# Patient Record
Sex: MALE | Race: BLACK OR AFRICAN AMERICAN | NOT HISPANIC OR LATINO | Employment: UNEMPLOYED | URBAN - METROPOLITAN AREA
[De-identification: names, ages, dates, MRNs, and addresses within clinical notes are randomized per-mention and may not be internally consistent; named-entity substitution may affect disease eponyms.]

---

## 2023-06-25 ENCOUNTER — HOSPITAL ENCOUNTER (EMERGENCY)
Facility: HOSPITAL | Age: 28
Discharge: HOME | End: 2023-06-25
Attending: EMERGENCY MEDICINE

## 2023-06-25 VITALS
TEMPERATURE: 99 F | WEIGHT: 179 LBS | OXYGEN SATURATION: 100 % | RESPIRATION RATE: 20 BRPM | BODY MASS INDEX: 23.72 KG/M2 | SYSTOLIC BLOOD PRESSURE: 137 MMHG | DIASTOLIC BLOOD PRESSURE: 86 MMHG | HEIGHT: 73 IN | HEART RATE: 82 BPM

## 2023-06-25 DIAGNOSIS — S99.912A LEFT ANKLE INJURY: ICD-10-CM

## 2023-06-25 PROCEDURE — 96372 THER/PROPH/DIAG INJ SC/IM: CPT | Performed by: EMERGENCY MEDICINE

## 2023-06-25 PROCEDURE — 63600175 PHARM REV CODE 636 W HCPCS: Mod: ER | Performed by: EMERGENCY MEDICINE

## 2023-06-25 PROCEDURE — 99284 EMERGENCY DEPT VISIT MOD MDM: CPT | Mod: ER

## 2023-06-25 RX ORDER — DICLOFENAC SODIUM 75 MG/1
75 TABLET, DELAYED RELEASE ORAL 2 TIMES DAILY
Qty: 60 TABLET | Refills: 0 | Status: SHIPPED | OUTPATIENT
Start: 2023-06-25

## 2023-06-25 RX ORDER — KETOROLAC TROMETHAMINE 30 MG/ML
15 INJECTION, SOLUTION INTRAMUSCULAR; INTRAVENOUS
Status: COMPLETED | OUTPATIENT
Start: 2023-06-25 | End: 2023-06-25

## 2023-06-25 RX ADMIN — KETOROLAC TROMETHAMINE 15 MG: 30 INJECTION, SOLUTION INTRAMUSCULAR; INTRAVENOUS at 08:06

## 2023-06-26 NOTE — ED PROVIDER NOTES
Encounter Date: 6/25/2023       History     Chief Complaint   Patient presents with    Leg Injury     Patient reports he was playing basketball and experienced a sudden pain and limited rom to the left foot and ankle.      HPI  28 y.o.    Co L posterior ankle pain after playing basketball tonight    Able to walk  Worse with movement  Mod  Nr    Review of patient's allergies indicates:  No Known Allergies  No past medical history on file.  No past surgical history on file.  No family history on file.     Review of Systems  All systems were reviewed/examined and were negative except as noted in the HPI.    Physical Exam     Initial Vitals [06/25/23 2015]   BP Pulse Resp Temp SpO2   137/86 82 20 98.6 °F (37 °C) 100 %      MAP       --         Physical Exam    General: the patient is awake, alert, and in no apparent distress.  Head: normocephalic and atraumatic, sclera are clear  Neck: supple without meningismus  Chest: no respiratory distress  Heart: regular rate and rhythm  Extremities: warm and well perfused    L posterior ankle tenderness, foot nt and nvi    Fibular head nt    Achilles intact  Skin: warm and dry  Psych conversant  Neuro: awake, alert, moving all extremities    ED Course   Procedures  Labs Reviewed - No data to display       Imaging Results              X-Ray Ankle Complete Left (Final result)  Result time 06/25/23 20:40:01      Final result by Daniele Porter MD (06/25/23 20:40:01)                   Impression:      No acute abnormality.      Electronically signed by: Daniele Porter  Date:    06/25/2023  Time:    20:40               Narrative:    EXAMINATION:  XR ANKLE COMPLETE 3 VIEW LEFT    CLINICAL HISTORY:  XR ANKLE COMPLETE 3 VIEW LEFTUnspecified injury of left ankle, initial encounter    COMPARISON:  None    FINDINGS:  Multiple radiographic views  were obtained.    No evidence of acute fracture or dislocation.  Bony mineralization is normal.  Soft tissues are unremarkable.                                        Medications   ketorolac injection 15 mg (15 mg Intramuscular Given 6/25/23 2038)         Medical Decision Making:    This is an emergent evaluation of a patient presenting to the ED.  Nursing notes were reviewed.  Imaging reviewed by me (ANI pardo), personally and independently, and prelims if available.  No acute/emergent pathologic findings noted on radiologic studies ordered.    I reviewed radiology images personally along with interpretations.    I decided to obtain and review old medical records, which showed: few prior visits    Evaluation for Emergency Medical Condition  The patient received a medical screening exam and within a reasonable degree of clinical confidence an emergency medical condition has not been identified.  The patient is instructed on proper follow up and return precautions to the ED.      Henri Haque MD, YG                       Clinical Impression:   Final diagnoses:  [S99.912A] Left ankle injury        ED Disposition Condition    Discharge Stable          ED Prescriptions       Medication Sig Dispense Start Date End Date Auth. Provider    diclofenac (VOLTAREN) 75 MG EC tablet Take 1 tablet (75 mg total) by mouth 2 (two) times daily. 60 tablet 6/25/2023 -- Ten Haque MD          Follow-up Information       Follow up With Specialties Details Why Contact Info    [facility] Schedule an appointment as soon as possible for a visit   [address], [address]  [facility] [address] [phone]          Discharged to home in stable condition, return to ED warnings given, follow up and patient care instructions given.      Henri Haque MD, YG, [facility]  Department of Emergency Medicine       Ten Haque MD  06/28/23 0900

## 2023-07-03 ENCOUNTER — OFFICE VISIT (OUTPATIENT)
Dept: PODIATRY | Facility: CLINIC | Age: 28
End: 2023-07-03
Payer: MEDICAID

## 2023-07-03 VITALS — BODY MASS INDEX: 22.93 KG/M2 | WEIGHT: 173 LBS | HEIGHT: 73 IN

## 2023-07-03 DIAGNOSIS — S99.912A INJURY OF LEFT ANKLE, INITIAL ENCOUNTER: ICD-10-CM

## 2023-07-03 DIAGNOSIS — S86.012A RUPTURE OF LEFT ACHILLES TENDON, INITIAL ENCOUNTER: Primary | ICD-10-CM

## 2023-07-03 PROCEDURE — 99999 PR PBB SHADOW E&M-EST. PATIENT-LVL III: CPT | Mod: PBBFAC,,, | Performed by: PODIATRIST

## 2023-07-03 PROCEDURE — 99203 OFFICE O/P NEW LOW 30 MIN: CPT | Mod: S$PBB,,, | Performed by: PODIATRIST

## 2023-07-03 PROCEDURE — 99213 OFFICE O/P EST LOW 20 MIN: CPT | Mod: PBBFAC,PN | Performed by: PODIATRIST

## 2023-07-03 PROCEDURE — 99203 PR OFFICE/OUTPT VISIT, NEW, LEVL III, 30-44 MIN: ICD-10-PCS | Mod: S$PBB,,, | Performed by: PODIATRIST

## 2023-07-03 PROCEDURE — 99999 PR PBB SHADOW E&M-EST. PATIENT-LVL III: ICD-10-PCS | Mod: PBBFAC,,, | Performed by: PODIATRIST

## 2023-07-03 NOTE — PROGRESS NOTES
"Subjective:      Patient ID: Alonzo Nascimento Jr. is a 28 y.o. male.    Chief Complaint: Foot Pain (Achilles tendon left foot )      28 y.o. male presenting with left ankle pain.  Patient points posterior aspect of the ankle area for pain.  Describes pain as aching.  Mild to moderate pain.  Was playing basketball and noticed pain over Achilles tendon.  Was seen in the emergency room.  X-ray noted no acute osseous injury.  Has been walking since injury.      Level of ambulation/Occupation:   Smoking status:   Berta-D Def:   DM:  Other:     Review of Systems   Musculoskeletal:         Left foot pain           No past medical history on file.    No past surgical history on file.    No family history on file.    Social History     Socioeconomic History    Marital status: Single   Tobacco Use    Smoking status: Never    Smokeless tobacco: Never       Current Outpatient Medications   Medication Sig Dispense Refill    diclofenac (VOLTAREN) 75 MG EC tablet Take 1 tablet (75 mg total) by mouth 2 (two) times daily. 60 tablet 0     No current facility-administered medications for this visit.       Review of patient's allergies indicates:  No Known Allergies    Vitals:    07/03/23 1348   Weight: 78.5 kg (173 lb)   Height: 6' 1" (1.854 m)   PainSc:   5   PainLoc: Foot       Objective:      Physical Exam  Constitutional:       General: He is not in acute distress.     Appearance: He is well-developed.   HENT:      Nose: Nose normal.   Eyes:      Conjunctiva/sclera: Conjunctivae normal.   Pulmonary:      Effort: Pulmonary effort is normal.   Chest:      Chest wall: No tenderness.   Abdominal:      Tenderness: There is no abdominal tenderness.   Musculoskeletal:      Cervical back: Normal range of motion.   Neurological:      Mental Status: He is alert and oriented to person, place, and time.   Psychiatric:         Behavior: Behavior normal.       Vascular: Distal DP/PT pulses palpable 2/4. CRT < 3 sec to tips of toes. No " vericosities noted to LEs. Hair growth present LE, warm to touch LE  Left foot:  Mild edema noted to ankle.     Dermatologic: No open lesions, lacerations or wounds. Interdigital spaces clean, dry and intact. No erythema, rubor, calor noted LE    Musculoskeletal:   Left foot:  Pain along the Achilles tendon distally, positive Gracia test, strike defect noted distal aspect of the Achilles tendon.     Neurological: Light touch, proprioception, and sharp/dull sensation are all intact. Protective threshold with the [place]-Wienstein monofilament is intact. Vibratory sensation intact.         Assessment:       Encounter Diagnoses   Name Primary?    Rupture of left Achilles tendon, initial encounter Yes    Left ankle injury          Plan:       Alonzo was seen today for foot pain.    Diagnoses and all orders for this visit:    Rupture of left Achilles tendon, initial encounter  -     MRI Ankle Without Contrast Left; Future    Left ankle injury  -     Ambulatory referral/consult to Podiatry      I counseled the patient on his conditions, their implications and medical management.    28 y.o. male with left Achilles tendon tear.    -left ankle x-ray reviewed with the patient no acute osseous abnormalities.  -positive Gracia test.  Most likely Achilles tendon rupture.  I will get an MRI to confirm. NWB in long CAM. Long CAM dispensed.     -I reviewed imaging, clinical history, and diagnosis as above with the patient. I attempted to use layman's terms to educate the patient as well as utilize foot models and/or pictures. I personally went through imaging with the patient.    -The nature of the condition, options for management, as well as potential risks and complications were discussed in detail with patient. Patient was amenable to my recommendations and left my office fully informed and will follow up as instructed or sooner if necessary.    -f/u after MRI     Note dictated with voice recognition software, please excuse  any grammatical errors.

## 2023-07-12 ENCOUNTER — HOSPITAL ENCOUNTER (OUTPATIENT)
Dept: RADIOLOGY | Facility: HOSPITAL | Age: 28
Discharge: HOME | End: 2023-07-12
Attending: PODIATRIST
Payer: MEDICAID

## 2023-07-12 DIAGNOSIS — S86.012A RUPTURE OF LEFT ACHILLES TENDON, INITIAL ENCOUNTER: Primary | ICD-10-CM

## 2023-07-12 DIAGNOSIS — S86.012A RUPTURE OF LEFT ACHILLES TENDON, INITIAL ENCOUNTER: ICD-10-CM

## 2023-07-12 PROCEDURE — 73721 MRI JNT OF LWR EXTRE W/O DYE: CPT | Mod: 26,LT,, | Performed by: RADIOLOGY

## 2023-07-12 PROCEDURE — 73721 MRI ANKLE WITHOUT CONTRAST LEFT: ICD-10-PCS | Mod: 26,LT,, | Performed by: RADIOLOGY

## 2023-07-12 PROCEDURE — 73721 MRI JNT OF LWR EXTRE W/O DYE: CPT | Mod: TC,PO,LT

## 2023-07-12 RX ORDER — SODIUM CHLORIDE 0.9 % (FLUSH) 0.9 %
10 SYRINGE (ML) INJECTION
Status: CANCELLED | OUTPATIENT
Start: 2023-07-12

## 2023-07-12 RX ORDER — CEFAZOLIN SODIUM 2 G/50ML
2 SOLUTION INTRAVENOUS
Status: CANCELLED | OUTPATIENT
Start: 2023-07-12

## 2023-07-12 RX ORDER — SODIUM CHLORIDE 9 MG/ML
INJECTION, SOLUTION INTRAVENOUS CONTINUOUS
Status: CANCELLED | OUTPATIENT
Start: 2023-07-12

## 2023-08-08 ENCOUNTER — OFFICE VISIT (OUTPATIENT)
Dept: PODIATRY | Facility: CLINIC | Age: 28
End: 2023-08-08
Payer: MEDICAID

## 2023-08-08 VITALS — HEIGHT: 73 IN | BODY MASS INDEX: 22.8 KG/M2 | WEIGHT: 172 LBS

## 2023-08-08 DIAGNOSIS — M25.60 DECREASED RANGE OF MOTION: ICD-10-CM

## 2023-08-08 DIAGNOSIS — Z98.890 STATUS POST ACHILLES TENDON REPAIR: Primary | ICD-10-CM

## 2023-08-08 DIAGNOSIS — Z09 FOLLOW-UP EXAMINATION FOLLOWING SURGERY: ICD-10-CM

## 2023-08-08 PROCEDURE — 3008F PR BODY MASS INDEX (BMI) DOCUMENTED: ICD-10-PCS | Mod: CPTII,,, | Performed by: PODIATRIST

## 2023-08-08 PROCEDURE — 99024 PR POST-OP FOLLOW-UP VISIT: ICD-10-PCS | Mod: ,,, | Performed by: PODIATRIST

## 2023-08-08 PROCEDURE — 99213 OFFICE O/P EST LOW 20 MIN: CPT | Mod: PBBFAC,PN | Performed by: PODIATRIST

## 2023-08-08 PROCEDURE — 1160F RVW MEDS BY RX/DR IN RCRD: CPT | Mod: CPTII,,, | Performed by: PODIATRIST

## 2023-08-08 PROCEDURE — 3008F BODY MASS INDEX DOCD: CPT | Mod: CPTII,,, | Performed by: PODIATRIST

## 2023-08-08 PROCEDURE — 1160F PR REVIEW ALL MEDS BY PRESCRIBER/CLIN PHARMACIST DOCUMENTED: ICD-10-PCS | Mod: CPTII,,, | Performed by: PODIATRIST

## 2023-08-08 PROCEDURE — 99024 POSTOP FOLLOW-UP VISIT: CPT | Mod: ,,, | Performed by: PODIATRIST

## 2023-08-08 PROCEDURE — 99999 PR PBB SHADOW E&M-EST. PATIENT-LVL III: CPT | Mod: PBBFAC,,, | Performed by: PODIATRIST

## 2023-08-08 PROCEDURE — 99999 PR PBB SHADOW E&M-EST. PATIENT-LVL III: ICD-10-PCS | Mod: PBBFAC,,, | Performed by: PODIATRIST

## 2023-08-08 PROCEDURE — 1159F MED LIST DOCD IN RCRD: CPT | Mod: CPTII,,, | Performed by: PODIATRIST

## 2023-08-08 PROCEDURE — 1159F PR MEDICATION LIST DOCUMENTED IN MEDICAL RECORD: ICD-10-PCS | Mod: CPTII,,, | Performed by: PODIATRIST

## 2023-08-08 NOTE — PROGRESS NOTES
Subjective:      Patient ID: Alonzo Nascimento Jr. is a 28 y.o. male.    Chief Complaint: Post-op Evaluation (Sx:07/14/2023)      28 y.o. male presenting with left ankle pain.  Patient points posterior aspect of the ankle area for pain.  Describes pain as aching.  Mild to moderate pain.  Was playing basketball and noticed pain over Achilles tendon.  Was seen in the emergency room.  X-ray noted no acute osseous injury.  Has been walking since injury.    8/8/23 s/p L achilles tendon repair (DOS 7/14/23).  Has been nonweightbearing since surgery.  Denies pain.  Presenting with crutches.  Patient missed appointment last time due to transportation issues.       Level of ambulation/Occupation:   Smoking status:   Berta-D Def:   DM:  Other:     ROS          No past medical history on file.    Past Surgical History:   Procedure Laterality Date    ANTERIOR CRUCIATE LIGAMENT REPAIR Right     2018    APPLICATION OF SPLINT Left 7/14/2023    Procedure: APPLICATION, SPLINT;  Surgeon: Lenore Pearl DPM;  Location: [facility] OR;  Service: Podiatry;  Laterality: Left;    REPAIR OF ACHILLES TENDON Left 7/14/2023    Procedure: REPAIR, TENDON, ACHILLES;  Surgeon: Lenore Pearl DPM;  Location: [facility] OR;  Service: Podiatry;  Laterality: Left;       No family history on file.    Social History     Socioeconomic History    Marital status: Single   Tobacco Use    Smoking status: Never    Smokeless tobacco: Never   Substance and Sexual Activity    Alcohol use: Yes     Comment: rarely    Drug use: Yes     Types: Marijuana    Sexual activity: Yes     Partners: Female       Current Outpatient Medications   Medication Sig Dispense Refill    diclofenac (VOLTAREN) 75 MG EC tablet Take 1 tablet (75 mg total) by mouth 2 (two) times daily. 60 tablet 0    HYDROcodone-acetaminophen (NORCO) 5-325 mg per tablet Take 1 tablet by mouth every 8 (eight) hours as needed for Pain. 15 tablet 0     No current facility-administered medications for this visit.  "      Review of patient's allergies indicates:  No Known Allergies    Vitals:    08/08/23 1021   Weight: 78 kg (172 lb)   Height: 6' 1" (1.854 m)   PainSc:   2   PainLoc: Foot       Objective:      Physical Exam  Constitutional:       General: He is not in acute distress.     Appearance: He is well-developed.   HENT:      Nose: Nose normal.   Eyes:      Conjunctiva/sclera: Conjunctivae normal.   Pulmonary:      Effort: Pulmonary effort is normal.   Chest:      Chest wall: No tenderness.   Abdominal:      Tenderness: There is no abdominal tenderness.   Musculoskeletal:      Cervical back: Normal range of motion.   Neurological:      Mental Status: He is alert and oriented to person, place, and time.   Psychiatric:         Behavior: Behavior normal.         Vascular: Distal DP/PT pulses palpable 2/4. CRT < 3 sec to tips of toes. No vericosities noted to LEs. Hair growth present LE, warm to touch LE    Dermatologic: No open lesions, lacerations or wounds. Interdigital spaces clean, dry and intact. No erythema, rubor, calor noted LE  LLE: suture intact. No wound dehiscence.     Musculoskeletal:   Left foot:  decreased ROM/strength of ankle/achilles tendon. No pain over achilles tendon.     Neurological: Light touch, proprioception, and sharp/dull sensation are all intact. Protective threshold with the [place]-Wienstein monofilament is intact. Vibratory sensation intact.         Assessment:       Encounter Diagnoses   Name Primary?    Status post Achilles tendon repair Yes    Decreased range of motion     Follow-up examination following surgery            Plan:       Alonzo was seen today for post-op evaluation.    Diagnoses and all orders for this visit:    Status post Achilles tendon repair  -     Ambulatory referral/consult to Physical/Occupational Therapy; Future    Decreased range of motion  -     Ambulatory referral/consult to Physical/Occupational Therapy; Future    Follow-up examination following surgery        I " counseled the patient on his conditions, their implications and medical management.    28 y.o. male with s/p L achilles tendon repair.     -rx. PT  -suture removed. No wound dehiscence.  Will start PT. He already has a long CAM walker at home. I placed PS back until he gets home. Should be able to transition out of CAM walker in 3-4 weeks with PT.     -I reviewed imaging, clinical history, and diagnosis as above with the patient. I attempted to use layman's terms to educate the patient as well as utilize foot models and/or pictures. I personally went through imaging with the patient.    -The nature of the condition, options for management, as well as potential risks and complications were discussed in detail with patient. Patient was amenable to my recommendations and left my office fully informed and will follow up as instructed or sooner if necessary.    -f/u 4 wks     Note dictated with voice recognition software, please excuse any grammatical errors.

## 2023-08-09 PROBLEM — Z98.890 STATUS POST ACHILLES TENDON REPAIR: Status: ACTIVE | Noted: 2023-08-09

## 2023-08-09 PROBLEM — Z74.09 IMPAIRED FUNCTIONAL MOBILITY, BALANCE, GAIT, AND ENDURANCE: Status: ACTIVE | Noted: 2023-08-09

## 2023-08-09 PROBLEM — M25.672 IMPAIRED RANGE OF MOTION OF LEFT ANKLE: Status: ACTIVE | Noted: 2023-08-09

## 2023-09-12 ENCOUNTER — PATIENT MESSAGE (OUTPATIENT)
Dept: PODIATRY | Facility: CLINIC | Age: 28
End: 2023-09-12
Payer: MEDICAID

## 2023-09-20 ENCOUNTER — OFFICE VISIT (OUTPATIENT)
Dept: PODIATRY | Facility: CLINIC | Age: 28
End: 2023-09-20
Payer: MEDICAID

## 2023-09-20 VITALS — BODY MASS INDEX: 22.8 KG/M2 | WEIGHT: 172 LBS | HEIGHT: 73 IN

## 2023-09-20 DIAGNOSIS — Z98.890 STATUS POST ACHILLES TENDON REPAIR: ICD-10-CM

## 2023-09-20 DIAGNOSIS — Z09 FOLLOW-UP EXAMINATION FOLLOWING SURGERY: Primary | ICD-10-CM

## 2023-09-20 PROCEDURE — 1160F PR REVIEW ALL MEDS BY PRESCRIBER/CLIN PHARMACIST DOCUMENTED: ICD-10-PCS | Mod: CPTII,,, | Performed by: PODIATRIST

## 2023-09-20 PROCEDURE — 3008F PR BODY MASS INDEX (BMI) DOCUMENTED: ICD-10-PCS | Mod: CPTII,,, | Performed by: PODIATRIST

## 2023-09-20 PROCEDURE — 1159F MED LIST DOCD IN RCRD: CPT | Mod: CPTII,,, | Performed by: PODIATRIST

## 2023-09-20 PROCEDURE — 99999 PR PBB SHADOW E&M-EST. PATIENT-LVL III: CPT | Mod: PBBFAC,,, | Performed by: PODIATRIST

## 2023-09-20 PROCEDURE — 99999 PR PBB SHADOW E&M-EST. PATIENT-LVL III: ICD-10-PCS | Mod: PBBFAC,,, | Performed by: PODIATRIST

## 2023-09-20 PROCEDURE — 3008F BODY MASS INDEX DOCD: CPT | Mod: CPTII,,, | Performed by: PODIATRIST

## 2023-09-20 PROCEDURE — 1160F RVW MEDS BY RX/DR IN RCRD: CPT | Mod: CPTII,,, | Performed by: PODIATRIST

## 2023-09-20 PROCEDURE — 99024 POSTOP FOLLOW-UP VISIT: CPT | Mod: ,,, | Performed by: PODIATRIST

## 2023-09-20 PROCEDURE — 99213 OFFICE O/P EST LOW 20 MIN: CPT | Mod: PBBFAC,PN | Performed by: PODIATRIST

## 2023-09-20 PROCEDURE — 99024 PR POST-OP FOLLOW-UP VISIT: ICD-10-PCS | Mod: ,,, | Performed by: PODIATRIST

## 2023-09-20 PROCEDURE — 1159F PR MEDICATION LIST DOCUMENTED IN MEDICAL RECORD: ICD-10-PCS | Mod: CPTII,,, | Performed by: PODIATRIST

## 2023-09-20 NOTE — PROGRESS NOTES
Subjective:      Patient ID: Alonzo Nascimento Jr. is a 28 y.o. male.    Chief Complaint: No chief complaint on file.      28 y.o. male presenting with left ankle pain.  Patient points posterior aspect of the ankle area for pain.  Describes pain as aching.  Mild to moderate pain.  Was playing basketball and noticed pain over Achilles tendon.  Was seen in the emergency room.  X-ray noted no acute osseous injury.  Has been walking since injury.    8/8/23 s/p L achilles tendon repair (DOS 7/14/23).  Has been nonweightbearing since surgery.  Denies pain.  Presenting with crutches.  Patient missed appointment last time due to transportation issues.     9/20/23:  PO f/u hx as above. Been in PT wbat in [facility]. No new issues.    Level of ambulation/Occupation:   Smoking status:   Berta-D Def:   DM:  Other:     ROS          No past medical history on file.    Past Surgical History:   Procedure Laterality Date    ANTERIOR CRUCIATE LIGAMENT REPAIR Right     2018    APPLICATION OF SPLINT Left 7/14/2023    Procedure: APPLICATION, SPLINT;  Surgeon: Lenore Pearl DPM;  Location: [facility] OR;  Service: Podiatry;  Laterality: Left;    REPAIR OF ACHILLES TENDON Left 7/14/2023    Procedure: REPAIR, TENDON, ACHILLES;  Surgeon: Lenore Pearl DPM;  Location: [facility] OR;  Service: Podiatry;  Laterality: Left;       No family history on file.    Social History     Socioeconomic History    Marital status: Single   Tobacco Use    Smoking status: Never    Smokeless tobacco: Never   Substance and Sexual Activity    Alcohol use: Yes     Comment: rarely    Drug use: Yes     Types: Marijuana    Sexual activity: Yes     Partners: Female       Current Outpatient Medications   Medication Sig Dispense Refill    diclofenac (VOLTAREN) 75 MG EC tablet Take 1 tablet (75 mg total) by mouth 2 (two) times daily. 60 tablet 0    HYDROcodone-acetaminophen (NORCO) 5-325 mg per tablet Take 1 tablet by mouth every 8 (eight) hours as needed for Pain. 15 tablet 0     No  current facility-administered medications for this visit.       Review of patient's allergies indicates:  No Known Allergies    There were no vitals filed for this visit.      Objective:      Physical Exam  Constitutional:       General: He is not in acute distress.     Appearance: He is well-developed.   HENT:      Nose: Nose normal.   Eyes:      Conjunctiva/sclera: Conjunctivae normal.   Pulmonary:      Effort: Pulmonary effort is normal.   Chest:      Chest wall: No tenderness.   Abdominal:      Tenderness: There is no abdominal tenderness.   Musculoskeletal:      Cervical back: Normal range of motion.   Neurological:      Mental Status: He is alert and oriented to person, place, and time.   Psychiatric:         Behavior: Behavior normal.       Vascular: Distal DP/PT pulses palpable 2/4. CRT < 3 sec to tips of toes. No vericosities noted to LEs. Hair growth present LE, warm to touch LE    Dermatologic: No open lesions, lacerations or wounds. Interdigital spaces clean, dry and intact. No erythema, rubor, calor noted LE  LLE: scar present    Musculoskeletal:   Left foot:  decreased ROM/strength of ankle/achilles tendon. No pain over achilles tendon.     Neurological: Light touch, proprioception, and sharp/dull sensation are all intact. Protective threshold with the [place]-Wienstein monofilament is intact. Vibratory sensation intact.         Assessment:       Encounter Diagnoses   Name Primary?    Follow-up examination following surgery Yes    Status post Achilles tendon repair            Plan:       Diagnoses and all orders for this visit:    Follow-up examination following surgery    Status post Achilles tendon repair        I counseled the patient on his conditions, their implications and medical management.    28 y.o. male with s/p L achilles tendon repair.     -rx. None  Cont PT  -DC CAM  -WBAT  -pt at end of healing    -I reviewed imaging, clinical history, and diagnosis as above with the patient. I attempted  to use layman's terms to educate the patient as well as utilize foot models and/or pictures. I personally went through imaging with the patient.    -The nature of the condition, options for management, as well as potential risks and complications were discussed in detail with patient. Patient was amenable to my recommendations and left my office fully informed and will follow up as instructed or sooner if necessary.    -f/u PRN    Note dictated with voice recognition software, please excuse any grammatical errors.

## 2023-09-20 NOTE — PATIENT INSTRUCTIONS
"Achilles Tendon Rupture    What Is the Achilles Tendon?    A tendon is a band of tissue that connects a muscle to a bone. The Achilles tendon runs down the back of the lower leg and connects the calf muscle to the heel bone. Also called the heel cord, the Achilles tendon facilitates walking by helping to raise the heel off the ground.    What Is an Achilles Tendon Rupture?    An Achilles tendon rupture is a complete or partial tear that occurs when the tendon is stretched beyond its capacity. Forceful jumping or pivoting, or sudden accelerations of running, can overstretch the tendon and cause a tear. An injury to the tendon can also result from falling or tripping.    Achilles tendon ruptures are most often seen in "weekend warriors"--typically, middle-aged people participating in sports in their spare time. Less commonly, illness or medications, such as steroids or certain antibiotics, may weaken the tendon and contribute to ruptures.    Signs & Symptoms   A person with a ruptured Achilles tendon may experience one or more of the following:      Sudden pain (which feels like a kick or a stab) in the back of the ankle or calf--often subsiding into a dull ache  A popping or snapping sensation  Swelling on the back of the leg between the heel and the calf  Difficulty walking (especially upstairs or uphill) and difficulty rising up on the toes     These symptoms require prompt medical attention to prevent further damage. Until the patient is able to see a doctor, the RICE method should be used. This involves:    Rest. Stay off the injured foot and ankle, since walking can cause pain or further damage.  Ice. Apply a bag of ice covered with a thin towel to reduce swelling and pain. Do not put ice directly against the skin.  Compression. Wrap the foot and ankle in an elastic bandage to prevent further swelling.  Elevation. Keep the leg elevated to reduce the swelling. It should be even with or slightly above heart " level.     Diagnosis  In diagnosing an Achilles tendon rupture, the foot and ankle surgeon will ask questions about how and when the injury occurred and whether the patient has previously injured the tendon or experienced similar symptoms. The surgeon will examine the foot and ankle, feeling for a defect in the tendon that suggests a tear. Range of motion and muscle strength will be evaluated and compared to the uninjured foot and ankle. If the Achilles tendon is ruptured, the patient will have less strength in pushing down (as on a gas pedal) and will have difficulty rising on the toes.    The diagnosis of an Achilles tendon rupture is typically straightforward and can be made through this type of examination. In some cases, however, the surgeon may order an MRI or other advanced imaging tests.    Treatment  Treatment options for an Achilles tendon rupture include surgical and nonsurgical approaches. The decision of whether to proceed with surgery or nonsurgical treatment is based on the severity of the rupture and the patients health status and activity level.    Nonsurgical Treatment  Nonsurgical treatment, which is generally associated with a higher rate of rerupture, is selected for minor ruptures, less active patients and those with medical conditions that prevent them from undergoing surgery. Nonsurgical treatment involves use of a cast, walking boot or brace to restrict motion and allow the torn tendon to heal.    Surgery  Surgery offers important potential benefits. Besides decreasing the likelihood of rerupturing the Achilles tendon, surgery often increases the patients push-off strength and improves muscle function and movement of the ankle.     Various surgical techniques are available to repair the rupture. The surgeon will select the procedure best suited to the patient.    Following surgery, the foot and ankle are initially immobilized in a cast or walking boot. The surgeon will determine when the  patient can begin weightbearing.     Complications such as incision-healing difficulties, rerupture of the tendon or nerve pain can arise after surgery.    Physical Therapy  Whether an Achilles tendon rupture is treated surgically or nonsurgically, physical therapy is an important component of the healing process. Physical therapy involves exercises that strengthen the muscles and improve range of motion in the foot and ankle.

## 2024-06-10 ENCOUNTER — CLINICAL SUPPORT (OUTPATIENT)
Dept: REHABILITATION | Facility: HOSPITAL | Age: 29
End: 2024-06-10
Payer: MEDICAID

## 2024-06-10 DIAGNOSIS — Z09 FOLLOW-UP EXAMINATION, FOLLOWING OTHER SURGERY: Primary | ICD-10-CM

## 2024-06-10 DIAGNOSIS — Z98.890 STATUS POST ACHILLES TENDON REPAIR: ICD-10-CM

## 2024-06-10 DIAGNOSIS — R29.898 WEAKNESS OF BOTH HIPS: ICD-10-CM

## 2024-06-10 DIAGNOSIS — M25.60 DECREASED RANGE OF MOTION: ICD-10-CM

## 2024-06-10 DIAGNOSIS — M62.81 CALF MUSCLE WEAKNESS: ICD-10-CM

## 2024-06-10 PROCEDURE — 97110 THERAPEUTIC EXERCISES: CPT | Mod: PO

## 2024-06-10 PROCEDURE — 97161 PT EVAL LOW COMPLEX 20 MIN: CPT | Mod: PO

## 2024-06-10 NOTE — PLAN OF CARE
[facility]  Physical Therapy Initial Evaluation    Date: 6/10/2024   Name: Alonzo Nascimento Jr.  Clinic Number: [id number]    Therapy Diagnosis: No diagnosis found.  Physician: Jayesh Chaney Jr., *    Physician Orders: PT Eval and Treat   Medical Diagnosis from Referral: Follow-up examination following surgery [Z09], Status post Achilles tendon repair [Z98.890], Decreased range of motion [M25.60]   Evaluation Date: 6/10/2024  Authorization Period Expiration: 4/26/2025  Plan of Care Expiration: 9/10/2024  Visit # / Visits authorized: 1/ Eval    Time In: 1407  Time Out: 1507  Total Appointment Time (timed & untimed codes): 60 minutes    Precautions: Standard    Subjective   Date of onset: L achilles tendon repair on 7/14/2023  History of current condition - Alonzo reports: Initial injury playing basketball while pushing off. Pt reports not able to complete therapy due to insurance reasons and that his uncle was in hospice and was taking care of him. Pt's last session was in Dec. Pt wants to continue playing recreational basketball with friends. Pt reports persistent pain at the distal achilles attachment point that radiates around the lateral heel. Pt denies N&T. Pt reports pain with touching, poking, or hitting the distal incision site. Pt reports slight discomfort with EROM DF, and is apprehensive to jumping and running activities.   Pt has recently gotten gym      Medical History:   No past medical history on file.    Surgical History:   Alonzo Nascimento Jr.  has a past surgical history that includes Anterior cruciate ligament repair (Right); Repair of Achilles tendon (Left, 7/14/2023); and Application of splint (Left, 7/14/2023).    Medications:   Alonzo has a current medication list which includes the following prescription(s): diclofenac and hydrocodone-acetaminophen.    Allergies:   Review of patient's allergies indicates:  No Known Allergies     Imaging: Take on 7/3/2023    EXAM:   MRI ANKLE WITHOUT CONTRAST LEFT     CLINICAL HISTORY:   Strain of left Achilles tendon, initial encounter.     TECHNIQUE:  MR left knee performed multiplanar multisequence imaging.     COMPARISON STUDY:  None.     FINDINGS:  The Achilles tendon is disrupted, occurring 7 cm above the calcaneal insertion, with fluid-filled defect and up to 1.5 cm retraction of torn tendon margins.     There is marked Achilles tendinosis with pronounced fusiform thickening and increased signal of the Achilles tendon, most pronounced near the disruption site.     There is associated mild Achilles paratenon and Kager's fat pad edema.     Flexor, extensor, and peroneal tendons are normal.  The lateral and medial ankle ligaments are normal.  The bone marrow signal intensity is normal.  Normal alignment.  Normal talar dome.  No significant arthrosis.     Plantar aponeurosis, sinus tarsi, and tarsal tunnel are normal.           Impression:   Achilles tendon disruption, detailed above.     Finalized on: 7/12/2023 4:36 PM By:  Mk Morley MD  [facility]# [id number]      2023-07-12 16:[id number]    [facility]    Prior Therapy: Yes  Social History: Lives with mom  Occupation: Security (requires ability to run/jog)  Prior Level of Function: Limited with ADLs/IADLs, work, recreational, and health related activities  Current Level of Function: Continued limitations with ADLs/IADLs, work, recreational, and health related activities    Pain:  Current 3/10, worst 5/10, best 0/10   Location: L heel  Description: Aching and Tight  Aggravating Factors: touch, EROM dorsiflexion  Easing Factors:  Epson salt baths, rest    Pt's goals: Return to playing basketball, no apprehension with work duties    Objective   Observation:     Pt A&O x 4 w/ neutral affect.   Distal incision site is hypertrophic     Gait:    Increased tibial IR on L, occasional on R, scissoring gait    Functional:  Squat: Decreased WB through forefoot w/ L > R, increased forward lean, R weight shift, B  foot pronation     SL Squat: Increased knee valgus w/ hip MR and foot pronation B    SLS: 20s B ; increased foot pronation on L, increased hip MR on R    DL calf raise: Decreased heel height on L    SL calf raise: R- decreased heel height after 14 ; L - 25% of DL height with progressive loss of heel height after 5 reps                  Range of Motion: AROM:  Hip Left Right   Flexion 95 degrees 95 degrees   Abduction 45 degrees 45 degrees   Ext Rotation 55 degrees 60 degrees   Int Rotation 35 degrees 25 degrees     Ankle Left Right   Dorsiflexion 0  degrees 5  degrees   Plantarflexion 55  degrees 65  degrees   Inversion 35  degrees 35  degrees   Eversion 10  degrees 15  degrees       Strength:  Hip Left Right   Supine flexion 4-/5 4+/5   Abduction 4-/5 4+/5   Extension 3+/5 3+/5     Ankle Left Right   Dorsiflexion 5/5 5/5   Plantarflexion 4+/5 5/5   Inversion 4/5 4+/5   Eversion 4-/5 4+/5     Special Tests:   Seated Slump Test: Neg   Superficial fibular NTT: Neg   Tibial NTT: Neg   Cutaneous NTT: Neg       Joint Mobility:               1st MTP                          Hypomobile w/ first ray hypermobility compensation              Talocrural                          Hypomobile              Subtalar                           Hyper mobile                   Palpation:   TTP+ = tender to palpation              Distal incision site on heel     Sensation:              WNL     Flexibility:              Big toe                          big toe flexor / extender tightness                           Gastroc/Soleus                           tightness                                        Plantar fascia                           (R) - WNL                          (L) - Hypermobile    Limitation/Restriction for FOTO Foot Survey    Therapist reviewed FOTO scores for Alonzo Azam Ortega on 6/10/2024.   FOTO documents entered into [employer] - see Media section.    Limitation Score: 16%  Predicted Limitation Score: 14%         TREATMENT    Treatment Time In: 1407  Treatment Time Out: 1507  Total Treatment time (time-based codes) separate from Evaluation: 30 minutes    All units billed as Ther-ex as per Medicaid rules    Alonzo received therapeutic exercises to develop strength, endurance, ROM, and flexibility for 21 minutes including:    DL calf raises on half FR 3 x 10 (pt educated on even heel height, quad control over TKE, controlled eccentrics)  Bent knee calf raises on half FR 3 x 10 (pt educated on maintaining knee flex angle/preventing knee ext)  SL runner squat x 12 / side (pt educated on preventing knee valgus/hip MR)    Alonzo received the following manual therapy techniques: Joint mobilizations, Myofacial release, and Soft tissue Mobilization were applied to the: foot/ankle for 09 minutes, including:   Post talar glides w/ DF   Great toe ext mobs    Home Exercises and Patient Education Provided    Education provided:   - HEP  - POC/prognosis     Written Home Exercises Provided: yes.  Exercises were reviewed and Alonzo was able to demonstrate them prior to the end of the session.  Alonzo demonstrated good  understanding of the education provided.     See EMR under Patient Instructions for exercises provided 6/10/2024.    Assessment   Alonzo is a 29 y.o. male referred to outpatient Physical Therapy with a medical diagnosis of Follow-up examination following surgery [Z09], Status post Achilles tendon repair [Z98.890], Decreased range of motion [M25.60] . Patient presents with limitations in ankle ROM, increased heel pain, tenderness of distal incision site, hypomobility of great toe and TC joint, decreased hip and calf strength, increased tightness of great toe and calves with limitations in ambulation, stair negotiation, transfers and ADLs/IADLs with limited ability to participate work, recreational, and health related activities      Pt prognosis is Excellent.   Pt will benefit from skilled outpatient Physical Therapy to address the  deficits stated above and in the chart below, provide pt/family education, and to maximize pt's level of independence.     Plan of care discussed with patient: Yes  Pt's spiritual, cultural and educational needs considered and patient is agreeable to the plan of care and goals as stated below:     Anticipated Barriers for therapy: Chronicity of sx    Medical Necessity is demonstrated by the following  History  Co-morbidities and personal factors that may impact the plan of care Co-morbidities:   None    Personal Factors:   lifestyle     low   Examination  Body Structures and Functions, activity limitations and participation restrictions that may impact the plan of care Body Regions:   lower extremities  trunk    Body Systems:    gross symmetry  ROM  strength  gross coordinated movement  balance  gait  transfers  transitions  motor control    Participation Restrictions:   Work, recreational, and health related activities    Activity limitations:   Learning and applying knowledge  no deficits    General Tasks and Commands  no deficits    Communication  no deficits    Mobility  lifting and carrying objects  walking    Self care  looking after one's health    Domestic Life  shopping  doing house work (cleaning house, washing dishes, laundry)    Interactions/Relationships  no deficits    Life Areas  employment    Community and Social Life  community life  recreation and leisure         high   Clinical Presentation stable and uncomplicated low   Decision Making/ Complexity Score: low     Goals:  Short Term Goals: 4-6 weeks   1. Maintain compliance and understanding of HEP. - PROGRESSING, NOT MET  2. Decrease subjective pain rating from a 5/10 pain with standing/walking to a consistent 0/10 pain with standing/walking. - PROGRESSING, NOT MET  3. Increase bilateral hip strength to > / = 4/5 with manual muscle testing to offload achilles. - PROGRESSING, NOT MET  4. Ability to perform DL heel raise x 20 with even heel height  for improved calf strength to offload achilles tendon PROGRESSING, NOT MET          Long Term Goals: 6 weeks   1. Decrease FOTO limitation score from 16% limitation to </= 14% limitation for better functional outcomes. - PROGRESSING, NOT MET  2. Increase ankle dorsiflexion and plantar flexion active range of motion to within normal limits in order to offload plantar fascia. - PROGRESSING, NOT MET  3. Patient will be able to walk 1 mile without reproduction of ( L ) heel pain. - PROGRESSING, NOT MET  4. Patient goal: Return to playing basketball. - PROGRESSING, NOT MET    Plan   Plan of care Certification: 6/10/2024 to 9/10/2024.    Outpatient Physical Therapy 2 times weekly for 12 weeks to include the following interventions: Gait Training, Manual Therapy, Moist Heat/ Ice, Neuromuscular Re-ed, Patient Education, Therapeutic Activities, and Therapeutic Exercise.     Benjy Quispe, PT, DPT

## 2024-06-13 PROBLEM — M62.81 CALF MUSCLE WEAKNESS: Status: ACTIVE | Noted: 2024-06-13

## 2024-06-13 PROBLEM — Z09 FOLLOW-UP EXAMINATION, FOLLOWING OTHER SURGERY: Status: ACTIVE | Noted: 2024-06-13

## 2024-06-13 PROBLEM — R29.898 WEAKNESS OF BOTH HIPS: Status: ACTIVE | Noted: 2024-06-13

## 2024-06-13 PROBLEM — M25.60 DECREASED RANGE OF MOTION: Status: ACTIVE | Noted: 2024-06-13

## 2024-06-26 ENCOUNTER — CLINICAL SUPPORT (OUTPATIENT)
Dept: REHABILITATION | Facility: HOSPITAL | Age: 29
End: 2024-06-26
Payer: MEDICAID

## 2024-06-26 DIAGNOSIS — R29.898 WEAKNESS OF BOTH HIPS: ICD-10-CM

## 2024-06-26 DIAGNOSIS — M25.60 DECREASED RANGE OF MOTION: ICD-10-CM

## 2024-06-26 DIAGNOSIS — Z09 FOLLOW-UP EXAMINATION, FOLLOWING OTHER SURGERY: Primary | ICD-10-CM

## 2024-06-26 DIAGNOSIS — M62.81 CALF MUSCLE WEAKNESS: ICD-10-CM

## 2024-06-26 PROCEDURE — 97110 THERAPEUTIC EXERCISES: CPT | Mod: PO

## 2024-06-26 NOTE — PROGRESS NOTES
[facility]   Physical Therapy Treatment Note     Name: Alonzo Nascimento Jr.  Clinic Number: [id number]    Therapy Diagnosis:   Encounter Diagnoses   Name Primary?    Follow-up examination, following other surgery Yes    Decreased range of motion     Calf muscle weakness     Weakness of both hips      Physician: Jayesh Chaney Jr., *    Visit Date: 6/26/2024    Physician Orders: PT Eval and Treat   Medical Diagnosis from Referral: Follow-up examination following surgery [Z09], Status post Achilles tendon repair [Z98.890], Decreased range of motion [M25.60]   Evaluation Date: 6/10/2024  Authorization Period Expiration: 4/26/2025  Plan of Care Expiration: 9/10/2024  Visit # / Visits authorized: 1/16 + Eval     Time In: 1006  Time Out: 1106  Total Appointment Time (timed & untimed codes): 60 minutes     Precautions: Standard    FOTO 1st:   FOTO 3rd:  FOTO 10th:      SUBJECTIVE     Pt reports: Increased swelling after IE although no sig pain, reports self lancing distal incision area to drain swelling. Did HEP since with no issues.    He was compliant with home exercise program.  Response to previous treatment: Ongoing  Functional change: Ongoing    Pain: Did not verbalize/10  Location: left foot/ankle     OBJECTIVE     Objective Measures updated at progress report unless specified.     Treatment       Alonzo received the treatments listed below:      Therapeutic exercises to develop strength, endurance, ROM, flexibility, posture, and core stabilization for 60 minutes including:    DL calf raise x 10 > on half FR w/ ball 2 x 15  (pt educated on even heel height, preventing excessive inv)    Towel curls 5# (multiple bouts) (pt educated on incorporating ankld Dfand toe ext/ABD  Toe yoga (multiple bouts) / side (pt educated on maintaining WB through tripod of foot)  Doming (multiple bouts) (pt educated on preventing toe flex, maintaining WB through tripod of foot    DL bent knee calf raise on  half FR w/ ball 3 x 10    Lateral band walks GTB 1 lap (pt educated on preventing knee valgus, maintaining WB through tripod of foot, incorporating doming)  V walks w sprint ready position (pt educated on preventing knee valgus, incorporating doming on midstance phase)    SL runner squat 3 x 10 / side (pt educated on WB through tripod of foot, preventing knee valgus)    Manual therapy techniques: Joint mobilizations, Myofacial release, and Soft tissue Mobilization were applied to the: foot/ankle for 00 minutes, including:          Patient Education and Home Exercises     Home Exercises Provided and Patient Education Provided     Education provided:   - As mentioned above    Written Home Exercises Provided: Patient instructed to cont prior HEP. Exercises were reviewed and Alonzo was able to demonstrate them prior to the end of the session.  Alonzo demonstrated good  understanding of the education provided. See EMR under Patient Instructions for exercises provided during therapy sessions    ASSESSMENT     Pt completed session as noted above. Pt currently benefits from use of ball during calf raises to prevent excessive inv. Pt benefited from mod to max verbal, visual, and tactile cues to maintain WB through tripod of foot during foot intrinsic exercises that improved w/ repetition. Pt tolerated tx well w/ no increase in sx.    Alonzo Is progressing well towards his goals.     Pt prognosis is Good.     Pt will continue to benefit from skilled outpatient physical therapy to address the deficits listed in the problem list box on initial evaluation, provide pt/family education and to maximize pt's level of independence in the home and community environment.     Pt's spiritual, cultural and educational needs considered and pt agreeable to plan of care and goals.     Anticipated barriers to physical therapy: Chronicity of sx     Goals:   Short Term Goals: 4-6 weeks   1. Maintain compliance and understanding of HEP. -  PROGRESSING, NOT MET  2. Decrease subjective pain rating from a 5/10 pain with standing/walking to a consistent 0/10 pain with standing/walking. - PROGRESSING, NOT MET  3. Increase bilateral hip strength to > / = 4/5 with manual muscle testing to offload achilles. - PROGRESSING, NOT MET  4. Ability to perform DL heel raise x 20 with even heel height for improved calf strength to offload achilles tendon PROGRESSING, NOT MET       Long Term Goals: 6 weeks   1. Decrease FOTO limitation score from 16% limitation to </= 14% limitation for better functional outcomes. - PROGRESSING, NOT MET  2. Increase ankle dorsiflexion and plantar flexion active range of motion to within normal limits in order to offload plantar fascia. - PROGRESSING, NOT MET  3. Patient will be able to walk 1 mile without reproduction of ( L ) heel pain. - PROGRESSING, NOT MET  4. Patient goal: Return to playing basketball. - PROGRESSING, NOT MET    PLAN     Cont PT as per POC    Plan of care Certification: 6/10/2024 to 9/10/2024.     Outpatient Physical Therapy 2 times weekly for 12 weeks to include the following interventions: Gait Training, Manual Therapy, Moist Heat/ Ice, Neuromuscular Re-ed, Patient Education, Therapeutic Activities, and Therapeutic Exercise.     Benjy Quispe, PT, DPT

## 2024-07-02 ENCOUNTER — CLINICAL SUPPORT (OUTPATIENT)
Dept: REHABILITATION | Facility: HOSPITAL | Age: 29
End: 2024-07-02
Payer: MEDICAID

## 2024-07-02 DIAGNOSIS — Z09 FOLLOW-UP EXAMINATION, FOLLOWING OTHER SURGERY: Primary | ICD-10-CM

## 2024-07-02 DIAGNOSIS — M25.60 DECREASED RANGE OF MOTION: ICD-10-CM

## 2024-07-02 DIAGNOSIS — R29.898 WEAKNESS OF BOTH HIPS: ICD-10-CM

## 2024-07-02 DIAGNOSIS — M62.81 CALF MUSCLE WEAKNESS: ICD-10-CM

## 2024-07-02 PROCEDURE — 97110 THERAPEUTIC EXERCISES: CPT | Mod: PO

## 2024-07-02 NOTE — PROGRESS NOTES
[facility]   Physical Therapy Treatment Note     Name: Alonzo Nascimento Jr.  Clinic Number: [id number]    Therapy Diagnosis:   Encounter Diagnoses   Name Primary?    Follow-up examination, following other surgery Yes    Decreased range of motion     Calf muscle weakness     Weakness of both hips      Physician: Jayesh Chaney Jr., *    Visit Date: 7/2/2024    Physician Orders: PT Eval and Treat   Medical Diagnosis from Referral: Follow-up examination following surgery [Z09], Status post Achilles tendon repair [Z98.890], Decreased range of motion [M25.60]   Evaluation Date: 6/10/2024  Authorization Period Expiration: 4/26/2025  Plan of Care Expiration: 9/10/2024  Visit # / Visits authorized: 2/16 + Eval     Time In: 1028 (pt late upon arrival)  Time Out: 1128  Total Appointment Time (timed & untimed codes): 60 minutes     Precautions: Standard    FOTO 1st:   FOTO 3rd:  FOTO 10th:      SUBJECTIVE     Pt reports: No issues after previous session. Reports his ankle swelled and had to drain it again after work on Fri    He was compliant with home exercise program.  Response to previous treatment: Ongoing  Functional change: Ongoing    Pain: Did not verbalize/10  Location: left foot/ankle     OBJECTIVE     Distal scar site: Appears as blister, min to no bleeding. Bandage applied    Treatment       Alonzo received the treatments listed below:      Therapeutic exercises to develop strength, endurance, ROM, flexibility, posture, and core stabilization for 60 minutes including:    Pt educated on use of bandage to prevent friction on distal incision site at heel while at work, use of intermittent ankle muscle pump when at work or in long periods of dependent position to manage swelling    NuStep lvl5 10' for improved cardiovascular and muscular endurance, tissue extensibility, and ROM (pt educated on WB through tripod of foot, incorporation of gentle doming)    DL calf raise on half FR w/ ball 3 x  12  (pt educated on even heel height, preventing excessive inv)    Towel curls 5# (multiple bouts) (pt educated on incorporating ankld Dfand toe ext/ABD  Toe yoga (multiple bouts) / side (pt educated on maintaining WB through tripod of foot)  Doming (multiple bouts) (pt educated on preventing toe flex, maintaining WB through tripod of foot    Heel to wall ankle mobility 2 x 15  / side w/ 3-5s holds on L while anterior ankle capsule tightness exists (pt educated on even WB through tripod of foot, gentle doming incorporation)  Squat to chair      NT  DL bent knee calf raise on half FR w/ ball 3 x 10    Lateral band walks GTB 1 lap (pt educated on preventing knee valgus, maintaining WB through tripod of foot, incorporating doming)  V walks w sprint ready position (pt educated on preventing knee valgus, incorporating doming on midstance phase)    SL runner squat 3 x 10 / side (pt educated on WB through tripod of foot, preventing knee valgus)    Manual therapy techniques: Joint mobilizations, Myofacial release, and Soft tissue Mobilization were applied to the: foot/ankle for 00 minutes, including:          Patient Education and Home Exercises     Home Exercises Provided and Patient Education Provided     Education provided:   - As mentioned above    Written Home Exercises Provided: Patient instructed to cont prior HEP. Exercises were reviewed and Alonzo was able to demonstrate them prior to the end of the session.  Alonzo demonstrated good  understanding of the education provided. See EMR under Patient Instructions for exercises provided during therapy sessions    ASSESSMENT     Pt completed session as noted above. Pt educated on use of bandage over distal incision site at work to reduce friction to the area. Pt demo'd good carryover in foot intrinsic exercises from previous session, w/ ability to incorporate it into WB activities. Pt tolerated tx well w/ no increase in sx.    Alonzo Is progressing well towards his  goals.     Pt prognosis is Good.     Pt will continue to benefit from skilled outpatient physical therapy to address the deficits listed in the problem list box on initial evaluation, provide pt/family education and to maximize pt's level of independence in the home and community environment.     Pt's spiritual, cultural and educational needs considered and pt agreeable to plan of care and goals.     Anticipated barriers to physical therapy: Chronicity of sx     Goals:   Short Term Goals: 4-6 weeks   1. Maintain compliance and understanding of HEP. - PROGRESSING, NOT MET  2. Decrease subjective pain rating from a 5/10 pain with standing/walking to a consistent 0/10 pain with standing/walking. - PROGRESSING, NOT MET  3. Increase bilateral hip strength to > / = 4/5 with manual muscle testing to offload achilles. - PROGRESSING, NOT MET  4. Ability to perform DL heel raise x 20 with even heel height for improved calf strength to offload achilles tendon PROGRESSING, NOT MET       Long Term Goals: 6 weeks   1. Decrease FOTO limitation score from 16% limitation to </= 14% limitation for better functional outcomes. - PROGRESSING, NOT MET  2. Increase ankle dorsiflexion and plantar flexion active range of motion to within normal limits in order to offload plantar fascia. - PROGRESSING, NOT MET  3. Patient will be able to walk 1 mile without reproduction of ( L ) heel pain. - PROGRESSING, NOT MET  4. Patient goal: Return to playing basketball. - PROGRESSING, NOT MET    PLAN     Cont PT as per POC    Plan of care Certification: 6/10/2024 to 9/10/2024.     Outpatient Physical Therapy 2 times weekly for 12 weeks to include the following interventions: Gait Training, Manual Therapy, Moist Heat/ Ice, Neuromuscular Re-ed, Patient Education, Therapeutic Activities, and Therapeutic Exercise.     Benjy Quispe, PT, DPT

## 2024-07-19 ENCOUNTER — HOSPITAL ENCOUNTER (EMERGENCY)
Facility: HOSPITAL | Age: 29
Discharge: HOME | End: 2024-07-19
Attending: STUDENT IN AN ORGANIZED HEALTH CARE EDUCATION/TRAINING PROGRAM
Payer: MEDICAID

## 2024-07-19 ENCOUNTER — CLINICAL SUPPORT (OUTPATIENT)
Dept: REHABILITATION | Facility: HOSPITAL | Age: 29
End: 2024-07-19
Payer: MEDICAID

## 2024-07-19 VITALS
BODY MASS INDEX: 23.59 KG/M2 | RESPIRATION RATE: 17 BRPM | TEMPERATURE: 98 F | WEIGHT: 178 LBS | DIASTOLIC BLOOD PRESSURE: 74 MMHG | SYSTOLIC BLOOD PRESSURE: 139 MMHG | HEIGHT: 73 IN | HEART RATE: 73 BPM | OXYGEN SATURATION: 100 %

## 2024-07-19 DIAGNOSIS — M25.672 IMPAIRED RANGE OF MOTION OF LEFT ANKLE: ICD-10-CM

## 2024-07-19 DIAGNOSIS — Z98.890 STATUS POST ACHILLES TENDON REPAIR: Primary | ICD-10-CM

## 2024-07-19 DIAGNOSIS — S90.822A BLISTER OF HEEL, LEFT, INITIAL ENCOUNTER: Primary | ICD-10-CM

## 2024-07-19 DIAGNOSIS — Z74.09 IMPAIRED FUNCTIONAL MOBILITY, BALANCE, GAIT, AND ENDURANCE: ICD-10-CM

## 2024-07-19 DIAGNOSIS — T81.9XXA POST-OPERATIVE COMPLICATION: ICD-10-CM

## 2024-07-19 PROCEDURE — 97110 THERAPEUTIC EXERCISES: CPT | Mod: PO,CQ

## 2024-07-19 PROCEDURE — 99283 EMERGENCY DEPT VISIT LOW MDM: CPT | Mod: 25,ER

## 2024-07-19 NOTE — ED PROVIDER NOTES
NAME:  Alonzo Nascimento Jr.  CSN:     [id number]  MRN:    [id number]  ADMIT DATE: 7/19/2024        eMERGENCY dEPARTMENT eNCOUnter    CHIEF COMPLAINT    Chief Complaint   Patient presents with    Wound Check     Pt presents with C/O wound to L heel since September 2023.  Pt reports symptoms have been present since Achilles Sx in September 2023. Erythema and scab noted.        HPI    Alonzo Nascimento Jr. is a 29 y.o. male with a past medical history of  has no past medical history on file.     he presents to the ED due to chronic wound to the left posterior heel for the last year.  States that it never fully healed after he had surgery for Achilles tendon repair last July.  He notes that this swelling in that area seems to fluctuate.  He shows me a picture from June 29th with far more significant swelling in the area at that time.  States typically does not get any drainage.  Denies any pain is in able to ambulate without difficulty.  Has been working closely with physical therapy and they referred him here for additional evaluation today after outlining the area.  He does note that since the injury he has shifted the way he walks and he does feel his back he will typically rubs on his shoe.  Has not been seen by Podiatry since last fall.  States that he had this ulceration at that visit but they did not address it or explain why this may be the case.  Notes that the other incision is well healed without issue.    HPI       PAST MEDICAL HISTORY  History reviewed. No pertinent past medical history.    SURGICAL HISTORY    Past Surgical History:   Procedure Laterality Date    ANTERIOR CRUCIATE LIGAMENT REPAIR Right     2018    APPLICATION OF SPLINT Left 7/14/2023    Procedure: APPLICATION, SPLINT;  Surgeon: Lenore Pearl DPM;  Location: [facility] OR;  Service: Podiatry;  Laterality: Left;    REPAIR OF ACHILLES TENDON Left 7/14/2023    Procedure: REPAIR, TENDON, ACHILLES;  Surgeon: Lenore Pearl DPM;  Location: [facility] OR;  Service:  "Podiatry;  Laterality: Left;       FAMILY HISTORY    No family history on file.    SOCIAL HISTORY    Social History     Socioeconomic History    Marital status: Single   Tobacco Use    Smoking status: Never    Smokeless tobacco: Never   Substance and Sexual Activity    Alcohol use: Yes     Comment: rarely    Drug use: Yes     Types: Marijuana    Sexual activity: Yes     Partners: Female       MEDICATIONS  No current outpatient medications    ALLERGIES    Review of patient's allergies indicates:  No Known Allergies      REVIEW OF SYSTEMS   Review of Systems       PHYSICAL EXAM    Reviewed Triage Note    VITAL SIGNS:   ED Triage Vitals [07/19/24 1051]   Enc Vitals Group      /74      Pulse 73      Resp 17      Temp 98.1 °F (36.7 °C)      Temp Source Oral      SpO2 100 %      Weight 178 lb      Height 6' 1"      Head Circumference       Peak Flow       Pain Score       Pain Loc       Pain Education       Exclude from Growth Chart        Patient Vitals for the past 24 hrs:   BP Temp Temp src Pulse Resp SpO2 Height Weight   07/19/24 1051 139/74 98.1 °F (36.7 °C) Oral 73 17 100 % 6' 1" (1.854 m) 80.7 kg (178 lb)       Physical Exam    Nursing note and vitals reviewed.  Constitutional: He appears well-developed and well-nourished.   HENT:   Head: Normocephalic and atraumatic.   Eyes: EOM are normal. Pupils are equal, round, and reactive to light.   Neck: Neck supple.   Normal range of motion.  Cardiovascular:  Normal rate.           Pulmonary/Chest: No respiratory distress.   Abdominal: Abdomen is soft.   Musculoskeletal:         General: Normal range of motion.      Cervical back: Normal range of motion and neck supple.     Neurological: He is alert and oriented to person, place, and time.   Skin: Skin is warm and dry.   See photo below.  Trace fluctuance with chronic appearing callus to the left lateral posterior hill.  No reproducible pain with palpation or warmth noted.   Psychiatric: He has a normal mood and " affect.                EKG     Interpreted by EM physician if performed:               LABS  Pertinent labs reviewed. (See chart for details)   Labs Reviewed - No data to display      RADIOLOGY          Imaging Results              X-Ray Ankle Complete Left (Final result)  Result time 07/19/24 11:47:19      Final result by Norberto Mcdaniel MD (07/19/24 11:47:19)                   Impression:      No acute bony abnormality.  See above.      Electronically signed by: Norberto Mcdaniel MD  Date:    07/19/2024  Time:    11:47               Narrative:    EXAMINATION:  XR ANKLE COMPLETE 3 VIEW LEFT    CLINICAL HISTORY:  Unspecified complication of procedure, initial encounter    TECHNIQUE:  Standard radiography performed.    COMPARISON:  06/25/2023 x-ray    FINDINGS:  Bone density in architecture appears normal.    There is an abnormal appearance of the Achilles tendon with soft tissue thickening of the tendon location.  There is infiltration of the fat pad.  Findings may represent Achilles tendinitis.  No calcification.  No surgical clips.                                        PROCEDURES    Procedures      ED COURSE & MEDICAL DECISION MAKING    Pertinent Labs & Imaging studies reviewed. (See chart for details and specific orders.)          Summary of review of records:   Status post Achilles tendon repair on July 14th.  Has continued with physical therapy since that time for ongoing management.    Medical Decision Making  Amount and/or Complexity of Data Reviewed  External Data Reviewed: radiology and notes.  Radiology: ordered and independent interpretation performed. Decision-making details documented in ED Course.      Alonzo Nascimento JrMaia is a 29 y.o. male who presents for chronic blister to left posterior heel.  Notes that thighs fluctuates.  States that it never fully healed after his surgery.  Shows me photos and states that swelling is actually better than it has been recently.  Denies any pain or difficulty  with ambulation at this time.    Differential includes but is not limited to chronic friction blister, clinically low suspicion for underlying abscess or cellulitic infection at this time.          Medications - No data to display    ED Course as of 07/19/24 1223   Fri Jul 19, 2024   1152 X-Ray Ankle Complete Left  There is an abnormal appearance of the Achilles tendon with soft tissue thickening of the tendon location.  There is infiltration of the fat pad.  Findings may represent Achilles tendinitis.  No calcification.  No surgical clips. [HL]      ED Course User Index  [HL] Ivonne Haro, DO     Feel that x-ray findings are likely secondary to previous injury as he does not have any acute pain currently.  Podiatry referral provided and encouraged close follow-up.  May need orthotics or different she was to help this area more adequately heal.  Signs and symptoms of underlying infection discussed and clearly understands reasons to return for evaluation from that standpoint.        FINAL IMPRESSION    Final diagnoses:  [T81.9XXA] Post-operative complication  [S90.822A] Blister of heel, left, initial encounter (Primary)       DISPOSITION  Patient discharge in stable condition        ED Prescriptions    None       Follow-up Information       Follow up With Specialties Details Why Contact Info    Jayesh Chaney Jr., DPM Podiatry Schedule an appointment as soon as possible for a visit   [address] [facility] [address]  [facility] [address] [phone]      [facility] - Emergency Dept Emergency Medicine  As needed, If symptoms worsen [address] [facility] [address] [phone]              DISCLAIMER: This note was prepared with M*[employer] voice recognition transcription software. Garbled syntax, mangled pronouns, and other bizarre constructions may be attributed to that software system.             Ivonne Haro, DO  07/19/24 1223       Ivonne Haro, DO  07/19/24 1223

## 2024-07-19 NOTE — PROGRESS NOTES
"[facility]   Physical Therapy Treatment Note     Name: Alonzo Nascimento Jr.  Clinic Number: [id number]    Therapy Diagnosis:   No diagnosis found.    Physician: Jayesh Chaney Jr., *    Visit Date: 7/19/2024    Physician Orders: PT Eval and Treat   Medical Diagnosis from Referral: Follow-up examination following surgery [Z09], Status post Achilles tendon repair [Z98.890], Decreased range of motion [M25.60]   Evaluation Date: 6/10/2024  Authorization Period Expiration: 4/26/2025  Plan of Care Expiration: 9/10/2024  Visit # / Visits authorized: 2/16 + Eval     Time In: 10:15 AM   Time Out: 10:30 AM   Total Appointment Time (timed & untimed codes):15 minutes     Precautions: Standard    FOTO 1st:   FOTO 3rd:  FOTO 10th:      SUBJECTIVE     Pt reports: "My heel feels tight, I'm not sure why its looking like this today".   He was compliant with home exercise program.  Response to previous treatment: Ongoing  Functional change: Ongoing    Pain: NA/10  Location: left foot/ankle     OBJECTIVE     Distal scar site: Appears as blister, min to no bleeding. Bandage applied    Treatment       Alonzo received the treatments listed below:      Therapeutic exercises to develop strength, endurance, ROM, flexibility, posture, and core stabilization for 15 minutes including:   Wound Observation. >>>> Refer to  ASSESSMENT>>>>    NOT PERFORMED TODAY:   NuStep lvl5 10' for improved cardiovascular and muscular endurance, tissue extensibility, and ROM (pt educated on WB through tripod of foot, incorporation of gentle doming)    DL calf raise on half FR w/ ball 3 x 12  (pt educated on even heel height, preventing excessive inv)    Towel curls 5# (multiple bouts) (pt educated on incorporating ankld Dfand toe ext/ABD  Toe yoga (multiple bouts) / side (pt educated on maintaining WB through tripod of foot)  Doming (multiple bouts) (pt educated on preventing toe flex, maintaining WB through tripod of foot    Heel to " "wall ankle mobility 2 x 15  / side w/ 3-5s holds on L while anterior ankle capsule tightness exists (pt educated on even WB through tripod of foot, gentle doming incorporation)  Squat to chair      NT  DL bent knee calf raise on half FR w/ ball 3 x 10    Lateral band walks GTB 1 lap (pt educated on preventing knee valgus, maintaining WB through tripod of foot, incorporating doming)  V walks w sprint ready position (pt educated on preventing knee valgus, incorporating doming on midstance phase)    SL runner squat 3 x 10 / side (pt educated on WB through tripod of foot, preventing knee valgus)    Manual therapy techniques: Joint mobilizations, Myofacial release, and Soft tissue Mobilization were applied to the: foot/ankle for 00 minutes, including:      Patient Education and Home Exercises     Home Exercises Provided and Patient Education Provided     Education provided:   - advised to continue to monitor wound  - advised to contact MD or visit urgent care for wound L heel.     Written Home Exercises Provided: Patient instructed to cont prior HEP. Exercises were reviewed and Alonzo was able to demonstrate them prior to the end of the session.  Alonzo demonstrated good  understanding of the education provided. See EMR under Patient Instructions for exercises provided during therapy sessions    ASSESSMENT     Pt arrived to session with reports of discomfort in L heel.   Observed wound, thinned skin noted with a dry scab over blister appearance. Noted warmth & redness, which was outlined with sharpie marker to monitor spreading. No drainage noted, no reports of fever per pt, but he did express "tightness" with Left plantar / dorsi flexion in his heel.   PTA impression: possible wound infection. Notified supervising PT (Houston Jacobson DPT) to observe and advise. Pt was encouraged to visit urgent care and / or call MD to inform of findings today.     Alonzo Is progressing well towards his goals.     Pt prognosis is Good. "     Pt will continue to benefit from skilled outpatient physical therapy to address the deficits listed in the problem list box on initial evaluation, provide pt/family education and to maximize pt's level of independence in the home and community environment.     Pt's spiritual, cultural and educational needs considered and pt agreeable to plan of care and goals.     Anticipated barriers to physical therapy: Chronicity of sx     Goals:   Short Term Goals: 4-6 weeks   1. Maintain compliance and understanding of HEP. - PROGRESSING, NOT MET  2. Decrease subjective pain rating from a 5/10 pain with standing/walking to a consistent 0/10 pain with standing/walking. - PROGRESSING, NOT MET  3. Increase bilateral hip strength to > / = 4/5 with manual muscle testing to offload achilles. - PROGRESSING, NOT MET  4. Ability to perform DL heel raise x 20 with even heel height for improved calf strength to offload achilles tendon PROGRESSING, NOT MET       Long Term Goals: 6 weeks   1. Decrease FOTO limitation score from 16% limitation to </= 14% limitation for better functional outcomes. - PROGRESSING, NOT MET  2. Increase ankle dorsiflexion and plantar flexion active range of motion to within normal limits in order to offload plantar fascia. - PROGRESSING, NOT MET  3. Patient will be able to walk 1 mile without reproduction of ( L ) heel pain. - PROGRESSING, NOT MET  4. Patient goal: Return to playing basketball. - PROGRESSING, NOT MET    PLAN     Monitor Left heel wound. Follow up next session, advance as appropriate.   Plan of care Certification: 6/10/2024 to 9/10/2024.     Outpatient Physical Therapy 2 times weekly for 12 weeks to include the following interventions: Gait Training, Manual Therapy, Moist Heat/ Ice, Neuromuscular Re-ed, Patient Education, Therapeutic Activities, and Therapeutic Exercise.     Hammad Jane, PTA

## 2024-09-16 PROBLEM — Z09 FOLLOW-UP EXAMINATION, FOLLOWING OTHER SURGERY: Status: RESOLVED | Noted: 2024-06-13 | Resolved: 2024-09-16

## 2024-10-07 ENCOUNTER — HOSPITAL ENCOUNTER (EMERGENCY)
Facility: HOSPITAL | Age: 29
Discharge: HOSPICE | End: 2024-10-07
Attending: EMERGENCY MEDICINE
Payer: MEDICAID

## 2024-10-07 VITALS
OXYGEN SATURATION: 99 % | SYSTOLIC BLOOD PRESSURE: 135 MMHG | RESPIRATION RATE: 18 BRPM | TEMPERATURE: 99 F | HEART RATE: 61 BPM | DIASTOLIC BLOOD PRESSURE: 76 MMHG

## 2024-10-07 DIAGNOSIS — Z98.890 STATUS POST ACHILLES TENDON REPAIR: ICD-10-CM

## 2024-10-07 DIAGNOSIS — S80.822A: Primary | ICD-10-CM

## 2024-10-07 DIAGNOSIS — M25.473 ANKLE SWELLING: ICD-10-CM

## 2024-10-07 PROCEDURE — 25000003 PHARM REV CODE 250: Mod: ER

## 2024-10-07 PROCEDURE — 99284 EMERGENCY DEPT VISIT MOD MDM: CPT | Mod: 25,ER

## 2024-10-07 RX ORDER — BACITRACIN ZINC 500 UNIT/G
OINTMENT (GRAM) TOPICAL 2 TIMES DAILY
Qty: 30 G | Refills: 0 | Status: SHIPPED | OUTPATIENT
Start: 2024-10-07

## 2024-10-07 RX ORDER — CEPHALEXIN 500 MG/1
500 CAPSULE ORAL 2 TIMES DAILY
Qty: 10 CAPSULE | Refills: 0 | Status: SHIPPED | OUTPATIENT
Start: 2024-10-07 | End: 2024-10-12

## 2024-10-07 RX ADMIN — BACITRACIN ZINC, NEOMYCIN, POLYMYXIN B 1 EACH: 400; 3.5; 5 OINTMENT TOPICAL at 04:10

## 2024-10-07 NOTE — Clinical Note
"Alonzo Colbert"Azam was seen and treated in our emergency department on 10/7/2024.  He may return to work on 10/08/2024.       If you have any questions or concerns, please don't hesitate to call.      Elisabeth Pinto PA-C"

## 2024-10-07 NOTE — DISCHARGE INSTRUCTIONS
Today you were prescribed an antibiotic for possible infection to blister.  Please take this antibiotic as prescribed for the full course to ensure complete resolution of your bacterial infection. Be sure to take it at the same time every day to maintain consistent levels in your body.     While taking the medication, be sure to eat a snack or meal with each dose. DO NOT DRINK ALCOHOL, as it may interact negatively with some antibiotics. After taking an antibiotic you may need to wait for up to three hours before eating or drinking any dairy products. Grapefruit juice and dietary supplements containing minerals like calcium may also lessen the effect of antibiotics. Over the next few days, try to incorporate probiotics to help with your gut health because antibiotics can decrease good bacteria in your gut. Examples include: yogurt, kombucha, pickles, Poppi probiotic sodas, etc.

## 2024-10-07 NOTE — ED PROVIDER NOTES
"Encounter Date: 10/7/2024       History     Chief Complaint   Patient presents with    Foot Injury      Pt c/o of left foot pain that started a couple days ago per pt had a surgery last year in July. Pt is able to bearweight.     Patient is a 29-year-old male with no significant past medical history who presents to emergency room for persistent blister to left lateral ankle.  Patient states that he had surgery for Achilles tendon done a year ago.  After he got his boot off, he started to experience a blister to the area.  It has been persisting over the past year with intermittent swelling and redness.  States that it feels "tight" sometimes. No significant pain.  No fever, body aches, or chills.  Patient denies any new injury to the area.  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  No weakness, numbness, or tingling. No medications taken prior to arrival.     The history is provided by the patient. No  was used.     Review of patient's allergies indicates:  No Known Allergies  No past medical history on file.  Past Surgical History:   Procedure Laterality Date    ANTERIOR CRUCIATE LIGAMENT REPAIR Right     2018    APPLICATION OF SPLINT Left 7/14/2023    Procedure: APPLICATION, SPLINT;  Surgeon: Lenore Pearl DPM;  Location: [facility] OR;  Service: Podiatry;  Laterality: Left;    REPAIR OF ACHILLES TENDON Left 7/14/2023    Procedure: REPAIR, TENDON, ACHILLES;  Surgeon: Lenore Pearl DPM;  Location: [facility] OR;  Service: Podiatry;  Laterality: Left;     No family history on file.  Social History     Tobacco Use    Smoking status: Never    Smokeless tobacco: Never   Substance Use Topics    Alcohol use: Yes     Comment: rarely    Drug use: Yes     Types: Marijuana     Review of Systems   Constitutional:  Negative for chills, diaphoresis, fatigue and fever.   Musculoskeletal:  Positive for arthralgias (left ankle). Negative for joint swelling and myalgias.   Skin:  Positive " for wound (blister to ankle). Negative for color change and rash.   Neurological:  Negative for weakness and numbness.       Physical Exam     Initial Vitals [10/07/24 1542]   BP Pulse Resp Temp SpO2   135/76 61 18 98.7 °F (37.1 °C) 99 %      MAP       --         Physical Exam    Nursing note and vitals reviewed.  Constitutional: He appears well-developed and well-nourished. He is not diaphoretic. No distress.   Patient well-appearing.  Awake and alert.  No acute distress.  Maintaining airway appropriately.  Speaking in complete sentences.   HENT:   Head: Normocephalic and atraumatic.   Right Ear: External ear normal.   Left Ear: External ear normal.   Eyes: Conjunctivae and EOM are normal.   Neck: Neck supple.   Normal range of motion.  Pulmonary/Chest: No respiratory distress.   Musculoskeletal:         General: No tenderness or edema. Normal range of motion.      Cervical back: Normal range of motion and neck supple.     Neurological: He is alert and oriented to person, place, and time. He has normal strength.   Skin: Skin is warm. Capillary refill takes less than 2 seconds. Lesion noted.        Psychiatric: He has a normal mood and affect. His behavior is normal. Thought content normal.         ED Course   Procedures  Labs Reviewed - No data to display       Imaging Results               X-Ray Ankle Complete Left (Final result)  Result time 10/07/24 16:16:30      Final result by Norberto Cerna MD (10/07/24 16:16:30)                   Impression:      As above.    This report was flagged in Epic as abnormal.      Electronically signed by: Norberto Cerna  Date:    10/07/2024  Time:    16:16               Narrative:    EXAMINATION:  XR ANKLE COMPLETE 3 VIEW LEFT    CLINICAL HISTORY:  Effusion, unspecified ankle    TECHNIQUE:  AP, lateral and oblique views of the left ankle were performed.    COMPARISON:  None    FINDINGS:  Question nondisplaced fibular fracture best seen on oblique radiograph.  This could be a  prominent vascular channel.  Soft tissue swelling over the fibula and tiny ankle joint effusion suspected.                                       Medications   neomycin-bacitracnZn-polymyxnB packet 1 each (1 each Topical (Top) Given 10/7/24 1648)     Medical Decision Making  Patient presents to emergency room for swelling and blister to lateral aspect of left ankle.  Vital signs stable and within normal limits.  Physical exam as stated above.    Differential Diagnosis includes, but is not limited to fracture, dislocation, nerve injury/palsy, vascular injury, DVT, septic joint, cellulitis, bursitis, muscle strain, ligament tear/sprain, laceration, foreign body, abrasion, soft tissue contusion, osteoarthritis, or gout.  I do not suspect nerve or vascular injury, as sensation and pulses intact.  No significant extremity edema that would suggest DVT.  Patient with adequate range of motion.  Unlikely septic joint.  X-ray with questionable fracture.  However, patient with full range of motion of his ankle.  States that he has not had any new injury or inciting event that would suggest fracture.  He is still able to bear weight.  Lower suspicion for this.  He does have a blister to lateral aspect of left ankle with some erythema and swelling.  No warmth.  Was seen in the emergency room a few months ago and was diagnosed with a chronic friction blister.  He was lost to follow-up due to insurance.  Bacitracin applied in the emergency room with additional wound care by my nurse.  Will prescribe Keflex to use upon discharge.  Lower suspicion for abscess at this time. Advised patient wound care and conservative management such as rice therapy.  Referral placed to orthopedics and podiatry.  Patient was given multiple facilities and which he can receive follow up regardless of insurance.    I see no indication of an emergent process beyond that addressed during our encounter. Patient stable for discharge at this time. I have  counseled the patient regarding follow up with Podiatry/orthopedics and gave strict return precautions. I have discussed the final diagnosis and gave instructions regarding prescribed medications. Patient verbalized understanding and is agreeable.     Problems Addressed:  Ankle swelling: acute illness or injury  Friction blister of left lower extremity, initial encounter: acute illness or injury  Status post Achilles tendon repair: acute illness or injury    Amount and/or Complexity of Data Reviewed  External Data Reviewed: notes.     Details: Patient is seen in the emergency room on 07/19/2024 for similar complaint.  Imaging reviewed.  Blister appears much improved today than it did before.  He was diagnosed with chronic friction blister at that time.     Radiology: ordered. Decision-making details documented in ED Course.    Risk  OTC drugs.  Prescription drug management.  Risk Details: Comorbidities taken into consideration during the patient's evaluation and treatment include none.  Social determinants of health taken into consideration during development of our treatment plan include difficulty in obtaining follow-up, obtaining medications, health literacy, access to healthy options for preventative/conservative management, and/or support systems due to, but not limited to, transportation limitations, socioeconomic status, and environmental factors.                ED Course as of 10/07/24 1711   Mon Oct 07, 2024   1625 X-Ray Ankle Complete Left(!)  Independently reviewed x-ray.  Radiology reading with question nondisplaced fibular fracture best seen on oblique radiograph.  This could be a prominent vascular channel.  Soft tissue swelling over the fibula and tiny ankle joint effusion suspected. [BJ]      ED Course User Index  [BJ] Elisabeth Pinto PA-C                           Clinical Impression:  Final diagnoses:  [M25.473] Ankle swelling  [S80.822A] Friction blister of left lower extremity, initial encounter  (Primary)  [Z98.890] Status post Achilles tendon repair          ED Disposition Condition    Discharge Stable          ED Prescriptions       Medication Sig Dispense Start Date End Date Auth. Provider    cephALEXin (KEFLEX) 500 MG capsule Take 1 capsule (500 mg total) by mouth 2 (two) times a day. for 5 days 10 capsule 10/7/2024 10/12/2024 Elisabteh Pinto PA-C    bacitracin 500 unit/gram Oint Apply topically 2 (two) times daily. 30 g 10/7/2024 -- Elisabeth Pinto PA-C          Follow-up Information       Follow up With Specialties Details Why Contact Info Additional Information    [place] [facility]   [address]  [address]  [place] [id number] [phone]     [place] [facility]   [address]  [address]  [place] [phone] [address]    [place] [facility]   [address]  [address]  [place] [id number] [phone] [address]            This note was partially created using [employer] Voice Recognition software. Typographical and content errors may occur with this process. While efforts are made to detect and correct such errors, in some cases errors will persist. For this reason, wording in this document should be considered in the proper context and not strictly verbatim.        Elisabeth Pinto PA-C  10/07/24 1155

## 2024-10-21 ENCOUNTER — TELEPHONE (OUTPATIENT)
Dept: PODIATRY | Facility: CLINIC | Age: 29
End: 2024-10-21
Payer: MEDICAID

## 2024-10-21 NOTE — TELEPHONE ENCOUNTER
----- Message from Med Assistant Valdez sent at 10/21/2024  4:24 PM CDT -----  Regarding: FW: Self  If his pcp is not a [facility] pcp he do not qualify to be seen here  ----- Message -----  From: Derrell Carlson  Sent: 10/21/2024  12:18 PM CDT  To: [facility] Pod[facility] Clinical Support Staff  Subject: Self                                             Type: Patient Call Back     What is the request in detail: Please call pt back with clarification on if he can be seen pt has a referral in chart. Called staff no answer     Can the clinic reply by [facility]? No     Would the patient rather a call back or a response via [facility]? Call back    Best call back number: .[phone]      Additional Information:    Thank you.

## 2024-10-21 NOTE — TELEPHONE ENCOUNTER
"It was explained to pt [facility] is not accepting any new medicaid pt. Pt states "I am not a new medicaid  pt, I was seen by an [facility] podiatrist last yr."  Pt states he does not understand what the problem is now. I informed the pt that I would speak to the staff tomorrow and notify him of the outcome. Pt verbalized understanding.  "

## 2024-10-22 ENCOUNTER — TELEPHONE (OUTPATIENT)
Dept: PODIATRY | Facility: CLINIC | Age: 29
End: 2024-10-22
Payer: MEDICAID

## 2024-10-22 NOTE — TELEPHONE ENCOUNTER
Call placed to pt notifying pt that [facility] is not accepting any new medicaid pt. Pt told he can look into community health centers to see if he can get further assistance, and to go to the er or urgent care if he feels like he need to be seen immediately. Pt verbalized understanding.

## 2024-10-22 NOTE — TELEPHONE ENCOUNTER
----- Message from Med Assistant Valdez sent at 10/21/2024  4:24 PM CDT -----  Regarding: FW: Self  If his pcp is not a [facility] pcp he do not qualify to be seen here  ----- Message -----  From: Derrell Carlson  Sent: 10/21/2024  12:18 PM CDT  To: [facility] Podiat[facility] Clinical Support Staff  Subject: Self                                             Type: Patient Call Back     What is the request in detail: Please call pt back with clarification on if he can be seen pt has a referral in chart. Called staff no answer     Can the clinic reply by [facility]? No     Would the patient rather a call back or a response via [facility]? Call back    Best call back number: [phone]      Additional Information:    Thank you.

## 2024-11-11 ENCOUNTER — HOSPITAL ENCOUNTER (OUTPATIENT)
Dept: RADIOLOGY | Facility: HOSPITAL | Age: 29
Discharge: HOME | End: 2024-11-11
Payer: MEDICAID

## 2024-11-11 ENCOUNTER — OFFICE VISIT (OUTPATIENT)
Dept: FAMILY MEDICINE | Facility: HOSPITAL | Age: 29
End: 2024-11-11
Payer: MEDICAID

## 2024-11-11 VITALS
BODY MASS INDEX: 21.79 KG/M2 | WEIGHT: 164.44 LBS | HEIGHT: 73 IN | DIASTOLIC BLOOD PRESSURE: 85 MMHG | RESPIRATION RATE: 16 BRPM | HEART RATE: 65 BPM | SYSTOLIC BLOOD PRESSURE: 125 MMHG | OXYGEN SATURATION: 99 %

## 2024-11-11 DIAGNOSIS — Z11.3 SCREEN FOR STD (SEXUALLY TRANSMITTED DISEASE): ICD-10-CM

## 2024-11-11 DIAGNOSIS — Z76.89 ENCOUNTER TO ESTABLISH CARE WITH NEW DOCTOR: ICD-10-CM

## 2024-11-11 DIAGNOSIS — L03.116 CELLULITIS OF LEFT LOWER EXTREMITY: Primary | ICD-10-CM

## 2024-11-11 DIAGNOSIS — L03.116 CELLULITIS OF LEFT LOWER EXTREMITY: ICD-10-CM

## 2024-11-11 PROCEDURE — 73610 X-RAY EXAM OF ANKLE: CPT | Mod: 26,LT,, | Performed by: RADIOLOGY

## 2024-11-11 PROCEDURE — 73610 X-RAY EXAM OF ANKLE: CPT | Mod: TC,FY,LT

## 2024-11-11 PROCEDURE — 99214 OFFICE O/P EST MOD 30 MIN: CPT | Mod: 25

## 2024-11-11 RX ORDER — SULFAMETHOXAZOLE AND TRIMETHOPRIM 800; 160 MG/1; MG/1
1 TABLET ORAL 2 TIMES DAILY
Qty: 28 TABLET | Refills: 0 | Status: SHIPPED | OUTPATIENT
Start: 2024-11-11 | End: 2024-11-25

## 2024-11-11 NOTE — PROGRESS NOTES
[facility] FAMILY PRACTICE CLINIC NOTE  New Patient Visit      SUBJECTIVE:     Patient: Alonzo Nascimento Jr. is a 29 y.o. male.    Chief Compliant:   Chief Complaint   Patient presents with    [facility]     Foot pain, swelling, stiffness after foot sx in July of last year       History of Present Illness:  Presents to establish care.    Foot pain - s/p achilles repair July 2023. Reports recurrent infections since then with recent ED visit 10/7 with Xray completed and discharged with 5 day keflex prescription that patient reports completed full course as well as topical abx ointment. Reports minimal drainage, swelling, erythema. Denies fever, chills, nausea, vomiting, significant pain. Denies recent trauma, fall, injury. Denies known PMHx of DM, HTN.      Review of patient's allergies indicates:  No Known Allergies    History reviewed. No pertinent past medical history.    Current Outpatient Medications   Medication Instructions    bacitracin 500 unit/gram Oint Topical (Top), 2 times daily    sulfamethoxazole-trimethoprim 800-160mg (BACTRIM DS) 800-160 mg Tab 1 tablet, Oral, 2 times daily       Past Surgical History:   Procedure Laterality Date    ANTERIOR CRUCIATE LIGAMENT REPAIR Right     2018    APPLICATION OF SPLINT Left 7/14/2023    Procedure: APPLICATION, SPLINT;  Surgeon: Lenore Pearl DPM;  Location: [facility] OR;  Service: Podiatry;  Laterality: Left;    REPAIR OF ACHILLES TENDON Left 7/14/2023    Procedure: REPAIR, TENDON, ACHILLES;  Surgeon: Lenore Pearl DPM;  Location: [facility] OR;  Service: Podiatry;  Laterality: Left;       No family history on file.    Social History     Tobacco Use    Smoking status: Never    Smokeless tobacco: Never   Substance Use Topics    Alcohol use: Yes     Comment: rarely    Drug use: Yes     Types: Marijuana          Review of Systems   Constitutional:  Negative for fever.   Respiratory:  Negative for shortness of breath.    Cardiovascular:  Negative for chest pain.   Gastrointestinal:  " Negative for abdominal pain.   Skin:         Wound   Neurological:  Negative for headaches.     A 10+ review of systems was performed with pertinent positives and negatives noted above in the history of present illness. Other systems were negative unless otherwise specified.    OBJECTIVE:     Vital Signs (Most Recent)  Vitals:    11/11/24 0943   BP: 125/85   BP Location: Left arm   Patient Position: Sitting   Pulse: 65   Resp: 16   SpO2: 99%   Weight: 74.6 kg (164 lb 7.4 oz)   Height: 6' 1" (1.854 m)     BMI: Body mass index is 21.7 kg/m².     Physical Exam:  Physical Exam  Vitals and nursing note reviewed.   Constitutional:       General: He is not in acute distress.     Appearance: Normal appearance. He is normal weight. He is not ill-appearing, toxic-appearing or diaphoretic.   HENT:      Head: Normocephalic and atraumatic.      Right Ear: External ear normal.      Left Ear: External ear normal.      Nose: Nose normal.      Mouth/Throat:      Pharynx: Oropharynx is clear.   Eyes:      Extraocular Movements: Extraocular movements intact.   Cardiovascular:      Rate and Rhythm: Normal rate and regular rhythm.   Pulmonary:      Effort: Pulmonary effort is normal. No respiratory distress.   Musculoskeletal:         General: Normal range of motion.      Cervical back: Normal range of motion.      Comments: ROM, sensation, pulses, strength intact in LLE. Minimal tenderness to palpation.   Skin:     General: Skin is warm.      Comments: Left lateral ankle swelling, erythema with opening and minimal puss like, bloody drainage. Appreciate picture.   Neurological:      General: No focal deficit present.      Mental Status: He is alert and oriented to person, place, and time. Mental status is at baseline.   Psychiatric:         Mood and Affect: Mood normal.         Behavior: Behavior normal.         Thought Content: Thought content normal.          ASSESSMENT:   Alonzo Nascimento Jr. is a 29 y.o. male who presents to clinic " to for    1. Cellulitis of left lower extremity    2. Screen for STD (sexually transmitted disease)    3. Encounter to establish care with new doctor         PLAN:     Cellulitis of left lower extremity  - Chronic condition.  - Uncontrolled.  - Reviewed previous labs, tests, and/or imaging obtained since last visit.  - Discussed various diagnostic and treatment options with patient at this visit.  - Medications and/or medication refills as below.  - Orders, labs, and imaging ordered as below. Follow-up results with patient.  - Patient to follow-up for symptomatic progression. If no improvement at next visit, would consider referral to wound care and/or podiatry/previous surgeon.   -     Hemoglobin A1C; Future; Expected date: 11/11/2024  -     Lipid Panel; Future; Expected date: 11/11/2024  -     TSH; Future; Expected date: 11/11/2024  -     Comprehensive Metabolic Panel; Future; Expected date: 11/11/2024  -     CBC Auto Differential; Future; Expected date: 11/11/2024  -     Sedimentation rate; Future; Expected date: 11/11/2024  -     C-reactive protein; Future; Expected date: 11/11/2024  -     MRI Ankle W WO Contrast Left; Future; Expected date: 11/11/2024  -     X-Ray Ankle Complete Left; Future; Expected date: 11/11/2024  -     sulfamethoxazole-trimethoprim 800-160mg (BACTRIM DS) 800-160 mg Tab; Take 1 tablet by mouth 2 (two) times daily. for 14 days  Dispense: 28 tablet; Refill: 0    Screen for STD (sexually transmitted disease)  - Screening indicated. Discussed with patient. Orders as below. Follow up results.   -     HIV 1/2 Ag/Ab (4th Gen); Future; Expected date: 11/11/2024  -     Hepatitis C Antibody; Future; Expected date: 11/11/2024    Encounter to establish care with new doctor  -     HIV 1/2 Ag/Ab (4th Gen); Future; Expected date: 11/11/2024  -     Hepatitis C Antibody; Future; Expected date: 11/11/2024  -     Hemoglobin A1C; Future; Expected date: 11/11/2024  -     Lipid Panel; Future; Expected date:  11/11/2024  -     TSH; Future; Expected date: 11/11/2024  -     Comprehensive Metabolic Panel; Future; Expected date: 11/11/2024  -     CBC Auto Differential; Future; Expected date: 11/11/2024        Provided patient with anticipatory guidance and return precautions. Treatment plan discussed with patient, all questions answered, and patient acknowledged understanding and verbal agreement.      Follow-up in: 1 weeks; or sooner PRN if acute concerns arise.      Case discussed with Dr. SHITAL Sage    ________________________  Sohan Strange MD  [facility] [place] [facility] PGY-3

## 2024-11-15 ENCOUNTER — HOSPITAL ENCOUNTER (OUTPATIENT)
Dept: RADIOLOGY | Facility: HOSPITAL | Age: 29
Discharge: HOME | End: 2024-11-15
Payer: MEDICAID

## 2024-11-15 DIAGNOSIS — L03.116 CELLULITIS OF LEFT LOWER EXTREMITY: ICD-10-CM

## 2024-11-15 PROCEDURE — 25500020 PHARM REV CODE 255: Mod: PN

## 2024-11-15 PROCEDURE — 73723 MRI JOINT LWR EXTR W/O&W/DYE: CPT | Mod: 26,LT,, | Performed by: RADIOLOGY

## 2024-11-15 PROCEDURE — A9585 GADOBUTROL INJECTION: HCPCS | Mod: PN

## 2024-11-15 PROCEDURE — 73723 MRI JOINT LWR EXTR W/O&W/DYE: CPT | Mod: TC,PN,LT

## 2024-11-15 RX ORDER — GADOBUTROL 604.72 MG/ML
8 INJECTION INTRAVENOUS
Status: COMPLETED | OUTPATIENT
Start: 2024-11-15 | End: 2024-11-15

## 2024-11-15 RX ADMIN — GADOBUTROL 8 ML: 604.72 INJECTION INTRAVENOUS at 11:11

## 2024-11-19 ENCOUNTER — HOSPITAL ENCOUNTER (OUTPATIENT)
Facility: HOSPITAL | Age: 29
Discharge: HOME | End: 2024-11-20
Attending: EMERGENCY MEDICINE | Admitting: HOSPITALIST
Payer: MEDICAID

## 2024-11-19 ENCOUNTER — TELEPHONE (OUTPATIENT)
Dept: HEPATOLOGY | Facility: HOSPITAL | Age: 29
End: 2024-11-19
Payer: MEDICAID

## 2024-11-19 DIAGNOSIS — M86.9 OSTEOMYELITIS, UNSPECIFIED SITE, UNSPECIFIED TYPE: ICD-10-CM

## 2024-11-19 DIAGNOSIS — L03.116 CELLULITIS OF LEFT LOWER EXTREMITY: ICD-10-CM

## 2024-11-19 DIAGNOSIS — L08.9 INFECTED WOUND: Primary | ICD-10-CM

## 2024-11-19 DIAGNOSIS — T14.8XXA INFECTED WOUND: Primary | ICD-10-CM

## 2024-11-19 PROBLEM — S91.302A NON HEALING LEFT HEEL WOUND: Status: ACTIVE | Noted: 2024-11-19

## 2024-11-19 LAB
ALBUMIN SERPL BCP-MCNC: 4.4 G/DL (ref 3.5–5.2)
ALP SERPL-CCNC: 59 U/L (ref 40–150)
ALT SERPL W/O P-5'-P-CCNC: 24 U/L (ref 10–44)
ANION GAP SERPL CALC-SCNC: 12 MMOL/L (ref 8–16)
AST SERPL-CCNC: 19 U/L (ref 10–40)
BASOPHILS # BLD AUTO: 0.05 K/UL (ref 0–0.2)
BASOPHILS NFR BLD: 0.8 % (ref 0–1.9)
BILIRUB SERPL-MCNC: 0.3 MG/DL (ref 0.1–1)
BUN SERPL-MCNC: 12 MG/DL (ref 6–20)
CALCIUM SERPL-MCNC: 9.8 MG/DL (ref 8.7–10.5)
CHLORIDE SERPL-SCNC: 105 MMOL/L (ref 95–110)
CO2 SERPL-SCNC: 24 MMOL/L (ref 23–29)
CREAT SERPL-MCNC: 1.5 MG/DL (ref 0.5–1.4)
CRP SERPL-MCNC: 0.7 MG/L (ref 0–8.2)
DIFFERENTIAL METHOD BLD: NORMAL
EOSINOPHIL # BLD AUTO: 0.1 K/UL (ref 0–0.5)
EOSINOPHIL NFR BLD: 1.6 % (ref 0–8)
ERYTHROCYTE [DISTWIDTH] IN BLOOD BY AUTOMATED COUNT: 13.5 % (ref 11.5–14.5)
ERYTHROCYTE [SEDIMENTATION RATE] IN BLOOD BY PHOTOMETRIC METHOD: 8 MM/HR (ref 0–23)
EST. GFR  (NO RACE VARIABLE): >60 ML/MIN/1.73 M^2
GLUCOSE SERPL-MCNC: 93 MG/DL (ref 70–110)
HCT VFR BLD AUTO: 43.2 % (ref 40–54)
HGB BLD-MCNC: 14.4 G/DL (ref 14–18)
IMM GRANULOCYTES # BLD AUTO: 0.01 K/UL (ref 0–0.04)
IMM GRANULOCYTES NFR BLD AUTO: 0.2 % (ref 0–0.5)
LYMPHOCYTES # BLD AUTO: 1.6 K/UL (ref 1–4.8)
LYMPHOCYTES NFR BLD: 25.8 % (ref 18–48)
MCH RBC QN AUTO: 29 PG (ref 27–31)
MCHC RBC AUTO-ENTMCNC: 33.3 G/DL (ref 32–36)
MCV RBC AUTO: 87 FL (ref 82–98)
MONOCYTES # BLD AUTO: 0.5 K/UL (ref 0.3–1)
MONOCYTES NFR BLD: 8 % (ref 4–15)
NEUTROPHILS # BLD AUTO: 4 K/UL (ref 1.8–7.7)
NEUTROPHILS NFR BLD: 63.6 % (ref 38–73)
NRBC BLD-RTO: 0 /100 WBC
PLATELET # BLD AUTO: 189 K/UL (ref 150–450)
PMV BLD AUTO: 9.2 FL (ref 9.2–12.9)
POTASSIUM SERPL-SCNC: 4.4 MMOL/L (ref 3.5–5.1)
PROCALCITONIN SERPL IA-MCNC: 0.02 NG/ML
PROT SERPL-MCNC: 8.6 G/DL (ref 6–8.4)
RBC # BLD AUTO: 4.97 M/UL (ref 4.6–6.2)
SODIUM SERPL-SCNC: 141 MMOL/L (ref 136–145)
WBC # BLD AUTO: 6.24 K/UL (ref 3.9–12.7)

## 2024-11-19 PROCEDURE — 93010 ELECTROCARDIOGRAM REPORT: CPT | Mod: ,,, | Performed by: INTERNAL MEDICINE

## 2024-11-19 PROCEDURE — 84145 PROCALCITONIN (PCT): CPT

## 2024-11-19 PROCEDURE — 87075 CULTR BACTERIA EXCEPT BLOOD: CPT

## 2024-11-19 PROCEDURE — G0378 HOSPITAL OBSERVATION PER HR: HCPCS

## 2024-11-19 PROCEDURE — 99285 EMERGENCY DEPT VISIT HI MDM: CPT | Mod: 25

## 2024-11-19 PROCEDURE — 87205 SMEAR GRAM STAIN: CPT

## 2024-11-19 PROCEDURE — 87076 CULTURE ANAEROBE IDENT EACH: CPT

## 2024-11-19 PROCEDURE — 87186 SC STD MICRODIL/AGAR DIL: CPT

## 2024-11-19 PROCEDURE — 87040 BLOOD CULTURE FOR BACTERIA: CPT | Mod: 59 | Performed by: NURSE PRACTITIONER

## 2024-11-19 PROCEDURE — 25000003 PHARM REV CODE 250

## 2024-11-19 PROCEDURE — 86140 C-REACTIVE PROTEIN: CPT

## 2024-11-19 PROCEDURE — 87176 TISSUE HOMOGENIZATION CULTR: CPT

## 2024-11-19 PROCEDURE — 25500020 PHARM REV CODE 255: Performed by: HOSPITALIST

## 2024-11-19 PROCEDURE — 80053 COMPREHEN METABOLIC PANEL: CPT | Performed by: NURSE PRACTITIONER

## 2024-11-19 PROCEDURE — 85652 RBC SED RATE AUTOMATED: CPT

## 2024-11-19 PROCEDURE — 88311 DECALCIFY TISSUE: CPT | Performed by: PATHOLOGY

## 2024-11-19 PROCEDURE — 96374 THER/PROPH/DIAG INJ IV PUSH: CPT

## 2024-11-19 PROCEDURE — 85025 COMPLETE CBC W/AUTO DIFF WBC: CPT | Performed by: NURSE PRACTITIONER

## 2024-11-19 PROCEDURE — 63600175 PHARM REV CODE 636 W HCPCS

## 2024-11-19 PROCEDURE — 87070 CULTURE OTHR SPECIMN AEROBIC: CPT

## 2024-11-19 PROCEDURE — 88307 TISSUE EXAM BY PATHOLOGIST: CPT | Performed by: PATHOLOGY

## 2024-11-19 PROCEDURE — 93005 ELECTROCARDIOGRAM TRACING: CPT

## 2024-11-19 RX ORDER — TALC
6 POWDER (GRAM) TOPICAL NIGHTLY PRN
Status: DISCONTINUED | OUTPATIENT
Start: 2024-11-19 | End: 2024-11-20 | Stop reason: HOSPADM

## 2024-11-19 RX ORDER — ACETAMINOPHEN 325 MG/1
650 TABLET ORAL EVERY 4 HOURS PRN
Status: DISCONTINUED | OUTPATIENT
Start: 2024-11-19 | End: 2024-11-20 | Stop reason: HOSPADM

## 2024-11-19 RX ORDER — SODIUM CHLORIDE 0.9 % (FLUSH) 0.9 %
5 SYRINGE (ML) INJECTION
Status: DISCONTINUED | OUTPATIENT
Start: 2024-11-19 | End: 2024-11-20 | Stop reason: HOSPADM

## 2024-11-19 RX ORDER — SULFAMETHOXAZOLE AND TRIMETHOPRIM 800; 160 MG/1; MG/1
1 TABLET ORAL 2 TIMES DAILY
Status: DISCONTINUED | OUTPATIENT
Start: 2024-11-19 | End: 2024-11-20

## 2024-11-19 RX ORDER — NALOXONE HCL 0.4 MG/ML
0.02 VIAL (ML) INJECTION
Status: DISCONTINUED | OUTPATIENT
Start: 2024-11-19 | End: 2024-11-20 | Stop reason: HOSPADM

## 2024-11-19 RX ORDER — POLYETHYLENE GLYCOL 3350 17 G/17G
17 POWDER, FOR SOLUTION ORAL DAILY PRN
Status: DISCONTINUED | OUTPATIENT
Start: 2024-11-19 | End: 2024-11-20 | Stop reason: HOSPADM

## 2024-11-19 RX ORDER — ENOXAPARIN SODIUM 100 MG/ML
40 INJECTION SUBCUTANEOUS EVERY 24 HOURS
Status: DISCONTINUED | OUTPATIENT
Start: 2024-11-19 | End: 2024-11-20 | Stop reason: HOSPADM

## 2024-11-19 RX ORDER — KETOROLAC TROMETHAMINE 30 MG/ML
15 INJECTION, SOLUTION INTRAMUSCULAR; INTRAVENOUS ONCE
Status: COMPLETED | OUTPATIENT
Start: 2024-11-19 | End: 2024-11-19

## 2024-11-19 RX ORDER — ONDANSETRON HYDROCHLORIDE 2 MG/ML
4 INJECTION, SOLUTION INTRAVENOUS EVERY 8 HOURS PRN
Status: DISCONTINUED | OUTPATIENT
Start: 2024-11-19 | End: 2024-11-20 | Stop reason: HOSPADM

## 2024-11-19 RX ORDER — AMOXICILLIN 250 MG
1 CAPSULE ORAL 2 TIMES DAILY PRN
Status: DISCONTINUED | OUTPATIENT
Start: 2024-11-19 | End: 2024-11-20 | Stop reason: HOSPADM

## 2024-11-19 RX ADMIN — ACETAMINOPHEN 650 MG: 325 TABLET ORAL at 09:11

## 2024-11-19 RX ADMIN — KETOROLAC TROMETHAMINE 15 MG: 30 INJECTION, SOLUTION INTRAMUSCULAR; INTRAVENOUS at 08:11

## 2024-11-19 RX ADMIN — IOHEXOL 100 ML: 350 INJECTION, SOLUTION INTRAVENOUS at 03:11

## 2024-11-19 RX ADMIN — SULFAMETHOXAZOLE AND TRIMETHOPRIM 1 TABLET: 800; 160 TABLET ORAL at 08:11

## 2024-11-19 NOTE — PHARMACY MED REC
"  [facility]           Pharmacy  Admission Medication History     The home medication history was taken by Surekha Alfredo.      Medication history obtained from Medications listed below were obtained from: Patient/family    Based on information gathered for medication list, you may go to "Admission" then "Reconcile Home Medications" tabs to review and/or act upon those items.     The home medication list has been updated by the Pharmacy department.   Please read ALL comments highlighted in yellow.   Please address this information as you see fit.    Feel free to contact us if you have any questions or require assistance.    The medications listed below were removed from the home medication list.  Please reorder if appropriate:    Patient reports NOT TAKING the following medication(s):  Bacitracin oint      No current facility-administered medications on file prior to encounter.     Current Outpatient Medications on File Prior to Encounter   Medication Sig Dispense Refill    sulfamethoxazole-trimethoprim 800-160mg (BACTRIM DS) 800-160 mg Tab Take 1 tablet by mouth 2 (two) times daily. for 14 days 28 tablet 0       Please address this information as you see fit.  Feel free to contact us if you have any questions or require assistance.    Surekha Alfredo  [phone]                .          "

## 2024-11-19 NOTE — TELEPHONE ENCOUNTER
Called patient x2.    1st call - Made patient aware of MRI results indicating possible osteo and need for ED eval and possible admission. Patient stated understanding. Was made aware by clinic nursing patient showed up to clinic and not ED. Patient then left and went home.    2nd call - Called patient and explained to go to the ED and not clinic. Patient stated understanding and potentially show up to ED 11/19.    ________________________  Sohan Strange MD  \[facility] [place] [facility]\"" Family Medicine PGY-3

## 2024-11-19 NOTE — ASSESSMENT & PLAN NOTE
s/p achilles repair July 2023. Reports recurrent infections since.   Please see left foot wound for further details.

## 2024-11-19 NOTE — NURSING
"Virtual Sepsis Screening Note        Chart Reviewed: 2024, 1:47 PM    MRN: [id number]  : 1995    Bed: Waiting Room/Waiting Room      The sepsis screening tool has been completed on this patient by this virtual RN.       Was the Sepsis Protocol initiated for this patient?: Yes; prior to this screening per other provider.  If "Yes", was an initial Lactate ordered?: Yes; prior to this screening per other provider.    Was a secure chat sent to the patient's current provider?: N/A  If "Yes", to whom was the secure chat sent?:  n/a              Please, contact Kevin Montanez RN via secure chat with any questions or concerns.       "

## 2024-11-19 NOTE — ASSESSMENT & PLAN NOTE
The patient underwent workup in ED including foot x-ray revealed a stable lucent focus involving the posterior aspect of the calcaneus, stable since examination 10/07/2024 and a CT w/c of left foot which is pending.   - 11/19/24 Procalcitonin 0.02,   - 11/19/24 CRP: 0.7 and SED 8. These have down trended since 11/11/24  - Wound culture from 11/11/24: Scant growth of Citrobacter Koseri susceptible to Bactrim   - MRI from 11/11/24: prominent marrow edema and marrow hyperenhancement of the calcaneal tuberosity and body, possibly but not definitive for osteomyelitis    Plan:   Consult podiatry, appreciate recs   Wound care consulted, appreciate recs   Continue with PO bactrim for now, consider switching to IV Abx after podiatry evaluation.   Pending CT of left foot

## 2024-11-19 NOTE — PROGRESS NOTES
"Pharmacokinetic Initial Assessment: IV Vancomycin    Assessment/Plan:    Initiate intravenous vancomycin with loading dose of 1500 mg once followed by a maintenance dose of vancomycin 1000mg IV every 12 hours  Desired empiric serum trough concentration is 10 to 20 mcg/mL  Draw vancomycin trough level 60 min prior to fourth dose on 0430 at approximately 11/21/24  Pharmacy will continue to follow and monitor vancomycin.      Please contact pharmacy at extension [phone] with any questions regarding this assessment.     Thank you for the consult,   Reinier Tian       Patient brief summary:  Alonzo Nascimento Jr. is a 29 y.o. male initiated on antimicrobial therapy with IV Vancomycin for treatment of suspected bone/joint infection    Drug Allergies:   Review of patient's allergies indicates:  No Known Allergies    Actual Body Weight:   74.8 kg    Renal Function:   Estimated Creatinine Clearance: 75.5 mL/min (A) (based on SCr of 1.5 mg/dL (H)).,     Dialysis Method (if applicable):  N/A    CBC (last 72 hours):  Recent Labs   Lab Result Units 11/19/24  1411   WBC K/uL 6.24   Hemoglobin g/dL 14.4   Hematocrit % 43.2   Platelets K/uL 189   Gran % % 63.6   Lymph % % 25.8   Mono % % 8.0   Eosinophil % % 1.6   Basophil % % 0.8   Differential Method  Automated       Metabolic Panel (last 72 hours):  Recent Labs   Lab Result Units 11/19/24  1411   Sodium mmol/L 141   Potassium mmol/L 4.4   Chloride mmol/L 105   CO2 mmol/L 24   Glucose mg/dL 93   BUN mg/dL 12   Creatinine mg/dL 1.5*   Albumin g/dL 4.4   Total Bilirubin mg/dL 0.3   Alkaline Phosphatase U/L 59   AST U/L 19   ALT U/L 24       Drug levels (last 3 results):  No results for input(s): "VANCOMYCINRA", "VANCORANDOM", "VANCOMYCINPE", "VANCOPEAK", "VANCOMYCINTR", "VANCOTROUGH" in the last 72 hours.    Microbiologic Results:  Microbiology Results (last 7 days)       Procedure Component Value Units Date/Time    Blood culture x two cultures. Draw prior to antibiotics. [[id number]] " Collected: 11/19/24 1411    Order Status: Sent Specimen: Blood from Peripheral, Antecubital, Left     Blood culture x two cultures. Draw prior to antibiotics. [[id number]] Collected: 11/19/24 1411    Order Status: Sent Specimen: Blood from Peripheral, Antecubital, Left

## 2024-11-19 NOTE — HPI
29-year-old w/ no pertinent PMHx who presents to the ED after his PCP advised him to go do to concerns of possible osteomyelitis of Left ankle after recent MRI (11/11/24) showed prominent marrow edema and marrow hyperenhancement of the calcaneal tuberosity and body, possibly but not definitive for osteomyelitis. Patient denies any fever/chills, SOB, chest pain, nausea, vomiting, diarrhea, constipation or pain. Denies recent trauma, fall, injury. Denies known PMHx of DM, HTN. Has been tolerating PO without any issues. Compliant with medications including his Bactrim that was prescribed 11/11/24. At baseline patient AAOx3 and lives at home. Denies any alcohol use, tobacco use. Admits to marihuana use daily.     Of note, patient s/p achilles repair July 2023. Reports recurrent infections since then with recent ED visit 10/7 with Xray completed and discharged with 5 day keflex prescription that patient reports completed full course as well as topical abx ointment. Reports minimal drainage, swelling, erythema.      In the ED, initial vitals /69, HR 53, Temp 98.5F, SpO2 99 on room air. CBC unremarkable. CMP noticible for Cr 1.5 (Cr baseline 1.4). Procalcitonin 0.02, CRP: 0.7 and SED 8. The patient underwent workup including foot x-ray revealed a stable lucent focus involving the posterior aspect of the calcaneus, stable since examination 10/07/2024 and a CT w/c of left foot which is pending. Patient received no medications in the ED. [facility] consulted for evaluation non healing wound with concerns for osteomyelitis.

## 2024-11-19 NOTE — SUBJECTIVE & OBJECTIVE
History reviewed. No pertinent past medical history.    Past Surgical History:   Procedure Laterality Date    ANTERIOR CRUCIATE LIGAMENT REPAIR Right     2018    APPLICATION OF SPLINT Left 7/14/2023    Procedure: APPLICATION, SPLINT;  Surgeon: Lenore Pearl DPM;  Location: [facility] OR;  Service: Podiatry;  Laterality: Left;    REPAIR OF ACHILLES TENDON Left 7/14/2023    Procedure: REPAIR, TENDON, ACHILLES;  Surgeon: Lenore Pearl DPM;  Location: [facility] OR;  Service: Podiatry;  Laterality: Left;       Review of patient's allergies indicates:  No Known Allergies    No current facility-administered medications on file prior to encounter.     Current Outpatient Medications on File Prior to Encounter   Medication Sig    bacitracin 500 unit/gram Oint Apply topically 2 (two) times daily.    sulfamethoxazole-trimethoprim 800-160mg (BACTRIM DS) 800-160 mg Tab Take 1 tablet by mouth 2 (two) times daily. for 14 days     Family History    None       Tobacco Use    Smoking status: Never    Smokeless tobacco: Never   Substance and Sexual Activity    Alcohol use: Yes     Comment: rarely    Drug use: Yes     Types: Marijuana    Sexual activity: Yes     Partners: Female     Review of Systems   Constitutional:  Negative for fever.   HENT:  Negative for congestion, ear pain, rhinorrhea and sore throat.    Eyes:  Negative for visual disturbance.   Respiratory:  Negative for chest tightness and shortness of breath.    Cardiovascular:  Negative for chest pain and palpitations.   Gastrointestinal:  Negative for abdominal distention, abdominal pain, constipation, diarrhea, nausea and vomiting.   Endocrine: Negative for polyuria.   Genitourinary:  Negative for difficulty urinating and dysuria.   Musculoskeletal: Negative.    Skin: Negative.    Neurological:  Negative for headaches.     Objective:     Vital Signs (Most Recent):  Temp: 98.5 °F (36.9 °C) (11/19/24 1237)  Pulse: (!) 53 (11/19/24 1502)  Resp: (!) 21 (11/19/24 1502)  BP: (!) 142/83  (11/19/24 1502)  SpO2: 99 % (11/19/24 1502) Vital Signs (24h Range):  Temp:  [98.5 °F (36.9 °C)] 98.5 °F (36.9 °C)  Pulse:  [53-77] 53  Resp:  [18-21] 21  SpO2:  [99 %] 99 %  BP: (130-142)/(69-83) 142/83     Weight: 74.8 kg (165 lb)  Body mass index is 20.08 kg/m².     Physical Exam  Constitutional:       General: He is not in acute distress.     Appearance: Normal appearance. He is not ill-appearing.   HENT:      Head: Normocephalic and atraumatic.      Nose: Nose normal. No congestion or rhinorrhea.      Mouth/Throat:      Mouth: Mucous membranes are moist.   Eyes:      Pupils: Pupils are equal, round, and reactive to light.   Cardiovascular:      Rate and Rhythm: Normal rate and regular rhythm.      Pulses: Normal pulses.      Heart sounds: Normal heart sounds. No murmur heard.  Pulmonary:      Effort: Pulmonary effort is normal. No respiratory distress.      Breath sounds: Normal breath sounds. No wheezing or rales.   Abdominal:      General: Abdomen is flat. Bowel sounds are normal. There is no distension.      Palpations: Abdomen is soft.      Tenderness: There is no abdominal tenderness.   Musculoskeletal:         General: Normal range of motion.      Cervical back: Normal range of motion and neck supple.      Right lower leg: No edema.      Left lower leg: No edema.   Feet:      Left foot:      Skin integrity: Skin breakdown and dry skin present.      Comments: Non-healing wound on posterior aspect of left ankle   Skin:     General: Skin is warm and dry.   Neurological:      Mental Status: He is alert and oriented to person, place, and time. Mental status is at baseline.      Cranial Nerves: No cranial nerve deficit.      Sensory: No sensory deficit.      Motor: No weakness.      Coordination: Coordination normal.      Gait: Gait normal.   Psychiatric:         Mood and Affect: Mood normal.              CRANIAL NERVES     CN III, IV, VI   Pupils are equal, round, and reactive to light.       Significant Labs:  All pertinent labs within the past 24 hours have been reviewed.    Significant Imaging: I have reviewed all pertinent imaging results/findings within the past 24 hours.

## 2024-11-19 NOTE — ED NOTES
Pt arrived to ED with referral for possible osteomyelitis on left heel, posteriorly. Denies pain, unless walking when weight bearing on the foot. Pt reports surgery on left achilles July 2023. Pt noticed swelling in left ankle in March/April 2024. Pt reports that he is taking antibiotics which he got from recent ED stay for the same issue. Pt's physical therapist advised pt to come be seen. Pt AAOx4, NAD noted. Pt changed into gown, placed on cardiac monitoring, pulse ox, and automatic BP.

## 2024-11-19 NOTE — H&P
[facility] Medicine  History & Physical    Patient Name: Alonzo Nascimento Jr.  MRN: [id number]  Patient Class: OP- Observation  Admission Date: 11/19/2024  Attending Physician: Nahum Maravilla,*   Primary Care Provider: Sohan Strange MD         Patient information was obtained from patient, past medical records, and ER records.     Subjective:     Principal Problem:Non healing left heel wound    Chief Complaint:   Chief Complaint   Patient presents with    Referral     Patient presents to the ED for possible osteomyelitis in his left foot. Patient was referred for evaluation of wound. Ambulatory to triage, NAD noted.        HPI: 29-year-old w/ no pertinent PMHx who presents to the ED after his PCP advised him to go do to concerns of possible osteomyelitis of Left ankle after recent MRI (11/11/24) showed prominent marrow edema and marrow hyperenhancement of the calcaneal tuberosity and body, possibly but not definitive for osteomyelitis. Patient denies any fever/chills, SOB, chest pain, nausea, vomiting, diarrhea, constipation or pain. Denies recent trauma, fall, injury. Denies known PMHx of DM, HTN. Has been tolerating PO without any issues. Compliant with medications including his Bactrim that was prescribed 11/11/24. At baseline patient AAOx3 and lives at home. Denies any alcohol use, tobacco use. Admits to marihuana use daily.     Of note, patient s/p achilles repair July 2023. Reports recurrent infections since then with recent ED visit 10/7 with Xray completed and discharged with 5 day keflex prescription that patient reports completed full course as well as topical abx ointment. Reports minimal drainage, swelling, erythema.      In the ED, initial vitals /69, HR 53, Temp 98.5F, SpO2 99 on room air. CBC unremarkable. CMP noticible for Cr 1.5 (Cr baseline 1.4). Procalcitonin 0.02, CRP: 0.7 and SED 8. The patient underwent workup including foot x-ray revealed a stable lucent  focus involving the posterior aspect of the calcaneus, stable since examination 10/07/2024 and a CT w/c of left foot which is pending. Patient received no medications in the ED. [facility] consulted for evaluation non healing wound with concerns for osteomyelitis.            History reviewed. No pertinent past medical history.    Past Surgical History:   Procedure Laterality Date    ANTERIOR CRUCIATE LIGAMENT REPAIR Right     2018    APPLICATION OF SPLINT Left 7/14/2023    Procedure: APPLICATION, SPLINT;  Surgeon: Lenore Pearl DPM;  Location: [facility] OR;  Service: Podiatry;  Laterality: Left;    REPAIR OF ACHILLES TENDON Left 7/14/2023    Procedure: REPAIR, TENDON, ACHILLES;  Surgeon: Lenore Pearl DPM;  Location: [facility] OR;  Service: Podiatry;  Laterality: Left;       Review of patient's allergies indicates:  No Known Allergies    No current facility-administered medications on file prior to encounter.     Current Outpatient Medications on File Prior to Encounter   Medication Sig    bacitracin 500 unit/gram Oint Apply topically 2 (two) times daily.    sulfamethoxazole-trimethoprim 800-160mg (BACTRIM DS) 800-160 mg Tab Take 1 tablet by mouth 2 (two) times daily. for 14 days     Family History    None       Tobacco Use    Smoking status: Never    Smokeless tobacco: Never   Substance and Sexual Activity    Alcohol use: Yes     Comment: rarely    Drug use: Yes     Types: Marijuana    Sexual activity: Yes     Partners: Female     Review of Systems   Constitutional:  Negative for fever.   HENT:  Negative for congestion, ear pain, rhinorrhea and sore throat.    Eyes:  Negative for visual disturbance.   Respiratory:  Negative for chest tightness and shortness of breath.    Cardiovascular:  Negative for chest pain and palpitations.   Gastrointestinal:  Negative for abdominal distention, abdominal pain, constipation, diarrhea, nausea and vomiting.   Endocrine: Negative for polyuria.   Genitourinary:  Negative for  difficulty urinating and dysuria.   Musculoskeletal: Negative.    Skin: Negative.    Neurological:  Negative for headaches.     Objective:     Vital Signs (Most Recent):  Temp: 98.5 °F (36.9 °C) (11/19/24 1237)  Pulse: (!) 53 (11/19/24 1502)  Resp: (!) 21 (11/19/24 1502)  BP: (!) 142/83 (11/19/24 1502)  SpO2: 99 % (11/19/24 1502) Vital Signs (24h Range):  Temp:  [98.5 °F (36.9 °C)] 98.5 °F (36.9 °C)  Pulse:  [53-77] 53  Resp:  [18-21] 21  SpO2:  [99 %] 99 %  BP: (130-142)/(69-83) 142/83     Weight: 74.8 kg (165 lb)  Body mass index is 20.08 kg/m².     Physical Exam  Constitutional:       General: He is not in acute distress.     Appearance: Normal appearance. He is not ill-appearing.   HENT:      Head: Normocephalic and atraumatic.      Nose: Nose normal. No congestion or rhinorrhea.      Mouth/Throat:      Mouth: Mucous membranes are moist.   Eyes:      Pupils: Pupils are equal, round, and reactive to light.   Cardiovascular:      Rate and Rhythm: Normal rate and regular rhythm.      Pulses: Normal pulses.      Heart sounds: Normal heart sounds. No murmur heard.  Pulmonary:      Effort: Pulmonary effort is normal. No respiratory distress.      Breath sounds: Normal breath sounds. No wheezing or rales.   Abdominal:      General: Abdomen is flat. Bowel sounds are normal. There is no distension.      Palpations: Abdomen is soft.      Tenderness: There is no abdominal tenderness.   Musculoskeletal:         General: Normal range of motion.      Cervical back: Normal range of motion and neck supple.      Right lower leg: No edema.      Left lower leg: No edema.   Feet:      Left foot:      Skin integrity: Skin breakdown and dry skin present.      Comments: Non-healing wound on posterior aspect of left ankle   Skin:     General: Skin is warm and dry.   Neurological:      Mental Status: He is alert and oriented to person, place, and time. Mental status is at baseline.      Cranial Nerves: No cranial nerve deficit.       Sensory: No sensory deficit.      Motor: No weakness.      Coordination: Coordination normal.      Gait: Gait normal.   Psychiatric:         Mood and Affect: Mood normal.              CRANIAL NERVES     CN III, IV, VI   Pupils are equal, round, and reactive to light.       Significant Labs: All pertinent labs within the past 24 hours have been reviewed.    Significant Imaging: I have reviewed all pertinent imaging results/findings within the past 24 hours.  Assessment/Plan:     * Non healing left heel wound  The patient underwent workup in ED including foot x-ray revealed a stable lucent focus involving the posterior aspect of the calcaneus, stable since examination 10/07/2024 and a CT w/c of left foot which is pending.   - 11/19/24 Procalcitonin 0.02,   - 11/19/24 CRP: 0.7 and SED 8. These have down trended since 11/11/24  - Wound culture from 11/11/24: Scant growth of Citrobacter Koseri susceptible to Bactrim   - MRI from 11/11/24: prominent marrow edema and marrow hyperenhancement of the calcaneal tuberosity and body, possibly but not definitive for osteomyelitis    Plan:   Consult podiatry, appreciate recs   Wound care consulted, appreciate recs   Continue with PO bactrim for now, consider switching to IV Abx after podiatry evaluation.   Pending CT of left foot         Status post Achilles tendon repair  s/p achilles repair July 2023. Reports recurrent infections since.   Please see left foot wound for further details.         VTE Risk Mitigation (From admission, onward)           Ordered     enoxaparin injection 40 mg  Daily         11/19/24 0810                    Smith Ward MD  Department of Hospital Medicine  [facility] - Emergency Dept

## 2024-11-19 NOTE — ED PROVIDER NOTES
Encounter Date: 11/19/2024       History     Chief Complaint   Patient presents with    Referral     Patient presents to the ED for possible osteomyelitis in his left foot. Patient was referred for evaluation of wound. Ambulatory to triage, NAD noted.     29-year-old healthy male presents to the emergency department with a concern of osteomyelitis of the left heel.  Patient had a Achilles heel injury proximally 1 year ago during a basketball game.  That injury was surgically repaired on July 4th of last year.  Several months later the patient noticed some purulent material being expressed from the left lateral portion of the heel.  He was seen a doctor several times for this condition it was referred back to the original surgeon who did it was procedure.  However, he was never contacted by that surgeon and ultimately started developing some swelling of his left foot.  He was back to the emergency department a couple of weeks ago when he noticed the swelling it was prescribed oral Bactrim and referred to our facility for further evaluation and potential osteomyelitis.  Patient was denies any fever, chills, joint pain, muscle aches, or any other signs of bacteremia or systemic illness.  Patient was denies any pain or paresthesias with the foot.  He has no further concerns at this time.        Review of patient's allergies indicates:  No Known Allergies  History reviewed. No pertinent past medical history.  Past Surgical History:   Procedure Laterality Date    ANTERIOR CRUCIATE LIGAMENT REPAIR Right     2018    APPLICATION OF SPLINT Left 7/14/2023    Procedure: APPLICATION, SPLINT;  Surgeon: Lenore Pearl DPM;  [facility];  Service: Podiatry;  Laterality: Left;    REPAIR OF ACHILLES TENDON Left 7/14/2023    Procedure: REPAIR, TENDON, ACHILLES;  Surgeon: Lenore Pearl DPM;  [facility];  Service: Podiatry;  Laterality: Left;     No family history on file.  Social History     Tobacco Use    Smoking status:  Never    Smokeless tobacco: Never   Substance Use Topics    Alcohol use: Yes     Comment: rarely    Drug use: Yes     Types: Marijuana     Review of Systems    Physical Exam     Initial Vitals [11/19/24 1237]   BP Pulse Resp Temp SpO2   130/69 77 18 98.5 °F (36.9 °C) 99 %      MAP       --         Physical Exam    Vitals reviewed.  Constitutional: He appears well-developed and well-nourished. He is not diaphoretic. No distress.   HENT:   Head: Normocephalic and atraumatic.   Eyes: EOM are normal. Pupils are equal, round, and reactive to light.   Neck: Neck supple.   Normal range of motion.  Cardiovascular:  Normal rate, normal heart sounds and intact distal pulses.           Pulmonary/Chest: Breath sounds normal. No respiratory distress. He has no wheezes. He has no rhonchi. He has no rales. He exhibits no tenderness.   Abdominal: Abdomen is soft. He exhibits no distension. There is no abdominal tenderness. There is no rebound.   Musculoskeletal:         General: No tenderness or edema. Normal range of motion.      Cervical back: Normal range of motion and neck supple.      Comments: Weeping wounds in his left posterior and lateral heel.  Gentle pressure express some purulent material.  No tenderness to palpation.  No reduction in sensation.  No edema or significant discoloration noted.  Patient has full range of motion in his foot and Achilles     Neurological: He is alert and oriented to person, place, and time. He has normal strength. GCS score is 15. GCS eye subscore is 4. GCS verbal subscore is 5. GCS motor subscore is 6.   Skin: Skin is warm and dry.   Psychiatric: He has a normal mood and affect. His behavior is normal. Judgment and thought content normal.         ED Course   Procedures  Labs Reviewed   COMPREHENSIVE METABOLIC PANEL - Abnormal       Result Value    Sodium 141      Potassium 4.4      Chloride 105      CO2 24      Glucose 93      BUN 12      Creatinine 1.5 (*)     Calcium 9.8      Total Protein  8.6 (*)     Albumin 4.4      Total Bilirubin 0.3      Alkaline Phosphatase 59      AST 19      ALT 24      eGFR >60      Anion Gap 12     CULTURE, BLOOD   CULTURE, BLOOD   CBC W/ AUTO DIFFERENTIAL    WBC 6.24      RBC 4.97      Hemoglobin 14.4      Hematocrit 43.2      MCV 87      MCH 29.0      MCHC 33.3      RDW 13.5      Platelets 189      MPV 9.2      Immature Granulocytes 0.2      Gran # (ANC) 4.0      Immature Grans (Abs) 0.01      Lymph # 1.6      Mono # 0.5      Eos # 0.1      Baso # 0.05      nRBC 0      Gran % 63.6      Lymph % 25.8      Mono % 8.0      Eosinophil % 1.6      Basophil % 0.8      Differential Method Automated     SEDIMENTATION RATE    Sed Rate 8     C-REACTIVE PROTEIN    CRP 0.7     PROCALCITONIN    Procalcitonin 0.02       EKG Readings: (Independently Interpreted)   Normal sinus rhythm.  Normal axis.  Normal intervals.  No STEMI     ECG Results              EKG 12-lead (In process)        Collection Time Result Time QRS Duration OHS QTC Calculation    11/19/24 14:29:00 11/19/24 15:55:38 96 366                     In process by Interface, Lab In [facility] (11/19/24 15:55:47)                   Narrative:    Test Reason : M86.9,    Vent. Rate :  50 BPM     Atrial Rate :  50 BPM     P-R Int : 146 ms          QRS Dur :  96 ms      QT Int : 402 ms       P-R-T Axes :  50  52  54 degrees    QTcB Int : 366 ms    Sinus bradycardia  Septal infarct ,age undetermined  Abnormal ECG  No previous ECGs available    Referred By: AAAREFERRAL SELF           Confirmed By:                                   Imaging Results              CT Foot With Contrast Left (In process)                      X-Ray Foot Complete Left (In process)                      Medications   sodium chloride 0.9% flush 5 mL (has no administration in time range)   melatonin tablet 6 mg (has no administration in time range)   ondansetron injection 4 mg (has no administration in time range)   polyethylene glycol packet 17 g (has no  administration in time range)   senna-docusate 8.6-50 mg per tablet 1 tablet (has no administration in time range)   acetaminophen tablet 650 mg (has no administration in time range)   naloxone 0.4 mg/mL injection 0.02 mg (has no administration in time range)   enoxaparin injection 40 mg (has no administration in time range)   vancomycin - pharmacy to dose (has no administration in time range)   piperacillin-tazobactam (ZOSYN) 4.5 g in D5W 100 mL IVPB (MB+) (has no administration in time range)   vancomycin 1,500 mg in D5W 250 mL IVPB (admixture device) (has no administration in time range)   vancomycin (VANCOCIN) 1,000 mg in D5W 250 mL IVPB (admixture device) (1,000 mg Intravenous Trough Due As Scheduled Before Dose 11/21/24 0430)   iohexoL (OMNIPAQUE 350) injection 100 mL (100 mLs Intravenous Given 11/19/24 1555)     Medical Decision Making  29-year-old male with a concern for osteomyelitis    Differential includes but is not limited to local soft tissue infection, osteomyelitis, bacteremia    Patient has no clinical signs of bacteremia or sepsis.  There was some concern for osteomyelitis secondary to chronic soft tissue infection.  We will evaluate with a inflammatory markers and advanced imaging.    Patient was x-ray consistent with lytic lesions on calcaneus underlying the superficial infection.  It was presentation is consistent with osteomyelitis.  We have admitted the patient for further evaluation and MRI.    Amount and/or Complexity of Data Reviewed  Labs: ordered. Decision-making details documented in ED Course.  Radiology: ordered and independent interpretation performed. Decision-making details documented in ED Course.  ECG/medicine tests: ordered and independent interpretation performed.    Risk  OTC drugs.  Prescription drug management.               ED Course as of 11/19/24 1611   Tue Nov 19, 2024   1524 CBC auto differential  No leukocytosis [VC]   1524 Sedimentation rate  Within normal limits [VC]    1526 X-Ray Foot Complete Left  A concerning lytic lesion apparent on his calcaneus [VC]      ED Course User Index  [VC] Ananth Jurado MD                           Clinical Impression:  Final diagnoses:  [T14.8XXA, L08.9] Infected wound (Primary)          ED Disposition Condition    Observation                 Ananth Jurado MD  Resident  11/19/24 3190

## 2024-11-19 NOTE — FIRST PROVIDER EVALUATION
Emergency Department TeleTriage Encounter Note      CHIEF COMPLAINT    Chief Complaint   Patient presents with    Referral     Patient presents to the ED for possible osteomyelitis in his left foot. Patient was referred for evaluation of wound. Ambulatory to triage, NAD noted.       VITAL SIGNS   Initial Vitals [11/19/24 1237]   BP Pulse Resp Temp SpO2   130/69 77 18 98.5 °F (36.9 °C) 99 %      MAP       --            ALLERGIES    Review of patient's allergies indicates:  No Known Allergies    PROVIDER TRIAGE NOTE  Had MRI last week that showed possible osteomyelitis in the left foot. Instructed to come to the ED by his PCP. Has had no fever. Currently on PO antibiotics.     Limited physical exam via telehealth: The patient is awake, alert, answering questions appropriately and is not in respiratory distress.  As the Teletriage provider, I performed an initial assessment and ordered appropriate labs and imaging studies, if any, to facilitate the patient's care once placed in the ED. Once a room is available, care and a full evaluation will be completed by an alternate ED provider.  Any additional orders and the final disposition will be determined by that provider.  All imaging and labs will not be followed-up by the Teletriage Team, including myself.          ORDERS  Labs Reviewed - No data to display    ED Orders (720h ago, onward)      Start Ordered     Status Ordering Provider    11/19/24 1722 11/19/24 1322  POCT Venous Blood Gas (Lactate) #2  Once        Comments: This test should be used for VBGs.  If using this order for other tests (K, creatinine, HCT, PT/INR, lactate etc)  ONLY do so in the case of an emergency or rapid response.Notify Physician if: see parameters below.      Ordered REBECA UMAÑA.    11/19/24 1322 11/19/24 1322  ED Preference List Used to Initiate Sepsis Orders  Until discontinued         Ordered REBECA UMAÑA.    11/19/24 1322 11/19/24 1322  Blood culture x two cultures. Draw prior  to antibiotics.  Every 15 min      Comments: Aerobic and anaerobic     Order ID Start Status Ordering Provider   [id number] 11/19/24 1322 Ordered REBECA UMAÑA.   [id number] 11/19/24 1337 Ordered DALILADARREBECA A.       Ordered DARDARYULISAA A.    11/19/24 1322 11/19/24 1322  CBC auto differential  STAT         Ordered DALILADARYULISAA A.    11/19/24 1322 11/19/24 1322  Comprehensive metabolic panel  STAT         Ordered DALILADARYULISAA A.    11/19/24 1322 11/19/24 1322  Vital signs  Every 15 min        Comments: Every 15 minutes until SBP greater than 90 or MAP greater than 65, then every 30 minutes times one hour, then every one hour.    Ordered DALILADARYULISAA A.    11/19/24 1322 11/19/24 1322  Bed rest  Until discontinued         Ordered DALILADARYULISAA A.    11/19/24 1322 11/19/24 1322  Cardiac Monitoring - Adult  Continuous        Comments: Notify Physician If:    Ordered DALILADARYULISAA A.    11/19/24 1322 11/19/24 1322  Pulse Oximetry Continuous  Continuous         Ordered DALILADARYULISAA A.    11/19/24 1322 11/19/24 1322  Strict intake and output  Until discontinued         Ordered DALILADARYULISAA A.    11/19/24 1322 11/19/24 1322  Urinalysis, Reflex to Urine Culture Urine, Clean Catch  STAT         Ordered DALILADARYULISAA A.    11/19/24 1322 11/19/24 1322  Saline lock IV  Once         Ordered DALILADARYULISAA A.    11/19/24 1322 11/19/24 1322  EKG 12-lead  Once         Ordered DARDARYULISAA A.    11/19/24 1322 11/19/24 1322  POCT Venous Blood Gas (Lactate) #1  Once        Comments: This test should be used for VBGs.  If using this order for other tests (K, creatinine, HCT, PT/INR, lactate etc)  ONLY do so in the case of an emergency or rapid response.Notify Physician if: see parameters below.      Ordered REBECA UMAÑA A.              Virtual Visit Note: The provider triage portion of this emergency department evaluation and documentation was performed via [employer], a HIPAA-compliant telemedicine  application, in concert with a tele-presenter in the room. A face to face patient evaluation with one of my colleagues will occur once the patient is placed in an emergency department room.      DISCLAIMER: This note was prepared with [email or web address] voice recognition transcription software. Garbled syntax, mangled pronouns, and other bizarre constructions may be attributed to that software system.

## 2024-11-20 VITALS
TEMPERATURE: 100 F | BODY MASS INDEX: 20 KG/M2 | HEIGHT: 76 IN | DIASTOLIC BLOOD PRESSURE: 74 MMHG | WEIGHT: 164.25 LBS | SYSTOLIC BLOOD PRESSURE: 129 MMHG | OXYGEN SATURATION: 99 % | HEART RATE: 71 BPM | RESPIRATION RATE: 20 BRPM

## 2024-11-20 PROBLEM — M86.9 OSTEOMYELITIS: Status: ACTIVE | Noted: 2024-11-20

## 2024-11-20 LAB
ALBUMIN SERPL BCP-MCNC: 3.8 G/DL (ref 3.5–5.2)
ALP SERPL-CCNC: 53 U/L (ref 40–150)
ALT SERPL W/O P-5'-P-CCNC: 21 U/L (ref 10–44)
ANION GAP SERPL CALC-SCNC: 8 MMOL/L (ref 8–16)
AST SERPL-CCNC: 16 U/L (ref 10–40)
BASOPHILS # BLD AUTO: 0.05 K/UL (ref 0–0.2)
BASOPHILS NFR BLD: 0.6 % (ref 0–1.9)
BILIRUB SERPL-MCNC: 0.5 MG/DL (ref 0.1–1)
BILIRUB UR QL STRIP: NEGATIVE
BUN SERPL-MCNC: 12 MG/DL (ref 6–20)
CALCIUM SERPL-MCNC: 9.2 MG/DL (ref 8.7–10.5)
CHLORIDE SERPL-SCNC: 107 MMOL/L (ref 95–110)
CLARITY UR: CLEAR
CO2 SERPL-SCNC: 23 MMOL/L (ref 23–29)
COLOR UR: COLORLESS
CREAT SERPL-MCNC: 1.7 MG/DL (ref 0.5–1.4)
DIFFERENTIAL METHOD BLD: ABNORMAL
EOSINOPHIL # BLD AUTO: 0.1 K/UL (ref 0–0.5)
EOSINOPHIL NFR BLD: 1.6 % (ref 0–8)
ERYTHROCYTE [DISTWIDTH] IN BLOOD BY AUTOMATED COUNT: 13.3 % (ref 11.5–14.5)
EST. GFR  (NO RACE VARIABLE): 55 ML/MIN/1.73 M^2
GLUCOSE SERPL-MCNC: 96 MG/DL (ref 70–110)
GLUCOSE UR QL STRIP: NEGATIVE
GRAM STN SPEC: NORMAL
GRAM STN SPEC: NORMAL
HCT VFR BLD AUTO: 40.4 % (ref 40–54)
HGB BLD-MCNC: 13.5 G/DL (ref 14–18)
HGB UR QL STRIP: NEGATIVE
IMM GRANULOCYTES # BLD AUTO: 0.01 K/UL (ref 0–0.04)
IMM GRANULOCYTES NFR BLD AUTO: 0.1 % (ref 0–0.5)
KETONES UR QL STRIP: NEGATIVE
LEUKOCYTE ESTERASE UR QL STRIP: NEGATIVE
LYMPHOCYTES # BLD AUTO: 1.5 K/UL (ref 1–4.8)
LYMPHOCYTES NFR BLD: 18.8 % (ref 18–48)
MAGNESIUM SERPL-MCNC: 1.8 MG/DL (ref 1.6–2.6)
MCH RBC QN AUTO: 28.7 PG (ref 27–31)
MCHC RBC AUTO-ENTMCNC: 33.4 G/DL (ref 32–36)
MCV RBC AUTO: 86 FL (ref 82–98)
MONOCYTES # BLD AUTO: 1 K/UL (ref 0.3–1)
MONOCYTES NFR BLD: 12 % (ref 4–15)
NEUTROPHILS # BLD AUTO: 5.3 K/UL (ref 1.8–7.7)
NEUTROPHILS NFR BLD: 66.9 % (ref 38–73)
NITRITE UR QL STRIP: NEGATIVE
NRBC BLD-RTO: 0 /100 WBC
OHS QRS DURATION: 96 MS
OHS QTC CALCULATION: 366 MS
PH UR STRIP: 7 [PH] (ref 5–8)
PHOSPHATE SERPL-MCNC: 4 MG/DL (ref 2.7–4.5)
PLATELET # BLD AUTO: 187 K/UL (ref 150–450)
PMV BLD AUTO: 9.6 FL (ref 9.2–12.9)
POTASSIUM SERPL-SCNC: 4.4 MMOL/L (ref 3.5–5.1)
PROT SERPL-MCNC: 7.7 G/DL (ref 6–8.4)
PROT UR QL STRIP: NEGATIVE
RBC # BLD AUTO: 4.71 M/UL (ref 4.6–6.2)
SODIUM SERPL-SCNC: 138 MMOL/L (ref 136–145)
SP GR UR STRIP: 1.01 (ref 1–1.03)
URN SPEC COLLECT METH UR: ABNORMAL
UROBILINOGEN UR STRIP-ACNC: NEGATIVE EU/DL
WBC # BLD AUTO: 7.98 K/UL (ref 3.9–12.7)

## 2024-11-20 PROCEDURE — 99214 OFFICE O/P EST MOD 30 MIN: CPT | Mod: GC,,, | Performed by: PODIATRIST

## 2024-11-20 PROCEDURE — 85025 COMPLETE CBC W/AUTO DIFF WBC: CPT

## 2024-11-20 PROCEDURE — 81003 URINALYSIS AUTO W/O SCOPE: CPT

## 2024-11-20 PROCEDURE — 36415 COLL VENOUS BLD VENIPUNCTURE: CPT

## 2024-11-20 PROCEDURE — 84100 ASSAY OF PHOSPHORUS: CPT

## 2024-11-20 PROCEDURE — 25000003 PHARM REV CODE 250

## 2024-11-20 PROCEDURE — G0378 HOSPITAL OBSERVATION PER HR: HCPCS

## 2024-11-20 PROCEDURE — 80053 COMPREHEN METABOLIC PANEL: CPT

## 2024-11-20 PROCEDURE — 83735 ASSAY OF MAGNESIUM: CPT

## 2024-11-20 RX ORDER — SULFAMETHOXAZOLE AND TRIMETHOPRIM 800; 160 MG/1; MG/1
2 TABLET ORAL 2 TIMES DAILY
Qty: 56 TABLET | Refills: 0 | Status: SHIPPED | OUTPATIENT
Start: 2024-11-20 | End: 2024-11-25

## 2024-11-20 RX ORDER — SULFAMETHOXAZOLE AND TRIMETHOPRIM 800; 160 MG/1; MG/1
2 TABLET ORAL 2 TIMES DAILY
Status: DISCONTINUED | OUTPATIENT
Start: 2024-11-20 | End: 2024-11-20 | Stop reason: HOSPADM

## 2024-11-20 RX ORDER — DOCUSATE SODIUM 100 MG
200 CAPSULE ORAL
Status: DISCONTINUED | OUTPATIENT
Start: 2024-11-20 | End: 2024-11-20 | Stop reason: HOSPADM

## 2024-11-20 RX ADMIN — SULFAMETHOXAZOLE AND TRIMETHOPRIM 1 TABLET: 800; 160 TABLET ORAL at 08:11

## 2024-11-20 RX ADMIN — Medication 200 ML: at 08:11

## 2024-11-20 RX ADMIN — Medication 200 ML: at 11:11

## 2024-11-20 RX ADMIN — ACETAMINOPHEN 650 MG: 325 TABLET ORAL at 01:11

## 2024-11-20 RX ADMIN — Medication 200 ML: at 04:11

## 2024-11-20 NOTE — CONSULTS
General Infectious Diseases: Consult Note    Consult Requested By: Nahum Maravilla,*    Reason for Consult: osteomyelitis      IMPRESSION:  30yo man w/a history of Achilles tendon rupture (s/p L Achilles tendon repair on 7/14/2023, c/b chronic left heel/lateral wound with recurrent SSTI last in 10/2024 treated with kelfex for 5 days) who was admitted on 11/19/2024 with 10 days of worsening swelling, pain, erythema, and seropurulent drainage from a blistering left lateral ankle/heel ulceration with underlying calcaneal osteomyelitis (calcaneal tuberosity marrow edema and hyper-enhancement per 11/11/2024 MRI). He has undergone bone biopsy with GPC on gram stain, consistent with presumptive osteomyelitis (expect Staph or Strep species as etiology). Given improvement on TMP-SMX, suspect this agent may be efficacious for the cause. Patient would be a good candidate for oral therapy for osteomyelitis per OVIVA trial data and will follow-up cultures as an outpatient to determine final regimen.    - would discharge on TMP-SMX 2 DS tabs PO q12h (patient aware of dose increase to achieve 4mg/kg q12h of TMP)  - await pending bone cx as an outpatient to tailor 6wk course for osteomyelitis (ideally, will be able to treat with oral agents alone but will not be certain until culture results can be reviewed)  - would arrange appointment in primary care clinic on Monday to review culture results and settle on final antibiotics plan  - I may be reached via chat or on my cell (number below) to discuss final antibiotics plan on Monday  - patient aware of need for 6wks of antibiotics  - wound care and any activity restrictions per podiatry    Thanks for the consult. ID will sign off given patient discharge today.    Margy Johnson MD  ID Attending  [phone]    SUBJECTIVE:     History of Present Illness:  Mr. Nascimento is a 30yo man w/a history of Achilles tendon rupture (s/p L Achilles tendon repair on 7/14/2023, c/b chronic left  heel/lateral wound with recurrent SSTI last in 10/2024 treated with kelfex for 5 days) who was admitted on 11/19/2024 with 10 days of worsening swelling, pain, erythema, and seropurulent drainage from a blistering left lateral ankle/heel ulceration with underlying calcaneal osteomyelitis (calcaneal tuberosity marrow edema and hyper-enhancement per 11/11/2024 MRI). Patient denies systemic symptoms including F/C/S or N/V. He was prescribed TMP-SMX in primary care clinic with some improvement prior to admission for further evaluation given his MRI findings. He was afebrile on arrival without leukocytosis or elevated inflammatory markers (CRP 0.7, ESR 8). Bone biopsy was performed yesterday (in setting of recent TMP-SMX use) that revealed GPC on stain. ID has been consulted to assist in additional care of presumptive osteomyelitis. Patient reports no issues with taking TMP-SMX as an outpatient.    Past Medical History:  History reviewed. No pertinent past medical history.    Past Surgical History:  Past Surgical History:   Procedure Laterality Date    ANTERIOR CRUCIATE LIGAMENT REPAIR Right     2018    APPLICATION OF SPLINT Left 7/14/2023    Procedure: APPLICATION, SPLINT;  Surgeon: Lenore Pearl DPM;  Location: [facility] OR;  Service: Podiatry;  Laterality: Left;    REPAIR OF ACHILLES TENDON Left 7/14/2023    Procedure: REPAIR, TENDON, ACHILLES;  Surgeon: Lenore Pearl DPM;  Location: [facility] OR;  Service: Podiatry;  Laterality: Left;       Family History:  No MSK diseases.    Social History:  Social History     Tobacco Use    Smoking status: Never    Smokeless tobacco: Never   Substance Use Topics    Alcohol use: Yes     Comment: rarely    Drug use: Yes     Types: Marijuana   Athlete -- plays basketball.    Allergies:  Review of patient's allergies indicates:  No Known Allergies     Pertinent Medications:  Antibiotics (From admission, onward)      Start     Stop Route Frequency Ordered    11/19/24 2100   sulfamethoxazole-trimethoprim 800-160mg per tablet 1 tablet         -- Oral 2 times daily 11/19/24 6736              Review of Symptoms:  Constitutional: Denies fevers, weight loss, chills, or weakness.  Eyes: Denies changes in vision.  ENT: Denies dysphagia, nasal discharge, ear pain or discharge.  Cardiovascular: Denies chest pain, palpitations, orthopnea, or claudication.  Respiratory: Denies shortness of breath, cough, hemoptysis, or wheezing.  GI: Denies nausea/vomiting, hematochezia, melena, abd pain, or changes in appetite.  : Denies dysuria, incontinence, or hematuria.  Musculoskeletal: Denies joint pain or myalgias.  Skin/breast: as per HPI.  Neurologic: Denies headache, dizziness, vertigo, or paresthesias.  Psychiatric: Denies changes in mood or hallucinations.  Endocrine: Denies polyuria, polydipsia, heat/cold intolerance.  Hematologic/Lymph: Denies lymphadenopathy, easy bruising or easy bleeding.  Allergic/Immunologic: Denies rash, rhinitis.     OBJECTIVE:     Vital Signs (Most Recent)  Temp: 99.6 °F (37.6 °C) (11/20/24 1135)  Pulse: 69 (11/20/24 1135)  Resp: 20 (11/20/24 1135)  BP: 118/69 (11/20/24 1135)  SpO2: 99 % (11/20/24 1135)    Temperature Range Min/Max (Last 24H):  Temp:  [97.7 °F (36.5 °C)-99.6 °F (37.6 °C)]     Physical Exam:  Gen: Nontoxic, comfortable, no acute distress.    HEENT: PERRL. No scleral icterus. EOMI intact. No sinus tenderness. OP clear.  Pulmonary: Non labored. Clear to auscultation A/P/L. No wheezing, crackles, or rhonchi.   Cardiac: Normal S1 & S2 w/o rubs/murmurs/gallops.   Abdomen: Normal bowel sounds. Soft, nontender, nondistended. No rebound or guarding. No hepatosplenomegaly.  Extremities: No clubbing, cyanosis, or peripheral edema. Distal pulses symmetric..  Lymphatics: no preauricular, cervical, supraclavicular, or axillary LAD.  Musculoskeletal: no joint deformities or effusions.  Neurological:  Alert and oriented.  CN II-XII grossly intact. Gross motor intact x4.  Sensation grossly intact.  Skin: mildly macerated tissue with redness and swelling of left heel, serous drainage scant; negative probe to bone.      Laboratory:  CBC:   Lab Results   Component Value Date    WBC 7.98 11/20/2024    HGB 13.5 (L) 11/20/2024    HCT 40.4 11/20/2024    MCV 86 11/20/2024     11/20/2024       BMP:   Recent Labs   Lab 11/20/24  0549   GLU 96      K 4.4      CO2 23   BUN 12   CREATININE 1.7*   CALCIUM 9.2   MG 1.8       LFT:   Lab Results   Component Value Date    ALT 21 11/20/2024    AST 16 11/20/2024    ALKPHOS 53 11/20/2024    BILITOT 0.5 11/20/2024       Microbiology:  11/19 blood cx NGTD  11/19 bone cx: GPC on stain, cx pending      Diagnostic Results: personally reviewed and interpreted.  CT left foot:  Similar findings as previous MRI with bony erosions surrounding the lateral fixation screw of Achilles tendon repair.  This may represent infection or loosening of the screw.     Enlargement of the Achilles tendon.  This may be postoperative scarring as no intrinsic abnormal enhancement fluid or abnormal signal on the previous MRI to suggest inflammatory or infectious tendinitis.     Mild surrounding para tenon edema.  Correlate for inflammatory versus infectious process with no drainable abscess evident.     No evidence of fracture or other sites of bone erosion to suggest infection.     MRI left foot:  Intact Achilles tendon repair. Prominent marrow edema and marrow hyperenhancement of the calcaneal tuberosity and body, possibly but not definitive for osteomyelitis. Inflammatory change and edema anterior to the distal Achilles tendon compatible with patient's history of cellulitis. No evidence of abscess.     ASSESSMENT/PLAN:     Active Hospital Problems    Diagnosis  POA    *Non healing left heel wound [S91.302A]  Unknown    Status post Achilles tendon repair [Z98.890]  Not Applicable      Resolved Hospital Problems   No resolved problems to display.       Plan:  Please see top of page

## 2024-11-20 NOTE — DISCHARGE SUMMARY
[facility] Medicine  Discharge Summary      Patient Name: Alonzo Nascimento Jr.  MRN: [id number]  MAAME: [id number]  Patient Class: OP- Observation  Admission Date: 11/19/2024  Hospital Length of Stay: 0 days  Discharge Date and Time:  11/20/2024 3:06 PM  Attending Physician: Nahum Maravilla,*   Discharging Provider: Smith Ward MD  Primary Care Provider: Sohan Strange MD    Primary Care Team: Networked reference to record PCT     HPI:   29-year-old w/ no pertinent PMHx who presents to the ED after his PCP advised him to go do to concerns of possible osteomyelitis of Left ankle after recent MRI (11/11/24) showed prominent marrow edema and marrow hyperenhancement of the calcaneal tuberosity and body, possibly but not definitive for osteomyelitis. Patient denies any fever/chills, SOB, chest pain, nausea, vomiting, diarrhea, constipation or pain. Denies recent trauma, fall, injury. Denies known PMHx of DM, HTN. Has been tolerating PO without any issues. Compliant with medications including his Bactrim that was prescribed 11/11/24. At baseline patient AAOx3 and lives at home. Denies any alcohol use, tobacco use. Admits to marihuana use daily.     Of note, patient s/p achilles repair July 2023. Reports recurrent infections since then with recent ED visit 10/7 with Xray completed and discharged with 5 day keflex prescription that patient reports completed full course as well as topical abx ointment. Reports minimal drainage, swelling, erythema.      In the ED, initial vitals /69, HR 53, Temp 98.5F, SpO2 99 on room air. CBC unremarkable. CMP noticible for Cr 1.5 (Cr baseline 1.4). Procalcitonin 0.02, CRP: 0.7 and SED 8. The patient underwent workup including foot x-ray revealed a stable lucent focus involving the posterior aspect of the calcaneus, stable since examination 10/07/2024 and a CT w/c of left foot which is pending. Patient received no medications in the ED. [facility] consulted for evaluation non healing wound with concerns for osteomyelitis.          * No surgery found *      Hospital Course:   29yr old admitted for non healing wound of Left ankle with concerns for osteomyelitis. Patient underwent Wound culture on 11/11/24: Scant growth of Citrobacter Koseri which was susceptible to Bactrim DS 1 tab BID. However, MRI from 11/11/24 revealed prominent marrow edema and marrow hyperenhancement of the calcaneal tuberosity and body, possibly but not definitive for osteomyelitis. Bactrim DS has shown clinical improvement. Patient asymptomatic and afebrile with no signs of sepsis. VSS through admission. Podiatry was consulted and bone biopsy done 11/19/24 (NGTD). ID consulted for recs on antibiotic regimen who agreed on TMP-SMX but would increase dosage to 2 DS tabs q12h (as this should be closer to 4mg/kg PO q12h for osteomyelitis).     On the day of discharge patient was back to baseline and hemodynamically stable. He was sent home to resume home meds (as below). Bactrim regimen modified to 2 DS tabs q12h x 14 days. Close outpatient f/u was advised with  Podiatry clinic with Dr Garcia and to re-dress 2-3 times a week as follows: Rinse L heel wound with Vashe pat dry. Dress with Hydrofera blue, boot, and ACE wrap until follow-up appointment. Patient will f/u with [facility] clinic on 11/25/24 for recs on final cultures and antibiotic regimen modifications if needed (ok to contact ID Dr. Margy Johnson via chat or at [phone]). The importance of medication adherence was again stressed to the patient. Patient agreeable to discharge plan, expressed understanding, all questions answered, return precautions were discussed.     Physical Exam  Constitutional:       General: He is not in acute distress.     Appearance: Normal appearance. He is not ill-appearing.   HENT:      Head: Normocephalic and atraumatic.      Nose: Nose normal. No congestion or rhinorrhea.      Mouth/Throat:       Mouth: Mucous membranes are moist.   Eyes:      Pupils: Pupils are equal, round, and reactive to light.   Cardiovascular:      Rate and Rhythm: Normal rate and regular rhythm.      Pulses: Normal pulses.      Heart sounds: Normal heart sounds. No murmur heard.  Pulmonary:      Effort: Pulmonary effort is normal. No respiratory distress.      Breath sounds: Normal breath sounds. No wheezing or rales.   Abdominal:      General: Abdomen is flat. Bowel sounds are normal. There is no distension.      Palpations: Abdomen is soft.      Tenderness: There is no abdominal tenderness.   Musculoskeletal:         General: Normal range of motion.      Cervical back: Normal range of motion and neck supple.      Right lower leg: No edema.      Left lower leg: No edema.   Feet:      Left foot:      Skin integrity: Skin breakdown and dry skin present.      Comments: Non-healing wound on lateral/posterior aspect of left ankle (currently dressed)  Skin:     General: Skin is warm and dry.   Neurological:      Mental Status: He is alert and oriented to person, place, and time. Mental status is at baseline.      Cranial Nerves: No cranial nerve deficit.      Sensory: No sensory deficit.      Motor: No weakness.      Coordination: Coordination normal.      Gait: Gait normal.   Psychiatric:         Mood and Affect: Mood normal.    Goals of Care Treatment Preferences:  Code Status: Full Code    SDOH Screening:  The patient was screened for food insecurity, housing instability, transportation needs, utility difficulties, and interpersonal safety. The social determinant(s) of health identified as a concern this admission are:  Transportation difficulties    Social Drivers of Health with Concerns     Transportation Needs: Unmet Transportation Needs (11/19/2024)        Consults:   Consults (From admission, onward)          Status Ordering Provider     Inpatient consult to Infectious Diseases  Once        Provider:  (Not yet assigned)     Completed SOHAN STRANGE     Inpatient consult to Podiatry  Once        Provider:  (Not yet assigned)    Completed SIMON BARKER            No new Assessment & Plan notes have been filed under this hospital service since the last note was generated.  Service: Hospital Medicine    Final Active Diagnoses:    Diagnosis Date Noted POA    PRINCIPAL PROBLEM:  Osteomyelitis [M86.9] 11/20/2024 Yes    Non healing left heel wound [S91.302A] 11/19/2024 Unknown    Status post Achilles tendon repair [Z98.890] 08/09/2023 Not Applicable      Problems Resolved During this Admission:       Discharged Condition: good    Disposition: Home or Self Care    Follow Up:   Follow-up Information       Sohan Strange MD Follow up on 11/25/2024.    Specialty: Family Medicine  Contact information:  [address], [address] [address] [phone]                           Patient Instructions:      Ambulatory referral/consult to Podiatry   Standing Status: Future   Referral Priority: Routine Referral Type: Consultation   Referral Reason: Specialty Services Required   Requested Specialty: Podiatry   Number of Visits Requested: 1     Diet Adult Regular     Diet renal     Notify your health care provider if you experience any of the following:  temperature >100.4     Notify your health care provider if you experience any of the following:  persistent nausea and vomiting or diarrhea     Notify your health care provider if you experience any of the following:  severe uncontrolled pain     Notify your health care provider if you experience any of the following:  redness, tenderness, or signs of infection (pain, swelling, redness, odor or green/yellow discharge around incision site)     Notify your health care provider if you experience any of the following:  worsening rash     Notify your health care provider if you experience any of the following:  increased confusion or weakness     Notify your health care provider if you  experience any of the following:  persistent dizziness, light-headedness, or visual disturbances     Change dressing (specify)   Order Comments: Rinse wound with vashe (wound solution) and pat dry. Pack wound with iodoform, followed by gauze on top, kirlex, and ACE wrap.     Activity as tolerated       Significant Diagnostic Studies: N/A    Pending Diagnostic Studies:       Procedure Component Value Units Date/Time    Specimen to Pathology, Surgery Orthopedics [[id number]] Collected: 11/19/24 1826    Order Status: Sent Lab Status: In process Updated: 11/20/24 6301    Specimen: Tissue            Medications:  Reconciled Home Medications:      Medication List        CHANGE how you take these medications      sulfamethoxazole-trimethoprim 800-160mg 800-160 mg Tab  Commonly known as: BACTRIM DS  Take 2 tablets by mouth 2 (two) times daily. for 14 days  What changed: how much to take              Indwelling Lines/Drains at time of discharge:   Lines/Drains/Airways       None                   Time spent on the discharge of patient: 30 minutes         Smith Ward MD  Department of Hospital Medicine  [facility] S[facility]

## 2024-11-20 NOTE — NURSING
Patient being discharged home. IV removed without complication and catheter tip intact. Patient belongings accounted for. AVS reviewed with virtual nurse.

## 2024-11-20 NOTE — PLAN OF CARE
"CM met with pt - is AAO x 3; confirmed demographics   lives with his mother Talisha  [phone] -   dx:  infected L leg wound   Bone bx done - results are pending   pm:  "patient s/p achilles repair July 2023. Reports recurrent infections since then"  Pt for d/c to home today - will f/u with pcp Monday am   Sister will transport pt to home      11/20/24 1618   Discharge Planning   Assessment Type Discharge Planning Brief Assessment   Resource/Environmental Concerns none   Support Systems Parent  (Pantera Woodall )   Equipment Currently Used at Home crutches   Current Living Arrangements apartment   Patient/Family Anticipates Transition to home;home with family   Patient/Family Anticipated Services at Transition none   DME Needed Upon Discharge  none   Discharge Plan A Home;Home with family   Discharge Plan B Home with family;Home       "

## 2024-11-20 NOTE — PLAN OF CARE
Pt for d/c to home today   Will f/u with [facility]  Mon 11/25 - apt made by Jessi Mera MA with [facility]     Future Appointments   Date Time Provider Department Center   11/25/2024  9:00 AM Kapil Burgess MD [facility]     Pt may need IV abx -- cx are pending   [facility] with [facility] is aware of this pt - a new referral will need to be placed if pt needs IV abx      11/20/24 6086   Final Note   Assessment Type Final Discharge Note   Anticipated Discharge Disposition Home   What phone number can be called within the next 1-3 days to see how you are doing after discharge? [phone]   Post-Acute Status   Post-Acute Authorization Other   Other Status No Post-Acute Service Needs   Discharge Delays None known at this time

## 2024-11-20 NOTE — PLAN OF CARE
Problem: Adult Inpatient Plan of Care  Goal: Plan of Care Review  Outcome: Progressing  Goal: Absence of Hospital-Acquired Illness or Injury  Outcome: Progressing  Goal: Optimal Comfort and Wellbeing  Outcome: Progressing     Problem: Pain Acute  Goal: Optimal Pain Control and Function  Outcome: Progressing     Problem: Skin or Soft Tissue Infection  Goal: Absence of Infection Signs and Symptoms  Outcome: Progressing     Problem: Wound  Goal: Optimal Coping  Outcome: Progressing  Goal: Optimal Functional Ability  Outcome: Progressing  Goal: Absence of Infection Signs and Symptoms  Outcome: Progressing  Goal: Improved Oral Intake  Outcome: Progressing  Goal: Optimal Pain Control and Function  Outcome: Progressing  Goal: Skin Health and Integrity  Outcome: Progressing  Goal: Optimal Wound Healing  Outcome: Progressing     Pt AAOx4. One-time dose Toradol and PRN Tylenol given for c/o pain. No N/V. Medications administered per MAR. On RA. L foot dressing CDI. LLE elevated on pillow. Bed in lowest position with wheels locked and side rails raised x2. Encouraged to call for assistance.

## 2024-11-20 NOTE — HOSPITAL COURSE
29yr old admitted for non healing wound of Left ankle with concerns for osteomyelitis. Patient underwent Wound culture on 11/11/24: Scant growth of Citrobacter Koseri which was susceptible to Bactrim DS 1 tab BID. However, MRI from 11/11/24 revealed prominent marrow edema and marrow hyperenhancement of the calcaneal tuberosity and body, possibly but not definitive for osteomyelitis. Bactrim DS has shown clinical improvement. Patient asymptomatic and afebrile with no signs of sepsis. VSS through admission. Podiatry was consulted and bone biopsy done 11/19/24 (NGTD). ID consulted for recs on antibiotic regimen who agreed on TMP-SMX but would increase dosage to 2 DS tabs q12h (as this should be closer to 4mg/kg PO q12h for osteomyelitis).     On the day of discharge patient was back to baseline and hemodynamically stable. He was sent home to resume home meds (as below). Bactrim regimen modified to 2 DS tabs q12h x 14 days. Close outpatient f/u was advised with  Podiatry clinic with Dr Garcia and to re-dress 2-3 times a week as follows: Rinse L heel wound with Vashe pat dry. Dress with Hydrofera blue, boot, and ACE wrap until follow-up appointment. Patient will f/u with [facility] clinic on 11/25/24 for recs on final cultures and antibiotic regimen modifications if needed (ok to contact ID Dr. Margy Johnson via chat or at [phone]). The importance of medication adherence was again stressed to the patient. Patient agreeable to discharge plan, expressed understanding, all questions answered, return precautions were discussed.

## 2024-11-20 NOTE — CONSULTS
[facility] - Emergency Dept  Podiatry  Consult Note    Patient Name: Alonzo Nascimento Jr.  MRN: [id number]  Admission Date: 11/19/2024  Hospital Length of Stay: 0 days  Attending Physician: Nahum Maravilla,*  Primary Care Provider: Sohan Strange MD     Inpatient consult to Podiatry  Consult performed by: Liv Gonzalez DPM  Consult ordered by: Smith Ward MD  Reason for consult: see below        Subjective:     History of Present Illness:  29M with no PMHx who presented to the ED after his PCP advised him to go due to concerns of possible OM to L heel. Podiatry was consulted for non healing L heel wound. Patient had achilles tendon repair w/ Dr Pearl on 7/14/23. Patient says wound to his heel has been a chronic problem. Per chart review, patient reports recurrent infections since surgery with recent ED visit on 10/7 and d/c w/ 5 day keflex prescription, that the reportedly completed as well as topical abx ointment. Reports minimal drainage, swelling, erythema from his wound. Denies any recent trauma to his L foot. Denies f/c/n/v. Denies SOB or chest pain. Denies any other pedal complaints.     VSS, afebrile, WBC WNL. MRI L ankle 11/19 shows marrow edema and marrow hyperenhancement of the calcaneal tuberosity and body, possibly but not definitive for OM. XR L foot unremarkable. CT L ankle shows similar findings to MRI.    Scheduled Meds:   enoxparin  40 mg Subcutaneous Daily    sulfamethoxazole-trimethoprim 800-160mg  1 tablet Oral BID     Continuous Infusions:  PRN Meds:  Current Facility-Administered Medications:     acetaminophen, 650 mg, Oral, Q4H PRN    melatonin, 6 mg, Oral, Nightly PRN    naloxone, 0.02 mg, Intravenous, PRN    ondansetron, 4 mg, Intravenous, Q8H PRN    polyethylene glycol, 17 g, Oral, Daily PRN    senna-docusate 8.6-50 mg, 1 tablet, Oral, BID PRN    sodium chloride 0.9%, 5 mL, Intravenous, PRN    Review of patient's allergies indicates:  No Known Allergies     History reviewed. No  pertinent past medical history.  Past Surgical History:   Procedure Laterality Date    ANTERIOR CRUCIATE LIGAMENT REPAIR Right     2018    APPLICATION OF SPLINT Left 7/14/2023    Procedure: APPLICATION, SPLINT;  Surgeon: Lenore Pearl DPM;  Location: [facility];  Service: Podiatry;  Laterality: Left;    REPAIR OF ACHILLES TENDON Left 7/14/2023    Procedure: REPAIR, TENDON, ACHILLES;  Surgeon: Lenore Pearl DPM;  Location: [facility] OR;  Service: Podiatry;  Laterality: Left;       Family History    None       Tobacco Use    Smoking status: Never    Smokeless tobacco: Never   Substance and Sexual Activity    Alcohol use: Yes     Comment: rarely    Drug use: Yes     Types: Marijuana    Sexual activity: Yes     Partners: Female     Review of Systems   Constitutional:  Negative for fatigue and fever.   HENT: Negative.     Eyes: Negative.    Respiratory: Negative.     Cardiovascular: Negative.    Gastrointestinal: Negative.    Endocrine: Negative.    Genitourinary: Negative.    Skin:  Positive for wound.        Reports wound to L heel   Allergic/Immunologic: Negative.    Hematological: Negative.    Psychiatric/Behavioral: Negative.       Objective:     Vital Signs (Most Recent):  Temp: 98.5 °F (36.9 °C) (11/19/24 1237)  Pulse: (!) 53 (11/19/24 1502)  Resp: (!) 21 (11/19/24 1502)  BP: (!) 142/83 (11/19/24 1502)  SpO2: 99 % (11/19/24 1502) Vital Signs (24h Range):  Temp:  [98.5 °F (36.9 °C)] 98.5 °F (36.9 °C)  Pulse:  [53-77] 53  Resp:  [18-21] 21  SpO2:  [99 %] 99 %  BP: (130-142)/(69-83) 142/83     Weight: 74.8 kg (165 lb)  Body mass index is 20.08 kg/m².    Foot Exam    Left Foot/Ankle      Inspection and Palpation  Ecchymosis: none  Tenderness: bony tenderness   Swelling: (Swelling surrounding L heel wound)  Skin Exam: drainage, dry skin, maceration, cellulitis, skin changes, abnormal color, ulcer and erythema;     Neurovascular  Dorsalis pedis: 2+  Posterior tibial: 2+  Saphenous nerve sensation: normal  Tibial nerve  sensation: normal  Superficial peroneal nerve sensation: normal  Deep peroneal nerve sensation: normal  Sural nerve sensation: normal    Comments  L foot: Wound note to lateral heel. Masceration, erythema, swelling, and scant drainage noted. Negative probe to bone. No crepitus, malodor, or fluctuance noted. No other wounds noted. Palpable PT and DP pulse.     Laboratory:  BMP:   Recent Labs   Lab 11/19/24  1411   GLU 93      K 4.4      CO2 24   BUN 12   CREATININE 1.5*   CALCIUM 9.8     CBC:   Recent Labs   Lab 11/19/24  1411   WBC 6.24   RBC 4.97   HGB 14.4   HCT 43.2      MCV 87   MCH 29.0   MCHC 33.3     Diagnostic Results:  I have reviewed all pertinent imaging results/findings within the past 24 hours.    Clinical Findings:    Assessment/Plan:     Orthopedic  * Non healing left heel wound  Assesment  Wound note to lateral L heel. Masceration, erythema, swelling, and scant drainage noted. Negative probe to bone. No crepitus, malodor, or fluctuance noted. No other wounds noted. Palpable PT and DP pulses. XR L foot unremarkable. MRI L ankle 11/19 shows marrow edema and marrow hyperenhancement of the calcaneal tuberosity and body, possibly but not definitive for OM. CT L ankle shows similar findings to MRI. IDSA mild foot wound infection    Plan  -Physical exam and imaging findings discussed with the patient.  Discussed with the patient that imaging shows possible OM to L heel.  Due to nonhealing and chronic L heel wound that was not resolved with abx there is high suspicion of OM to L calcaneus.  -Podiatry recommends bone biopsy of L calcaneus, abx, and wound care.  Patient amenable to plan  -No surgical intervention from Podiatry perspective.  -Bone bx conducted to L calcaneaus. Full procedure note to follow  -Abx per Primary  -L foot dressed with Hydrofera blue, Kerlix, and ACE wrap  -Wound care orders to follow  -WBAT  -All other care per primary  -Podiatry will continue to follow    Future  Discharge Recommendations  -Patient to follow up in outpatient Podiatry clinic with Dr Garcia. Podiatry will schedule  -Antibiotics per Primary  -HH/SNF to do dressings 2-3 times a week as follows:  Rinse L heel wound with Vashe pat dry.  Dress with Hydrofera blue, boot, and ACE wrap  -WBAT  -Keep dressings clean, dry, and intact until follow-up appointment    Thank you for your consult. I will follow-up with patient. Please contact us if you have any additional questions.    Liv Gonzalez DPM  Podiatry  [place] - Emergency Dept

## 2024-11-20 NOTE — PLAN OF CARE
Reviewed AVS and discharge instructions with patient.Teaching done with teach back method on wound care, follow up visits, activity. Patient verbalized understanding to all instructions

## 2024-11-20 NOTE — ASSESSMENT & PLAN NOTE
The patient underwent workup in ED including foot x-ray revealed a stable lucent focus involving the posterior aspect of the calcaneus, stable since examination 10/07/2024 and a CT w/c of left foot which is pending.   - 11/19/24 Procalcitonin 0.02,   - 11/19/24 CRP: 0.7 and SED 8. These have down trended since 11/11/24  - Wound culture from 11/11/24: Scant growth of Citrobacter Koseri susceptible to Bactrim   - MRI from 11/11/24: prominent marrow edema and marrow hyperenhancement of the calcaneal tuberosity and body, possibly but not definitive for osteomyelitis  - CT left foot 11/19/24: Similar findings as previous MRI with bony erosions surrounding the lateral fixation screw of Achilles tendon repair.  This may represent infection or loosening of the screw.   - Bone Bx 11/19/24 (In process)    Plan:   Consult podiatry, appreciate recs   Wound care consulted, appreciate recs   Continue with PO bactrim for now, consider switching to IV Abx after final Bone culture review.

## 2024-11-20 NOTE — CONSULTS
Wound consult received for L foot wound- pt already assessed by podiatry with treatment plan in place.  Nurse reports no other wound/skin concerns.

## 2024-11-20 NOTE — ASSESSMENT & PLAN NOTE
Assesment  Wound note to lateral L heel. Masceration, erythema, swelling, and scant drainage noted. Negative probe to bone. No crepitus, malodor, or fluctuance noted. No other wounds noted. Palpable PT and DP pulses. XR L foot unremarkable. MRI L ankle 11/19 shows marrow edema and marrow hyperenhancement of the calcaneal tuberosity and body, possibly but not definitive for OM. CT L ankle shows similar findings to MRI. IDSA mild foot wound infection    Plan  -Physical exam and imaging findings discussed with the patient.  Discussed with the patient that imaging shows possible OM to L heel.  Due to nonhealing and chronic L heel wound that was not resolved with abx there is high suspicion of OM to L calcaneus.  -Podiatry recommends bone biopsy of L calcaneus, abx, and wound care.  Patient amenable to plan  -No surgical intervention from Podiatry perspective.  -Bone bx conducted to L calcaneaus. Full procedure note to follow  -Abx per Primary  -L foot dressed with Hydrofera blue, Kerlix, and ACE wrap  -Wound care orders to follow up  -WBAT  -All other care per primary  -Podiatry will continue to follow    Future Discharge Recommendations  -Patient to follow up in outpatient Podiatry clinic with Dr Garcia. Podiatry will schedule  -Antibiotics per Primary  -HH/SNF to do dressings 2-3 times a week as follows:  Rinse L heel wound with Vashe pat dry.  Dress with Hydrofera blue, boot, and ACE wrap  -WBAT  -Keep dressings clean, dry, and intact until follow-up appointment

## 2024-11-20 NOTE — HPI
29M with no PMHx who presented to the ED after his PCP advised him to go due to concerns of possible OM to L heel. Podiatry was consulted for non healing L heel wound. Patient had achilles tendon repair w/ Dr Pealr on 7/14/23. Patient says wound to his heel has been a chronic problem. Per chart review, patient reports recurrent infections since surgery with recent ED visit on 10/7 and d/c w/ 5 day keflex prescription, that the reportedly completed as well as topical abx ointment. Reports minimal drainage, swelling, erythema from his wound. Denies any recent trauma to his L foot. Denies f/c/n/v. Denies SOB or chest pain. Denies any other pedal complaints.     VSS, afebrile, WBC WNL. MRI L ankle 11/19 shows marrow edema and marrow hyperenhancement of the calcaneal tuberosity and body, possibly but not definitive for OM. XR L foot unremarkable. CT L ankle shows similar findings to MRI.

## 2024-11-20 NOTE — SUBJECTIVE & OBJECTIVE
Scheduled Meds:   enoxparin  40 mg Subcutaneous Daily    sulfamethoxazole-trimethoprim 800-160mg  1 tablet Oral BID     Continuous Infusions:  PRN Meds:  Current Facility-Administered Medications:     acetaminophen, 650 mg, Oral, Q4H PRN    melatonin, 6 mg, Oral, Nightly PRN    naloxone, 0.02 mg, Intravenous, PRN    ondansetron, 4 mg, Intravenous, Q8H PRN    polyethylene glycol, 17 g, Oral, Daily PRN    senna-docusate 8.6-50 mg, 1 tablet, Oral, BID PRN    sodium chloride 0.9%, 5 mL, Intravenous, PRN    Review of patient's allergies indicates:  No Known Allergies     History reviewed. No pertinent past medical history.  Past Surgical History:   Procedure Laterality Date    ANTERIOR CRUCIATE LIGAMENT REPAIR Right     2018    APPLICATION OF SPLINT Left 7/14/2023    Procedure: APPLICATION, SPLINT;  Surgeon: Lenore Pearl DPM;  Location: [facility] OR;  Service: Podiatry;  Laterality: Left;    REPAIR OF ACHILLES TENDON Left 7/14/2023    Procedure: REPAIR, TENDON, ACHILLES;  Surgeon: Lenore Pearl DPM;  Location: [facility] OR;  Service: Podiatry;  Laterality: Left;       Family History    None       Tobacco Use    Smoking status: Never    Smokeless tobacco: Never   Substance and Sexual Activity    Alcohol use: Yes     Comment: rarely    Drug use: Yes     Types: Marijuana    Sexual activity: Yes     Partners: Female     Review of Systems   Constitutional:  Negative for fatigue and fever.   HENT: Negative.     Eyes: Negative.    Respiratory: Negative.     Cardiovascular: Negative.    Gastrointestinal: Negative.    Endocrine: Negative.    Genitourinary: Negative.    Skin:  Positive for wound.        Reports wound to L heel   Allergic/Immunologic: Negative.    Hematological: Negative.    Psychiatric/Behavioral: Negative.       Objective:     Vital Signs (Most Recent):  Temp: 98.5 °F (36.9 °C) (11/19/24 1237)  Pulse: (!) 53 (11/19/24 1502)  Resp: (!) 21 (11/19/24 1502)  BP: (!) 142/83 (11/19/24 1502)  SpO2: 99 % (11/19/24 1502) Vital  Signs (24h Range):  Temp:  [98.5 °F (36.9 °C)] 98.5 °F (36.9 °C)  Pulse:  [53-77] 53  Resp:  [18-21] 21  SpO2:  [99 %] 99 %  BP: (130-142)/(69-83) 142/83     Weight: 74.8 kg (165 lb)  Body mass index is 20.08 kg/m².    Foot Exam    Left Foot/Ankle      Inspection and Palpation  Ecchymosis: none  Tenderness: bony tenderness   Swelling: (Swelling surrounding L heel wound)  Skin Exam: drainage, dry skin, maceration, cellulitis, skin changes, abnormal color, ulcer and erythema;     Neurovascular  Dorsalis pedis: 2+  Posterior tibial: 2+  Saphenous nerve sensation: normal  Tibial nerve sensation: normal  Superficial peroneal nerve sensation: normal  Deep peroneal nerve sensation: normal  Sural nerve sensation: normal    Comments  L foot: Wound note to lateral heel. Masceration, erythema, swelling, and scant drainage noted. Negative probe to bone. No crepitus, malodor, or fluctuance noted. No other wounds noted. Palpable PT and DP pulse.     Laboratory:  BMP:   Recent Labs   Lab 11/19/24  1411   GLU 93      K 4.4      CO2 24   BUN 12   CREATININE 1.5*   CALCIUM 9.8     CBC:   Recent Labs   Lab 11/19/24  1411   WBC 6.24   RBC 4.97   HGB 14.4   HCT 43.2      MCV 87   MCH 29.0   MCHC 33.3     Diagnostic Results:  I have reviewed all pertinent imaging results/findings within the past 24 hours.    Clinical Findings:

## 2024-11-20 NOTE — PROCEDURES
"Alonzo Nascimento Jr. is a 29 y.o. male patient.    Temp: 98.5 °F (36.9 °C) (11/19/24 1237)  Pulse: (!) 53 (11/19/24 1502)  Resp: (!) 21 (11/19/24 1502)  BP: (!) 142/83 (11/19/24 1502)  SpO2: 99 % (11/19/24 1502)  Weight: 74.8 kg (165 lb) (11/19/24 1237)  Height: 6' 4" (193 cm) (11/19/24 1237)       Bone marrow    Date/Time: 11/19/2024 7:41 PM    Performed by: Liv Gonzalez DPM  Authorized by: Jaswinder Garcia DPM    Consent Done?: Yes (Written)   Immediately prior to procedure a time out was called to verify the correct patient, procedure, equipment, support staff and site/side marked as required. .      Assistants?: Yes    List of assistants: Jaswinder Landa was present for the entire procedure.    Position: supine  Anesthesia: local infiltration  Local anesthetic: bupivacaine 0.5% without epinephrine and lidocaine 1% without epinephrine  Aspiration?: No    Biopsy?: Yes    Number of Specimens: 1  Estimated blood loss (cc):2  Patient tolerated the procedure well with no immediate complications.  Post-operative instructions were provided for the patient.    Bone biopsy conducted to L calcaneus using a Jamshidi trocar needle.  Patient tolerated procedure well.  Bone specimen sent to the lab for further evaluation.      11/19/2024    "

## 2024-11-21 ENCOUNTER — TELEPHONE (OUTPATIENT)
Dept: PODIATRY | Facility: CLINIC | Age: 29
End: 2024-11-21
Payer: MEDICAID

## 2024-11-21 NOTE — PROGRESS NOTES
[facility]  Podiatry  Progress Note    Patient Name: Alonzo Nascimento Jr.  MRN: [id number]  Admission Date: 11/19/2024  Hospital Length of Stay: 0 days  Attending Physician: No att. providers found  Primary Care Provider: Sohan Strange MD     Subjective:     Interval History:  No adverse events overnight.  VSS, afebrile, WBC WNL.  Dressings clean, dry, and intact.  Patient reports pain to his L heel that is tolerable with pain meds.  Denies fevers, chills, nausea, or vomiting.  Denies SOB or chest pain.  Denies any new pedal complaints.    Scheduled Meds:  Continuous Infusions:  PRN Meds:    Review of Systems   Constitutional:  Negative for fatigue and fever.   HENT: Negative.     Eyes: Negative.    Respiratory: Negative.     Cardiovascular: Negative.    Gastrointestinal: Negative.    Endocrine: Negative.    Genitourinary: Negative.    Skin:  Positive for wound.        Reports wound to L heel   Allergic/Immunologic: Negative.    Hematological: Negative.    Psychiatric/Behavioral: Negative.    Objective:     Vital Signs (Most Recent):  Temp: 99.8 °F (37.7 °C) (11/20/24 1548)  Pulse: 71 (11/20/24 1548)  Resp: 20 (11/20/24 1548)  BP: 129/74 (11/20/24 1548)  SpO2: 99 % (11/20/24 1548) Vital Signs (24h Range):  Temp:  [99.6 °F (37.6 °C)-99.8 °F (37.7 °C)] 99.8 °F (37.7 °C)  Pulse:  [69-71] 71  Resp:  [20] 20  SpO2:  [99 %] 99 %  BP: (118-129)/(69-74) 129/74     Weight: 74.5 kg (164 lb 3.9 oz)  Body mass index is 19.99 kg/m².    Foot Exam     Left Foot/Ankle    Inspection and Palpation  Ecchymosis: none  Tenderness: bony tenderness   Swelling: (Swelling surrounding L heel wound)  Skin Exam: drainage, dry skin, maceration, cellulitis, skin changes, abnormal color, ulcer and erythema;      Neurovascular  Dorsalis pedis: 2+  Posterior tibial: 2+  Saphenous nerve sensation: normal  Tibial nerve sensation: normal  Superficial peroneal nerve sensation: normal  Deep peroneal nerve sensation: normal  Sural nerve sensation:  normal     Comments  L foot: Wound note to lateral heel. Masceration, erythema, swelling, and scant drainage noted. Negative probe to bone. No crepitus, malodor, or fluctuance noted. No other wounds noted. Palpable PT and DP pulse.    10/20:  S/p L calcaneus bone biopsy.  Wound noted to lateral heel with scant sanguinous drainage.  Erythema and swelling still present    Laboratory:  BMP:   Recent Labs   Lab 11/20/24  0549   GLU 96      K 4.4      CO2 23   BUN 12   CREATININE 1.7*   CALCIUM 9.2   MG 1.8     CBC:   Recent Labs   Lab 11/20/24  0549   WBC 7.98   RBC 4.71   HGB 13.5*   HCT 40.4      MCV 86   MCH 28.7   MCHC 33.4     Diagnostic Results:  I have reviewed all pertinent imaging results/findings within the past 24 hours.    Clinical Findings:    Assessment/Plan:     Orthopedic  Non healing left heel wound  Assesment  Wound note to lateral L heel. Masceration, erythema, swelling, and scant drainage noted. Negative probe to bone. No crepitus, malodor, or fluctuance noted. No other wounds noted. Palpable PT and DP pulses. XR L foot unremarkable. MRI L ankle 11/19 shows marrow edema and marrow hyperenhancement of the calcaneal tuberosity and body, possibly but not definitive for OM. CT L ankle shows similar findings to MRI. IDSA mild foot wound infection. s/p L calcaneaus bone bx 11/19. Pending bone cx. Possile I&D and hardware removal. Patient was dischaged from [facility] hospital on the evening of 11/21 before cultures were finalized and surgical intervention.     Plan  -Physical exam and imaging findings discussed with the patient.  Discussed with the patient that imaging shows possible OM to L heel.   -S/p L calcaneaus bone bx 11/19.   -Bone cx +gram positive cocc, final cx results pending  -Discussed with the patient that we would review imaging and possible surgical intervention with incision and drainage and possible audible  -Abx per Primary  -L foot dressed with Hydrofera blue, Kerlix, and  ACE wrap  -Wound care orders to follow up  -WBAT  -All other care per primary  -Podiatry will continue to follow    Future Discharge Recommendations  -Patient to follow up in outpatient Podiatry clinic with Dr Garcia. Podiatry will schedule  -Antibiotics per Primary  -HH/SNF to do dressings 2-3 times a week as follows:  Rinse L heel wound with Vashe pat dry.  Dress with Hydrofera blue, boot, and ACE wrap  -WBAT  -Keep dressings clean, dry, and intact until follow-up appointment                Liv Gonzalez DPM  Podiatry  [place] - Med Surg

## 2024-11-21 NOTE — SUBJECTIVE & OBJECTIVE
Subjective:     Interval History:  No adverse events overnight.  VSS, afebrile, WBC WNL.  Dressings clean, dry, and intact.  Patient reports pain to his L heel that is tolerable with pain meds.  Denies fevers, chills, nausea, or vomiting.  Denies SOB or chest pain.  Denies any new pedal complaints.    Scheduled Meds:  Continuous Infusions:  PRN Meds:    Review of Systems   Constitutional:  Negative for fatigue and fever.   HENT: Negative.     Eyes: Negative.    Respiratory: Negative.     Cardiovascular: Negative.    Gastrointestinal: Negative.    Endocrine: Negative.    Genitourinary: Negative.    Skin:  Positive for wound.        Reports wound to L heel   Allergic/Immunologic: Negative.    Hematological: Negative.    Psychiatric/Behavioral: Negative.    Objective:     Vital Signs (Most Recent):  Temp: 99.8 °F (37.7 °C) (11/20/24 1548)  Pulse: 71 (11/20/24 1548)  Resp: 20 (11/20/24 1548)  BP: 129/74 (11/20/24 1548)  SpO2: 99 % (11/20/24 1548) Vital Signs (24h Range):  Temp:  [99.6 °F (37.6 °C)-99.8 °F (37.7 °C)] 99.8 °F (37.7 °C)  Pulse:  [69-71] 71  Resp:  [20] 20  SpO2:  [99 %] 99 %  BP: (118-129)/(69-74) 129/74     Weight: 74.5 kg (164 lb 3.9 oz)  Body mass index is 19.99 kg/m².    Foot Exam     Left Foot/Ankle    Inspection and Palpation  Ecchymosis: none  Tenderness: bony tenderness   Swelling: (Swelling surrounding L heel wound)  Skin Exam: drainage, dry skin, maceration, cellulitis, skin changes, abnormal color, ulcer and erythema;      Neurovascular  Dorsalis pedis: 2+  Posterior tibial: 2+  Saphenous nerve sensation: normal  Tibial nerve sensation: normal  Superficial peroneal nerve sensation: normal  Deep peroneal nerve sensation: normal  Sural nerve sensation: normal     Comments  L foot: Wound note to lateral heel. Masceration, erythema, swelling, and scant drainage noted. Negative probe to bone. No crepitus, malodor, or fluctuance noted. No other wounds noted. Palpable PT and DP pulse.    10/20:  S/p  L calcaneus bone biopsy.  Wound noted to lateral heel with scant sanguinous drainage.  Erythema and swelling still present    Laboratory:  BMP:   Recent Labs   Lab 11/20/24  0549   GLU 96      K 4.4      CO2 23   BUN 12   CREATININE 1.7*   CALCIUM 9.2   MG 1.8     CBC:   Recent Labs   Lab 11/20/24  0549   WBC 7.98   RBC 4.71   HGB 13.5*   HCT 40.4      MCV 86   MCH 28.7   MCHC 33.4     Diagnostic Results:  I have reviewed all pertinent imaging results/findings within the past 24 hours.    Clinical Findings:

## 2024-11-21 NOTE — TELEPHONE ENCOUNTER
Spoke with Mr Nascimento to assist him with making an appt with Dr Garcia. Accepted appt date and time of 11/25/24 at 11:15 am for hospital f/u (biopsy). Will see [facility] prior then come to appt to see Dr Garcia. No other needs voiced. Call back encouraged if needed.

## 2024-11-21 NOTE — ASSESSMENT & PLAN NOTE
Assesment  Wound note to lateral L heel. Masceration, erythema, swelling, and scant drainage noted. Negative probe to bone. No crepitus, malodor, or fluctuance noted. No other wounds noted. Palpable PT and DP pulses. XR L foot unremarkable. MRI L ankle 11/19 shows marrow edema and marrow hyperenhancement of the calcaneal tuberosity and body, possibly but not definitive for OM. CT L ankle shows similar findings to MRI. IDSA mild foot wound infection. s/p L calcaneaus bone bx 11/19. Pending bone cx. Possile I&D and hardware removal. Patient was dischaged from [facility] on the evening of 11/21.    Plan  -Physical exam and imaging findings discussed with the patient.  Discussed with the patient that imaging shows possible OM to L heel.   -S/p L calcaneaus bone bx 11/19.   -Bone cx +gram positive cocc, final cx results pending  -Discussed with the patient that we would review imaging and possible surgical intervention with incision and drainage and possible audible  -Abx per Primary  -L foot dressed with Hydrofera blue, Kerlix, and ACE wrap  -Wound care orders to follow up  -WBAT  -All other care per primary  -Podiatry will continue to follow    Future Discharge Recommendations  -Patient to follow up in outpatient Podiatry clinic with Dr Garcia. Podiatry will schedule  -Antibiotics per Primary  -HH/SNF to do dressings 2-3 times a week as follows:  Rinse L heel wound with Vashe pat dry.  Dress with Hydrofera blue, boot, and ACE wrap  -WBAT  -Keep dressings clean, dry, and intact until follow-up appointment

## 2024-11-21 NOTE — TELEPHONE ENCOUNTER
"----- Message from Liv Gonzalez sent at 11/20/2024  7:09 PM CST -----  Regarding: Follow up appointment  Hi,     This patient was seen by this provider while admitted to the hospital. They have now been discharged. Please schedule a follow-up Podiatry clinic appointment in 1-2 weeks with this provider, for "L heel wound +OM and s/p bone bx procedure".    Thank you.     Liv Gonzalez DPM   Podiatric Medicine & Surgery   [facility]  "

## 2024-11-22 LAB
BACTERIA SPEC AEROBE CULT: ABNORMAL
BACTERIA SPEC ANAEROBE CULT: ABNORMAL
FINAL PATHOLOGIC DIAGNOSIS: NORMAL
GROSS: NORMAL
Lab: NORMAL

## 2024-11-23 LAB
BACTERIA BLD CULT: NORMAL

## 2024-11-24 LAB
BACTERIA BLD CULT: NORMAL
BACTERIA BLD CULT: NORMAL

## 2024-11-25 ENCOUNTER — OFFICE VISIT (OUTPATIENT)
Dept: FAMILY MEDICINE | Facility: HOSPITAL | Age: 29
End: 2024-11-25
Payer: MEDICAID

## 2024-11-25 ENCOUNTER — OFFICE VISIT (OUTPATIENT)
Dept: PODIATRY | Facility: CLINIC | Age: 29
End: 2024-11-25
Payer: MEDICAID

## 2024-11-25 ENCOUNTER — ANESTHESIA EVENT (OUTPATIENT)
Dept: SURGERY | Facility: HOSPITAL | Age: 29
End: 2024-11-25
Payer: MEDICAID

## 2024-11-25 VITALS
BODY MASS INDEX: 21.27 KG/M2 | HEIGHT: 73 IN | DIASTOLIC BLOOD PRESSURE: 70 MMHG | SYSTOLIC BLOOD PRESSURE: 116 MMHG | WEIGHT: 160.5 LBS | OXYGEN SATURATION: 99 % | HEART RATE: 60 BPM

## 2024-11-25 VITALS
HEART RATE: 62 BPM | HEIGHT: 73 IN | DIASTOLIC BLOOD PRESSURE: 69 MMHG | BODY MASS INDEX: 21.27 KG/M2 | SYSTOLIC BLOOD PRESSURE: 130 MMHG | WEIGHT: 160.5 LBS

## 2024-11-25 DIAGNOSIS — M86.172 ACUTE OSTEOMYELITIS OF LEFT CALCANEUS: Primary | ICD-10-CM

## 2024-11-25 DIAGNOSIS — M86.9 OSTEOMYELITIS, UNSPECIFIED SITE, UNSPECIFIED TYPE: Primary | ICD-10-CM

## 2024-11-25 LAB — BACTERIA SPEC ANAEROBE CULT: ABNORMAL

## 2024-11-25 PROCEDURE — 99213 OFFICE O/P EST LOW 20 MIN: CPT | Mod: 27

## 2024-11-25 PROCEDURE — 3075F SYST BP GE 130 - 139MM HG: CPT | Mod: CPTII,,, | Performed by: PODIATRIST

## 2024-11-25 PROCEDURE — 99999 PR PBB SHADOW E&M-EST. PATIENT-LVL IV: CPT | Mod: PBBFAC,,, | Performed by: PODIATRIST

## 2024-11-25 PROCEDURE — 1160F RVW MEDS BY RX/DR IN RCRD: CPT | Mod: CPTII,,, | Performed by: PODIATRIST

## 2024-11-25 PROCEDURE — 3008F BODY MASS INDEX DOCD: CPT | Mod: CPTII,,, | Performed by: PODIATRIST

## 2024-11-25 PROCEDURE — 3078F DIAST BP <80 MM HG: CPT | Mod: CPTII,,, | Performed by: PODIATRIST

## 2024-11-25 PROCEDURE — 99214 OFFICE O/P EST MOD 30 MIN: CPT | Mod: PBBFAC,PN | Performed by: PODIATRIST

## 2024-11-25 PROCEDURE — 3044F HG A1C LEVEL LT 7.0%: CPT | Mod: CPTII,,, | Performed by: PODIATRIST

## 2024-11-25 PROCEDURE — 99215 OFFICE O/P EST HI 40 MIN: CPT | Mod: 57,S$PBB,, | Performed by: PODIATRIST

## 2024-11-25 PROCEDURE — 1159F MED LIST DOCD IN RCRD: CPT | Mod: CPTII,,, | Performed by: PODIATRIST

## 2024-11-25 RX ORDER — METRONIDAZOLE 500 MG/1
500 TABLET ORAL EVERY 8 HOURS
Qty: 126 TABLET | Refills: 0 | Status: SHIPPED | OUTPATIENT
Start: 2024-11-25 | End: 2025-01-06

## 2024-11-25 RX ORDER — CEFAZOLIN SODIUM 2 G/50ML
2 SOLUTION INTRAVENOUS
OUTPATIENT
Start: 2024-11-25

## 2024-11-25 RX ORDER — SODIUM CHLORIDE 0.9 % (FLUSH) 0.9 %
10 SYRINGE (ML) INJECTION
Status: SHIPPED | OUTPATIENT
Start: 2024-11-25

## 2024-11-25 RX ORDER — SULFAMETHOXAZOLE AND TRIMETHOPRIM 800; 160 MG/1; MG/1
2 TABLET ORAL 2 TIMES DAILY
Qty: 148 TABLET | Refills: 0 | Status: SHIPPED | OUTPATIENT
Start: 2024-11-25 | End: 2025-01-01

## 2024-11-25 NOTE — PLAN OF CARE
Brief ID Note:  Patient's final bone biopsy cultures reviewed: Citrobacter koseri (sensitive to TMP-SMX, quinolones) and Prevotella (an anaerobe) have grown. Will avoid use of quinolone given prior repair of Achilles tendon. He will be seen in family medicine clinic this morning for revision of his antibiotics regimen followed by clinical follow-up with podiatry.    - would continue TMP-SMX 2DS tabs PO q12h and add metronidazole 500mg PO q8h for treatment of polymicrobial osteomyelitis -- plan a 6wk course through 1/1/2025  - would check a BMP today in FM clinic to ensure tolerance of high dose TMP-SMX and if K/Cr are reasonably stable, may continue this regimen without further lab checks through conclusion in January  - I have contacted his primary team from admission to connect with clinic provider today to ensure recommendations are seen (as provider is not online via secure chat)  - clinical follow-up to continue with podiatry    Please contact me at the number below with additional questions.    Margy Johnson MD  [employer] ID Attending  [phone]

## 2024-11-25 NOTE — PROGRESS NOTES
"Subjective:     Patient ID: Alonzo Nascimento Jr. is a 29 y.o. male.    Chief Complaint: Follow-up    Patient is seen post hospital discharge for osteomyelitis of the left calcaneus.  He has a prior history of Achilles tendon repair per  on 07/14/2023.  States that he has always had some type of discomfort to this area.  Bone biopsy was completed while in the hospital which grew Citrobacter koseri and prevotella bevia.  Patient was placed on 6 week course of oral Bactrim DS and metronidazole per PCP.  Patient was ambulating with slippers and relates that he has a job working on the [employer] that requires an hour standing at a time.    Vitals:    11/25/24 1058   BP: 130/69   Pulse: 62   Weight: 72.8 kg (160 lb 7.9 oz)   Height: 6' 1" (1.854 m)   PainSc: 0-No pain      No past medical history on file.    Past Surgical History:   Procedure Laterality Date    ANTERIOR CRUCIATE LIGAMENT REPAIR Right     2018    APPLICATION OF SPLINT Left 7/14/2023    Procedure: APPLICATION, SPLINT;  Surgeon: Lenore Pearl DPM;  Location: [facility] OR;  Service: Podiatry;  Laterality: Left;    REPAIR OF ACHILLES TENDON Left 7/14/2023    Procedure: REPAIR, TENDON, ACHILLES;  Surgeon: Lenore Pearl DPM;  Location: [facility] OR;  Service: Podiatry;  Laterality: Left;       No family history on file.    Social History     Socioeconomic History    Marital status: Single   Tobacco Use    Smoking status: Never    Smokeless tobacco: Never   Substance and Sexual Activity    Alcohol use: Yes     Comment: rarely    Drug use: Yes     Types: Marijuana    Sexual activity: Yes     Partners: Female     Social Drivers of Health     Financial Resource Strain: Low Risk  (11/19/2024)    Overall Financial Resource Strain (CARDIA)     Difficulty of Paying Living Expenses: Not very hard   Food Insecurity: No Food Insecurity (11/19/2024)    Hunger Vital Sign     Worried About Running Out of Food in the Last Year: Never true     Ran Out of Food in the Last Year: " Never true   Transportation Needs: Unmet Transportation Needs (11/19/2024)    TRANSPORTATION NEEDS     Transportation : Yes, it has kept me from non-medical meetings, appointments, work or from getting things that I need.   Stress: No Stress Concern Present (11/19/2024)    [employer] [place] [employer] - Occupational Stress Questionnaire     Feeling of Stress : Only a little   Housing Stability: Low Risk  (11/19/2024)    Housing Stability Vital Sign     Unable to Pay for Housing in the Last Year: No     Homeless in the Last Year: No       Current Outpatient Medications   Medication Sig Dispense Refill    sulfamethoxazole-trimethoprim 800-160mg (BACTRIM DS) 800-160 mg Tab Take 2 tablets by mouth 2 (two) times daily. for 14 days 56 tablet 0     Current Facility-Administered Medications   Medication Dose Route Frequency Provider Last Rate Last Admin    sodium chloride 0.9% flush 10 mL  10 mL Intravenous PRN Jaswinder Garcia DPM           Review of patient's allergies indicates:  No Known Allergies      Review of Systems   Constitutional: Negative for chills, fever and malaise/fatigue.   HENT:  Negative for congestion and hearing loss.    Cardiovascular:  Negative for chest pain, claudication and leg swelling.   Respiratory:  Negative for cough and shortness of breath.    Skin:  Positive for color change and poor wound healing.   Musculoskeletal:  Negative for back pain, joint pain, muscle cramps and muscle weakness.   Gastrointestinal:  Negative for nausea and vomiting.   Neurological:  Negative for numbness, paresthesias and weakness.   Psychiatric/Behavioral:  Negative for altered mental status.         Objective:     Physical Exam  Constitutional:       General: He is not in acute distress.     Appearance: He is not ill-appearing.   Cardiovascular:      Pulses:           Dorsalis pedis pulses are 2+ on the right side and 2+ on the left side.        Posterior tibial pulses are 2+ on the right side and  2+ on the left side.   Musculoskeletal:      Comments: Localized pain on palpation to lateral aspect of the left heel overlying the wound site.  No pain with attempted subtalar joint or ankle range motion left lower extremity.  No palpable fluctuance or crepitance.  Pain with squeezing the posterior tuber of the left calcaneus.   Skin:     General: Skin is warm.      Capillary Refill: Capillary refill takes less than 2 seconds.      Findings: Erythema present.      Nails: There is no clubbing.      Comments: Multiple small wounds to lateral aspect of the left heel with mixed granular fibrous base and scant serosanguineous drainage.  Moderate localized edema and resolving erythema to the area.   Neurological:      Mental Status: He is alert and oriented to person, place, and time.      Sensory: Sensation is intact.      Motor: Motor function is intact.           Assessment:      Encounter Diagnosis   Name Primary?    Acute osteomyelitis of left calcaneus Yes     Plan:     Alonzo was seen today for follow-up.    Diagnoses and all orders for this visit:    Acute osteomyelitis of left calcaneus  -     Case Request Operating Room: IRRIGATION AND DEBRIDEMENT  -     Full code; Standing  -     Place in Outpatient; Standing  -     Insert peripheral IV; Standing  -     Verify surgical site; Standing  -     Verify informed consent; Standing  -     Chlorhexidine (CHG) 2% Wipes; Standing  -     Insert peripheral IV    Other orders  -     sodium chloride 0.9% flush 10 mL  -     cefazolin (ANCEF) 2 gram in dextrose 5% 50 mL IVPB (premix)      I counseled the patient on his conditions, their implications and medical management.    Previous bone C&S and pathology reviewed.  Patient on oral Bactrim DS and metronidazole per PCP.  Patient with significant region of osteolysis within the body of the calcaneus in addition to MRI findings concerning for osteomyelitis.  We discussed the need for irrigation debridement of the infected bone  and to remove any foreign bodies and a be in the area which suspect previous peek anchor from the Achilles tendon repair in addition possible suture.  Risks, benefits anticipate postop course discussed in great detail.  No guarantees were given or implied.  Patient elected to proceed with surgical intervention outlined above.      This procedure has been fully reviewed with the patient and written informed consent has been obtained.    Patient recently admitted and optimize therefore does not require preoperative medical clearance.    Scheduled for surgical intervention on 12/03/2024 as mac with local anesthetic for outpatient debridement and implantation of antibiotic eluting cement.  This may require subsequent debridements or other staged procedures.  High risk for the infection progressing more proximally and leading to amputation if we do not remove the implanted foreign material and stabilize the bone.  Antibiotic therapy alone will not be enough.    Darco shoe dispensed.  Reviewed activity restrictions and modifications.  Very permitted limited weight-bearing.  Encouraged elevation at rest.      RTC week postop or p.r.n. as discussed     Assisted by Ming Gonzalez DPM PGY 2    A portion of this note was generated by voice recognition software and may contain spelling and grammar errors.      .

## 2024-11-25 NOTE — H&P (VIEW-ONLY)
"Subjective:     Patient ID: Alonzo Nascimento Jr. is a 29 y.o. male.    Chief Complaint: Follow-up    Patient is seen post hospital discharge for osteomyelitis of the left calcaneus.  He has a prior history of Achilles tendon repair per  on 07/14/2023.  States that he has always had some type of discomfort to this area.  Bone biopsy was completed while in the hospital which grew Citrobacter koseri and prevotella bevia.  Patient was placed on 6 week course of oral Bactrim DS and metronidazole per PCP.  Patient was ambulating with slippers and relates that he has a job working on the [employer] that requires an hour standing at a time.    Vitals:    11/25/24 1058   BP: 130/69   Pulse: 62   Weight: 72.8 kg (160 lb 7.9 oz)   Height: 6' 1" (1.854 m)   PainSc: 0-No pain      No past medical history on file.    Past Surgical History:   Procedure Laterality Date    ANTERIOR CRUCIATE LIGAMENT REPAIR Right     2018    APPLICATION OF SPLINT Left 7/14/2023    Procedure: APPLICATION, SPLINT;  Surgeon: Lenore Pearl DPM;  Location: [facility] OR;  Service: Podiatry;  Laterality: Left;    REPAIR OF ACHILLES TENDON Left 7/14/2023    Procedure: REPAIR, TENDON, ACHILLES;  Surgeon: Lenore Pearl DPM;  Location: [facility] OR;  Service: Podiatry;  Laterality: Left;       No family history on file.    Social History     Socioeconomic History    Marital status: Single   Tobacco Use    Smoking status: Never    Smokeless tobacco: Never   Substance and Sexual Activity    Alcohol use: Yes     Comment: rarely    Drug use: Yes     Types: Marijuana    Sexual activity: Yes     Partners: Female     Social Drivers of Health     Financial Resource Strain: Low Risk  (11/19/2024)    Overall Financial Resource Strain (CARDIA)     Difficulty of Paying Living Expenses: Not very hard   Food Insecurity: No Food Insecurity (11/19/2024)    Hunger Vital Sign     Worried About Running Out of Food in the Last Year: Never true     Ran Out of Food in the Last Year: " Never true   Transportation Needs: Unmet Transportation Needs (11/19/2024)    TRANSPORTATION NEEDS     Transportation : Yes, it has kept me from non-medical meetings, appointments, work or from getting things that I need.   Stress: No Stress Concern Present (11/19/2024)    [place] [place] [employer] - Occupational Stress Questionnaire     Feeling of Stress : Only a little   Housing Stability: Low Risk  (11/19/2024)    Housing Stability Vital Sign     Unable to Pay for Housing in the Last Year: No     Homeless in the Last Year: No       Current Outpatient Medications   Medication Sig Dispense Refill    sulfamethoxazole-trimethoprim 800-160mg (BACTRIM DS) 800-160 mg Tab Take 2 tablets by mouth 2 (two) times daily. for 14 days 56 tablet 0     Current Facility-Administered Medications   Medication Dose Route Frequency Provider Last Rate Last Admin    sodium chloride 0.9% flush 10 mL  10 mL Intravenous PRN Jaswinder Garcia DPM           Review of patient's allergies indicates:  No Known Allergies      Review of Systems   Constitutional: Negative for chills, fever and malaise/fatigue.   HENT:  Negative for congestion and hearing loss.    Cardiovascular:  Negative for chest pain, claudication and leg swelling.   Respiratory:  Negative for cough and shortness of breath.    Skin:  Positive for color change and poor wound healing.   Musculoskeletal:  Negative for back pain, joint pain, muscle cramps and muscle weakness.   Gastrointestinal:  Negative for nausea and vomiting.   Neurological:  Negative for numbness, paresthesias and weakness.   Psychiatric/Behavioral:  Negative for altered mental status.         Objective:     Physical Exam  Constitutional:       General: He is not in acute distress.     Appearance: He is not ill-appearing.   Cardiovascular:      Pulses:           Dorsalis pedis pulses are 2+ on the right side and 2+ on the left side.        Posterior tibial pulses are 2+ on the right side and  2+ on the left side.   Musculoskeletal:      Comments: Localized pain on palpation to lateral aspect of the left heel overlying the wound site.  No pain with attempted subtalar joint or ankle range motion left lower extremity.  No palpable fluctuance or crepitance.  Pain with squeezing the posterior tuber of the left calcaneus.   Skin:     General: Skin is warm.      Capillary Refill: Capillary refill takes less than 2 seconds.      Findings: Erythema present.      Nails: There is no clubbing.      Comments: Multiple small wounds to lateral aspect of the left heel with mixed granular fibrous base and scant serosanguineous drainage.  Moderate localized edema and resolving erythema to the area.   Neurological:      Mental Status: He is alert and oriented to person, place, and time.      Sensory: Sensation is intact.      Motor: Motor function is intact.           Assessment:      Encounter Diagnosis   Name Primary?    Acute osteomyelitis of left calcaneus Yes     Plan:     Alonzo was seen today for follow-up.    Diagnoses and all orders for this visit:    Acute osteomyelitis of left calcaneus  -     Case Request Operating Room: IRRIGATION AND DEBRIDEMENT  -     Full code; Standing  -     Place in Outpatient; Standing  -     Insert peripheral IV; Standing  -     Verify surgical site; Standing  -     Verify informed consent; Standing  -     Chlorhexidine (CHG) 2% Wipes; Standing  -     Insert peripheral IV    Other orders  -     sodium chloride 0.9% flush 10 mL  -     cefazolin (ANCEF) 2 gram in dextrose 5% 50 mL IVPB (premix)      I counseled the patient on his conditions, their implications and medical management.    Previous bone C&S and pathology reviewed.  Patient on oral Bactrim DS and metronidazole per PCP.  Patient with significant region of osteolysis within the body of the calcaneus in addition to MRI findings concerning for osteomyelitis.  We discussed the need for irrigation debridement of the infected bone  and to remove any foreign bodies and a be in the area which suspect previous peek anchor from the Achilles tendon repair in addition possible suture.  Risks, benefits anticipate postop course discussed in great detail.  No guarantees were given or implied.  Patient elected to proceed with surgical intervention outlined above.      This procedure has been fully reviewed with the patient and written informed consent has been obtained.    Patient recently admitted and optimize therefore does not require preoperative medical clearance.    Scheduled for surgical intervention on 12/03/2024 as mac with local anesthetic for outpatient debridement and implantation of antibiotic eluting cement.  This may require subsequent debridements or other staged procedures.  High risk for the infection progressing more proximally and leading to amputation if we do not remove the implanted foreign material and stabilize the bone.  Antibiotic therapy alone will not be enough.    Darco shoe dispensed.  Reviewed activity restrictions and modifications.  Very permitted limited weight-bearing.  Encouraged elevation at rest.      RTC week postop or p.r.n. as discussed     Assisted by Ming Gonzalez DPM PGY 2    A portion of this note was generated by voice recognition software and may contain spelling and grammar errors.      .

## 2024-11-25 NOTE — PROGRESS NOTES
"[facility]    Subjective:     Alonzo Nascimento Jr. is a 29 y.o. year old male who presents to clinic for     HFU:  - Patient was previously admitted to the hospital for osteomyelitis   - Prescribed Bactrim DS 2 tabletes BID for 14 days   - Seeing Podiatry today   - Per ID:   " - would continue TMP-SMX 2DS tabs PO q12h and add metronidazole 500mg PO q8h for treatment of polymicrobial osteomyelitis -- plan a 6wk course through 1/1/2025  - would check a BMP today in FM clinic to ensure tolerance of high dose TMP-SMX and if K/Cr are reasonably stable, may continue this regimen without further lab checks through conclusion in January  - clinical follow-up to continue with podiatry"   - Patient denies fevers, chills, diarrhea, or constipation  - States his walking is improving, still has some slight pain/swelling at the sight  - No discharge noted     Patient Active Problem List    Diagnosis Date Noted    Osteomyelitis 11/20/2024    Non healing left heel wound 11/19/2024    Decreased range of motion 06/13/2024    Calf muscle weakness 06/13/2024    Weakness of both hips 06/13/2024    Status post Achilles tendon repair 08/09/2023    Impaired range of motion of left ankle 08/09/2023    Impaired functional mobility, balance, gait, and endurance 08/09/2023        Review of patient's allergies indicates:  No Known Allergies     No past medical history on file.   Past Surgical History:   Procedure Laterality Date    ANTERIOR CRUCIATE LIGAMENT REPAIR Right     2018    APPLICATION OF SPLINT Left 7/14/2023    Procedure: APPLICATION, SPLINT;  Surgeon: Lenore Pearl DPM;  Location: [facility] OR;  Service: Podiatry;  Laterality: Left;    REPAIR OF ACHILLES TENDON Left 7/14/2023    Procedure: REPAIR, TENDON, ACHILLES;  Surgeon: Lenore Pearl DPM;  Location: [facility] OR;  Service: Podiatry;  Laterality: Left;      No family history on file.   Social History     Tobacco Use    Smoking status: Never    Smokeless tobacco: Never   Substance " Use Topics    Alcohol use: Yes     Comment: rarely      Review of Systems   Constitutional:  Negative for chills and fever.   Respiratory:  Negative for shortness of breath.    Cardiovascular:  Negative for chest pain.   Gastrointestinal:  Negative for nausea and vomiting.   Neurological:  Negative for dizziness.     Objective:     There were no vitals filed for this visit.  Physical Exam  Constitutional:       General: He is not in acute distress.     Appearance: He is not toxic-appearing.   HENT:      Head: Normocephalic.      Right Ear: External ear normal.      Left Ear: External ear normal.      Nose: Nose normal.   Eyes:      Extraocular Movements: Extraocular movements intact.   Cardiovascular:      Rate and Rhythm: Normal rate.      Pulses: Normal pulses.      Heart sounds: Normal heart sounds.   Pulmonary:      Effort: Pulmonary effort is normal.      Breath sounds: Normal breath sounds.   Musculoskeletal:      Comments: Bandage on left lateral ankle, appears blood tinged  Dressing taken down, wound appears nonerythematous, no drainage noted   Skin:     General: Skin is warm and dry.   Neurological:      Mental Status: He is alert.   Psychiatric:         Mood and Affect: Mood normal.       Assessment/Plan:     Alonzo Nascimento Jr. is a 29 y.o. year old male who presents to clinic for     1. Osteomyelitis, unspecified site, unspecified type (Primary)  - Wound appears stable on physical exam, patient has no systemic signs  - He endorses compliance with medication regimen thus far  - BMP ordered per ID recs, Cr essentially stable from about 1 week ago  - Will continue with antibiotic therapy and have patient return in 1 month to follow up on symptoms and progress  -Encourage continued wound care and follow up with podiatry    - Basic Metabolic Panel; Future  - metroNIDAZOLE (FLAGYL) 500 MG tablet; Take 1 tablet (500 mg total) by mouth every 8 (eight) hours.  Dispense: 126 tablet; Refill: 0  -  sulfamethoxazole-trimethoprim 800-160mg (BACTRIM DS) 800-160 mg Tab; Take 2 tablets by mouth 2 (two) times daily.  Dispense: 148 tablet; Refill: 0      Follow-up:1 month    ________________________  Kapil Burgess Jr, MD  [facility] PGY-2

## 2024-11-29 NOTE — PROGRESS NOTES
I assume primary medical responsibility for this patient. I have reviewed the history, physical, and assessement & treatment plan with the resident and agree that the care is reasonable and necessary. This service has been performed by a resident without the presence of a teaching physician under the primary care exception. If necessary, an addendum of additional findings or evaluation beyond the resident documentation will be noted below.      Silvia Mooney MD    [facility]

## 2024-12-03 ENCOUNTER — ANESTHESIA (OUTPATIENT)
Dept: SURGERY | Facility: HOSPITAL | Age: 29
End: 2024-12-03
Payer: MEDICAID

## 2024-12-04 ENCOUNTER — HOSPITAL ENCOUNTER (OUTPATIENT)
Facility: HOSPITAL | Age: 29
Discharge: HOME | End: 2024-12-04
Attending: PODIATRIST | Admitting: PODIATRIST
Payer: MEDICAID

## 2024-12-04 VITALS
TEMPERATURE: 98 F | SYSTOLIC BLOOD PRESSURE: 125 MMHG | HEART RATE: 59 BPM | DIASTOLIC BLOOD PRESSURE: 66 MMHG | RESPIRATION RATE: 15 BRPM | OXYGEN SATURATION: 98 %

## 2024-12-04 DIAGNOSIS — M86.172 ACUTE OSTEOMYELITIS OF LEFT CALCANEUS: ICD-10-CM

## 2024-12-04 DIAGNOSIS — Z98.890 POSTOPERATIVE STATE: Primary | ICD-10-CM

## 2024-12-04 PROCEDURE — 63600175 PHARM REV CODE 636 W HCPCS

## 2024-12-04 PROCEDURE — 25000003 PHARM REV CODE 250

## 2024-12-04 PROCEDURE — 88300 SURGICAL PATH GROSS: CPT | Performed by: PATHOLOGY

## 2024-12-04 PROCEDURE — 37000009 HC ANESTHESIA EA ADD 15 MINS: Performed by: PODIATRIST

## 2024-12-04 PROCEDURE — 71000015 HC POSTOP RECOV 1ST HR: Performed by: PODIATRIST

## 2024-12-04 PROCEDURE — 87102 FUNGUS ISOLATION CULTURE: CPT | Performed by: PODIATRIST

## 2024-12-04 PROCEDURE — 36000706: Performed by: PODIATRIST

## 2024-12-04 PROCEDURE — 37000008 HC ANESTHESIA 1ST 15 MINUTES: Performed by: PODIATRIST

## 2024-12-04 PROCEDURE — C1713 ANCHOR/SCREW BN/BN,TIS/BN: HCPCS | Performed by: PODIATRIST

## 2024-12-04 PROCEDURE — 87116 MYCOBACTERIA CULTURE: CPT | Performed by: PODIATRIST

## 2024-12-04 PROCEDURE — 87206 SMEAR FLUORESCENT/ACID STAI: CPT | Performed by: PODIATRIST

## 2024-12-04 PROCEDURE — 28005 TREAT FOOT BONE LESION: CPT | Mod: LT,,, | Performed by: PODIATRIST

## 2024-12-04 PROCEDURE — 87075 CULTR BACTERIA EXCEPT BLOOD: CPT | Performed by: PODIATRIST

## 2024-12-04 PROCEDURE — 36000707: Performed by: PODIATRIST

## 2024-12-04 PROCEDURE — 87070 CULTURE OTHR SPECIMN AEROBIC: CPT | Performed by: PODIATRIST

## 2024-12-04 PROCEDURE — 71000016 HC POSTOP RECOV ADDL HR: Performed by: PODIATRIST

## 2024-12-04 PROCEDURE — 63600175 PHARM REV CODE 636 W HCPCS: Performed by: PODIATRIST

## 2024-12-04 PROCEDURE — 87205 SMEAR GRAM STAIN: CPT | Performed by: PODIATRIST

## 2024-12-04 DEVICE — KIT STIMULAN RAPID CURE 5CC: Type: IMPLANTABLE DEVICE | Site: FOOT | Status: FUNCTIONAL

## 2024-12-04 RX ORDER — TOBRAMYCIN 40 MG/ML
INJECTION INTRAMUSCULAR; INTRAVENOUS
Status: DISCONTINUED | OUTPATIENT
Start: 2024-12-04 | End: 2024-12-04 | Stop reason: HOSPADM

## 2024-12-04 RX ORDER — BUPIVACAINE HYDROCHLORIDE 2.5 MG/ML
INJECTION, SOLUTION EPIDURAL; INFILTRATION; INTRACAUDAL
Status: DISCONTINUED | OUTPATIENT
Start: 2024-12-04 | End: 2024-12-04 | Stop reason: HOSPADM

## 2024-12-04 RX ORDER — LIDOCAINE HYDROCHLORIDE 10 MG/ML
INJECTION, SOLUTION INFILTRATION; PERINEURAL
Status: DISCONTINUED | OUTPATIENT
Start: 2024-12-04 | End: 2024-12-04 | Stop reason: HOSPADM

## 2024-12-04 RX ORDER — TOBRAMYCIN 40 MG/ML
240 INJECTION INTRAMUSCULAR; INTRAVENOUS ONCE
Status: DISCONTINUED | OUTPATIENT
Start: 2024-12-04 | End: 2024-12-04 | Stop reason: HOSPADM

## 2024-12-04 RX ORDER — HYDROCODONE BITARTRATE AND ACETAMINOPHEN 5; 325 MG/1; MG/1
1 TABLET ORAL EVERY 4 HOURS PRN
Status: DISCONTINUED | OUTPATIENT
Start: 2024-12-04 | End: 2024-12-04 | Stop reason: HOSPADM

## 2024-12-04 RX ORDER — MIDAZOLAM HYDROCHLORIDE 1 MG/ML
INJECTION, SOLUTION INTRAMUSCULAR; INTRAVENOUS
Status: DISCONTINUED | OUTPATIENT
Start: 2024-12-04 | End: 2024-12-04

## 2024-12-04 RX ORDER — DEXAMETHASONE SODIUM PHOSPHATE 4 MG/ML
INJECTION, SOLUTION INTRA-ARTICULAR; INTRALESIONAL; INTRAMUSCULAR; INTRAVENOUS; SOFT TISSUE
Status: DISCONTINUED | OUTPATIENT
Start: 2024-12-04 | End: 2024-12-04

## 2024-12-04 RX ORDER — VANCOMYCIN HYDROCHLORIDE 1 G/20ML
INJECTION, POWDER, LYOPHILIZED, FOR SOLUTION INTRAVENOUS
Status: DISCONTINUED | OUTPATIENT
Start: 2024-12-04 | End: 2024-12-04 | Stop reason: HOSPADM

## 2024-12-04 RX ORDER — CEFAZOLIN 2 G/1
2 INJECTION, POWDER, FOR SOLUTION INTRAMUSCULAR; INTRAVENOUS
Status: DISCONTINUED | OUTPATIENT
Start: 2024-12-04 | End: 2024-12-04 | Stop reason: HOSPADM

## 2024-12-04 RX ORDER — CEFAZOLIN SODIUM 1 G/3ML
INJECTION, POWDER, FOR SOLUTION INTRAMUSCULAR; INTRAVENOUS
Status: DISCONTINUED | OUTPATIENT
Start: 2024-12-04 | End: 2024-12-04

## 2024-12-04 RX ORDER — PROPOFOL 10 MG/ML
VIAL (ML) INTRAVENOUS
Status: DISCONTINUED | OUTPATIENT
Start: 2024-12-04 | End: 2024-12-04

## 2024-12-04 RX ORDER — ONDANSETRON HYDROCHLORIDE 2 MG/ML
INJECTION, SOLUTION INTRAVENOUS
Status: DISCONTINUED | OUTPATIENT
Start: 2024-12-04 | End: 2024-12-04

## 2024-12-04 RX ORDER — HYDROCODONE BITARTRATE AND ACETAMINOPHEN 7.5; 325 MG/1; MG/1
1 TABLET ORAL EVERY 4 HOURS PRN
Qty: 28 TABLET | Refills: 0 | Status: SHIPPED | OUTPATIENT
Start: 2024-12-04

## 2024-12-04 RX ORDER — FENTANYL CITRATE 50 UG/ML
INJECTION, SOLUTION INTRAMUSCULAR; INTRAVENOUS
Status: DISCONTINUED | OUTPATIENT
Start: 2024-12-04 | End: 2024-12-04

## 2024-12-04 RX ADMIN — ONDANSETRON 4 MG: 2 INJECTION, SOLUTION INTRAMUSCULAR; INTRAVENOUS at 02:12

## 2024-12-04 RX ADMIN — PROPOFOL 70 MG: 10 INJECTION, EMULSION INTRAVENOUS at 02:12

## 2024-12-04 RX ADMIN — CEFAZOLIN 2 G: 330 INJECTION, POWDER, FOR SOLUTION INTRAMUSCULAR; INTRAVENOUS at 02:12

## 2024-12-04 RX ADMIN — SODIUM CHLORIDE: 0.9 INJECTION, SOLUTION INTRAVENOUS at 02:12

## 2024-12-04 RX ADMIN — FENTANYL CITRATE 50 MCG: 50 INJECTION INTRAMUSCULAR; INTRAVENOUS at 02:12

## 2024-12-04 RX ADMIN — PROPOFOL 50 MCG/KG/MIN: 10 INJECTION, EMULSION INTRAVENOUS at 02:12

## 2024-12-04 RX ADMIN — DEXAMETHASONE SODIUM PHOSPHATE 4 MG: 4 INJECTION, SOLUTION INTRA-ARTICULAR; INTRALESIONAL; INTRAMUSCULAR; INTRAVENOUS; SOFT TISSUE at 02:12

## 2024-12-04 RX ADMIN — MIDAZOLAM 2 MG: 1 INJECTION INTRAMUSCULAR; INTRAVENOUS at 02:12

## 2024-12-04 NOTE — BRIEF OP NOTE
[facility] - Surgery (Hospital)  Brief Operative Note    Surgery Date: 12/4/2024     Surgeons and Role:     * Jaswinder Garcia DPM - Primary    Assisting Surgeon: Liv Gonzalez DPM PGY 2    Pre-op Diagnosis:  Acute osteomyelitis of left calcaneus [M86.172]    Post-op Diagnosis:  Post-Op Diagnosis Codes:     * Acute osteomyelitis of left calcaneus [M86.172]    Procedure(s) (LRB):  IRRIGATION AND DEBRIDEMENT of osteomyelitic bone (Left) calcaneus with excision of foreign body/anchors and suture within the calcaneus left foot.  Implantation of antibiotic eluding cement left calcaneus    Anesthesia: Local MAC    Operative Findings: excised extensive hypergranular tissue eminating from the skin along the lateral calcaneus and extending deep to the wall of the calcaneus with small opening noted directly into the bone. Once the non-viable bone was debrided fiberwire suture and peak anchors were excised from within the calcaneus with debridement proceeding to normal appearing bone. Copious irrigation with NS performed and antibiotic eluding cement pellets were implanted into the calcaneus.    Estimated Blood Loss: < 5 mL         Specimens:   Specimen (24h ago, onward)       Start     Ordered    12/04/24 1440  Specimen to Pathology, Surgery Other  Once        Comments: Pre-op Diagnosis: Acute osteomyelitis of left calcaneus [M86.172]Procedure(s):IRRIGATION AND DEBRIDEMENT Number of specimens: 1Name of specimens: Foreign Body - perm     References:    Click here for ordering Quick Tip   Question Answer Comment   Procedure Type: Other    Release to patient Immediate        12/04/24 1439                      Discharge Note    OUTCOME: Patient tolerated treatment/procedure well without complication and is now ready for discharge.    DISPOSITION: Home or Self Care    FINAL DIAGNOSIS:  Acute osteomyelitis of left calcaneus    FOLLOWUP: In clinic    DISCHARGE INSTRUCTIONS:    Discharge Procedure Orders   Diet general     Keep  surgical extremity elevated   Order Comments: To heart level at rest. Place pillows under the leg and float the heel to avoid pressure at the back of the heel.     Call MD for:  temperature >100.4     Call MD for:  persistent nausea and vomiting     Call MD for:  severe uncontrolled pain     Call MD for:  difficulty breathing, headache or visual disturbances     Leave dressing on - Keep it clean, dry, and intact until clinic visit     Weight bearing restrictions (specify)   Order Comments: If possible non-weightbearing left foot x 3 days with crutches advancing to light pressure with surgical shoe and crutches as tolerated.

## 2024-12-04 NOTE — ANESTHESIA PREPROCEDURE EVALUATION
[facility]  Anesthesia Pre-Operative Evaluation         Patient Name: Alonzo Nascimenot Jr.  YOB: 1995  MRN: [id number]    SUBJECTIVE:     Pre-operative evaluation for Procedure(s) (LRB):  IRRIGATION AND DEBRIDEMENT (Left)     12/04/2024    Alonzo Nascimento Jr. is a 29 y.o. male w/ a significant PMHx of acute osteomyelitis on the L calcaneus.    Patient now presents for the above procedure(s).    TTE:   none documented.     Patient Active Problem List   Diagnosis    Status post Achilles tendon repair    Impaired range of motion of left ankle    Impaired functional mobility, balance, gait, and endurance    Decreased range of motion    Calf muscle weakness    Weakness of both hips    Non healing left heel wound    Osteomyelitis    Acute osteomyelitis of left calcaneus       Review of patient's allergies indicates:  No Known Allergies    Current Inpatient Medications:   tobramycin  240 mg Intramuscular Once       No current facility-administered medications on file prior to encounter.     Current Outpatient Medications on File Prior to Encounter   Medication Sig Dispense Refill    metroNIDAZOLE (FLAGYL) 500 MG tablet Take 1 tablet (500 mg total) by mouth every 8 (eight) hours. 126 tablet 0    sulfamethoxazole-trimethoprim 800-160mg (BACTRIM DS) 800-160 mg Tab Take 2 tablets by mouth 2 (two) times daily. 148 tablet 0       Past Surgical History:   Procedure Laterality Date    ANTERIOR CRUCIATE LIGAMENT REPAIR Right     2018    APPLICATION OF SPLINT Left 7/14/2023    Procedure: APPLICATION, SPLINT;  Surgeon: Lenore Pearl DPM;  Location: [facility] OR;  Service: Podiatry;  Laterality: Left;    REPAIR OF ACHILLES TENDON Left 7/14/2023    Procedure: REPAIR, TENDON, ACHILLES;  Surgeon: Lenore Pearl DPM;  Location: [facility] OR;  Service: Podiatry;  Laterality: Left;       OBJECTIVE:     Vital Signs Range (Last 24H):  Temp:  [36.7 °C (98.1 °F)]   Pulse:  [63]   Resp:  [14]   BP: (126)/(67)   SpO2:  [100 %]        Significant Labs:  Lab Results   Component Value Date    WBC 7.98 11/20/2024    HGB 13.5 (L) 11/20/2024    HCT 40.4 11/20/2024     11/20/2024    CHOL 136 11/11/2024    TRIG 70 11/11/2024    HDL 43 11/11/2024    ALT 21 11/20/2024    AST 16 11/20/2024     11/25/2024    K 4.5 11/25/2024     11/25/2024    CREATININE 1.8 (H) 11/25/2024    BUN 16 11/25/2024    CO2 24 11/25/2024    TSH 0.925 11/11/2024    HGBA1C 5.4 11/11/2024       Diagnostic Studies: No relevant studies.    EKG:   Results for orders placed or performed during the hospital encounter of 11/19/24   EKG 12-lead    Collection Time: 11/19/24  2:29 PM   Result Value Ref Range    QRS Duration 96 ms    [facility] QTC Calculation 366 ms    Narrative    Test Reason : M86.9,    Vent. Rate :  50 BPM     Atrial Rate :  50 BPM     P-R Int : 146 ms          QRS Dur :  96 ms      QT Int : 402 ms       P-R-T Axes :  50  52  54 degrees    QTcB Int : 366 ms    Sinus bradycardia  Septal infarct ,age undetermined  Abnormal ECG  No previous ECGs available  Confirmed by Elizabeth Tillman ([id number]) on 11/20/2024 7:56:25 AM    Referred By: AAAREFERRAL SELF           Confirmed By: Elizabeth Tillman       ASSESSMENT/PLAN:         Pre-op Assessment    I have reviewed the Patient Summary Reports.     I have reviewed the Nursing Notes. I have reviewed the NPO Status.   I have reviewed the Medications.     Review of Systems  Anesthesia Hx:  No problems with previous Anesthesia               Denies Personal Hx of Anesthesia complications.                    Social:  Non-Smoker       Hematology/Oncology:       -- Denies Anemia:                  Denies Current/Recent Cancer                Cardiovascular:  Exercise tolerance: good    Denies Hypertension.   Denies MI.  Denies CAD.     Denies Dysrhythmias.    Denies CHF.    no hyperlipidemia   ECG has been reviewed.    Functional Capacity good / => 4 METS                         Pulmonary:    Denies COPD.  Denies  Asthma.   Denies Shortness of breath.   Denies Sleep Apnea.                Renal/:   Denies Chronic Renal Disease.                Hepatic/GI:      Denies GERD.                Musculoskeletal:     Osteomyelitis of L calcaneus            Neurological:  Denies TIA.  Denies CVA.    Denies Seizures.                                Endocrine:  Endocrine Normal                Physical Exam  General: Well nourished, Cooperative, Alert and Oriented    Airway:  Mouth Opening: Normal  TM Distance: Normal  Tongue: Normal  Neck ROM: Normal ROM    Dental:  Intact        Anesthesia Plan  Type of Anesthesia, risks & benefits discussed:    Anesthesia Type: MAC, Gen Natural Airway  Intra-op Monitoring Plan: Standard ASA Monitors  Post Op Pain Control Plan: multimodal analgesia  Induction:  IV  Informed Consent: Informed consent signed with the Patient and all parties understand the risks and agree with anesthesia plan.  All questions answered.   ASA Score: 2  Day of Surgery Review of History & Physical: H&P Update referred to the surgeon/provider.H&P completed by Anesthesiologist.    Ready For Surgery From Anesthesia Perspective.     .

## 2024-12-04 NOTE — TRANSFER OF CARE
Anesthesia Transfer of Care Note    Patient: Alonzo Nascimento Jr.    Procedure(s) Performed: Procedure(s) (LRB):  IRRIGATION AND DEBRIDEMENT (Left)    Patient location: [facility]    Anesthesia Type: general    Transport from OR: Transported from OR on room air with adequate spontaneous ventilation    Post pain: adequate analgesia    Post assessment: no apparent anesthetic complications and tolerated procedure well    Post vital signs: stable    Level of consciousness: responds to stimulation    Nausea/Vomiting: no nausea/vomiting    Complications: none    Transfer of care protocol was followed      Last vitals: Visit Vitals  /67 (BP Location: Right arm, Patient Position: Lying)   Pulse 63   Temp 36.7 °C (98.1 °F) (Skin)   Resp 14   SpO2 100%

## 2024-12-05 LAB
GRAM STN SPEC: NORMAL
GRAM STN SPEC: NORMAL

## 2024-12-05 NOTE — OP NOTE
[facility] - Surgery (Hospital)  Operative Note      Date of Procedure: 12/4/2024     Procedure(s) (LRB):  IRRIGATION AND DEBRIDEMENT of osteomyelitic bone (Left) calcaneus with excision of foreign body/anchors and suture within the calcaneus left foot.  Implantation of antibiotic eluding cement left calcaneus    Surgeons and Role:     * Jaswinder Garcia DPM - Primary    Assisting Surgeon:    Liv Gonzalez DPM, Rahim DPM    Pre-Operative Diagnosis: Acute osteomyelitis of left calcaneus [M86.172]    Post-Operative Diagnosis: Post-Op Diagnosis Codes:     * Acute osteomyelitis of left calcaneus [M86.172]    Anesthesia: Local MAC    Operative Findings (including complications, if any):   excised extensive hypergranular tissue eminating from the skin along the lateral calcaneus and extending deep to the wall of the calcaneus with small opening noted directly into the bone. Once the non-viable bone was debrided fiberwire suture and peak anchors were excised from within the calcaneus with debridement proceeding to normal appearing bone. Copious irrigation with NS performed and antibiotic eluding cement pellets were implanted into the calcaneus.     Description of Technical Procedures:   Patient was brought to the operating room on a stretcher and transferred to the OR table in supine position. Anesthesia was induced. Tourniquet was applied to the L mid calf.  20 mL of one to one mixture of 0.5% bupivacaine plain and 1% lidocaine plain was locally infiltrated. The L foot was draped and prepped in a sterile manner. Tourniquet was inflated to 250 mmHg.     Attention was first directed to the L lateral heel.  An open wound was noted inferior to the lateral malleolus with surrounding erythema swelling.  A skin marker was used to draw a planned curvilinear incision inferior to the peroneal tendons along the lateral hindfoot and also ellipsing the wound.  Using a #15 scalpel incision was made through skin following the  previously drawn incision down to subcutaneous tissue.  Thickened hypertrophic granulation tissue and scar tissue was noted and was excised throughout the wound site down to bone.  The sural nerve could not be adequately identified and most likely was excised.  The lateral wall of the calcaneus was identified and the wound tracked into the bone.  A rongeur was utilized to excise all nonviable tissue and bone creating a cortical window proximally 1 cm in diameter into the calcaneus.  Peak Arthrex anchor and FiberWire suture were identified and excised in addition to any hypergranular tissue within the calcaneus down to healthy appearing bone. The surgical site was rinsed with copious amounts of normal saline via bulb syringe x5.  Stimulant antibiotic cement beads imbedded with tobramycin and vancomycin antibiotics were lightly packed into the bony deficit of the calcaneus.  The skin was repaired and reapproximated with 3-0 Prolene suture utilizing a combination vertical mattress and simple interrupted sutures.  Tourniquet deflated.  R foot dressed with Xeroform, gauze, cast padding, and ACE wrap.    The patient tolerated the procedure and anesthesia well. Patient was transferred to the recovery room with vital signs stable, vascular status intact, and capillary refill time <3 seconds to the distal L foot. Following a period of post-op monitoring, the patient will be discharged home on the following written and oral post-op instructions:      - Keep dressing dry and intact until clinic visit.  - Avoid excessive ambulation, ambulate with surgical shoe on at all times with partial weightbearing as tolerated  - Ice and elevate L foot when at rest.  - All prescriptions were given to patient pre and post-op.   - Contact Dr. Garcia for all post op follow up care and if any problems arise.     Estimated Blood Loss (EBL): < 5 mL           Implants:   Implant Name Type Inv. Item Serial No.  Lot No. LRB No. Used  Action   KIT STIMULAN RAPID CURE 5CC - [id number]  KIT STIMULAN RAPID CURE 5CC  BIOCOMPOSITE  Left 1 Implanted       Specimens:   Specimen (24h ago, onward)       Start     Ordered    12/04/24 1440  Specimen to Pathology, Surgery Other  Once        Comments: Pre-op Diagnosis: Acute osteomyelitis of left calcaneus [M86.172]Procedure(s):IRRIGATION AND DEBRIDEMENT Number of specimens: 1Name of specimens: Foreign Body - perm     References:    Click here for ordering Quick Tip   Question Answer Comment   Procedure Type: Other    Release to patient Immediate        12/04/24 1439                            Condition: Good    Disposition: PACU - hemodynamically stable.    Attestation: I was present and scrubbed for the entire procedure.    Discharge Note    OUTCOME: Patient tolerated treatment/procedure well without complication and is now ready for discharge.    DISPOSITION: Home or Self Care    FINAL DIAGNOSIS:  Acute osteomyelitis of left calcaneus    FOLLOWUP: In clinic    DISCHARGE INSTRUCTIONS:    Discharge Procedure Orders   Diet general     Keep surgical extremity elevated   Order Comments: To heart level at rest. Place pillows under the leg and float the heel to avoid pressure at the back of the heel.     Call MD for:  temperature >100.4     Call MD for:  persistent nausea and vomiting     Call MD for:  severe uncontrolled pain     Call MD for:  difficulty breathing, headache or visual disturbances     Leave dressing on - Keep it clean, dry, and intact until clinic visit     Weight bearing restrictions (specify)   Order Comments: If possible non-weightbearing left foot x 3 days with crutches advancing to light pressure with surgical shoe and crutches as tolerated.     I directly supervised the above resident and was scrubbed for the entire surgical case.  Jaswinder Garcia DPM, FACFAS

## 2024-12-05 NOTE — ANESTHESIA POSTPROCEDURE EVALUATION
Anesthesia Post Evaluation    Patient: Alonzo Nascimento Jr.    Procedure(s) Performed: Procedure(s) (LRB):  IRRIGATION AND DEBRIDEMENT (Left)  INSERTION, ANTIBIOTIC SPACER, LOWER EXTREMITY (Left)    Final Anesthesia Type: general      Patient location during evaluation: GI PACU  Patient participation: Yes- Able to Participate  Level of consciousness: awake and alert and oriented  Post-procedure vital signs: reviewed and stable  Pain management: adequate  Airway patency: patent    PONV status at discharge: No PONV  Anesthetic complications: no      Cardiovascular status: hemodynamically stable  Respiratory status: spontaneous ventilation and unassisted  Hydration status: euvolemic  Follow-up not needed.              Vitals Value Taken Time   /66 12/04/24 1730   Temp 36.8 °C (98.3 °F) 12/04/24 1730   Pulse 59 12/04/24 1730   Resp 15 12/04/24 1730   SpO2 98 % 12/04/24 1730         No case tracking events are documented in the log.      Pain/Jes Score: Jes Score: 10 (12/4/2024  5:30 PM)

## 2024-12-06 LAB
ACID FAST MOD KINY STN SPEC: NORMAL
MYCOBACTERIUM SPEC QL CULT: NORMAL

## 2024-12-07 LAB — BACTERIA SPEC AEROBE CULT: NORMAL

## 2024-12-09 LAB
BACTERIA SPEC ANAEROBE CULT: NORMAL
FUNGUS SPEC CULT: NORMAL

## 2024-12-10 ENCOUNTER — OFFICE VISIT (OUTPATIENT)
Dept: PODIATRY | Facility: CLINIC | Age: 29
End: 2024-12-10
Payer: MEDICAID

## 2024-12-10 VITALS — DIASTOLIC BLOOD PRESSURE: 74 MMHG | SYSTOLIC BLOOD PRESSURE: 119 MMHG | HEART RATE: 72 BPM

## 2024-12-10 DIAGNOSIS — T81.31XA POSTOPERATIVE WOUND DEHISCENCE, INITIAL ENCOUNTER: Primary | ICD-10-CM

## 2024-12-10 DIAGNOSIS — M86.172 ACUTE OSTEOMYELITIS OF LEFT CALCANEUS: ICD-10-CM

## 2024-12-10 PROCEDURE — 99024 POSTOP FOLLOW-UP VISIT: CPT | Mod: ,,, | Performed by: PODIATRIST

## 2024-12-10 PROCEDURE — 3044F HG A1C LEVEL LT 7.0%: CPT | Mod: CPTII,,, | Performed by: PODIATRIST

## 2024-12-10 PROCEDURE — 3078F DIAST BP <80 MM HG: CPT | Mod: CPTII,,, | Performed by: PODIATRIST

## 2024-12-10 PROCEDURE — 1159F MED LIST DOCD IN RCRD: CPT | Mod: CPTII,,, | Performed by: PODIATRIST

## 2024-12-10 PROCEDURE — 99999 PR PBB SHADOW E&M-EST. PATIENT-LVL III: CPT | Mod: PBBFAC,,, | Performed by: PODIATRIST

## 2024-12-10 PROCEDURE — 3074F SYST BP LT 130 MM HG: CPT | Mod: CPTII,,, | Performed by: PODIATRIST

## 2024-12-10 PROCEDURE — 1160F RVW MEDS BY RX/DR IN RCRD: CPT | Mod: CPTII,,, | Performed by: PODIATRIST

## 2024-12-10 PROCEDURE — 99213 OFFICE O/P EST LOW 20 MIN: CPT | Mod: PBBFAC,PN | Performed by: PODIATRIST

## 2024-12-10 NOTE — PROGRESS NOTES
Subjective:     Patient ID: Alonzo Nascimento Jr. is a 29 y.o. male.    Chief Complaint: Post-op Evaluation (1 week after surgery, no pain)    Patient is seen post hospital discharge for osteomyelitis of the left calcaneus.  He has a prior history of Achilles tendon repair per  on 07/14/2023.  States that he has always had some type of discomfort to this area.  Bone biopsy was completed while in the hospital which grew Citrobacter koseri and prevotella bevia.  Patient was placed on 6 week course of oral Bactrim DS and metronidazole per PCP.  Patient was ambulating with slippers and relates that he has a job working on the [employer] that requires an hour standing at a time.    12/10/2024:  Post I&D of infected osteomyelitic bone with foreign body removal left calcaneus on 12/04/2024.  No pain reported.  Partial weight-bearing to left lower extremity with Darco shoe and crutches.  Taking oral Bactrim DS and Flagyl as prescribed.    Vitals:    12/10/24 1540   BP: 119/74   Pulse: 72   PainSc: 0-No pain      History reviewed. No pertinent past medical history.    Past Surgical History:   Procedure Laterality Date    ANTERIOR CRUCIATE LIGAMENT REPAIR Right     2018    APPLICATION OF SPLINT Left 7/14/2023    Procedure: APPLICATION, SPLINT;  Surgeon: Lenore Pearl DPM;  Location: [facility] beds at the [facility] OR;  Service: Podiatry;  Laterality: Left;    INSERTION, ANTIBIOTIC SPACER, LOWER EXTREMITY Left 12/4/2024    Procedure: INSERTION, ANTIBIOTIC SPACER, LOWER EXTREMITY;  Surgeon: Jaswinder Garcia DPM;  Location: [facility] OR;  Service: Podiatry;  Laterality: Left;    IRRIGATION AND DEBRIDEMENT Left 12/4/2024    Procedure: IRRIGATION AND DEBRIDEMENT;  Surgeon: Jaswinder Garcia DPM;  Location: [facility] OR;  Service: Podiatry;  Laterality: Left;  mini c-arm, Stimulan jr tobramycin/Vancomycin    REPAIR OF ACHILLES TENDON Left 7/14/2023    Procedure: REPAIR, TENDON, ACHILLES;  Surgeon: Lenore Pearl DPM;  Location: [facility] beds at the [facility] OR;  Service:  Podiatry;  Laterality: Left;       No family history on file.    Social History     Socioeconomic History    Marital status: Single   Tobacco Use    Smoking status: Never    Smokeless tobacco: Never   Substance and Sexual Activity    Alcohol use: Yes     Comment: rarely    Drug use: Yes     Types: Marijuana    Sexual activity: Yes     Partners: Female     Social Drivers of Health     Financial Resource Strain: Low Risk  (11/19/2024)    Overall Financial Resource Strain (CARDIA)     Difficulty of Paying Living Expenses: Not very hard   Food Insecurity: No Food Insecurity (11/19/2024)    Hunger Vital Sign     Worried About Running Out of Food in the Last Year: Never true     Ran Out of Food in the Last Year: Never true   Transportation Needs: Unmet Transportation Needs (11/19/2024)    TRANSPORTATION NEEDS     Transportation : Yes, it has kept me from non-medical meetings, appointments, work or from getting things that I need.   Stress: No Stress Concern Present (11/19/2024)    [employer] - Occupational Stress Questionnaire     Feeling of Stress : Only a little   Housing Stability: Low Risk  (11/19/2024)    Housing Stability Vital Sign     Unable to Pay for Housing in the Last Year: No     Homeless in the Last Year: No       Current Outpatient Medications   Medication Sig Dispense Refill    HYDROcodone-acetaminophen (NORCO) 7.5-325 mg per tablet Take 1 tablet by mouth every 4 (four) hours as needed for Pain. 28 tablet 0    metroNIDAZOLE (FLAGYL) 500 MG tablet Take 1 tablet (500 mg total) by mouth every 8 (eight) hours. 126 tablet 0    sulfamethoxazole-trimethoprim 800-160mg (BACTRIM DS) 800-160 mg Tab Take 2 tablets by mouth 2 (two) times daily. 148 tablet 0     Current Facility-Administered Medications   Medication Dose Route Frequency Provider Last Rate Last Admin    sodium chloride 0.9% flush 10 mL  10 mL Intravenous PRN Jaswinder Garcia DPM           Review of patient's allergies  indicates:  No Known Allergies      Review of Systems   Constitutional: Negative for chills, fever and malaise/fatigue.   HENT:  Negative for congestion and hearing loss.    Cardiovascular:  Negative for chest pain, claudication and leg swelling.   Respiratory:  Negative for cough and shortness of breath.    Skin:  Positive for color change and poor wound healing.   Musculoskeletal:  Negative for back pain, joint pain, muscle cramps and muscle weakness.   Gastrointestinal:  Negative for nausea and vomiting.   Neurological:  Negative for numbness, paresthesias and weakness.   Psychiatric/Behavioral:  Negative for altered mental status.         Objective:     Physical Exam  Constitutional:       General: He is not in acute distress.     Appearance: He is not ill-appearing.   Cardiovascular:      Pulses:           Dorsalis pedis pulses are 2+ on the right side and 2+ on the left side.        Posterior tibial pulses are 2+ on the right side and 2+ on the left side.   Musculoskeletal:      Comments: Localized pain on palpation to lateral aspect of the left heel overlying the wound site.  No pain with attempted subtalar joint or ankle range motion left lower extremity.  No palpable fluctuance or crepitance.  Mild pain with squeezing the posterior tuber of the left calcaneus.   Skin:     General: Skin is warm.      Capillary Refill: Capillary refill takes less than 2 seconds.      Findings: Erythema present.      Nails: There is no clubbing.      Comments: Lateral left heel surgical incision with intact sutures but there is some central dehiscence noting sydnie incisional maceration and granular tissue formation in the mid aspect.  No surrounding erythema.  No odor.  Mild serous drainage observed.   Neurological:      Mental Status: He is alert and oriented to person, place, and time.      Sensory: Sensation is intact.      Motor: Motor function is intact.           Assessment:      Encounter Diagnoses   Name Primary?     Postoperative wound dehiscence, initial encounter Yes    Acute osteomyelitis of left calcaneus      Plan:     Alonzo was seen today for post-op evaluation.    Diagnoses and all orders for this visit:    Postoperative wound dehiscence, initial encounter  -     X-Ray Calcaneus 2 View Left; Future    Acute osteomyelitis of left calcaneus  -     X-Ray Calcaneus 2 View Left; Future      I counseled the patient on his conditions, their implications and medical management.    Dressing left foot removed.  Left foot cleansed Hibiclens scrub and normal saline.  Applied Hydrofera ready blue secured with cast padding and Ace wrap forming modified football dressing.  Extra supplies was given to the patient to change in 1 week.  Reviewed appropriate instructions in detail.  Continue elevation at rest.      Continue modified weight-bearing to the left lower extremity with crutches.  Patient is not cleared returned to work.  We discussed rest and elevation as much as possible.      Intra op pathology pending.      RTC within 2 weeks for suture removal or p.r.n. as discussed     Assisted by Ming Gonzalez DPM PGY 2    A portion of this note was generated by voice recognition software and may contain spelling and grammar errors.      .

## 2024-12-11 DIAGNOSIS — N18.31 STAGE 3A CHRONIC KIDNEY DISEASE: Primary | ICD-10-CM

## 2024-12-11 LAB
FINAL PATHOLOGIC DIAGNOSIS: NORMAL
GROSS: NORMAL
Lab: NORMAL

## 2024-12-14 ENCOUNTER — HOSPITAL ENCOUNTER (EMERGENCY)
Facility: HOSPITAL | Age: 29
Discharge: HOME | End: 2024-12-14
Attending: EMERGENCY MEDICINE
Payer: MEDICAID

## 2024-12-14 ENCOUNTER — NURSE TRIAGE (OUTPATIENT)
Dept: ADMINISTRATIVE | Facility: CLINIC | Age: 29
End: 2024-12-14
Payer: MEDICAID

## 2024-12-14 VITALS
OXYGEN SATURATION: 100 % | HEART RATE: 83 BPM | BODY MASS INDEX: 22.26 KG/M2 | TEMPERATURE: 100 F | SYSTOLIC BLOOD PRESSURE: 132 MMHG | DIASTOLIC BLOOD PRESSURE: 77 MMHG | RESPIRATION RATE: 18 BRPM | WEIGHT: 168 LBS | HEIGHT: 73 IN

## 2024-12-14 DIAGNOSIS — B34.9 VIRAL SYNDROME: ICD-10-CM

## 2024-12-14 DIAGNOSIS — R51.9 NONINTRACTABLE HEADACHE, UNSPECIFIED CHRONICITY PATTERN, UNSPECIFIED HEADACHE TYPE: ICD-10-CM

## 2024-12-14 DIAGNOSIS — S91.302A OPEN WOUND OF LEFT FOOT WITH COMPLICATION, INITIAL ENCOUNTER: ICD-10-CM

## 2024-12-14 DIAGNOSIS — R11.2 NAUSEA AND VOMITING, UNSPECIFIED VOMITING TYPE: Primary | ICD-10-CM

## 2024-12-14 LAB
ALBUMIN SERPL BCP-MCNC: 4.5 G/DL (ref 3.5–5.2)
ALP SERPL-CCNC: 182 U/L (ref 38–126)
ALT SERPL W/O P-5'-P-CCNC: 877 U/L (ref 10–44)
ANION GAP SERPL CALC-SCNC: 7 MMOL/L (ref 8–16)
AST SERPL-CCNC: 1069 U/L (ref 15–46)
BILIRUB SERPL-MCNC: 0.3 MG/DL (ref 0.1–1)
CALCIUM SERPL-MCNC: 9.2 MG/DL (ref 8.7–10.5)
CHLORIDE SERPL-SCNC: 99 MMOL/L (ref 95–110)
CO2 SERPL-SCNC: 27 MMOL/L (ref 23–29)
CREAT SERPL-MCNC: 1.62 MG/DL (ref 0.5–1.4)
EST. GFR  (NO RACE VARIABLE): 57.8 ML/MIN/1.73 M^2
GLUCOSE SERPL-MCNC: 107 MG/DL (ref 70–110)
INFLUENZA A, MOLECULAR: NEGATIVE
INFLUENZA B, MOLECULAR: NEGATIVE
LACTATE SERPL-SCNC: 1.1 MMOL/L (ref 0.5–2.2)
POTASSIUM SERPL-SCNC: 4.3 MMOL/L (ref 3.5–5.1)
PROT SERPL-MCNC: 8.3 G/DL (ref 6–8.4)
SARS-COV-2 RDRP RESP QL NAA+PROBE: NEGATIVE
SODIUM SERPL-SCNC: 133 MMOL/L (ref 136–145)
SPECIMEN SOURCE: NORMAL
UUN UR-MCNC: 12 MG/DL (ref 2–20)

## 2024-12-14 PROCEDURE — 80053 COMPREHEN METABOLIC PANEL: CPT | Mod: ER | Performed by: EMERGENCY MEDICINE

## 2024-12-14 PROCEDURE — 85025 COMPLETE CBC W/AUTO DIFF WBC: CPT | Mod: ER | Performed by: EMERGENCY MEDICINE

## 2024-12-14 PROCEDURE — 83605 ASSAY OF LACTIC ACID: CPT | Mod: ER | Performed by: EMERGENCY MEDICINE

## 2024-12-14 PROCEDURE — 87635 SARS-COV-2 COVID-19 AMP PRB: CPT | Mod: ER | Performed by: EMERGENCY MEDICINE

## 2024-12-14 PROCEDURE — 80074 ACUTE HEPATITIS PANEL: CPT | Mod: ER | Performed by: EMERGENCY MEDICINE

## 2024-12-14 PROCEDURE — 96372 THER/PROPH/DIAG INJ SC/IM: CPT | Mod: ER

## 2024-12-14 PROCEDURE — 63600175 PHARM REV CODE 636 W HCPCS: Mod: ER | Performed by: EMERGENCY MEDICINE

## 2024-12-14 PROCEDURE — 87502 INFLUENZA DNA AMP PROBE: CPT | Mod: ER | Performed by: EMERGENCY MEDICINE

## 2024-12-14 PROCEDURE — 99284 EMERGENCY DEPT VISIT MOD MDM: CPT | Mod: 25,ER

## 2024-12-14 PROCEDURE — 96372 THER/PROPH/DIAG INJ SC/IM: CPT | Performed by: EMERGENCY MEDICINE

## 2024-12-14 PROCEDURE — 25000003 PHARM REV CODE 250: Mod: ER | Performed by: EMERGENCY MEDICINE

## 2024-12-14 RX ORDER — METOCLOPRAMIDE 10 MG/1
10 TABLET ORAL EVERY 6 HOURS
Qty: 30 TABLET | Refills: 0 | Status: SHIPPED | OUTPATIENT
Start: 2024-12-14

## 2024-12-14 RX ORDER — METOCLOPRAMIDE HYDROCHLORIDE 5 MG/ML
10 INJECTION INTRAMUSCULAR; INTRAVENOUS
Status: COMPLETED | OUTPATIENT
Start: 2024-12-14 | End: 2024-12-14

## 2024-12-14 RX ORDER — ONDANSETRON 4 MG/1
4 TABLET, ORALLY DISINTEGRATING ORAL
Status: COMPLETED | OUTPATIENT
Start: 2024-12-14 | End: 2024-12-14

## 2024-12-14 RX ADMIN — ONDANSETRON 4 MG: 4 TABLET, ORALLY DISINTEGRATING ORAL at 10:12

## 2024-12-14 RX ADMIN — METOCLOPRAMIDE 10 MG: 5 INJECTION, SOLUTION INTRAMUSCULAR; INTRAVENOUS at 10:12

## 2024-12-14 NOTE — TELEPHONE ENCOUNTER
"Pt c/o feeling nausea/vomiting. States that he is currently on Flagyl 500 mg and Bactrim 800-160 mg as ordered after I&D. Pt states that he is taking meds with food. States that he ate pizza on Thursday and vomited. Pt states that he has not vomited since. Pt states that he was advised to eat greek yogurt but ran out a day ago. Pt also c/o decrease in appetite. Home care advised per protocol. Pt advised to callback with new or worsening of s/s. VU. Encounter routed to provider.   Reason for Disposition   MILD or MODERATE vomiting (e.g., 1 - 5 times / day)    Additional Information   Negative: Shock suspected (e.g., cold/pale/clammy skin, too weak to stand, low BP, rapid pulse)   Negative: Difficult to awaken or acting confused (e.g., disoriented, slurred speech)   Negative: Sounds like a life-threatening emergency to the triager   Negative: [1] Vomiting AND [2] contains red blood or black ("coffee ground") material  (Exception: Few red streaks in vomit that only happened once.)   Negative: Severe pain in one eye   Negative: Recent head injury (within last 3 days)   Negative: Recent abdominal injury (within last 3 days)   Negative: [1] Insulin-dependent diabetes (Type I) AND [2] glucose > 400 mg/dL (22 mmol/L)   Negative: [1] Vomiting AND [2] hernia is more painful or swollen than usual   Negative: [1] SEVERE vomiting (e.g., 6 or more times/day) AND [2] present > 8 hours (Exception: Patient sounds well, is drinking liquids, does not sound dehydrated, and vomiting has lasted less than 24 hours.)   Negative: [1] MODERATE vomiting (e.g., 3 - 5 times/day) AND [2] age > 60 years   Negative: Severe headache (e.g., excruciating)  (Exception: Similar to previous migraines.)   Negative: High-risk adult (e.g., diabetes mellitus, brain tumor, V-P shunt, hernia)   Negative: [1] Drinking very little AND [2] dehydration suspected (e.g., no urine > 12 hours, very dry mouth, very lightheaded)   Negative: Patient sounds very sick or " weak to the triager   Negative: [1] Vomiting AND [2] abdomen looks much more swollen than usual   Negative: [1] Vomiting AND [2] contains bile (green color)   Negative: [1] Constant abdominal pain AND [2] present > 2 hours   Negative: [1] Fever > 103 F (39.4 C) AND [2] not able to get the fever down using Fever Care Advice   Negative: [1] Fever > 101 F (38.3 C) AND [2] age > 60 years   Negative: [1] Fever > 100 F (37.8 C) AND [2] bedridden (e.g., CVA, chronic illness, recovering from surgery)   Negative: [1] Fever > 100 F (37.8 C) AND [2] weak immune system (e.g., HIV positive, cancer chemo, splenectomy, organ transplant, chronic steroids)   Negative: Taking any of the following medications: digoxin (Lanoxin), lithium, theophylline, phenytoin (Dilantin)   Negative: [1] MILD or MODERATE vomiting AND [2] present > 48 hours (2 days)  (Exception: Mild vomiting with associated diarrhea.)   Negative: Fever present > 3 days (72 hours)   Negative: Vomiting a prescription medication   Negative: [1] MILD vomiting with diarrhea AND [2] present > 5 days   Negative: Substance use (drug use) or unhealthy alcohol use, known or suspected   Negative: [1] Few streaks of blood in vomit AND [2] occurred one time   Negative: Vomiting is a chronic symptom (recurrent or ongoing AND present > 4 weeks)   Negative: [1] SEVERE vomiting (e.g., 6 or more times/day, vomits everything) BUT [2] hydrated    Protocols used: Vomiting-A-AH

## 2024-12-15 LAB
BASOPHILS # BLD AUTO: 0.04 K/UL (ref 0–0.2)
BASOPHILS NFR BLD: 0.7 % (ref 0–1.9)
DIFFERENTIAL METHOD BLD: ABNORMAL
EOSINOPHIL # BLD AUTO: 0.4 K/UL (ref 0–0.5)
EOSINOPHIL NFR BLD: 7.9 % (ref 0–8)
ERYTHROCYTE [DISTWIDTH] IN BLOOD BY AUTOMATED COUNT: 13.2 % (ref 11.5–14.5)
GIANT PLATELETS BLD QL SMEAR: PRESENT
HAV IGM SERPL QL IA: NORMAL
HBV CORE IGM SERPL QL IA: NORMAL
HBV SURFACE AG SERPL QL IA: NORMAL
HCT VFR BLD AUTO: 41.4 % (ref 40–54)
HCV AB SERPL QL IA: NORMAL
HGB BLD-MCNC: 14.3 G/DL (ref 14–18)
IMM GRANULOCYTES # BLD AUTO: 0.02 K/UL (ref 0–0.04)
IMM GRANULOCYTES NFR BLD AUTO: 0.4 % (ref 0–0.5)
LYMPHOCYTES # BLD AUTO: 0.7 K/UL (ref 1–4.8)
LYMPHOCYTES NFR BLD: 11.9 % (ref 18–48)
MCH RBC QN AUTO: 28.5 PG (ref 27–31)
MCHC RBC AUTO-ENTMCNC: 34.5 G/DL (ref 32–36)
MCV RBC AUTO: 83 FL (ref 82–98)
MONOCYTES # BLD AUTO: 0.5 K/UL (ref 0.3–1)
MONOCYTES NFR BLD: 8.4 % (ref 4–15)
NEUTROPHILS # BLD AUTO: 3.9 K/UL (ref 1.8–7.7)
NEUTROPHILS NFR BLD: 70.7 % (ref 38–73)
NRBC BLD-RTO: 0 /100 WBC
PLATELET # BLD AUTO: 114 K/UL (ref 150–450)
PMV BLD AUTO: 9.4 FL (ref 9.2–12.9)
RBC # BLD AUTO: 5.01 M/UL (ref 4.6–6.2)
WBC # BLD AUTO: 5.45 K/UL (ref 3.9–12.7)

## 2024-12-15 NOTE — ED NOTES
Pt arrives to the ED with chills and an overall feeling of unwellness, accompanied by intermittent nausea & vomiting that began on Thursday.  Patient reports being treated with antibiotics for osteomyelitis, specifically Bactrim & Flagyl, and states he has been on antibiotics since November.  The patient mentions that his doctor increased his antibiotic dosage approximately 2 weeks ago and is concerned that this might be contributing to his gastric upset. Pt denies experiencing any fevers but reports not having a thermometer at home to verify.

## 2024-12-15 NOTE — ED NOTES
Called patient to inform him that additional blood is needed for further testing.   Patient stated he has no issue returning to the facility to have more blood drawn.

## 2024-12-15 NOTE — ED NOTES
Called patient to inform him that his liver enzyme levels were elevated. Explained that the ED physician ordered additional blood test for further evaluation and will send a message to his doctor regarding the results. Advised the patient to contact his physician on Monday for follow-up and to avoid Tylenol or any medications with Tylenol/Acetaminophen in it.

## 2024-12-15 NOTE — ED PROVIDER NOTES
Encounter Date: 12/14/2024       History     Chief Complaint   Patient presents with    Nausea    Vomiting    Headache    Chills     Pt states that he has been feeling unwell for the last couple days. Taking abx for acute osteomyelitis. Bactrim/Flagyl.      HPI  29 y.o.  Co nv frontal ha chills x few days  Not vaccinated for covid nor flu  Also chronic osteo of L foot, has been debrided on two abx    Review of patient's allergies indicates:  No Known Allergies  History reviewed. No pertinent past medical history.  Past Surgical History:   Procedure Laterality Date    ANTERIOR CRUCIATE LIGAMENT REPAIR Right     2018    APPLICATION OF SPLINT Left 7/14/2023    Procedure: APPLICATION, SPLINT;  Surgeon: Lenore Pearl DPM;  Location: [facility] OR;  Service: Podiatry;  Laterality: Left;    INSERTION, ANTIBIOTIC SPACER, LOWER EXTREMITY Left 12/4/2024    Procedure: INSERTION, ANTIBIOTIC SPACER, LOWER EXTREMITY;  Surgeon: Jaswinder Garcia DPM;  Location: [facility] OR;  Service: Podiatry;  Laterality: Left;    IRRIGATION AND DEBRIDEMENT Left 12/4/2024    Procedure: IRRIGATION AND DEBRIDEMENT;  Surgeon: Jaswinder Garcia DPM;  Location: [facility] OR;  Service: Podiatry;  Laterality: Left;  mini c-arm, Stimulan jr tobramycin/Vancomycin    REPAIR OF ACHILLES TENDON Left 7/14/2023    Procedure: REPAIR, TENDON, ACHILLES;  Surgeon: Lenore Pearl DPM;  Location: [facility] OR;  Service: Podiatry;  Laterality: Left;     No family history on file.  Social History     Tobacco Use    Smoking status: Never    Smokeless tobacco: Never   Substance Use Topics    Alcohol use: Yes     Comment: rarely    Drug use: Yes     Types: Marijuana     Review of Systems  All systems were reviewed/examined and were negative except as noted in the HPI.    Physical Exam     Initial Vitals [12/14/24 2144]   BP Pulse Resp Temp SpO2   132/73 105 18 100 °F (37.8 °C) 96 %      MAP       --         Physical Exam    General: the patient is awake, alert, and in no apparent  distress.  Head: normocephalic and atraumatic, sclera are clear  Neck: supple without meningismus  Chest:, no respiratory distress  Heart: regular rate and rhythm  ABD soft, nontender, nondistended, no peritoneal signs  Extremities: warm and well perfused    L foot in splint/dressing  Skin: warm and dry  Psych conversant  Neuro: awake, alert, moving all extremities    ED Course   Procedures  Labs Reviewed   CBC W/ AUTO DIFFERENTIAL - Abnormal       Result Value    WBC 5.45      RBC 5.01      Hemoglobin 14.3      Hematocrit 41.4      MCV 83      MCH 28.5      MCHC 34.5      RDW 13.2      Platelets 114 (*)     MPV 9.4      Immature Granulocytes 0.4      Gran # (ANC) 3.9      Immature Grans (Abs) 0.02      Lymph # 0.7 (*)     Mono # 0.5      Eos # 0.4      Baso # 0.04      nRBC 0      Gran % 70.7      Lymph % 11.9 (*)     Mono % 8.4      Eosinophil % 7.9      Basophil % 0.7      Large/Giant Platelets Present      Differential Method Automated     COMPREHENSIVE METABOLIC PANEL - Abnormal    Sodium 133 (*)     Potassium 4.3      Chloride 99      CO2 27      Glucose 107      BUN 12      Creatinine 1.62 (*)     Calcium 9.2      Total Protein 8.3      Albumin 4.5      Total Bilirubin 0.3      Alkaline Phosphatase 182 (*)     AST 1,069 (*)      (*)     Anion Gap 7 (*)     eGFR 57.8 (*)    INFLUENZA A & B BY MOLECULAR    Influenza A, Molecular Negative      Influenza B, Molecular Negative      Flu A & B Source Nasal swab     SARS-COV-2 RNA AMPLIFICATION, QUAL    SARS-CoV-2 RNA, Amplification, Qual Negative     LACTIC ACID, PLASMA    Lactate (Lactic Acid) 1.1     HEPATITIS PANEL, ACUTE   HEPATITIS PANEL, ACUTE    Hepatitis B Surface Ag Non-reactive      Hep B C IgM Non-reactive      Hep A IgM Non-reactive      Hepatitis C Ab Non-reactive            Imaging Results    None          Medications   ondansetron disintegrating tablet 4 mg (4 mg Oral Given 12/14/24 2207)   metoclopramide injection 10 mg (10 mg Intramuscular  Given 12/14/24 3514)     Medical Decision Making                 Medical Decision Making:    This is an emergent evaluation of a patient presenting to the ED.  Nursing notes were reviewed.  Elev LFTs  Will send hep panel  I personally reviewed and interpreted the laboratory results.  Communicated with another physician regarding patient's care: PCP msg  I decided to obtain and review old medical records, which showed: podiatry fu    Evaluation for Emergency Medical Condition  The patient received a medical screening exam and within a reasonable degree of clinical confidence an emergency medical condition has not been identified.  The patient is instructed on proper follow up and return precautions to the ED.      Henri Haque MD, YG                     Clinical Impression:  Final diagnoses:  [R11.2] Nausea and vomiting, unspecified vomiting type (Primary)  [R51.9] Nonintractable headache, unspecified chronicity pattern, unspecified headache type  [B34.9] Viral syndrome  [S91.302A] Open wound of left foot with complication, initial encounter          ED Disposition Condition    Discharge Stable          ED Prescriptions       Medication Sig Dispense Start Date End Date Auth. Provider    metoclopramide HCl (REGLAN) 10 MG tablet Take 1 tablet (10 mg total) by mouth every 6 (six) hours. 30 tablet 12/14/2024 -- Ten Haque MD          Follow-up Information       Follow up With Specialties Details Why Contact Info    Sohan Strange MD Family Medicine Schedule an appointment as soon as possible for a visit   [address], [place] [address]  [place] [address] [phone]            Discharged to home in stable condition, return to ED warnings given, follow up and patient care instructions given.      Henri Haque MD, YG, FACEP  Department of Emergency Medicine       Ten Haque MD  12/16/24 8350

## 2024-12-16 ENCOUNTER — NURSE TRIAGE (OUTPATIENT)
Dept: ADMINISTRATIVE | Facility: CLINIC | Age: 29
End: 2024-12-16
Payer: MEDICAID

## 2024-12-17 ENCOUNTER — OFFICE VISIT (OUTPATIENT)
Dept: PRIMARY CARE CLINIC | Facility: CLINIC | Age: 29
End: 2024-12-17
Payer: MEDICAID

## 2024-12-17 ENCOUNTER — TELEPHONE (OUTPATIENT)
Dept: FAMILY MEDICINE | Facility: HOSPITAL | Age: 29
End: 2024-12-17
Payer: MEDICAID

## 2024-12-17 DIAGNOSIS — A08.4 VIRAL GASTROENTERITIS: ICD-10-CM

## 2024-12-17 DIAGNOSIS — E87.1 HYPONATREMIA: ICD-10-CM

## 2024-12-17 DIAGNOSIS — N17.9 AKI (ACUTE KIDNEY INJURY): ICD-10-CM

## 2024-12-17 DIAGNOSIS — N18.1 STAGE 1 CHRONIC KIDNEY DISEASE: ICD-10-CM

## 2024-12-17 DIAGNOSIS — R79.89 ELEVATED LFTS: Primary | ICD-10-CM

## 2024-12-17 PROCEDURE — 3044F HG A1C LEVEL LT 7.0%: CPT | Mod: CPTII,95,,

## 2024-12-17 PROCEDURE — G2211 COMPLEX E/M VISIT ADD ON: HCPCS | Mod: 95,,,

## 2024-12-17 PROCEDURE — 1160F RVW MEDS BY RX/DR IN RCRD: CPT | Mod: CPTII,95,,

## 2024-12-17 PROCEDURE — 1159F MED LIST DOCD IN RCRD: CPT | Mod: CPTII,95,,

## 2024-12-17 PROCEDURE — 99213 OFFICE O/P EST LOW 20 MIN: CPT | Mod: 95,,,

## 2024-12-17 NOTE — TELEPHONE ENCOUNTER
Returned patients call patient was scheduled for an appt.        ----- Message from Priscilla sent at 12/16/2024  4:25 PM CST -----  Type:  Call Back    Who Called:pt     Does the patient know what this is regarding?:Medication and Er visit   Would the patient rather a call back or a response via [employer]? Call   Best Call Back Number: [phone]  Additional Information:

## 2024-12-17 NOTE — TELEPHONE ENCOUNTER
"Has been vomiting. Went to ED on Saturday for vomiting. Was told it could be from taking the antibiotics Bactrim and Flagyl after being diagnosed with acute osteomyelitis. Has been decreasing the dose of the antibiotics. Scared to eat due to vomiting. Has vomited once in the past 24 hours. Advised to see PCP within 24 hours.  Reason for Disposition   [1] MILD or MODERATE vomiting AND [2] present > 48 hours (2 days)  (Exception: Mild vomiting with associated diarrhea.)    Additional Information   Negative: Shock suspected (e.g., cold/pale/clammy skin, too weak to stand, low BP, rapid pulse)   Negative: Difficult to awaken or acting confused (e.g., disoriented, slurred speech)   Negative: Sounds like a life-threatening emergency to the triager   Negative: [1] Vomiting AND [2] contains red blood or black ("coffee ground") material  (Exception: Few red streaks in vomit that only happened once.)   Negative: Severe pain in one eye   Negative: Recent head injury (within last 3 days)   Negative: Recent abdominal injury (within last 3 days)   Negative: [1] Insulin-dependent diabetes (Type I) AND [2] glucose > 400 mg/dL (22 mmol/L)   Negative: [1] Vomiting AND [2] hernia is more painful or swollen than usual   Negative: [1] SEVERE vomiting (e.g., 6 or more times/day) AND [2] present > 8 hours (Exception: Patient sounds well, is drinking liquids, does not sound dehydrated, and vomiting has lasted less than 24 hours.)   Negative: [1] MODERATE vomiting (e.g., 3 - 5 times/day) AND [2] age > 60 years   Negative: Severe headache (e.g., excruciating)  (Exception: Similar to previous migraines.)   Negative: High-risk adult (e.g., diabetes mellitus, brain tumor, V-P shunt, hernia)   Negative: [1] Drinking very little AND [2] dehydration suspected (e.g., no urine > 12 hours, very dry mouth, very lightheaded)   Negative: Patient sounds very sick or weak to the triager   Negative: [1] Vomiting AND [2] abdomen looks much more swollen than " usual   Negative: [1] Vomiting AND [2] contains bile (green color)   Negative: [1] Constant abdominal pain AND [2] present > 2 hours   Negative: [1] Fever > 103 F (39.4 C) AND [2] not able to get the fever down using Fever Care Advice   Negative: [1] Fever > 101 F (38.3 C) AND [2] age > 60 years   Negative: [1] Fever > 100 F (37.8 C) AND [2] bedridden (e.g., CVA, chronic illness, recovering from surgery)   Negative: [1] Fever > 100 F (37.8 C) AND [2] weak immune system (e.g., HIV positive, cancer chemo, splenectomy, organ transplant, chronic steroids)   Negative: Taking any of the following medications: digoxin (Lanoxin), lithium, theophylline, phenytoin (Dilantin)    Protocols used: Vomiting-A-AH

## 2024-12-17 NOTE — PROGRESS NOTES
I assume primary medical responsibility for this patient. I have reviewed the history, physical, and assessement & treatment plan with the resident and agree that the care is reasonable and necessary. This service has been performed by a resident without the presence of a teaching physician under the primary care exception. If necessary, an addendum of additional findings or evaluation beyond the resident documentation will be noted below.    Agree with plan to trend LFTs and kidney function with recent flu like symptoms improving.

## 2024-12-17 NOTE — PROGRESS NOTES
[facility] FAMILY PRACTICE CLINIC NOTE  Virtual Visit      SUBJECTIVE:     Patient: Alonzo Nascimento Jr. is a 29 y.o. male.    Chief Compliant: No chief complaint on file.      History of Present Illness:  Attended a televisit concerning vomiting, lab results.    Vomiting - Onset Thursday. Visited the ED Saturday. Got prescription for metoclopromide. Improved since then. Started on bland diet (chicken broth, crackers) without issue. Endorses associated diarrhea on Sunday, he thinks is 2/2 to all liquid diet. Endorses associated intermittent headache, chills, and subjective fever. Denies abdominal pain, chest pain, shortness of breath, constipation.    Lab Results - decreased kidney function. Repeat testing completed during ED visit Saturday and found to have significantly increased LFTs. Recent acute hep panel negative. Of note, patient taking lots of abx for ankle wound (PO bactrim and flagyl). Patient denies abdominal pain, alcohol use, risky sexual behavior, IV drug use, known exposures.      Review of Systems   Constitutional:  Positive for chills. Negative for fever.   Respiratory:  Negative for shortness of breath.    Cardiovascular:  Negative for chest pain.   Gastrointestinal:  Positive for nausea and vomiting. Negative for abdominal pain, constipation and diarrhea.   Neurological:  Negative for headaches.     A 10+ review of systems was performed with pertinent positives and negatives noted above in the history of present illness. Other systems were negative unless otherwise specified.    ASSESSMENT:   Alonzo Nascimento Jr. is a 29 y.o. male who presents to clinic to for    1. Elevated LFTs    2. LINK (acute kidney injury)    3. Hyponatremia    4. Stage 1 chronic kidney disease         PLAN:     Elevated LFTs  - Acute condition.  - Uncontrolled.  - Reviewed previous labs, tests, and/or imaging obtained since last visit.  - Discussed possible etiologies with patient including but not limited to adverse effect from PO  abx and less likely acute hepatitis or malignancy given recent normal LFTs.  - Discussed various diagnostic and treatment options with patient at this visit.  - Continue current medications.  - Orders, labs, and imaging ordered as below. Follow-up results with patient.  - Patient to follow-up with Podiatry on 12/23.  - Defer abx regimen to Podiatry. Will message Dr. Garcia to make aware.  - Repeat CMP ordered for 12/23.  - Patient to follow-up in person with me 12/27.   -     Comprehensive Metabolic Panel; Future; Expected date: 12/23/2024    LINK (acute kidney injury)  - Acute condition.  - Uncontrolled.  - Improving.  - Reviewed previous labs, tests, and/or imaging obtained since last visit.  - Discussed possible etiologies with patient including but not limited to LINK in setting of gastroenteritis /- PO abx use vs CKD.  - Discussed various diagnostic and treatment options with patient at this visit.  - Discussed adequate PO hydration with patient at this visit.  - Continue to monitor and if no improvement in 3-4 weeks, likely refer to nephrology.  - Will monitor on CMP ordered on 12/23.   -     Comprehensive Metabolic Panel; Future; Expected date: 12/23/2024    Hyponatremia  - Acute condition.  - Uncontrolled.  - Reviewed previous labs, tests, and/or imaging obtained since last visit.  - Discussed possible etiologies with patient including but not limited to hypovolemia.  - Discussed various diagnostic and treatment options with patient at this visit.  - Discussed adequate PO hydration and [place] diet with patient at this visit.  - Orders, labs, and imaging ordered as below. Follow-up results with patient.   -     Comprehensive Metabolic Panel; Future; Expected date: 12/23/2024    Stage 1 chronic kidney disease  - Acute condition.  - Uncontrolled.  - Plan as above.   -     Comprehensive Metabolic Panel; Future; Expected date: 12/23/2024    Viral gastroenteritis  - Acute condition.  - Controlled.  - Improving.  -  Reviewed previous labs, tests, and/or imaging obtained since last visit.  - Discussed various diagnostic and treatment options with patient at this visit.  - Discussed adequate PO hydration and bland diet with advancement as tolerated with patient at this visit.          Provided patient with anticipatory guidance and return precautions. Treatment plan discussed with patient, all questions answered, and patient acknowledged understanding and verbal agreement.      Follow-up in: 1-2 weeks; or sooner PRN if acute concerns arise.      ________________________  Sohan Strange MD  [facility] Family Medicine PGY-3        Audio Only Telehealth Visit  The patient location is: Home  The chief complaint leading to consultation is: viral gastroenteritis, lab results  Visit type: Virtual visit with audio only due to patient choice  Total time spent with patient: 20 minutes  Each patient to whom I provide medical services by telemedicine is:  (1) informed of the relationship between the physician and patient and the respective role of any other health care provider with respect to management of the patient; and (2) notified that they may decline to receive medical services by telemedicine and may withdraw from such care at any time. Patient verbally consented to receive this service via voice-only telephone call.     This service was not originating from a related E/M service provided within the previous 7 days nor will  to an E/M service or procedure within the next 24 hours or my soonest available appointment.  Prevailing standard of care was able to be met in this audio-only visit.

## 2024-12-19 ENCOUNTER — TELEPHONE (OUTPATIENT)
Dept: FAMILY MEDICINE | Facility: HOSPITAL | Age: 29
End: 2024-12-19
Payer: MEDICAID

## 2024-12-19 NOTE — TELEPHONE ENCOUNTER
----- Message from Jaswinder Garcia DPM sent at 12/18/2024  7:14 AM CST -----  Thanks for the update...lets hold the antibiotic therapy for a week a reassess. He also has antibiotic eluding cement in the wound that helps.    Max.  ----- Message -----  From: Sohan Strange MD  Sent: 12/17/2024   2:01 PM CST  To: Jaswinder Garcia DPM    Good afternoon Dr. Garcia.    Just wanted to keep you in the loop. His LFTs are significantly elevated. He seems to have a viral gastroenteritis right now so it is most likely a reactive elevation in the setting of recent normal LFTs, recent negative acute hepatitis panel, no RUQ pain, no risk factors. I know he is on a heavy dose of bactrim and flagyl which I know can cause elevated LFTs. I think that this may be less likely in his case, but wanted to make you aware especially since his cultures remain negative as well as he will be following up with you on Mon 12/23.      ________________________  Sohan Strange MD  [facility] Family Medicine PGY-3

## 2024-12-19 NOTE — TELEPHONE ENCOUNTER
Called patient to convey message consultation with Dr. Garcia to hold patient PO abx at this time.    Patient verbally acknowledged understanding and agreement with treatment plan. All patient questions were answered. Patient encouraged to follow the treatment plan (i.e. complete ordered labs/imaging, take medications as prescribed) as documented. Patient instructed to call the clinic, reach out via [employer], and/or schedule an in office appointment with any further questions or concerns.    ________________________  Sohan Strange MD  [facility] Family Medicine PGY-3

## 2024-12-20 ENCOUNTER — HOSPITAL ENCOUNTER (EMERGENCY)
Facility: HOSPITAL | Age: 29
Discharge: HOME | End: 2024-12-21
Attending: EMERGENCY MEDICINE
Payer: MEDICAID

## 2024-12-20 DIAGNOSIS — R21 RASH: ICD-10-CM

## 2024-12-20 DIAGNOSIS — R11.2 NAUSEA AND VOMITING, UNSPECIFIED VOMITING TYPE: ICD-10-CM

## 2024-12-20 DIAGNOSIS — R79.89 ELEVATED LIVER FUNCTION TESTS: Primary | ICD-10-CM

## 2024-12-20 DIAGNOSIS — R53.1 WEAKNESS: ICD-10-CM

## 2024-12-20 LAB
ALBUMIN SERPL BCP-MCNC: 3.8 G/DL (ref 3.5–5.2)
ALP SERPL-CCNC: 437 U/L (ref 38–126)
ALT SERPL W/O P-5'-P-CCNC: 390 U/L (ref 10–44)
ANION GAP SERPL CALC-SCNC: 13 MMOL/L (ref 8–16)
AST SERPL-CCNC: 256 U/L (ref 15–46)
BASOPHILS # BLD AUTO: 0.28 K/UL (ref 0–0.2)
BASOPHILS NFR BLD: 2.1 % (ref 0–1.9)
BILIRUB SERPL-MCNC: 5.1 MG/DL (ref 0.1–1)
CALCIUM SERPL-MCNC: 8.8 MG/DL (ref 8.7–10.5)
CHLORIDE SERPL-SCNC: 96 MMOL/L (ref 95–110)
CO2 SERPL-SCNC: 20 MMOL/L (ref 23–29)
CREAT SERPL-MCNC: 1.41 MG/DL (ref 0.5–1.4)
DIFFERENTIAL METHOD BLD: ABNORMAL
EOSINOPHIL # BLD AUTO: 1.4 K/UL (ref 0–0.5)
EOSINOPHIL NFR BLD: 10.6 % (ref 0–8)
ERYTHROCYTE [DISTWIDTH] IN BLOOD BY AUTOMATED COUNT: 13.1 % (ref 11.5–14.5)
EST. GFR  (NO RACE VARIABLE): >60 ML/MIN/1.73 M^2
GLUCOSE SERPL-MCNC: 114 MG/DL (ref 70–110)
HCT VFR BLD AUTO: 44.1 % (ref 40–54)
HGB BLD-MCNC: 15.7 G/DL (ref 14–18)
IMM GRANULOCYTES # BLD AUTO: 0.09 K/UL (ref 0–0.04)
IMM GRANULOCYTES NFR BLD AUTO: 0.7 % (ref 0–0.5)
INFLUENZA A, MOLECULAR: NEGATIVE
INFLUENZA B, MOLECULAR: NEGATIVE
LIPASE SERPL-CCNC: 194 U/L (ref 23–300)
LYMPHOCYTES # BLD AUTO: 5 K/UL (ref 1–4.8)
LYMPHOCYTES NFR BLD: 37.8 % (ref 18–48)
MCH RBC QN AUTO: 28.2 PG (ref 27–31)
MCHC RBC AUTO-ENTMCNC: 35.6 G/DL (ref 32–36)
MCV RBC AUTO: 79 FL (ref 82–98)
MONOCYTES # BLD AUTO: 0.9 K/UL (ref 0.3–1)
MONOCYTES NFR BLD: 6.9 % (ref 4–15)
NEUTROPHILS # BLD AUTO: 5.5 K/UL (ref 1.8–7.7)
NEUTROPHILS NFR BLD: 41.9 % (ref 38–73)
NRBC BLD-RTO: 0 /100 WBC
PLATELET # BLD AUTO: 234 K/UL (ref 150–450)
PLATELET BLD QL SMEAR: ABNORMAL
PMV BLD AUTO: 10.2 FL (ref 9.2–12.9)
POTASSIUM SERPL-SCNC: 4 MMOL/L (ref 3.5–5.1)
PROT SERPL-MCNC: 8 G/DL (ref 6–8.4)
RBC # BLD AUTO: 5.56 M/UL (ref 4.6–6.2)
SARS-COV-2 RDRP RESP QL NAA+PROBE: NEGATIVE
SODIUM SERPL-SCNC: 129 MMOL/L (ref 136–145)
SPECIMEN SOURCE: NORMAL
UUN UR-MCNC: 11 MG/DL (ref 2–20)
WBC # BLD AUTO: 13.1 K/UL (ref 3.9–12.7)

## 2024-12-20 PROCEDURE — 25000003 PHARM REV CODE 250: Mod: ER | Performed by: EMERGENCY MEDICINE

## 2024-12-20 PROCEDURE — 25500020 PHARM REV CODE 255: Mod: ER | Performed by: EMERGENCY MEDICINE

## 2024-12-20 PROCEDURE — 99285 EMERGENCY DEPT VISIT HI MDM: CPT | Mod: 25,ER

## 2024-12-20 PROCEDURE — 83690 ASSAY OF LIPASE: CPT | Mod: ER | Performed by: NURSE PRACTITIONER

## 2024-12-20 PROCEDURE — 87502 INFLUENZA DNA AMP PROBE: CPT | Mod: ER | Performed by: EMERGENCY MEDICINE

## 2024-12-20 PROCEDURE — 96361 HYDRATE IV INFUSION ADD-ON: CPT | Mod: ER

## 2024-12-20 PROCEDURE — 87635 SARS-COV-2 COVID-19 AMP PRB: CPT | Mod: ER | Performed by: EMERGENCY MEDICINE

## 2024-12-20 PROCEDURE — 96360 HYDRATION IV INFUSION INIT: CPT | Mod: ER

## 2024-12-20 PROCEDURE — 80053 COMPREHEN METABOLIC PANEL: CPT | Mod: ER | Performed by: NURSE PRACTITIONER

## 2024-12-20 PROCEDURE — 85025 COMPLETE CBC W/AUTO DIFF WBC: CPT | Mod: ER | Performed by: NURSE PRACTITIONER

## 2024-12-20 RX ORDER — ONDANSETRON 4 MG/1
4 TABLET, ORALLY DISINTEGRATING ORAL EVERY 8 HOURS PRN
Qty: 14 TABLET | Refills: 1 | Status: ON HOLD | OUTPATIENT
Start: 2024-12-20

## 2024-12-20 RX ADMIN — SODIUM CHLORIDE 1000 ML: 9 INJECTION, SOLUTION INTRAVENOUS at 09:12

## 2024-12-20 RX ADMIN — IOHEXOL 85 ML: 350 INJECTION, SOLUTION INTRAVENOUS at 10:12

## 2024-12-20 RX ADMIN — SODIUM CHLORIDE 1000 ML: 9 INJECTION, SOLUTION INTRAVENOUS at 11:12

## 2024-12-21 VITALS
DIASTOLIC BLOOD PRESSURE: 74 MMHG | BODY MASS INDEX: 21.2 KG/M2 | HEART RATE: 82 BPM | SYSTOLIC BLOOD PRESSURE: 130 MMHG | HEIGHT: 73 IN | OXYGEN SATURATION: 97 % | RESPIRATION RATE: 18 BRPM | WEIGHT: 160 LBS | TEMPERATURE: 99 F

## 2024-12-21 LAB
BACTERIA #/AREA URNS AUTO: NORMAL /HPF
BILIRUB UR QL STRIP: ABNORMAL
CLARITY UR REFRACT.AUTO: CLEAR
COLOR UR AUTO: YELLOW
GLUCOSE UR QL STRIP: NEGATIVE
HGB UR QL STRIP: NEGATIVE
HYALINE CASTS UR QL AUTO: 0 /LPF
KETONES UR QL STRIP: ABNORMAL
LEUKOCYTE ESTERASE UR QL STRIP: NEGATIVE
MICROSCOPIC COMMENT: NORMAL
NITRITE UR QL STRIP: NEGATIVE
PH UR STRIP: 7 [PH] (ref 5–8)
PROT UR QL STRIP: ABNORMAL
RBC #/AREA URNS AUTO: 0 /HPF (ref 0–4)
SP GR UR STRIP: <=1.005 (ref 1–1.03)
URN SPEC COLLECT METH UR: ABNORMAL
UROBILINOGEN UR STRIP-ACNC: ABNORMAL EU/DL
WBC #/AREA URNS AUTO: 2 /HPF (ref 0–5)

## 2024-12-21 PROCEDURE — 25000003 PHARM REV CODE 250: Mod: ER | Performed by: EMERGENCY MEDICINE

## 2024-12-21 PROCEDURE — 81000 URINALYSIS NONAUTO W/SCOPE: CPT | Mod: ER | Performed by: NURSE PRACTITIONER

## 2024-12-21 RX ORDER — DIPHENHYDRAMINE HCL 50 MG
50 CAPSULE ORAL
Status: COMPLETED | OUTPATIENT
Start: 2024-12-21 | End: 2024-12-21

## 2024-12-21 RX ADMIN — DIPHENHYDRAMINE HYDROCHLORIDE 50 MG: 50 CAPSULE ORAL at 12:12

## 2024-12-21 NOTE — DISCHARGE INSTRUCTIONS
Rest.  Drink plenty of fluids.  Return here at any time.  Call your doctor for close follow-up.  Have your blood work done on Monday as planned.  You may take Benadryl every 4-6 hours as needed for itching

## 2024-12-21 NOTE — ED PROVIDER NOTES
"Encounter Date: 12/20/2024       History     Chief Complaint   Patient presents with    Dehydration     Pt reports feeling dehydrated, vomited prior to arrival. Symptoms since last Thursday. Denies diarrhea. Pt states he was here Sunday for similar symptoms.      Chief complaint: Dehydrated  29-year-old says he feels dehydrated.  He has had excessive thirst for the last day.  He has had nausea, vomiting and decreased appetite for the last week.  He was seen here 6 days ago and his liver function tests were elevated.  This was assumed to be from taking Flagyl and Bactrim for osteomyelitis to his left foot.  He was told to stop taking the antibiotics.  His last dose was 3 days ago.  He has a occasional "bubbling" to his abdomen but denies overt abdominal pain.  He is able to keep Pedialyte down today but vomits water occasionally.  He feels weak.  He said his urine has been dark as well.  He had a hepatitis test done last week which was negative.  He did noticed that his eyes were yellow today and he just noticed a rash while in the ER    The history is provided by the patient and medical records.     Review of patient's allergies indicates:  No Known Allergies  History reviewed. No pertinent past medical history.  Past Surgical History:   Procedure Laterality Date    ANTERIOR CRUCIATE LIGAMENT REPAIR Right     2018    APPLICATION OF SPLINT Left 7/14/2023    Procedure: APPLICATION, SPLINT;  Surgeon: Lenore Pearl DPM;  Location: [facility] OR;  Service: Podiatry;  Laterality: Left;    INSERTION, ANTIBIOTIC SPACER, LOWER EXTREMITY Left 12/4/2024    Procedure: INSERTION, ANTIBIOTIC SPACER, LOWER EXTREMITY;  Surgeon: Jaswinder Garcia DPM;  Location: [facility] OR;  Service: Podiatry;  Laterality: Left;    IRRIGATION AND DEBRIDEMENT Left 12/4/2024    Procedure: IRRIGATION AND DEBRIDEMENT;  Surgeon: Jaswinder Garcia DPM;  Location: [facility] OR;  Service: Podiatry;  Laterality: Left;  mini c-arm, Stimulan jr tobramycin/Vancomycin " "   REPAIR OF ACHILLES TENDON Left 7/14/2023    Procedure: REPAIR, TENDON, ACHILLES;  Surgeon: Lenore Pearl DPM;  Location: [facility] OR;  Service: Podiatry;  Laterality: Left;     No family history on file.  Social History     Tobacco Use    Smoking status: Never    Smokeless tobacco: Never   Substance Use Topics    Alcohol use: Yes     Comment: rarely    Drug use: Yes     Types: Marijuana     Review of Systems   Constitutional:  Positive for activity change, appetite change and fatigue.   HENT: Negative.     Eyes:         Yellow eyes   Respiratory:  Negative for shortness of breath.    Cardiovascular:  Negative for chest pain.   Gastrointestinal:  Positive for abdominal pain (occasional "bubbling), constipation, nausea and vomiting. Negative for diarrhea.   Genitourinary:  Negative for decreased urine volume (urine is dark colored).   Musculoskeletal:  Negative for myalgias.   Skin:  Positive for rash.   Neurological:  Positive for weakness.   Psychiatric/Behavioral:  The patient is nervous/anxious.        Physical Exam     Initial Vitals [12/20/24 2021]   BP Pulse Resp Temp SpO2   125/67 (!) 123 18 99.8 °F (37.7 °C) 96 %      MAP       --         Physical Exam    Constitutional: He appears well-developed and well-nourished.   HENT:   Head: Normocephalic and atraumatic.   Eyes: EOM are normal. Pupils are equal, round, and reactive to light. Scleral icterus is present.   Neck: Neck supple.   Cardiovascular:  Regular rhythm and normal heart sounds.   Tachycardia present.         Pulmonary/Chest: Breath sounds normal. No respiratory distress. He has no wheezes.   Abdominal: Abdomen is soft. He exhibits no distension. There is no hepatomegaly. There is no abdominal tenderness. There is no rebound and no guarding.   Musculoskeletal:         General: Normal range of motion.      Cervical back: Neck supple.      Comments: Left foot in bandage     Neurological: He is alert and oriented to person, place, and time. No cranial " nerve deficit.   Skin: Skin is warm and dry. Rash (diffuse petechial rash) noted.   Psychiatric: He has a normal mood and affect.         ED Course   Critical Care    Date/Time: 12/21/2024 3:06 AM    Performed by: Janie Rodriguez MD  Authorized by: Janie Rodriguez MD  Direct patient critical care time: 30 minutes  Additional history critical care time: 5 minutes  Ordering / reviewing critical care time: 5 minutes  Documentation critical care time: 5 minutes  Consulting other physicians critical care time: 5 minutes  Total critical care time (exclusive of procedural time) : 50 minutes  Critical care was necessary to treat or prevent imminent or life-threatening deterioration of the following conditions: hepatic failure.        Labs Reviewed   CBC W/ AUTO DIFFERENTIAL - Abnormal       Result Value    WBC 13.10 (*)     RBC 5.56      Hemoglobin 15.7      Hematocrit 44.1      MCV 79 (*)     MCH 28.2      MCHC 35.6      RDW 13.1      Platelets 234      MPV 10.2      Immature Granulocytes 0.7 (*)     Gran # (ANC) 5.5      Immature Grans (Abs) 0.09 (*)     Lymph # 5.0 (*)     Mono # 0.9      Eos # 1.4 (*)     Baso # 0.28 (*)     nRBC 0      Gran % 41.9      Lymph % 37.8      Mono % 6.9      Eosinophil % 10.6 (*)     Basophil % 2.1 (*)     Platelet Estimate Appears normal      Differential Method Automated     COMPREHENSIVE METABOLIC PANEL - Abnormal    Sodium 129 (*)     Potassium 4.0      Chloride 96      CO2 20 (*)     Glucose 114 (*)     BUN 11      Creatinine 1.41 (*)     Calcium 8.8      Total Protein 8.0      Albumin 3.8      Total Bilirubin 5.1 (*)     Alkaline Phosphatase 437 (*)      (*)      (*)     Anion Gap 13      eGFR >60.0     URINALYSIS, REFLEX TO URINE CULTURE - Abnormal    Specimen UA Urine, Clean Catch      Color, UA Yellow      Appearance, UA Clear      pH, UA 7.0      Specific Gravity, UA <=1.005 (*)     Protein, UA 1+ (*)     Glucose, UA Negative      Ketones, UA 1+ (*)      Bilirubin (UA) 3+ (*)     Occult Blood UA Negative      Nitrite, UA Negative      Urobilinogen, UA 4.0-6.0 (*)     Leukocytes, UA Negative      Narrative:     Preferred Collection Type->Urine, Clean Catch  Specimen Source->Urine   INFLUENZA A & B BY MOLECULAR    Influenza A, Molecular Negative      Influenza B, Molecular Negative      Flu A & B Source Nasal swab     LIPASE    Lipase Result 194     SARS-COV-2 RNA AMPLIFICATION, QUAL    SARS-CoV-2 RNA, Amplification, Qual Negative     URINALYSIS MICROSCOPIC    RBC, UA 0      WBC, UA 2      Bacteria None      Hyaline Casts, UA 0      Microscopic Comment SEE COMMENT      Narrative:     Preferred Collection Type->Urine, Clean Catch  Specimen Source->Urine          Imaging Results              CT Abdomen Pelvis With IV Contrast Routine Oral Contrast (Final result)  Result time 12/20/24 22:23:46      Final result by Richi Elizalde DO (12/20/24 22:23:46)                   Impression:     No acute findings.    All CT scans at [this location] are performed using dose modulation techniques as appropriate to a performed exam including the following:  Automated exposure control; adjustment of the mA and/or kV according to patient size (this includes techniques or standardized protocols for targeted exams where dose is matched to indication / reason for exam; i.e. extremities or head); use of iterative reconstruction technique.    Finalized on: 12/20/2024 10:23 PM By:  Richi Elizalde  [facility]# [id number]      2024-12-20 22:25:48.202    [facility]               Narrative:    EXAM: CT ABDOMEN PELVIS WITH IV CONTRAST    CLINICAL HISTORY: Nausea and vomiting    COMPARISON: None    TECHNIQUE: Standard thin-section axial images, with reformatted sagittal and coronal images.    FINDINGS: Lung bases are clear.    Liver, spleen, pancreas, adrenals, gallbladder, kidneys and ureters are unremarkable.    Vascular structures are unremarkable.  No abdominal or retroperitoneal lymphadenopathy.  No ascites  or fat stranding within the abdomen.  No dilatation of the large or small bowel.  No evidence for appendicitis.    Pelvis: No pelvic free fluid, iliac chain or inguinal lymphadenopathy.    No concerning lytic or sclerotic bony lesions.                                         Medications   sodium chloride 0.9% bolus 1,000 mL 1,000 mL (0 mLs Intravenous Stopped 12/20/24 2201)   iohexoL (OMNIPAQUE 350) injection 85 mL (85 mLs Intravenous Given 12/20/24 2201)   sodium chloride 0.9% bolus 1,000 mL 1,000 mL (0 mLs Intravenous Stopped 12/21/24 0021)   diphenhydrAMINE capsule 50 mg (50 mg Oral Given 12/21/24 0025)     Medical Decision Making  29-year-old presents secondary to feeling dehydrated.  Patient was told last week that he had elevated liver function tests.  He was advised to stop taking Bactrim and Flagyl which she has been on for about a month for osteomyelitis.  He still feels fatigued.  He has no tenderness to his abdomen.  He does have a mild diffuse erythematous rash.  Patient was not aware of this until the nurse pointed it out.  He does admit that he has been kind of itchy recently    Medical decision-making: An IV was established and patient was given 2 L of IV fluids.  He was also given Benadryl for the itching.  He continues to have elevated liver function tests.  On December 14th his bilirubin was normal and today it is 5.1 which would explain the icteric sclera.  His alkaline phosphatase also increased to 437.  AST and ALT are still elevated but are decreased from previous levels.  CT of the abdomen and pelvis was done and this was negative for acute abnormalities.  I discussed his care with Dr. Watson with GI.  He agreed with me that the patient did not need to be admitted to the hospital at this time.  Patient has repeat labs due in 2 days.  An emergent referral was placed to the hepatology Clinic.  He did have a hepatitis panel done last week which was negative.  Patient has a white count of 13.1.   His urine showed protein ketones and 3+ bilirubin.  He will be discharged on Zofran    Amount and/or Complexity of Data Reviewed  External Data Reviewed:      Details: Patient had surgery for I and D of an infected calcaneus with osteomyelitis.  Please see above for previous labs  Labs: ordered. Decision-making details documented in ED Course.     Details: See above  Radiology: ordered.     Details: No acute findings on CT per the radiologist's  Discussion of management or test interpretation with external provider(s): I discussed the patient with Dr. Watson with GI who agreed that the patient did not require admission at this time    Risk  Prescription drug management.      Additional MDM:   Differential Diagnosis:   Increased LFTs, cirrhosis, hepatitis, medication side effect, pruritus, rash associated with liver failure                                    Clinical Impression:  Final diagnoses:  [R79.89] Elevated liver function tests (Primary)  [R53.1] Weakness  [R11.2] Nausea and vomiting, unspecified vomiting type  [R21] Rash          ED Disposition Condition    Discharge Stable          ED Prescriptions       Medication Sig Dispense Start Date End Date Auth. Provider    ondansetron (ZOFRAN-ODT) 4 MG TbDL Take 1 tablet (4 mg total) by mouth every 8 (eight) hours as needed. 14 tablet 12/20/2024 -- Janie Rodriguez MD          Follow-up Information       Follow up With Specialties Details Why Contact Info    Sohan Strange MD Family Medicine Schedule an appointment as soon as possible for a visit   [address], [address]  [place] [id number] [phone]      EMERGENCY - [facility]   If symptoms worsen [facility] [facility] [id number]    Jaswinder Garcia, JOYA Podiatry, Wound Care   [address]  [address]  [place] [address]  [phone]               Janie Rodriguez MD  12/21/24 3746

## 2024-12-21 NOTE — FIRST PROVIDER EVALUATION
Emergency Department TeleTriage Encounter Note      CHIEF COMPLAINT    Chief Complaint   Patient presents with    Dehydration     Pt reports feeling dehydrated, vomited prior to arrival. Symptoms since last Thursday. Denies diarrhea. Pt states he was here Sunday for similar symptoms.        VITAL SIGNS   Initial Vitals [12/20/24 2021]   BP Pulse Resp Temp SpO2   125/67 (!) 123 18 99.8 °F (37.7 °C) 96 %      MAP       --            ALLERGIES    Review of patient's allergies indicates:  No Known Allergies    PROVIDER TRIAGE NOTE  Verbal consent for the teletriage evaluation was given by the patient at the start of the evaluation.  All efforts will be made to maintain patient's privacy during the evaluation.      This is a teletriage evaluation of a 29 y.o. male presenting to the ED with c/o dark urine, dry itchy skin, vomiting (2 episodes today) that started 8 days ago. Limited physical exam via telehealth: The patient is awake, alert, answering questions appropriately and is not in respiratory distress.  As the Teletriage provider, I performed an initial assessment and ordered appropriate labs and imaging studies, if any, to facilitate the patient's care once placed in the ED. Once a room is available, care and a full evaluation will be completed by an alternate ED provider.  Any additional orders and the final disposition will be determined by that provider.  All imaging and labs will not be followed-up by the Teletriage Team, including myself.      ORDERS  Labs Reviewed   CBC W/ AUTO DIFFERENTIAL   COMPREHENSIVE METABOLIC PANEL   LIPASE   URINALYSIS, REFLEX TO URINE CULTURE   SARS-COV-2 RDRP GENE   POCT INFLUENZA A/B MOLECULAR     ED Orders (720h ago, onward)      Start Ordered     Status Ordering Provider    12/20/24 2028 12/20/24 2027  Weigh patient  Once         Ordered FERMIN MELARA    12/20/24 2027 12/20/24 2026  Vital signs  Every 2 hours         Ordered FERMIN MELARA    12/20/24 2027 12/20/24 2026  Diet NPO   Diet effective now         Ordered FERMIN MELARA    12/20/24 2027 12/20/24 2026  Insert peripheral IV  Once         Ordered FERMIN MELARA    12/20/24 2027 12/20/24 2026  CBC W/ AUTO DIFFERENTIAL  STAT         Ordered FERMIN MELARA    12/20/24 2027 12/20/24 2026  Comp. Metabolic Panel  STAT         Ordered FERMIN MELARA    12/20/24 2027 12/20/24 2026  Lipase  STAT         Ordered FERMIN MELARA    12/20/24 2027 12/20/24 2026  Urinalysis, Reflex to Urine Culture Urine, Clean Catch  STAT         Ordered FERMIN MELARA    12/20/24 2027 12/20/24 2026  POCT COVID-19 Rapid Screening  Once         Ordered FERMIN MELARA    12/20/24 2027 12/20/24 2026  POCT Influenza A/B Molecular  Once         Ordered FERMIN MELARA          Virtual Visit Note: The provider triage portion of this emergency department evaluation and documentation was performed via [employer], a HIPAA-compliant telemedicine application, in concert with a tele-presenter in the room. A face to face patient evaluation with one of my colleagues will occur once the patient is placed in an emergency department room.      DISCLAIMER: This note was prepared with [employer] voice recognition transcription software. Garbled syntax, mangled pronouns, and other bizarre constructions may be attributed to that software system.

## 2024-12-23 ENCOUNTER — HOSPITAL ENCOUNTER (INPATIENT)
Facility: HOSPITAL | Age: 29
LOS: 1 days | Discharge: ANOTHER HOSPITAL | DRG: 815 | End: 2024-12-24
Attending: EMERGENCY MEDICINE | Admitting: HOSPITALIST
Payer: MEDICAID

## 2024-12-23 ENCOUNTER — HOSPITAL ENCOUNTER (OUTPATIENT)
Dept: RADIOLOGY | Facility: HOSPITAL | Age: 29
Discharge: HOME | DRG: 815 | End: 2024-12-23
Attending: PODIATRIST
Payer: MEDICAID

## 2024-12-23 ENCOUNTER — OFFICE VISIT (OUTPATIENT)
Dept: PODIATRY | Facility: CLINIC | Age: 29
End: 2024-12-23
Payer: MEDICAID

## 2024-12-23 VITALS — HEART RATE: 116 BPM | DIASTOLIC BLOOD PRESSURE: 72 MMHG | SYSTOLIC BLOOD PRESSURE: 100 MMHG

## 2024-12-23 DIAGNOSIS — D72.12 DRESS SYNDROME: Primary | ICD-10-CM

## 2024-12-23 DIAGNOSIS — T81.31XA POSTOPERATIVE WOUND DEHISCENCE, INITIAL ENCOUNTER: ICD-10-CM

## 2024-12-23 DIAGNOSIS — T50.905A DRESS SYNDROME: Primary | ICD-10-CM

## 2024-12-23 DIAGNOSIS — M86.172 ACUTE OSTEOMYELITIS OF LEFT CALCANEUS: ICD-10-CM

## 2024-12-23 DIAGNOSIS — T81.31XD POSTOPERATIVE WOUND DEHISCENCE, SUBSEQUENT ENCOUNTER: Primary | ICD-10-CM

## 2024-12-23 DIAGNOSIS — R11.2 NAUSEA AND VOMITING, UNSPECIFIED VOMITING TYPE: ICD-10-CM

## 2024-12-23 DIAGNOSIS — T50.905S ADVERSE EFFECT OF DRUG, SEQUELA: ICD-10-CM

## 2024-12-23 DIAGNOSIS — R11.2 NAUSEA & VOMITING: ICD-10-CM

## 2024-12-23 DIAGNOSIS — Z09 FOLLOW-UP EXAMINATION FOLLOWING SURGERY: ICD-10-CM

## 2024-12-23 PROBLEM — Z98.890 STATUS POST ACHILLES TENDON REPAIR: Status: RESOLVED | Noted: 2023-08-09 | Resolved: 2024-12-23

## 2024-12-23 PROBLEM — E87.1 HYPONATREMIA: Status: ACTIVE | Noted: 2024-12-23

## 2024-12-23 PROBLEM — N17.9 AKI (ACUTE KIDNEY INJURY): Status: ACTIVE | Noted: 2024-12-23

## 2024-12-23 LAB
ALBUMIN SERPL BCP-MCNC: 2.3 G/DL (ref 3.5–5.2)
ALP SERPL-CCNC: 341 U/L (ref 40–150)
ALT SERPL W/O P-5'-P-CCNC: 635 U/L (ref 10–44)
ANION GAP SERPL CALC-SCNC: 12 MMOL/L (ref 8–16)
AST SERPL-CCNC: 384 U/L (ref 10–40)
BACTERIA #/AREA URNS HPF: NORMAL /HPF
BASOPHILS NFR BLD: 0 % (ref 0–1.9)
BILIRUB SERPL-MCNC: 4.1 MG/DL (ref 0.1–1)
BILIRUB UR QL STRIP: ABNORMAL
BUN SERPL-MCNC: 19 MG/DL (ref 6–20)
CALCIUM SERPL-MCNC: 8 MG/DL (ref 8.7–10.5)
CHLORIDE SERPL-SCNC: 92 MMOL/L (ref 95–110)
CLARITY UR: ABNORMAL
CO2 SERPL-SCNC: 21 MMOL/L (ref 23–29)
COLOR UR: YELLOW
CREAT SERPL-MCNC: 1.9 MG/DL (ref 0.5–1.4)
CREAT UR-MCNC: 142.7 MG/DL (ref 23–375)
CRP SERPL-MCNC: 36.4 MG/L (ref 0–8.2)
DIFFERENTIAL METHOD BLD: ABNORMAL
EOSINOPHIL NFR BLD: 12 % (ref 0–8)
ERYTHROCYTE [DISTWIDTH] IN BLOOD BY AUTOMATED COUNT: 13.8 % (ref 11.5–14.5)
ERYTHROCYTE [SEDIMENTATION RATE] IN BLOOD BY PHOTOMETRIC METHOD: 21 MM/HR (ref 0–23)
EST. GFR  (NO RACE VARIABLE): 48 ML/MIN/1.73 M^2
FIO2: 21 %
GLUCOSE SERPL-MCNC: 133 MG/DL (ref 70–110)
GLUCOSE UR QL STRIP: NEGATIVE
HCT VFR BLD AUTO: 43.9 % (ref 40–54)
HGB BLD-MCNC: 15.6 G/DL (ref 14–18)
HGB UR QL STRIP: ABNORMAL
HYALINE CASTS #/AREA URNS LPF: 0 /LPF
IMM GRANULOCYTES # BLD AUTO: ABNORMAL K/UL (ref 0–0.04)
IMM GRANULOCYTES NFR BLD AUTO: ABNORMAL % (ref 0–0.5)
INFLUENZA A, MOLECULAR: NEGATIVE
INFLUENZA B, MOLECULAR: NEGATIVE
INR PPP: 2.1 (ref 0.8–1.2)
KETONES UR QL STRIP: NEGATIVE
LACTATE SERPL-SCNC: 2.7 MMOL/L (ref 0.5–2.2)
LEUKOCYTE ESTERASE UR QL STRIP: NEGATIVE
LIPASE SERPL-CCNC: 28 U/L (ref 4–60)
LYMPHOCYTES NFR BLD: 7 % (ref 18–48)
MAGNESIUM SERPL-MCNC: 1.8 MG/DL (ref 1.6–2.6)
MCH RBC QN AUTO: 28.4 PG (ref 27–31)
MCHC RBC AUTO-ENTMCNC: 35.5 G/DL (ref 32–36)
MCV RBC AUTO: 80 FL (ref 82–98)
METAMYELOCYTES NFR BLD MANUAL: 5 %
MICROSCOPIC COMMENT: NORMAL
MONOCYTES NFR BLD: 3 % (ref 4–15)
MYELOCYTES NFR BLD MANUAL: 6 %
NEUTROPHILS NFR BLD: 64 % (ref 38–73)
NEUTS BAND NFR BLD MANUAL: 3 %
NITRITE UR QL STRIP: NEGATIVE
NRBC BLD-RTO: 0 /100 WBC
PCO2 BLDA: 49.7 MMHG (ref 35–45)
PH SMN: 7.35 [PH] (ref 7.35–7.45)
PH UR STRIP: 6 [PH] (ref 5–8)
PLATELET # BLD AUTO: 248 K/UL (ref 150–450)
PLATELET BLD QL SMEAR: ABNORMAL
PMV BLD AUTO: 10.4 FL (ref 9.2–12.9)
PO2 BLDA: 22.9 MMHG (ref 40–60)
POC BASE DEFICIT: 0.9 MMOL/L (ref -2–2)
POC HCO3: 27.4 MMOL/L (ref 24–28)
POC PERFORMED BY: ABNORMAL
POC SATURATED O2: 31.3 % (ref 95–100)
POTASSIUM SERPL-SCNC: 4.6 MMOL/L (ref 3.5–5.1)
PROT SERPL-MCNC: 6.6 G/DL (ref 6–8.4)
PROT UR QL STRIP: ABNORMAL
PROTHROMBIN TIME: 22 SEC (ref 9–12.5)
RBC # BLD AUTO: 5.49 M/UL (ref 4.6–6.2)
RBC #/AREA URNS HPF: 1 /HPF (ref 0–4)
SARS-COV-2 RDRP RESP QL NAA+PROBE: NEGATIVE
SODIUM SERPL-SCNC: 125 MMOL/L (ref 136–145)
SODIUM UR-SCNC: 22 MMOL/L (ref 20–250)
SP GR UR STRIP: 1.01 (ref 1–1.03)
SPECIMEN SOURCE: ABNORMAL
SPECIMEN SOURCE: NORMAL
TROPONIN I SERPL DL<=0.01 NG/ML-MCNC: <0.006 NG/ML (ref 0–0.03)
URN SPEC COLLECT METH UR: ABNORMAL
UROBILINOGEN UR STRIP-ACNC: >=8 EU/DL
UUN UR-MCNC: 651 MG/DL (ref 140–1050)
WBC # BLD AUTO: 28.36 K/UL (ref 3.9–12.7)
WBC #/AREA URNS HPF: 1 /HPF (ref 0–5)

## 2024-12-23 PROCEDURE — 83605 ASSAY OF LACTIC ACID: CPT | Performed by: EMERGENCY MEDICINE

## 2024-12-23 PROCEDURE — 99900035 HC TECH TIME PER 15 MIN (STAT)

## 2024-12-23 PROCEDURE — 99213 OFFICE O/P EST LOW 20 MIN: CPT | Mod: 24,S$PBB,, | Performed by: PODIATRIST

## 2024-12-23 PROCEDURE — 87798 DETECT AGENT NOS DNA AMP: CPT

## 2024-12-23 PROCEDURE — 87040 BLOOD CULTURE FOR BACTERIA: CPT | Performed by: EMERGENCY MEDICINE

## 2024-12-23 PROCEDURE — 93005 ELECTROCARDIOGRAM TRACING: CPT

## 2024-12-23 PROCEDURE — 87502 INFLUENZA DNA AMP PROBE: CPT | Performed by: EMERGENCY MEDICINE

## 2024-12-23 PROCEDURE — 80053 COMPREHEN METABOLIC PANEL: CPT | Performed by: EMERGENCY MEDICINE

## 2024-12-23 PROCEDURE — 99213 OFFICE O/P EST LOW 20 MIN: CPT | Mod: PBBFAC,25,PN | Performed by: PODIATRIST

## 2024-12-23 PROCEDURE — 84300 ASSAY OF URINE SODIUM: CPT

## 2024-12-23 PROCEDURE — 25000003 PHARM REV CODE 250: Performed by: EMERGENCY MEDICINE

## 2024-12-23 PROCEDURE — 93010 ELECTROCARDIOGRAM REPORT: CPT | Mod: ,,, | Performed by: INTERNAL MEDICINE

## 2024-12-23 PROCEDURE — 86140 C-REACTIVE PROTEIN: CPT

## 2024-12-23 PROCEDURE — 99999 PR PBB SHADOW E&M-EST. PATIENT-LVL III: CPT | Mod: PBBFAC,,, | Performed by: PODIATRIST

## 2024-12-23 PROCEDURE — 84484 ASSAY OF TROPONIN QUANT: CPT

## 2024-12-23 PROCEDURE — 25000003 PHARM REV CODE 250

## 2024-12-23 PROCEDURE — 11000001 HC ACUTE MED/SURG PRIVATE ROOM

## 2024-12-23 PROCEDURE — 1160F RVW MEDS BY RX/DR IN RCRD: CPT | Mod: CPTII,,, | Performed by: PODIATRIST

## 2024-12-23 PROCEDURE — 81000 URINALYSIS NONAUTO W/SCOPE: CPT

## 2024-12-23 PROCEDURE — 84540 ASSAY OF URINE/UREA-N: CPT

## 2024-12-23 PROCEDURE — 99285 EMERGENCY DEPT VISIT HI MDM: CPT | Mod: 25,27

## 2024-12-23 PROCEDURE — 73650 X-RAY EXAM OF HEEL: CPT | Mod: 26,LT,, | Performed by: STUDENT IN AN ORGANIZED HEALTH CARE EDUCATION/TRAINING PROGRAM

## 2024-12-23 PROCEDURE — 96374 THER/PROPH/DIAG INJ IV PUSH: CPT

## 2024-12-23 PROCEDURE — 96361 HYDRATE IV INFUSION ADD-ON: CPT

## 2024-12-23 PROCEDURE — 85027 COMPLETE CBC AUTOMATED: CPT | Performed by: EMERGENCY MEDICINE

## 2024-12-23 PROCEDURE — 3044F HG A1C LEVEL LT 7.0%: CPT | Mod: CPTII,,, | Performed by: PODIATRIST

## 2024-12-23 PROCEDURE — 63600175 PHARM REV CODE 636 W HCPCS: Performed by: EMERGENCY MEDICINE

## 2024-12-23 PROCEDURE — 85652 RBC SED RATE AUTOMATED: CPT

## 2024-12-23 PROCEDURE — 87635 SARS-COV-2 COVID-19 AMP PRB: CPT | Performed by: EMERGENCY MEDICINE

## 2024-12-23 PROCEDURE — 63600175 PHARM REV CODE 636 W HCPCS

## 2024-12-23 PROCEDURE — 83735 ASSAY OF MAGNESIUM: CPT | Performed by: EMERGENCY MEDICINE

## 2024-12-23 PROCEDURE — 1159F MED LIST DOCD IN RCRD: CPT | Mod: CPTII,,, | Performed by: PODIATRIST

## 2024-12-23 PROCEDURE — 3074F SYST BP LT 130 MM HG: CPT | Mod: CPTII,,, | Performed by: PODIATRIST

## 2024-12-23 PROCEDURE — 85007 BL SMEAR W/DIFF WBC COUNT: CPT | Performed by: EMERGENCY MEDICINE

## 2024-12-23 PROCEDURE — 82803 BLOOD GASES ANY COMBINATION: CPT

## 2024-12-23 PROCEDURE — 82570 ASSAY OF URINE CREATININE: CPT

## 2024-12-23 PROCEDURE — 83690 ASSAY OF LIPASE: CPT | Performed by: EMERGENCY MEDICINE

## 2024-12-23 PROCEDURE — 85610 PROTHROMBIN TIME: CPT

## 2024-12-23 PROCEDURE — 3078F DIAST BP <80 MM HG: CPT | Mod: CPTII,,, | Performed by: PODIATRIST

## 2024-12-23 PROCEDURE — 73650 X-RAY EXAM OF HEEL: CPT | Mod: TC,PN,LT

## 2024-12-23 RX ORDER — POLYETHYLENE GLYCOL 3350 17 G/17G
17 POWDER, FOR SOLUTION ORAL DAILY
Status: DISCONTINUED | OUTPATIENT
Start: 2024-12-23 | End: 2024-12-24 | Stop reason: HOSPADM

## 2024-12-23 RX ORDER — CEFTRIAXONE 2 G/1
2 INJECTION, POWDER, FOR SOLUTION INTRAMUSCULAR; INTRAVENOUS
Status: DISCONTINUED | OUTPATIENT
Start: 2024-12-23 | End: 2024-12-23

## 2024-12-23 RX ORDER — ONDANSETRON HYDROCHLORIDE 2 MG/ML
4 INJECTION, SOLUTION INTRAVENOUS EVERY 8 HOURS PRN
Status: DISCONTINUED | OUTPATIENT
Start: 2024-12-23 | End: 2024-12-24 | Stop reason: HOSPADM

## 2024-12-23 RX ORDER — ENOXAPARIN SODIUM 100 MG/ML
40 INJECTION SUBCUTANEOUS EVERY 24 HOURS
Status: DISCONTINUED | OUTPATIENT
Start: 2024-12-23 | End: 2024-12-24 | Stop reason: HOSPADM

## 2024-12-23 RX ORDER — GLUCAGON 1 MG
1 KIT INJECTION
Status: DISCONTINUED | OUTPATIENT
Start: 2024-12-23 | End: 2024-12-24 | Stop reason: HOSPADM

## 2024-12-23 RX ORDER — IBUPROFEN 200 MG
16 TABLET ORAL
Status: DISCONTINUED | OUTPATIENT
Start: 2024-12-23 | End: 2024-12-24 | Stop reason: HOSPADM

## 2024-12-23 RX ORDER — IBUPROFEN 200 MG
24 TABLET ORAL
Status: DISCONTINUED | OUTPATIENT
Start: 2024-12-23 | End: 2024-12-24 | Stop reason: HOSPADM

## 2024-12-23 RX ORDER — SODIUM CHLORIDE 9 MG/ML
INJECTION, SOLUTION INTRAVENOUS
Status: COMPLETED | OUTPATIENT
Start: 2024-12-23 | End: 2024-12-23

## 2024-12-23 RX ORDER — SODIUM CHLORIDE 0.9 % (FLUSH) 0.9 %
5 SYRINGE (ML) INJECTION
Status: DISCONTINUED | OUTPATIENT
Start: 2024-12-23 | End: 2024-12-24 | Stop reason: HOSPADM

## 2024-12-23 RX ORDER — NALOXONE HCL 0.4 MG/ML
0.02 VIAL (ML) INJECTION
Status: DISCONTINUED | OUTPATIENT
Start: 2024-12-23 | End: 2024-12-24 | Stop reason: HOSPADM

## 2024-12-23 RX ORDER — DIPHENHYDRAMINE HYDROCHLORIDE 50 MG/ML
50 INJECTION INTRAMUSCULAR; INTRAVENOUS
Status: COMPLETED | OUTPATIENT
Start: 2024-12-23 | End: 2024-12-23

## 2024-12-23 RX ORDER — CLOBETASOL PROPIONATE 0.5 MG/G
CREAM TOPICAL 2 TIMES DAILY
Status: DISCONTINUED | OUTPATIENT
Start: 2024-12-23 | End: 2024-12-24 | Stop reason: HOSPADM

## 2024-12-23 RX ORDER — ONDANSETRON 8 MG/1
8 TABLET, ORALLY DISINTEGRATING ORAL
Status: DISCONTINUED | OUTPATIENT
Start: 2024-12-23 | End: 2024-12-23

## 2024-12-23 RX ORDER — AMOXICILLIN 250 MG
1 CAPSULE ORAL 2 TIMES DAILY
Status: DISCONTINUED | OUTPATIENT
Start: 2024-12-23 | End: 2024-12-24 | Stop reason: HOSPADM

## 2024-12-23 RX ORDER — TALC
6 POWDER (GRAM) TOPICAL NIGHTLY PRN
Status: DISCONTINUED | OUTPATIENT
Start: 2024-12-23 | End: 2024-12-24 | Stop reason: HOSPADM

## 2024-12-23 RX ADMIN — ENOXAPARIN SODIUM 40 MG: 40 INJECTION SUBCUTANEOUS at 03:12

## 2024-12-23 RX ADMIN — POLYETHYLENE GLYCOL 3350 17 G: 17 POWDER, FOR SOLUTION ORAL at 04:12

## 2024-12-23 RX ADMIN — SODIUM CHLORIDE, POTASSIUM CHLORIDE, SODIUM LACTATE AND CALCIUM CHLORIDE 1000 ML: 600; 310; 30; 20 INJECTION, SOLUTION INTRAVENOUS at 01:12

## 2024-12-23 RX ADMIN — DIPHENHYDRAMINE HYDROCHLORIDE 50 MG: 50 INJECTION INTRAMUSCULAR; INTRAVENOUS at 11:12

## 2024-12-23 RX ADMIN — PREDNISONE 50 MG: 20 TABLET ORAL at 03:12

## 2024-12-23 RX ADMIN — Medication 6 MG: at 10:12

## 2024-12-23 RX ADMIN — SODIUM CHLORIDE: 9 INJECTION, SOLUTION INTRAVENOUS at 11:12

## 2024-12-23 RX ADMIN — DOCUSATE SODIUM AND SENNOSIDES 1 TABLET: 8.6; 5 TABLET, FILM COATED ORAL at 04:12

## 2024-12-23 RX ADMIN — CLOBETASOL PROPIONATE: 0.5 CREAM TOPICAL at 08:12

## 2024-12-23 NOTE — PROGRESS NOTES
Subjective:     Patient ID: Alonzo Nascimento Jr. is a 29 y.o. male.    Chief Complaint: Post-op Evaluation (3 week post op; reports nausea, vomiting and muscle cramps. Rash noted to bilateral palms. Repots rash to torso, back and arms. Per pt he did notify PCP and did take Benadryl. Dr Garcia aware)    Patient is seen post hospital discharge for osteomyelitis of the left calcaneus.  He has a prior history of Achilles tendon repair per  on 07/14/2023.  States that he has always had some type of discomfort to this area.  Bone biopsy was completed while in the hospital which grew Citrobacter koseri and prevotella bevia.  Patient was placed on 6 week course of oral Bactrim DS and metronidazole per PCP.  Patient was ambulating with slippers and relates that he has a job working on the [employer] that requires an hour standing at a time.    12/10/2024:  Post I&D of infected osteomyelitic bone with foreign body removal left calcaneus on 12/04/2024.  No pain reported.  Partial weight-bearing to left lower extremity with Darco shoe and crutches.  Taking oral Bactrim DS and Flagyl as prescribed.    12/23/2024:  Patient complains of persistent nausea and vomiting with dehydration.  He has been taking Pedialyte and multiple fluids to try stay dehydrated.  His lips or cracking.  He has a diffuse papular rash has broken out throughout his body.  He was seen at the ED on 12/20/2024 and given a single dose of Benadryl.  A referral was also placed to a kidney specialist.  Patient was told to stop his oral Bactrim nearly a week ago by his primary.  Patient has a follow up with his primary care physician on 12/27/2024 but will attempt to go today.    Vitals:    12/23/24 0926   BP: 100/72   Pulse: (!) 116   PainSc: 0-No pain      History reviewed. No pertinent past medical history.    Past Surgical History:   Procedure Laterality Date    ANTERIOR CRUCIATE LIGAMENT REPAIR Right     2018    APPLICATION OF SPLINT Left 7/14/2023     Procedure: APPLICATION, SPLINT;  Surgeon: Lenore Pearl DPM;  Location: [facility] OR;  Service: Podiatry;  Laterality: Left;    INSERTION, ANTIBIOTIC SPACER, LOWER EXTREMITY Left 12/4/2024    Procedure: INSERTION, ANTIBIOTIC SPACER, LOWER EXTREMITY;  Surgeon: Jaswinder Garcia DPM;  Location: [facility] OR;  Service: Podiatry;  Laterality: Left;    IRRIGATION AND DEBRIDEMENT Left 12/4/2024    Procedure: IRRIGATION AND DEBRIDEMENT;  Surgeon: Jaswinder Garcia DPM;  Location: [facility] OR;  Service: Podiatry;  Laterality: Left;  mini c-arm, Stimulan jr tobramycin/Vancomycin    REPAIR OF ACHILLES TENDON Left 7/14/2023    Procedure: REPAIR, TENDON, ACHILLES;  Surgeon: Lenore Pearl DPM;  Location: [facility] OR;  Service: Podiatry;  Laterality: Left;       No family history on file.    Social History     Socioeconomic History    Marital status: Single   Tobacco Use    Smoking status: Never    Smokeless tobacco: Never   Substance and Sexual Activity    Alcohol use: Yes     Comment: rarely    Drug use: Yes     Types: Marijuana    Sexual activity: Yes     Partners: Female     Social Drivers of Health     Financial Resource Strain: Low Risk  (11/19/2024)    Overall Financial Resource Strain (CARDIA)     Difficulty of Paying Living Expenses: Not very hard   Food Insecurity: No Food Insecurity (11/19/2024)    Hunger Vital Sign     Worried About Running Out of Food in the Last Year: Never true     Ran Out of Food in the Last Year: Never true   Transportation Needs: Unmet Transportation Needs (11/19/2024)    TRANSPORTATION NEEDS     Transportation : Yes, it has kept me from non-medical meetings, appointments, work or from getting things that I need.   Stress: No Stress Concern Present (11/19/2024)    [employer] - Occupational Stress Questionnaire     Feeling of Stress : Only a little   Housing Stability: Low Risk  (11/19/2024)    Housing Stability Vital Sign     Unable to Pay for Housing in the Last Year: No      Homeless in the Last Year: No       Current Outpatient Medications   Medication Sig Dispense Refill    HYDROcodone-acetaminophen (NORCO) 7.5-325 mg per tablet Take 1 tablet by mouth every 4 (four) hours as needed for Pain. 28 tablet 0    metoclopramide HCl (REGLAN) 10 MG tablet Take 1 tablet (10 mg total) by mouth every 6 (six) hours. 30 tablet 0    metroNIDAZOLE (FLAGYL) 500 MG tablet Take 1 tablet (500 mg total) by mouth every 8 (eight) hours. 126 tablet 0    ondansetron (ZOFRAN-ODT) 4 MG TbDL Take 1 tablet (4 mg total) by mouth every 8 (eight) hours as needed. 14 tablet 1    sulfamethoxazole-trimethoprim 800-160mg (BACTRIM DS) 800-160 mg Tab Take 2 tablets by mouth 2 (two) times daily. 148 tablet 0     Current Facility-Administered Medications   Medication Dose Route Frequency Provider Last Rate Last Admin    sodium chloride 0.9% flush 10 mL  10 mL Intravenous PRN Jaswinder Garcia, DPM           Review of patient's allergies indicates:  No Known Allergies      Review of Systems   Constitutional: Negative for chills, fever and malaise/fatigue.   HENT:  Negative for congestion and hearing loss.    Cardiovascular:  Negative for chest pain, claudication and leg swelling.   Respiratory:  Negative for cough and shortness of breath.    Skin:  Positive for color change and poor wound healing.   Musculoskeletal:  Negative for back pain, joint pain, muscle cramps and muscle weakness.   Gastrointestinal:  Negative for nausea and vomiting.   Neurological:  Negative for numbness, paresthesias and weakness.   Psychiatric/Behavioral:  Negative for altered mental status.         Objective:     Physical Exam  Constitutional:       General: He is not in acute distress.     Appearance: He is not ill-appearing.   Cardiovascular:      Pulses:           Dorsalis pedis pulses are 2+ on the right side and 2+ on the left side.        Posterior tibial pulses are 2+ on the right side and 2+ on the left side.   Musculoskeletal:       Comments: Localized pain on palpation to lateral aspect of the left heel overlying the wound site.  No pain with attempted subtalar joint or ankle range motion left lower extremity.  No palpable fluctuance or crepitance.  Mild pain with squeezing the posterior tuber of the left calcaneus.   Skin:     General: Skin is warm.      Capillary Refill: Capillary refill takes less than 2 seconds.      Findings: No erythema.      Nails: There is no clubbing.      Comments: Surgical dehiscence to wound site lateral left hindfoot.  Note mild serosanguineous drainage with granular tissue beginning to form.  Moderate reduction in edema to the left heel.   Neurological:      Mental Status: He is alert and oriented to person, place, and time.      Sensory: Sensation is intact.      Motor: Motor function is intact.           Assessment:      Encounter Diagnoses   Name Primary?    Postoperative wound dehiscence, subsequent encounter Yes    Acute osteomyelitis of left calcaneus     Follow-up examination following surgery      Plan:     Alonzo was seen today for post-op evaluation.    Diagnoses and all orders for this visit:    Postoperative wound dehiscence, subsequent encounter    Acute osteomyelitis of left calcaneus    Follow-up examination following surgery      I counseled the patient on his conditions, their implications and medical management.    Dressing left foot removed.  Left foot cleansed Hibiclens scrub and normal saline.  Remove sutures.  A dissolving antibiotic cement bead was noted and removed from the surgical site with a sterile curette.  Applied Hydrofera ready blue secured with cast padding and Ace wrap forming modified football dressing.  Extra supplies was given to the patient to change later this week.  Reviewed appropriate instructions in detail.  Continue elevation at rest.      I discussed with the patient if his symptoms are worsening that he should most likely present to the ED for further workup and to  receive IV antibiotic fluids.  In addition patient will stop by to see his PCP on the 4th floor of this building today.    Continue modified weight-bearing to the left lower extremity with crutches.  Patient is not cleared returned to work.  We discussed rest and elevation as much as possible.      RTC weekly for dressing changes and to monitor of the wound.  Left calcaneus x-ray reviewed noting stable postoperative changes.    Assisted by Dani Cornelius DPM PGY 3    A portion of this note was generated by voice recognition software and may contain spelling and grammar errors.      .

## 2024-12-23 NOTE — H&P
[facility] Medicine  History & Physical    Patient Name: Alonzo Nascimento Jr.  MRN: [id number]  Patient Class: IP- Inpatient  Admission Date: 12/23/2024  Attending Physician: Bashir Sawyer MD   Primary Care Provider: Sohan Strange MD         Patient information was obtained from patient, past medical records, and ER records.     Subjective:     Principal Problem:DRESS syndrome    Chief Complaint:   Chief Complaint   Patient presents with    Emesis     Vomiting, fatigue, body aches, cold sweats x 1 week. Unable to tolerate PO. Pt states also has rash.        HPI: Ms. Alonzo Nascimento is a 28 y/o male with a PMHx of a left achilles tendon rupture s/p repair on 07/14/23 and osteomyelitis of of left calcaneus on 11/20/24 s/p I&D 12/4/24 by Dr. Garcia presents to the ED with complaints of nausea/vomiting that started a couple of weeks ago and a full body rash that he noticed on 12/20. Patient was started on oral bactrim and flagyl on 11/20 for a 6 week course (end date: 01/01/2025). Patient states that he went to the ED on 12/20 for the rash and was given benedryl and anti-emetics which, either did not help much. Patient presented to Dr. Garcia/s office on 12/23 for post-op evaluation and was still complaining of nausea/vomiting and worsening full body rash. Patient advised to see PCP however PCP unavailable today so patient presented to the ED. Patient states he has only been able to keep Pedialyte and Liquid IV down. On Physical exam, patient noted to have whole body maculopapular rash that is mildly TTP with erythema and dry mucus membranes.     In the ED, initial vital signs /55, HR 82, Temp 98.3F, SpO2 98% on room air. Labs include CBC with stable H/H 15.8/43.9 and WBC elevated to 28.38. CMP with Na 125, K 4.6, Cl 92, Co2 21 and BUN/Cr 19/1.9 (baseline Cr 1.4). LFTs elevated: t bili 4.1, , , . CRP 36.4. Left Calcaneus XR: No acute fracture. Ill-defined lucency  involving posterolateral aspect of calcaneus with overlying increased soft tissue attenuation. Of note, patient with reported previous osteomyelitis and foreign body removal at this site. No definite new cortical destruction/erosive changes. Recommend clinical correlation and continue follow-up. EKG NSR. Lactic acid level: 2.7, INR 2.1, PT, 22. Initiated on Benedryl and 1L NS.  [facility] consulted for evaluation for admission for DRESS.            History reviewed. No pertinent past medical history.    Past Surgical History:   Procedure Laterality Date    ANTERIOR CRUCIATE LIGAMENT REPAIR Right     2018    APPLICATION OF SPLINT Left 7/14/2023    Procedure: APPLICATION, SPLINT;  Surgeon: Lenore Pearl DPM;  Location: [facility] OR;  Service: Podiatry;  Laterality: Left;    INSERTION, ANTIBIOTIC SPACER, LOWER EXTREMITY Left 12/4/2024    Procedure: INSERTION, ANTIBIOTIC SPACER, LOWER EXTREMITY;  Surgeon: Jaswinder Garcia DPM;  Location: [facility] OR;  Service: Podiatry;  Laterality: Left;    IRRIGATION AND DEBRIDEMENT Left 12/4/2024    Procedure: IRRIGATION AND DEBRIDEMENT;  Surgeon: Jaswinder Garcia DPM;  Location: [facility] OR;  Service: Podiatry;  Laterality: Left;  mini c-arm, Stimulan jr tobramycin/Vancomycin    REPAIR OF ACHILLES TENDON Left 7/14/2023    Procedure: REPAIR, TENDON, ACHILLES;  Surgeon: Lenore Pearl DPM;  Location: [facility] OR;  Service: Podiatry;  Laterality: Left;       Review of patient's allergies indicates:   Allergen Reactions    Bactrim [sulfamethoxazole-trimethoprim] Rash     Developed DRESS       Current Facility-Administered Medications on File Prior to Encounter   Medication    sodium chloride 0.9% flush 10 mL     Current Outpatient Medications on File Prior to Encounter   Medication Sig    ondansetron (ZOFRAN-ODT) 4 MG TbDL Take 1 tablet (4 mg total) by mouth every 8 (eight) hours as needed.    HYDROcodone-acetaminophen (NORCO) 7.5-325 mg per tablet Take 1 tablet by mouth every 4 (four)  hours as needed for Pain. (Patient not taking: Reported on 12/23/2024)    metoclopramide HCl (REGLAN) 10 MG tablet Take 1 tablet (10 mg total) by mouth every 6 (six) hours. (Patient not taking: Reported on 12/23/2024)    metroNIDAZOLE (FLAGYL) 500 MG tablet Take 1 tablet (500 mg total) by mouth every 8 (eight) hours. (Patient not taking: Reported on 12/23/2024)    [DISCONTINUED] sulfamethoxazole-trimethoprim 800-160mg (BACTRIM DS) 800-160 mg Tab Take 2 tablets by mouth 2 (two) times daily.     Family History    None       Tobacco Use    Smoking status: Never    Smokeless tobacco: Never   Substance and Sexual Activity    Alcohol use: Yes     Comment: rarely    Drug use: Yes     Types: Marijuana    Sexual activity: Yes     Partners: Female     Review of Systems   Constitutional:  Positive for activity change, appetite change and fatigue. Negative for fever.   Respiratory:  Negative for shortness of breath.    Cardiovascular:  Negative for chest pain.   Gastrointestinal:  Positive for constipation, nausea and vomiting.   Genitourinary:  Negative for dysuria.   Skin:  Positive for rash (Whole body rash with erythema).   Neurological:  Negative for dizziness and light-headedness.     Objective:     Vital Signs (Most Recent):  Temp: 98.3 °F (36.8 °C) (12/23/24 1019)  Pulse: 82 (12/23/24 1402)  Resp: (!) 21 (12/23/24 1402)  BP: (!) 94/52 (12/23/24 1402)  SpO2: 98 % (12/23/24 1402) Vital Signs (24h Range):  Temp:  [98.3 °F (36.8 °C)] 98.3 °F (36.8 °C)  Pulse:  [] 82  Resp:  [12-35] 21  SpO2:  [94 %-100 %] 98 %  BP: ()/(52-76) 94/52     Weight: 72.6 kg (160 lb)  Body mass index is 21.11 kg/m².     Physical Exam  Constitutional:       General: He is not in acute distress.     Appearance: Normal appearance. He is normal weight. He is not ill-appearing, toxic-appearing or diaphoretic.   HENT:      Head: Normocephalic and atraumatic.      Mouth/Throat:      Mouth: Mucous membranes are dry.      Comments: Tongue dry  with lesion, see media  Cardiovascular:      Rate and Rhythm: Normal rate and regular rhythm.      Pulses: Normal pulses.      Heart sounds: Normal heart sounds.   Pulmonary:      Effort: Pulmonary effort is normal. No respiratory distress.      Breath sounds: Normal breath sounds.   Abdominal:      General: Bowel sounds are normal. There is no distension.      Palpations: Abdomen is soft.      Tenderness: There is no abdominal tenderness.   Skin:     Findings: Erythema and rash (whole body maculopapular rash with erythema) present.   Neurological:      Mental Status: He is alert and oriented to person, place, and time.            Significant Labs: All pertinent labs within the past 24 hours have been reviewed.    Significant Imaging: I have reviewed all pertinent imaging results/findings within the past 24 hours.  Assessment/Plan:     * DRESS syndrome  Patient with history of taking oral bactrim and flagyl developed whole body maculopapular rash that he noticed on 12/20/24.   Concern for moderate DRESS syndrome with elevated LFTs and LINK  - US retroperitoneal unremarkable  - PT/INR: 22/2.1  - Crp elevated to 36.4  - Lactic acid 2.7  - Troponin WNL and EKG WNL  - WBC 28.36    Plan:  - Initiated prednisone 50 mg daily and clobetasol cream  - Call placed to [facility] for transfer to another facility for dermatology and hepatology  - Will hold all antibiotics for time being, consider ID consult for adjustment of antibiotic regimen  - F/u CMV/EBV      LINK (acute kidney injury)  LINK is likely due to pre-renal azotemia due to dehydration. Baseline creatinine is  1.4 . Most recent creatinine and eGFR are listed below.  Recent Labs     12/20/24  2100 12/23/24  1059   CREATININE 1.41* 1.9*   EGFRNORACEVR >60.0 48*      Plan  - LINK is stable  - Avoid nephrotoxins and renally dose meds for GFR listed above  - Monitor urine output, serial BMP, and adjust therapy as needed  - Will bolus 1L LR    Hyponatremia  Hyponatremia is likely  due to Dehydration/hypovolemia. The patient's most recent sodium results are listed below.  Recent Labs     12/20/24  2100 12/23/24  1059   * 125*     Plan  - Correct the sodium by 4-6mEq in 24 hours.   - Obtain the following studies: Urine sodium, urine osmolality, serum osmolality.  - Will treat the hyponatremia with IV fluids as follows: 1L LR for dehydration and Removal of offending medications  - Monitor sodium Daily.   - Patient hyponatremia is stable      Osteomyelitis  See Dress Syndrome for plan        VTE Risk Mitigation (From admission, onward)           Ordered     enoxaparin injection 40 mg  Daily         12/23/24 1255     IP VTE HIGH RISK PATIENT  Once         12/23/24 1255     Place sequential compression device  Until discontinued         12/23/24 1255                     _________________________________________  Adilene Bolton MD, PGY-1  [facility] - [place]

## 2024-12-23 NOTE — PHARMACY MED REC
"        [facility]           Pharmacy  Admission Medication History     The home medication history was taken by Charlotte Magallanes.      Medication history obtained from Medications listed below were obtained from: Patient/family.    Based on information gathered for medication list, you may go to "Admission" then "Reconcile Home Medications" tabs to review and/or act upon those items.     The home medication list has been updated by the Pharmacy department.   Please read ALL comments highlighted in yellow.   Please address this information as you see fit.    Feel free to contact us if you have any questions or require assistance.        Current Facility-Administered Medications on File Prior to Encounter   Medication Dose Route Frequency Provider Last Rate Last Admin    sodium chloride 0.9% flush 10 mL  10 mL Intravenous PRN Jaswinder Garcia, JOYA         Current Outpatient Medications on File Prior to Encounter   Medication Sig Dispense Refill    ondansetron (ZOFRAN-ODT) 4 MG TbDL Take 1 tablet (4 mg total) by mouth every 8 (eight) hours as needed. 14 tablet 1    HYDROcodone-acetaminophen (NORCO) 7.5-325 mg per tablet Take 1 tablet by mouth every 4 (four) hours as needed for Pain. (Patient not taking: Reported on 12/23/2024) 28 tablet 0    metoclopramide HCl (REGLAN) 10 MG tablet Take 1 tablet (10 mg total) by mouth every 6 (six) hours. (Patient not taking: Reported on 12/23/2024) 30 tablet 0    metroNIDAZOLE (FLAGYL) 500 MG tablet Take 1 tablet (500 mg total) by mouth every 8 (eight) hours. (Patient not taking: Reported on 12/23/2024) 126 tablet 0    [DISCONTINUED] sulfamethoxazole-trimethoprim 800-160mg (BACTRIM DS) 800-160 mg Tab Take 2 tablets by mouth 2 (two) times daily. 148 tablet 0       Please address this information as you see fit.  Feel free to contact us if you have any questions or require assistance.    Charlotte Magallanes  [phone]          .          "

## 2024-12-23 NOTE — ED NOTES
"Pt presented to ED from home w/ C/O "feeling unwell, nausea, vomiting, and weakness." Pt states that this has been ongoing for "about a week." Recent surgical pt on 12/5 w/ Dr. Garcia for a wound wash out 2/2 osteo to the foot. Pt wound is currently wrapped and covered. Pt using crutches and wearing boot. Pt denies CP, SOB, abdominal pain, or fevers at home. Pt reports chills, muscle aches, and extreme fatigue; noting "he has been in bed everday all day." Pt also states that he was on antibiotics post surgical procedure with Dr. Garcia, but he "stopped taking then when he's been sick." Pt AOx4, VSS, denies pain.   "

## 2024-12-23 NOTE — ED PROVIDER NOTES
Encounter Date: 12/23/2024       History     Chief Complaint   Patient presents with    Emesis     Vomiting, fatigue, body aches, cold sweats x 1 week. Unable to tolerate PO. Pt states also has rash.     29-year-old male with a history of osteomyelitis of the foot presents with ongoing nausea vomiting and diffuse body rash.  Patient says he feels very dehydrated.  Patient was seen twice in the ED over the past few weeks was told his liver function tests were elevated.  Patient has been on Flagyl and Bactrim for infection of the foot.  Patient says he stopped using the antibiotics a few days ago.  Last dose of Benadryl was a few days ago.  No fever, abdominal pain cough, shortness of breath.      Review of patient's allergies indicates:  No Known Allergies  History reviewed. No pertinent past medical history.  Past Surgical History:   Procedure Laterality Date    ANTERIOR CRUCIATE LIGAMENT REPAIR Right     2018    APPLICATION OF SPLINT Left 7/14/2023    Procedure: APPLICATION, SPLINT;  Surgeon: Lenore Pearl DPM;  Location: [facility] OR;  Service: Podiatry;  Laterality: Left;    INSERTION, ANTIBIOTIC SPACER, LOWER EXTREMITY Left 12/4/2024    Procedure: INSERTION, ANTIBIOTIC SPACER, LOWER EXTREMITY;  Surgeon: Jaswinder Garcia DPM;  Location: [facility] OR;  Service: Podiatry;  Laterality: Left;    IRRIGATION AND DEBRIDEMENT Left 12/4/2024    Procedure: IRRIGATION AND DEBRIDEMENT;  Surgeon: Jaswinder Garcia DPM;  Location: [facility] OR;  Service: Podiatry;  Laterality: Left;  mini c-arm, Stimulan rj tobramycin/Vancomycin    REPAIR OF ACHILLES TENDON Left 7/14/2023    Procedure: REPAIR, TENDON, ACHILLES;  Surgeon: Lenore Pearl DPM;  Location: [facility] OR;  Service: Podiatry;  Laterality: Left;     No family history on file.  Social History     Tobacco Use    Smoking status: Never    Smokeless tobacco: Never   Substance Use Topics    Alcohol use: Yes     Comment: rarely    Drug use: Yes     Types: Marijuana     Review of Systems    Constitutional:  Negative for appetite change.   Eyes:  Negative for pain.   Respiratory:  Negative for shortness of breath.    Cardiovascular:  Negative for chest pain.   Gastrointestinal:  Positive for nausea and vomiting. Negative for abdominal pain and diarrhea.   Genitourinary:  Negative for frequency.   Musculoskeletal:  Negative for arthralgias and neck pain.   Skin:  Positive for rash.   Neurological:  Negative for headaches.   Psychiatric/Behavioral:  Negative for confusion.        Physical Exam     Initial Vitals [12/23/24 1019]   BP Pulse Resp Temp SpO2   (!) 115/55 (!) 129 20 98.3 °F (36.8 °C) 97 %      MAP       --         Physical Exam    Nursing note and vitals reviewed.  HENT:   Head: Atraumatic.   Mucous membrane lesions as noted in picture below   Eyes: Conjunctivae and EOM are normal.   Neck:   Normal range of motion.  Cardiovascular:      Exam reveals no gallop and no friction rub.       No murmur heard.  Tachycardic   Pulmonary/Chest: Breath sounds normal. No respiratory distress. He has no wheezes. He has no rales.   Abdominal: Abdomen is soft. Bowel sounds are normal. He exhibits no distension. There is no abdominal tenderness.   Musculoskeletal:         General: No edema. Normal range of motion.      Cervical back: Normal range of motion.     Neurological: He is alert and oriented to person, place, and time.   Skin: Skin is warm and dry.   Psychiatric: He has a normal mood and affect.               ED Course   Critical Care    Date/Time: 12/23/2024 11:58 AM    Performed by: Bashir Sawyer MD  Authorized by: Bashir Sawyer MD  Direct patient critical care time: 28 minutes  Ordering / reviewing critical care time: 10 minutes  Documentation critical care time: 10 minutes  Consulting other physicians critical care time: 5 minutes  Total critical care time (exclusive of procedural time) : 53 minutes  Critical care was necessary to treat or prevent imminent or life-threatening deterioration  of the following conditions: shock, sepsis, hepatic failure, circulatory failure, endocrine crisis and metabolic crisis.  Critical care was time spent personally by me on the following activities: development of treatment plan with patient or surrogate, discussions with consultants, obtaining history from patient or surrogate, ordering and performing treatments and interventions, examination of patient, evaluation of patient's response to treatment, ordering and review of laboratory studies, pulse oximetry, review of old charts and re-evaluation of patient's condition.        Labs Reviewed   CBC W/ AUTO DIFFERENTIAL - Abnormal       Result Value    WBC 28.36 (*)     RBC 5.49      Hemoglobin 15.6      Hematocrit 43.9      MCV 80 (*)     MCH 28.4      MCHC 35.5      RDW 13.8      Platelets 248      MPV 10.4      Immature Granulocytes CANCELED      Immature Grans (Abs) CANCELED      nRBC 0      Gran % 64.0      Lymph % 7.0 (*)     Mono % 3.0 (*)     Eosinophil % 12.0 (*)     Basophil % 0.0      Bands 3.0      Metamyelocytes 5.0      Myelocytes 6.0      Platelet Estimate Appears normal      Differential Method Manual     COMPREHENSIVE METABOLIC PANEL - Abnormal    Sodium 125 (*)     Potassium 4.6      Chloride 92 (*)     CO2 21 (*)     Glucose 133 (*)     BUN 19      Creatinine 1.9 (*)     Calcium 8.0 (*)     Total Protein 6.6      Albumin 2.3 (*)     Total Bilirubin 4.1 (*)     Alkaline Phosphatase 341 (*)      (*)      (*)     eGFR 48 (*)     Anion Gap 12     INFLUENZA A & B BY MOLECULAR    Influenza A, Molecular Negative      Influenza B, Molecular Negative      Flu A & B Source Nasal swab     CULTURE, BLOOD   CULTURE, BLOOD   SARS-COV-2 RNA AMPLIFICATION, QUAL    SARS-CoV-2 RNA, Amplification, Qual Negative     LIPASE    Lipase 28     MAGNESIUM    Magnesium 1.8     LACTIC ACID, PLASMA     EKG Readings: (Independently Interpreted)   Initial Reading: No STEMI. Rhythm: Normal Sinus Rhythm. Heart Rate: 95.  Ectopy: No Ectopy. Conduction: Normal.       Imaging Results    None          Medications   0.9% NaCl infusion ( Intravenous New Bag 12/23/24 1101)   diphenhydrAMINE injection 50 mg (50 mg Intravenous Given 12/23/24 1101)     Medical Decision Making  29-year-old male presenting with ongoing vomiting and rash.  Recently seen and had elevated liver function tests.  During 1 of his previous ED visits, hepatitis panel was sent is negative.     Amount and/or Complexity of Data Reviewed  Labs: ordered. Decision-making details documented in ED Course.    Risk  Prescription drug management.  Decision regarding hospitalization.               ED Course as of 12/23/24 1236   Mon Dec 23, 2024   1156 Comprehensive Metabolic Panel(!) [SN]   1156 CBC Auto Differential(!) [SN]   1156 Lipase: 28 [SN]   1156 Magnesium : 1.8 [SN]   1156 POCT Venous Blood Gas(!!) [SN]   1156 COVID-19 Rapid Screening [SN]   1156 Influenza A & B by Molecular [SN]   1156 Patient's labs show worsening hyponatremia.  Renal function also mildly worsening.  LFTs stable.  CBC shows leukocytosis worsening, now 28,000.  Also has increasing eosinophilia.  Unclear if this is DRESS vs SJS with mucous membrane findings.  Discussed with family Medicine resident on-call who will come evaluate the patient [SN]   1235 Family medicine resident has evaluated patient.  They will admit patient to the hospital.  With the patient's leukocytosis and known ostia of the foot, will draw blood cultures however will hold on antibiotics per their request. [SN]      ED Course User Index  [SN] Bashir Sawyer MD                           Clinical Impression:  Final diagnoses:  [R11.2] Nausea & vomiting  [D72.12, T50.905A] DRESS syndrome (Primary)          ED Disposition Condition    Admit Stable                Bashir Sawyer MD  12/23/24 1236

## 2024-12-23 NOTE — ASSESSMENT & PLAN NOTE
Hyponatremia is likely due to Dehydration/hypovolemia. The patient's most recent sodium results are listed below.  Recent Labs     12/20/24  2100 12/23/24  1059   * 125*     Plan  - Correct the sodium by 4-6mEq in 24 hours.   - Obtain the following studies: Urine sodium, urine osmolality, serum osmolality.  - Will treat the hyponatremia with IV fluids as follows: 1L LR for dehydration and Removal of offending medications  - Monitor sodium Daily.   - Patient hyponatremia is stable

## 2024-12-23 NOTE — ASSESSMENT & PLAN NOTE
Patient with history of taking oral bactrim and flagyl developed whole body maculopapular rash that he noticed on 12/20/24.   Concern for moderate DRESS syndrome with elevated LFTs and LINK  - US retroperitoneal unremarkable  - PT/INR: 22/2.1  - Crp elevated to 36.4  - Lactic acid 2.7  - Troponin WNL and EKG WNL  - WBC 28.36    Plan:  - Initiated prednisone 50 mg daily and clobetasol cream  - Call placed to [facility] for transfer to another facility for dermatology and hepatology  - Will hold all antibiotics for time being, consider ID consult for adjustment of antibiotic regimen  - F/u CMV/EBV

## 2024-12-23 NOTE — Clinical Note
Diagnosis: DRESS syndrome [212157]   Reason for IP Medical Treatment  (Clinical interventions that can only be accomplished in the IP setting? ) :: DRESS syndrome

## 2024-12-23 NOTE — SUBJECTIVE & OBJECTIVE
History reviewed. No pertinent past medical history.    Past Surgical History:   Procedure Laterality Date    ANTERIOR CRUCIATE LIGAMENT REPAIR Right     2018    APPLICATION OF SPLINT Left 7/14/2023    Procedure: APPLICATION, SPLINT;  Surgeon: Lenore Pearl DPM;  Location: [facility] OR;  Service: Podiatry;  Laterality: Left;    INSERTION, ANTIBIOTIC SPACER, LOWER EXTREMITY Left 12/4/2024    Procedure: INSERTION, ANTIBIOTIC SPACER, LOWER EXTREMITY;  Surgeon: Jaswinder Garcia DPM;  Location: [facility] OR;  Service: Podiatry;  Laterality: Left;    IRRIGATION AND DEBRIDEMENT Left 12/4/2024    Procedure: IRRIGATION AND DEBRIDEMENT;  Surgeon: Jaswinder Garcia DPM;  Location: [facility] OR;  Service: Podiatry;  Laterality: Left;  mini c-arm, Stimulan jr tobramycin/Vancomycin    REPAIR OF ACHILLES TENDON Left 7/14/2023    Procedure: REPAIR, TENDON, ACHILLES;  Surgeon: Lenore Pearl DPM;  Location: [facility] OR;  Service: Podiatry;  Laterality: Left;       Review of patient's allergies indicates:   Allergen Reactions    Bactrim [sulfamethoxazole-trimethoprim] Rash     Developed DRESS       Current Facility-Administered Medications on File Prior to Encounter   Medication    sodium chloride 0.9% flush 10 mL     Current Outpatient Medications on File Prior to Encounter   Medication Sig    ondansetron (ZOFRAN-ODT) 4 MG TbDL Take 1 tablet (4 mg total) by mouth every 8 (eight) hours as needed.    HYDROcodone-acetaminophen (NORCO) 7.5-325 mg per tablet Take 1 tablet by mouth every 4 (four) hours as needed for Pain. (Patient not taking: Reported on 12/23/2024)    metoclopramide HCl (REGLAN) 10 MG tablet Take 1 tablet (10 mg total) by mouth every 6 (six) hours. (Patient not taking: Reported on 12/23/2024)    metroNIDAZOLE (FLAGYL) 500 MG tablet Take 1 tablet (500 mg total) by mouth every 8 (eight) hours. (Patient not taking: Reported on 12/23/2024)    [DISCONTINUED] sulfamethoxazole-trimethoprim 800-160mg (BACTRIM DS) 800-160 mg Tab Take 2  tablets by mouth 2 (two) times daily.     Family History    None       Tobacco Use    Smoking status: Never    Smokeless tobacco: Never   Substance and Sexual Activity    Alcohol use: Yes     Comment: rarely    Drug use: Yes     Types: Marijuana    Sexual activity: Yes     Partners: Female     Review of Systems   Constitutional:  Positive for activity change, appetite change and fatigue. Negative for fever.   Respiratory:  Negative for shortness of breath.    Cardiovascular:  Negative for chest pain.   Gastrointestinal:  Positive for constipation, nausea and vomiting.   Genitourinary:  Negative for dysuria.   Skin:  Positive for rash (Whole body rash with erythema).   Neurological:  Negative for dizziness and light-headedness.     Objective:     Vital Signs (Most Recent):  Temp: 98.3 °F (36.8 °C) (12/23/24 1019)  Pulse: 82 (12/23/24 1402)  Resp: (!) 21 (12/23/24 1402)  BP: (!) 94/52 (12/23/24 1402)  SpO2: 98 % (12/23/24 1402) Vital Signs (24h Range):  Temp:  [98.3 °F (36.8 °C)] 98.3 °F (36.8 °C)  Pulse:  [] 82  Resp:  [12-35] 21  SpO2:  [94 %-100 %] 98 %  BP: ()/(52-76) 94/52     Weight: 72.6 kg (160 lb)  Body mass index is 21.11 kg/m².     Physical Exam  Constitutional:       General: He is not in acute distress.     Appearance: Normal appearance. He is normal weight. He is not ill-appearing, toxic-appearing or diaphoretic.   HENT:      Head: Normocephalic and atraumatic.      Mouth/Throat:      Mouth: Mucous membranes are dry.      Comments: Tongue dry with lesion, see media  Cardiovascular:      Rate and Rhythm: Normal rate and regular rhythm.      Pulses: Normal pulses.      Heart sounds: Normal heart sounds.   Pulmonary:      Effort: Pulmonary effort is normal. No respiratory distress.      Breath sounds: Normal breath sounds.   Abdominal:      General: Bowel sounds are normal. There is no distension.      Palpations: Abdomen is soft.      Tenderness: There is no abdominal tenderness.   Skin:      Findings: Erythema and rash (whole body maculopapular rash with erythema) present.   Neurological:      Mental Status: He is alert and oriented to person, place, and time.            Significant Labs: All pertinent labs within the past 24 hours have been reviewed.    Significant Imaging: I have reviewed all pertinent imaging results/findings within the past 24 hours.

## 2024-12-23 NOTE — ASSESSMENT & PLAN NOTE
LINK is likely due to pre-renal azotemia due to dehydration. Baseline creatinine is  1.4 . Most recent creatinine and eGFR are listed below.  Recent Labs     12/20/24  2100 12/23/24  1059   CREATININE 1.41* 1.9*   EGFRNORACEVR >60.0 48*      Plan  - LINK is stable  - Avoid nephrotoxins and renally dose meds for GFR listed above  - Monitor urine output, serial BMP, and adjust therapy as needed  - Will bolus 1L LR

## 2024-12-23 NOTE — HPI
Ms. Alonzo Nascimento is a 30 y/o male with a PMHx of a left achilles tendon rupture s/p repair on 07/14/23 and osteomyelitis of of left calcaneus on 11/20/24 s/p I&D 12/4/24 by Dr. Garcia presents to the ED with complaints of nausea/vomiting that started a couple of weeks ago and a full body rash that he noticed on 12/20. Patient was started on oral bactrim and flagyl on 11/20 for a 6 week course (end date: 01/01/2025). Patient states that he went to the ED on 12/20 for the rash and was given benedryl and anti-emetics which, either did not help much. Patient presented to Dr. Garcia/s office on 12/23 for post-op evaluation and was still complaining of nausea/vomiting and worsening full body rash. Patient advised to see PCP however PCP unavailable today so patient presented to the ED. Patient states he has only been able to keep Pedialyte and Liquid IV down. On Physical exam, patient noted to have whole body maculopapular rash that is mildly TTP with erythema and dry mucus membranes.     In the ED, initial vital signs /55, HR 82, Temp 98.3F, SpO2 98% on room air. Labs include CBC with stable H/H 15.8/43.9 and WBC elevated to 28.38. CMP with Na 125, K 4.6, Cl 92, Co2 21 and BUN/Cr 19/1.9 (baseline Cr 1.4). LFTs elevated: t bili 4.1, , , . CRP 36.4. Left Calcaneus XR: No acute fracture. Ill-defined lucency involving posterolateral aspect of calcaneus with overlying increased soft tissue attenuation. Of note, patient with reported previous osteomyelitis and foreign body removal at this site. No definite new cortical destruction/erosive changes. Recommend clinical correlation and continue follow-up. EKG NSR. Lactic acid level: 2.7, INR 2.1, PT, 22. Initiated on Benedryl and 1L NS.  [facility] consulted for evaluation for admission for DRESS.

## 2024-12-24 ENCOUNTER — HOSPITAL ENCOUNTER (INPATIENT)
Facility: HOSPITAL | Age: 29
LOS: 10 days | Discharge: HOME HEALTH | DRG: 814 | End: 2025-01-03
Attending: STUDENT IN AN ORGANIZED HEALTH CARE EDUCATION/TRAINING PROGRAM | Admitting: STUDENT IN AN ORGANIZED HEALTH CARE EDUCATION/TRAINING PROGRAM
Payer: MEDICAID

## 2024-12-24 VITALS
WEIGHT: 158.75 LBS | DIASTOLIC BLOOD PRESSURE: 64 MMHG | RESPIRATION RATE: 18 BRPM | SYSTOLIC BLOOD PRESSURE: 111 MMHG | HEART RATE: 95 BPM | BODY MASS INDEX: 21.04 KG/M2 | OXYGEN SATURATION: 96 % | TEMPERATURE: 99 F | HEIGHT: 73 IN

## 2024-12-24 DIAGNOSIS — Z01.818 PRE-TRANSPLANT EVALUATION FOR LIVER TRANSPLANT: Primary | ICD-10-CM

## 2024-12-24 DIAGNOSIS — D72.12 DRESS SYNDROME: ICD-10-CM

## 2024-12-24 DIAGNOSIS — T50.905A DRESS SYNDROME: ICD-10-CM

## 2024-12-24 DIAGNOSIS — R21 RASH: ICD-10-CM

## 2024-12-24 DIAGNOSIS — R00.0 TACHYCARDIA: ICD-10-CM

## 2024-12-24 DIAGNOSIS — M86.172 ACUTE OSTEOMYELITIS OF LEFT CALCANEUS: ICD-10-CM

## 2024-12-24 PROBLEM — N18.30 STAGE 3 CHRONIC KIDNEY DISEASE: Status: ACTIVE | Noted: 2024-12-24

## 2024-12-24 PROBLEM — R79.89 ELEVATED LFTS: Status: ACTIVE | Noted: 2024-12-24

## 2024-12-24 LAB
ALBUMIN SERPL BCP-MCNC: 2 G/DL (ref 3.5–5.2)
ALP SERPL-CCNC: 307 U/L (ref 40–150)
ALT SERPL W/O P-5'-P-CCNC: 611 U/L (ref 10–44)
ANION GAP SERPL CALC-SCNC: 12 MMOL/L (ref 8–16)
AST SERPL-CCNC: 318 U/L (ref 10–40)
BASOPHILS # BLD AUTO: 0.05 K/UL (ref 0–0.2)
BASOPHILS NFR BLD: 0.2 % (ref 0–1.9)
BILIRUB SERPL-MCNC: 2.5 MG/DL (ref 0.1–1)
BUN SERPL-MCNC: 23 MG/DL (ref 6–20)
CALCIUM SERPL-MCNC: 8 MG/DL (ref 8.7–10.5)
CHLORIDE SERPL-SCNC: 96 MMOL/L (ref 95–110)
CMV DNA SPEC QL NAA+PROBE: NORMAL
CO2 SERPL-SCNC: 20 MMOL/L (ref 23–29)
CREAT SERPL-MCNC: 1.5 MG/DL (ref 0.5–1.4)
CYTOMEGALOVIRUS PCR, QUANT: NOT DETECTED IU/ML
DIFFERENTIAL METHOD BLD: ABNORMAL
EOSINOPHIL # BLD AUTO: 4.3 K/UL (ref 0–0.5)
EOSINOPHIL NFR BLD: 14.1 % (ref 0–8)
ERYTHROCYTE [DISTWIDTH] IN BLOOD BY AUTOMATED COUNT: 14.2 % (ref 11.5–14.5)
EST. GFR  (NO RACE VARIABLE): >60 ML/MIN/1.73 M^2
GLUCOSE SERPL-MCNC: 110 MG/DL (ref 70–110)
HCT VFR BLD AUTO: 42.1 % (ref 40–54)
HGB BLD-MCNC: 14.6 G/DL (ref 14–18)
IMM GRANULOCYTES # BLD AUTO: 0.62 K/UL (ref 0–0.04)
IMM GRANULOCYTES NFR BLD AUTO: 2 % (ref 0–0.5)
LYMPHOCYTES # BLD AUTO: 6.9 K/UL (ref 1–4.8)
LYMPHOCYTES NFR BLD: 22.5 % (ref 18–48)
MAGNESIUM SERPL-MCNC: 2 MG/DL (ref 1.6–2.6)
MCH RBC QN AUTO: 27.8 PG (ref 27–31)
MCHC RBC AUTO-ENTMCNC: 34.7 G/DL (ref 32–36)
MCV RBC AUTO: 80 FL (ref 82–98)
MONOCYTES # BLD AUTO: 3.2 K/UL (ref 0.3–1)
MONOCYTES NFR BLD: 10.5 % (ref 4–15)
NEUTROPHILS # BLD AUTO: 15.5 K/UL (ref 1.8–7.7)
NEUTROPHILS NFR BLD: 50.7 % (ref 38–73)
NRBC BLD-RTO: 0 /100 WBC
PHOSPHATE SERPL-MCNC: 5.1 MG/DL (ref 2.7–4.5)
PLATELET # BLD AUTO: 257 K/UL (ref 150–450)
PLATELET BLD QL SMEAR: ABNORMAL
PMV BLD AUTO: 11.2 FL (ref 9.2–12.9)
POTASSIUM SERPL-SCNC: 5.1 MMOL/L (ref 3.5–5.1)
PROT SERPL-MCNC: 6 G/DL (ref 6–8.4)
RBC # BLD AUTO: 5.26 M/UL (ref 4.6–6.2)
SODIUM SERPL-SCNC: 128 MMOL/L (ref 136–145)
WBC # BLD AUTO: 30.59 K/UL (ref 3.9–12.7)

## 2024-12-24 PROCEDURE — 85025 COMPLETE CBC W/AUTO DIFF WBC: CPT

## 2024-12-24 PROCEDURE — 63600175 PHARM REV CODE 636 W HCPCS

## 2024-12-24 PROCEDURE — 25000003 PHARM REV CODE 250

## 2024-12-24 PROCEDURE — 80053 COMPREHEN METABOLIC PANEL: CPT

## 2024-12-24 PROCEDURE — 11000001 HC ACUTE MED/SURG PRIVATE ROOM

## 2024-12-24 PROCEDURE — 83735 ASSAY OF MAGNESIUM: CPT

## 2024-12-24 PROCEDURE — 25000003 PHARM REV CODE 250: Performed by: HOSPITALIST

## 2024-12-24 PROCEDURE — 84100 ASSAY OF PHOSPHORUS: CPT

## 2024-12-24 PROCEDURE — 36415 COLL VENOUS BLD VENIPUNCTURE: CPT

## 2024-12-24 RX ORDER — PREDNISONE 50 MG/1
50 TABLET ORAL DAILY
Status: ON HOLD
Start: 2024-12-25

## 2024-12-24 RX ORDER — NALOXONE HCL 0.4 MG/ML
0.02 VIAL (ML) INJECTION
Status: DISCONTINUED | OUTPATIENT
Start: 2024-12-24 | End: 2025-01-03 | Stop reason: HOSPADM

## 2024-12-24 RX ORDER — CLOBETASOL PROPIONATE 0.5 MG/G
CREAM TOPICAL 2 TIMES DAILY
Status: DISCONTINUED | OUTPATIENT
Start: 2024-12-24 | End: 2024-12-25

## 2024-12-24 RX ORDER — IBUPROFEN 200 MG
24 TABLET ORAL
Status: DISCONTINUED | OUTPATIENT
Start: 2024-12-24 | End: 2025-01-03 | Stop reason: HOSPADM

## 2024-12-24 RX ORDER — CLOBETASOL PROPIONATE 0.5 MG/G
CREAM TOPICAL 2 TIMES DAILY
Status: ON HOLD
Start: 2024-12-24

## 2024-12-24 RX ORDER — TALC
6 POWDER (GRAM) TOPICAL NIGHTLY PRN
Status: DISCONTINUED | OUTPATIENT
Start: 2024-12-24 | End: 2025-01-03 | Stop reason: HOSPADM

## 2024-12-24 RX ORDER — SODIUM CHLORIDE 0.9 % (FLUSH) 0.9 %
5 SYRINGE (ML) INJECTION
Status: DISCONTINUED | OUTPATIENT
Start: 2024-12-24 | End: 2025-01-03 | Stop reason: HOSPADM

## 2024-12-24 RX ORDER — ENOXAPARIN SODIUM 100 MG/ML
40 INJECTION SUBCUTANEOUS EVERY 24 HOURS
Status: DISCONTINUED | OUTPATIENT
Start: 2024-12-25 | End: 2024-12-28

## 2024-12-24 RX ORDER — IBUPROFEN 200 MG
16 TABLET ORAL
Status: DISCONTINUED | OUTPATIENT
Start: 2024-12-24 | End: 2025-01-03 | Stop reason: HOSPADM

## 2024-12-24 RX ORDER — ONDANSETRON HYDROCHLORIDE 2 MG/ML
4 INJECTION, SOLUTION INTRAVENOUS EVERY 8 HOURS PRN
Status: DISCONTINUED | OUTPATIENT
Start: 2024-12-24 | End: 2025-01-03 | Stop reason: HOSPADM

## 2024-12-24 RX ORDER — DIPHENHYDRAMINE HCL 25 MG
25 CAPSULE ORAL EVERY 6 HOURS PRN
Status: DISCONTINUED | OUTPATIENT
Start: 2024-12-25 | End: 2025-01-03 | Stop reason: HOSPADM

## 2024-12-24 RX ORDER — GLUCAGON 1 MG
1 KIT INJECTION
Status: DISCONTINUED | OUTPATIENT
Start: 2024-12-24 | End: 2025-01-03 | Stop reason: HOSPADM

## 2024-12-24 RX ORDER — POLYETHYLENE GLYCOL 3350 17 G/17G
17 POWDER, FOR SOLUTION ORAL DAILY
Status: DISCONTINUED | OUTPATIENT
Start: 2024-12-25 | End: 2025-01-03 | Stop reason: HOSPADM

## 2024-12-24 RX ORDER — SODIUM CHLORIDE 9 MG/ML
INJECTION, SOLUTION INTRAVENOUS CONTINUOUS
Status: DISCONTINUED | OUTPATIENT
Start: 2024-12-24 | End: 2024-12-25

## 2024-12-24 RX ORDER — DIPHENHYDRAMINE HCL 25 MG
25 CAPSULE ORAL ONCE
Status: COMPLETED | OUTPATIENT
Start: 2024-12-24 | End: 2024-12-24

## 2024-12-24 RX ORDER — AMOXICILLIN 250 MG
1 CAPSULE ORAL 2 TIMES DAILY
Status: DISCONTINUED | OUTPATIENT
Start: 2024-12-24 | End: 2025-01-03 | Stop reason: HOSPADM

## 2024-12-24 RX ADMIN — CLOBETASOL PROPIONATE: 0.5 CREAM TOPICAL at 09:12

## 2024-12-24 RX ADMIN — SENNOSIDES AND DOCUSATE SODIUM 1 TABLET: 50; 8.6 TABLET ORAL at 09:12

## 2024-12-24 RX ADMIN — PREDNISONE 50 MG: 20 TABLET ORAL at 09:12

## 2024-12-24 RX ADMIN — DIPHENHYDRAMINE HYDROCHLORIDE 25 MG: 25 CAPSULE ORAL at 11:12

## 2024-12-24 RX ADMIN — Medication 6 MG: at 10:12

## 2024-12-24 RX ADMIN — SODIUM CHLORIDE: 9 INJECTION, SOLUTION INTRAVENOUS at 09:12

## 2024-12-24 NOTE — HOSPITAL COURSE
Patient admitted for clinical DRESS Syndrome. He was started on oral prednisone 50 mg daily and clobetasol cream. As patient likely to need dermatology services, will transfer to a facility with derm availability. Patient's Lfts and LINK were improving on 12/24. He was tolerating a soft diet. Transfer to [facility] secured on 12/24. Patient hemodynamically stable for transfer. All questions answered. Patient verbalized understanding.

## 2024-12-24 NOTE — ASSESSMENT & PLAN NOTE
Hyponatremia is likely due to Dehydration/hypovolemia. The patient's most recent sodium results are listed below.  Recent Labs     12/23/24  1059 12/24/24  0523   * 128*       Plan  - Correct the sodium by 4-6mEq in 24 hours.   - Obtain the following studies: Urine sodium, urine osmolality, serum osmolality.  - Will treat the hyponatremia with IV fluids as follows: s/p 1L LR for dehydration and Removal of offending medications  - Monitor sodium Daily.   - Patient hyponatremia is improving

## 2024-12-24 NOTE — PLAN OF CARE
SW met with pt at bedside this AM to complete DCA. Per pt he was having 0/10 pain and was in a pleasant mood throughout assessment. Pt stated that he lives at home with his mother Talisha [phone]. Per pt his mother is currently out of town. Pt is independent in all his ADLs and does not use any HME at home. Pt does not receive any HD or HH services at this time. SW informed pt that he would likely be transferred to [facility] later this evening. Pt did not have any additional questions or concerns for sw at this time. White board updated with CM name and contact information.  Discharge brochure provided.  Pt encouraged to call with any questions or concerns.  Cm will continue to follow pt through transitions of care and assist with any discharge needs.    Sung Sharma, MSPAM  [phone]    Future Appointments   Date Time Provider Department Center   12/27/2024 10:20 AM Sohan Strange MD [facility]   12/31/2024  1:15 PM Jaswinder Garcia, [facility]   1/6/2025  9:45 AM Jaswinder Garcia, [facility]   1/23/2025  1:00 PM Jaswinder Garcia, [facility]   1/30/2025  9:00 AM Jaswinder Garcia, [facility]        12/24/24 1146   Discharge Assessment   Assessment Type Discharge Planning Assessment   Confirmed/corrected address, phone number and insurance Yes   Confirmed Demographics Correct on Facesheet   Source of Information patient   When was your last doctors appointment? 12/18/24   Communicated GARETH with patient/caregiver Yes   Reason For Admission DRESS Syndrome   People in Home parent(s)   Facility Arrived From: Home   Do you expect to return to your current living situation? Yes   Do you have help at home or someone to help you manage your care at home? Yes   Who are your caregiver(s) and their phone number(s)? mom Talisha [phone]   Prior to hospitilization cognitive status: Alert/Oriented   Current cognitive status:  Alert/Oriented   Walking or Climbing Stairs Difficulty no   Dressing/Bathing Difficulty no   Do you have any problems with: Errands/Grocery   Home Accessibility wheelchair accessible   Home Layout Able to live on 1st floor   Equipment Currently Used at Home none   Readmission within 30 days? No   Patient currently being followed by outpatient case management? No   Do you currently have service(s) that help you manage your care at home? No   Do you take prescription medications? Yes   Do you have prescription coverage? Yes   Coverage Medicaid   Do you have any problems affording any of your prescribed medications? No   Is the patient taking medications as prescribed? yes   Who is going to help you get home at discharge? danielle Woodall [phone]   How do you get to doctors appointments? health plan transportation;other (see comments)  (uber)   Are you on dialysis? No   Do you take coumadin? No   Discharge Plan A Hospital Transfer   DME Needed Upon Discharge  none   Discharge Plan discussed with: Patient   Transition of Care Barriers None   Physical Activity   On average, how many days per week do you engage in moderate to strenuous exercise (like a brisk walk)? Pt Unable   On average, how many minutes do you engage in exercise at this level? Pt Unable   Alcohol Use   Q1: How often do you have a drink containing alcohol? Pt Unable   Q2: How many drinks containing alcohol do you have on a typical day when you are drinking? Pt Unable   Q3: How often do you have six or more drinks on one occasion? Pt Unable   Utilities   In the past 12 months has the electric, gas, oil, or water company threatened to shut off services in your home? Pt Unable   Health Literacy   How often do you need to have someone help you when you read instructions, pamphlets, or other written material from your doctor or pharmacy? Often   OTHER   Name(s) of People in Home danielle Woodall [phone]

## 2024-12-24 NOTE — PLAN OF CARE
Problem: Adult Inpatient Plan of Care  Goal: Plan of Care Review  Outcome: Progressing  Goal: Patient-Specific Goal (Individualized)  Outcome: Progressing  Goal: Optimal Comfort and Wellbeing  Outcome: Progressing     Problem: Acute Kidney Injury/Impairment  Goal: Fluid and Electrolyte Balance  Outcome: Progressing  Goal: Improved Oral Intake  Outcome: Progressing     Problem: Wound  Goal: Absence of Infection Signs and Symptoms  Outcome: Progressing  Goal: Skin Health and Integrity  Outcome: Progressing  Goal: Optimal Wound Healing  Outcome: Progressing     Problem: Pain Acute  Goal: Optimal Pain Control and Function  Outcome: Progressing

## 2024-12-24 NOTE — ASSESSMENT & PLAN NOTE
LINK is likely due to pre-renal azotemia due to dehydration. Baseline creatinine is  1.4 . Most recent creatinine and eGFR are listed below.  Recent Labs     12/23/24  1059 12/24/24  0523   CREATININE 1.9* 1.5*   EGFRNORACEVR 48* >60        Plan  - LINK is improving  - Avoid nephrotoxins and renally dose meds for GFR listed above  - Monitor urine output, serial BMP, and adjust therapy as needed  - S/p bolus 1L LR

## 2024-12-24 NOTE — ASSESSMENT & PLAN NOTE
Patient with history of taking oral bactrim and flagyl developed whole body maculopapular rash that he noticed on 12/20/24.   Concern for moderate DRESS syndrome with elevated LFTs and LINK  - US retroperitoneal unremarkable  - PT/INR: 22/2.1  - Crp elevated to 36.4  - Lactic acid 2.7  - Troponin WNL and EKG WNL  - WBC 28.36    Plan:  - Continued on prednisone 50 mg daily and clobetasol cream  - Call placed to [facility] for transfer to another facility for dermatology and hepatology  - Will hold all antibiotics for time being, consider ID consult for adjustment of antibiotic regimen  - F/u CMV/EBV

## 2024-12-24 NOTE — ASSESSMENT & PLAN NOTE
Patient with history of taking oral bactrim and flagyl developed whole body maculopapular rash that he noticed on 12/20/24.   Concern for moderate DRESS syndrome with elevated LFTs and LINK  - US retroperitoneal unremarkable  - PT/INR: 22/2.1  - Crp elevated to 36.4  - Lactic acid 2.7  - Troponin WNL and EKG WNL  - WBC 28.36    Plan:  - Continued on prednisone 50 mg daily and clobetasol cream  - Call placed to [facility] for transfer to another facility for dermatology and hepatology  - Will hold all antibiotics for time being, consider ID consult for adjustment of antibiotic regimen  - F/u CMV/EBV

## 2024-12-24 NOTE — PROGRESS NOTES
[facility] Medicine  Progress Note    Patient Name: Alonzo Nascimento Jr.  MRN: [id number]  Patient Class: IP- Inpatient   Admission Date: 12/23/2024  Length of Stay: 1 days  Attending Physician: Nahum Maravilla,*  Primary Care Provider: Sohan Strange MD        Subjective     Principal Problem:DRESS syndrome        HPI:  Ms. Alonzo Nascimento is a 30 y/o male with a PMHx of a left achilles tendon rupture s/p repair on 07/14/23 and osteomyelitis of of left calcaneus on 11/20/24 s/p I&D 12/4/24 by Dr. Garcia presents to the ED with complaints of nausea/vomiting that started a couple of weeks ago and a full body rash that he noticed on 12/20. Patient was started on oral bactrim and flagyl on 11/20 for a 6 week course (end date: 01/01/2025). Patient states that he went to the ED on 12/20 for the rash and was given benedryl and anti-emetics which, either did not help much. Patient presented to Dr. Garcia/s office on 12/23 for post-op evaluation and was still complaining of nausea/vomiting and worsening full body rash. Patient advised to see PCP however PCP unavailable today so patient presented to the ED. Patient states he has only been able to keep Pedialyte and Liquid IV down. On Physical exam, patient noted to have whole body maculopapular rash that is mildly TTP with erythema and dry mucus membranes.     In the ED, initial vital signs /55, HR 82, Temp 98.3F, SpO2 98% on room air. Labs include CBC with stable H/H 15.8/43.9 and WBC elevated to 28.38. CMP with Na 125, K 4.6, Cl 92, Co2 21 and BUN/Cr 19/1.9 (baseline Cr 1.4). LFTs elevated: t bili 4.1, , , . CRP 36.4. Left Calcaneus XR: No acute fracture. Ill-defined lucency involving posterolateral aspect of calcaneus with overlying increased soft tissue attenuation. Of note, patient with reported previous osteomyelitis and foreign body removal at this site. No definite new cortical destruction/erosive changes. Recommend  clinical correlation and continue follow-up. EKG NSR. Lactic acid level: 2.7, INR 2.1, PT, 22. Initiated on Benedryl and 1L NS.  [facility] consulted for evaluation for admission for DRESS.            Overview/Hospital Course:  Patient admitted for clinical DRESS Syndrome. He was started on oral prednisone 50 mg daily and clobetasol cream. As patient likely to need dermatology services, will transfer to a facility with derm availability.    Interval History: Patient reports marginal improvement in itching today. Will give benedryl prn for itching. Patient reports he has able to tolerate a soft diet.    Review of Systems   Constitutional:  Positive for activity change, appetite change and fatigue. Negative for fever.   Respiratory:  Negative for shortness of breath.    Cardiovascular:  Negative for chest pain.   Gastrointestinal:  Positive for constipation, nausea and vomiting.   Genitourinary:  Negative for dysuria.   Skin:  Positive for rash (Whole body rash with erythema).   Neurological:  Negative for dizziness and light-headedness.     Objective:     Vital Signs (Most Recent):  Temp: 98.7 °F (37.1 °C) (12/24/24 1055)  Pulse: 104 (12/24/24 1055)  Resp: 17 (12/24/24 1055)  BP: 109/70 (12/24/24 1055)  SpO2: 95 % (12/24/24 1055) Vital Signs (24h Range):  Temp:  [97.9 °F (36.6 °C)-100 °F (37.8 °C)] 98.7 °F (37.1 °C)  Pulse:  [] 104  Resp:  [17-20] 17  SpO2:  [90 %-99 %] 95 %  BP: (106-121)/(57-80) 109/70     Weight: 72 kg (158 lb 11.7 oz)  Body mass index is 20.94 kg/m².    Intake/Output Summary (Last 24 hours) at 12/24/2024 1424  Last data filed at 12/24/2024 0457  Gross per 24 hour   Intake 3080 ml   Output 1050 ml   Net 2030 ml         Physical Exam  Constitutional:       General: He is not in acute distress.     Appearance: Normal appearance. He is normal weight. He is not ill-appearing, toxic-appearing or diaphoretic.   HENT:      Head: Normocephalic and atraumatic.      Mouth/Throat:      Mouth:  Mucous membranes are dry.      Comments: Tongue dry with lesion, see media  Cardiovascular:      Rate and Rhythm: Normal rate and regular rhythm.      Pulses: Normal pulses.      Heart sounds: Normal heart sounds.   Pulmonary:      Effort: Pulmonary effort is normal. No respiratory distress.      Breath sounds: Normal breath sounds.   Abdominal:      General: Bowel sounds are normal. There is no distension.      Palpations: Abdomen is soft.      Tenderness: There is no abdominal tenderness.   Skin:     Findings: Erythema and rash (whole body maculopapular rash with erythema) present.   Neurological:      Mental Status: He is alert and oriented to person, place, and time.             Significant Labs: All pertinent labs within the past 24 hours have been reviewed.    Significant Imaging: I have reviewed all pertinent imaging results/findings within the past 24 hours.    Assessment and Plan     * DRESS syndrome  Patient with history of taking oral bactrim and flagyl developed whole body maculopapular rash that he noticed on 12/20/24.   Concern for moderate DRESS syndrome with elevated LFTs and LINK  - US retroperitoneal unremarkable  - PT/INR: 22/2.1  - Crp elevated to 36.4  - Lactic acid 2.7  - Troponin WNL and EKG WNL  - WBC 28.36    Plan:  - Continued on prednisone 50 mg daily and clobetasol cream  - Call placed to [facility] for transfer to another facility for dermatology and hepatology  - Will hold all antibiotics for time being, consider ID consult for adjustment of antibiotic regimen  - F/u CMV/EBV      LINK (acute kidney injury)  LINK is likely due to pre-renal azotemia due to dehydration. Baseline creatinine is  1.4 . Most recent creatinine and eGFR are listed below.  Recent Labs     12/23/24  1059 12/24/24  0523   CREATININE 1.9* 1.5*   EGFRNORACEVR 48* >60        Plan  - LINK is improving  - Avoid nephrotoxins and renally dose meds for GFR listed above  - Monitor urine output, serial BMP, and adjust therapy as  needed  - S/p bolus 1L LR    Hyponatremia  Hyponatremia is likely due to Dehydration/hypovolemia. The patient's most recent sodium results are listed below.  Recent Labs     12/23/24  1059 12/24/24  0523   * 128*       Plan  - Correct the sodium by 4-6mEq in 24 hours.   - Obtain the following studies: Urine sodium, urine osmolality, serum osmolality.  - Will treat the hyponatremia with IV fluids as follows: s/p 1L LR for dehydration and Removal of offending medications  - Monitor sodium Daily.   - Patient hyponatremia is improving      Osteomyelitis  See Dress Syndrome for plan        VTE Risk Mitigation (From admission, onward)           Ordered     enoxaparin injection 40 mg  Daily         12/23/24 1255     IP VTE HIGH RISK PATIENT  Once         12/23/24 1255     Place sequential compression device  Until discontinued         12/23/24 1255                    Discharge Planning   GARETH:      Code Status: Full Code   Medical Readiness for Discharge Date:   Discharge Plan A: Hospital Transfer   Discharge Delays: (!) PFC Arranged Transportation      _________________________________________  Adilene Bolton MD, PGY-1  [facility] [place]

## 2024-12-24 NOTE — SUBJECTIVE & OBJECTIVE
Interval History: Patient reports marginal improvement in itching today. Will give benedryl prn for itching. Patient reports he has able to tolerate a soft diet.    Review of Systems   Constitutional:  Positive for activity change, appetite change and fatigue. Negative for fever.   Respiratory:  Negative for shortness of breath.    Cardiovascular:  Negative for chest pain.   Gastrointestinal:  Positive for constipation, nausea and vomiting.   Genitourinary:  Negative for dysuria.   Skin:  Positive for rash (Whole body rash with erythema).   Neurological:  Negative for dizziness and light-headedness.     Objective:     Vital Signs (Most Recent):  Temp: 98.7 °F (37.1 °C) (12/24/24 1055)  Pulse: 104 (12/24/24 1055)  Resp: 17 (12/24/24 1055)  BP: 109/70 (12/24/24 1055)  SpO2: 95 % (12/24/24 1055) Vital Signs (24h Range):  Temp:  [97.9 °F (36.6 °C)-100 °F (37.8 °C)] 98.7 °F (37.1 °C)  Pulse:  [] 104  Resp:  [17-20] 17  SpO2:  [90 %-99 %] 95 %  BP: (106-121)/(57-80) 109/70     Weight: 72 kg (158 lb 11.7 oz)  Body mass index is 20.94 kg/m².    Intake/Output Summary (Last 24 hours) at 12/24/2024 1424  Last data filed at 12/24/2024 0457  Gross per 24 hour   Intake 3080 ml   Output 1050 ml   Net 2030 ml         Physical Exam  Constitutional:       General: He is not in acute distress.     Appearance: Normal appearance. He is normal weight. He is not ill-appearing, toxic-appearing or diaphoretic.   HENT:      Head: Normocephalic and atraumatic.      Mouth/Throat:      Mouth: Mucous membranes are dry.      Comments: Tongue dry with lesion, see media  Cardiovascular:      Rate and Rhythm: Normal rate and regular rhythm.      Pulses: Normal pulses.      Heart sounds: Normal heart sounds.   Pulmonary:      Effort: Pulmonary effort is normal. No respiratory distress.      Breath sounds: Normal breath sounds.   Abdominal:      General: Bowel sounds are normal. There is no distension.      Palpations: Abdomen is soft.       Tenderness: There is no abdominal tenderness.   Skin:     Findings: Erythema and rash (whole body maculopapular rash with erythema) present.   Neurological:      Mental Status: He is alert and oriented to person, place, and time.             Significant Labs: All pertinent labs within the past 24 hours have been reviewed.    Significant Imaging: I have reviewed all pertinent imaging results/findings within the past 24 hours.

## 2024-12-24 NOTE — DISCHARGE SUMMARY
[facility] Medicine  Discharge Summary      Patient Name: Alonzo Nascimento Jr.  MRN: [id number]  MAAME: [id number]  Patient Class: IP- Inpatient  Admission Date: 12/23/2024  Hospital Length of Stay: 1 days  Discharge Date and Time:  12/24/2024 3:15 PM  Attending Physician: Nahum Maravilla,*   Discharging Provider: Adilene Bolton MD  Primary Care Provider: Sohan Strange MD    Primary Care Team: Networked reference to record PCT     HPI:   Ms. Alonzo Nascimento is a 30 y/o male with a PMHx of a left achilles tendon rupture s/p repair on 07/14/23 and osteomyelitis of of left calcaneus on 11/20/24 s/p I&D 12/4/24 by Dr. Garcia presents to the ED with complaints of nausea/vomiting that started a couple of weeks ago and a full body rash that he noticed on 12/20. Patient was started on oral bactrim and flagyl on 11/20 for a 6 week course (end date: 01/01/2025). Patient states that he went to the ED on 12/20 for the rash and was given benedryl and anti-emetics which, either did not help much. Patient presented to Dr. Garcia/s office on 12/23 for post-op evaluation and was still complaining of nausea/vomiting and worsening full body rash. Patient advised to see PCP however PCP unavailable today so patient presented to the ED. Patient states he has only been able to keep Pedialyte and Liquid IV down. On Physical exam, patient noted to have whole body maculopapular rash that is mildly TTP with erythema and dry mucus membranes.     In the ED, initial vital signs /55, HR 82, Temp 98.3F, SpO2 98% on room air. Labs include CBC with stable H/H 15.8/43.9 and WBC elevated to 28.38. CMP with Na 125, K 4.6, Cl 92, Co2 21 and BUN/Cr 19/1.9 (baseline Cr 1.4). LFTs elevated: t bili 4.1, , , . CRP 36.4. Left Calcaneus XR: No acute fracture. Ill-defined lucency involving posterolateral aspect of calcaneus with overlying increased soft tissue attenuation. Of note, patient with reported  previous osteomyelitis and foreign body removal at this site. No definite new cortical destruction/erosive changes. Recommend clinical correlation and continue follow-up. EKG NSR. Lactic acid level: 2.7, INR 2.1, PT, 22. Initiated on Benedryl and 1L NS.  [facility] consulted for evaluation for admission for DRESS.            * No surgery found *      Hospital Course:   Patient admitted for clinical DRESS Syndrome. He was started on oral prednisone 50 mg daily and clobetasol cream. As patient likely to need dermatology services, will transfer to a facility with derm availability. Patient's Lfts and LINK were improving on 12/24. He was tolerating a soft diet. Transfer to [facility] secured on 12/24. Patient hemodynamically stable for transfer. All questions answered. Patient verbalized understanding.      Goals of Care Treatment Preferences:  Code Status: Full Code         Consults:     * DRESS syndrome  Patient with history of taking oral bactrim and flagyl developed whole body maculopapular rash that he noticed on 12/20/24.   Concern for moderate DRESS syndrome with elevated LFTs and LINK  - US retroperitoneal unremarkable  - PT/INR: 22/2.1  - Crp elevated to 36.4  - Lactic acid 2.7  - Troponin WNL and EKG WNL  - WBC 28.36    Plan:  - Continued on prednisone 50 mg daily and clobetasol cream  - Call placed to [facility] for transfer to another facility for dermatology and hepatology  - Will hold all antibiotics for time being, consider ID consult for adjustment of antibiotic regimen  - F/u CMV/EBV      LINK (acute kidney injury)  LINK is likely due to pre-renal azotemia due to dehydration. Baseline creatinine is  1.4 . Most recent creatinine and eGFR are listed below.  Recent Labs     12/23/24  1059 12/24/24  0523   CREATININE 1.9* 1.5*   EGFRNORACEVR 48* >60        Plan  - LINK is improving  - Avoid nephrotoxins and renally dose meds for GFR listed above  - Monitor urine output, serial BMP, and adjust therapy as  needed  - S/p bolus 1L LR    Hyponatremia  Hyponatremia is likely due to Dehydration/hypovolemia. The patient's most recent sodium results are listed below.  Recent Labs     12/23/24  1059 12/24/24  0523   * 128*       Plan  - Correct the sodium by 4-6mEq in 24 hours.   - Obtain the following studies: Urine sodium, urine osmolality, serum osmolality.  - Will treat the hyponatremia with IV fluids as follows: s/p 1L LR for dehydration and Removal of offending medications  - Monitor sodium Daily.   - Patient hyponatremia is improving      Osteomyelitis  See Dress Syndrome for plan        Final Active Diagnoses:    Diagnosis Date Noted POA    PRINCIPAL PROBLEM:  DRESS syndrome [D72.12, T50.905A] 12/23/2024 Yes    Hyponatremia [E87.1] 12/23/2024 Yes    LINK (acute kidney injury) [N17.9] 12/23/2024 Yes    Osteomyelitis [M86.9] 11/20/2024 No      Problems Resolved During this Admission:    Diagnosis Date Noted Date Resolved POA    Status post Achilles tendon repair [Z98.890] 08/09/2023 12/23/2024 Not Applicable       Discharged Condition: stable    Disposition: Short Term Hospital    Follow Up:    Patient Instructions:      Diet Adult Regular       Significant Diagnostic Studies: N/A    Pending Diagnostic Studies:       None           Medications:  Reconciled Home Medications:      Medication List        START taking these medications      clobetasoL 0.05 % cream  Commonly known as: TEMOVATE  Apply topically 2 (two) times daily.     predniSONE 50 MG Tab  Commonly known as: DELTASONE  Take 1 tablet (50 mg total) by mouth once daily.  Start taking on: December 25, 2024            CONTINUE taking these medications      ondansetron 4 MG Tbdl  Commonly known as: ZOFRAN-ODT  Take 1 tablet (4 mg total) by mouth every 8 (eight) hours as needed.            STOP taking these medications      HYDROcodone-acetaminophen 7.5-325 mg per tablet  Commonly known as: NORCO     metoclopramide HCl 10 MG tablet  Commonly known as: REGLAN      metroNIDAZOLE 500 MG tablet  Commonly known as: FLAGYL              Indwelling Lines/Drains at time of discharge:   Lines/Drains/Airways       None                   Time spent on the discharge of patient: 45 minutes    _________________________________________  Adilene Bolton MD, PGY-1  [facility] - [place]

## 2024-12-24 NOTE — PLAN OF CARE
Pt is set to d/c to [facility] once a bed becomes available. Pt is informed of how transfer process works. Pt did not have any additional questions or CM at this time. Once MD places d/c order pt will be good from Cm pov. [facility] will arrange transport. Transfer pack made and place in pt's chart. Rounds completed on pt. All questions addressed. Bedside nurse to discuss d/c medications. Discussed importance to attend all f/u appts and take medications as prescribed. Verbalized understanding. Case Management to sign off.     Sung Zachary, MSPAM  [phone]    Future Appointments   Date Time Provider Department Center   12/27/2024 10:20 AM Sohan Strange MD [facility]   12/31/2024  1:15 PM Jaswinder Garcia, [facility]   1/6/2025  9:45 AM Jaswinder Garcia, [facility]   1/23/2025  1:00 PM Jaswinder Garcia, [facility]   1/30/2025  9:00 AM Jaswinder Garcia, [facility]        12/24/24 1152   Final Note   Assessment Type Final Discharge Note   Anticipated Discharge Disposition Still a [facility]   What phone number can be called within the next 1-3 days to see how you are doing after discharge? [phone]   Post-Acute Status   Coverage MEDICAID   Discharge Delays (!) [facility] Arranged Transportation

## 2024-12-24 NOTE — PROGRESS NOTES
CM confirmed with [facility] - pt is on the board for transfer to [facility] - [facility] Pol[facility] - unable to give a likely time for bed availability  [phone].

## 2024-12-24 NOTE — PLAN OF CARE
VIRTUAL NURSE:  Cued into patient's room.  Permission received per patient to turn camera to view patient.  Introduced as VN for night shift that will be working with floor nurse and nursing assistant.  Educated patient on VN's role in patient care and  VIP model.  Plan of care reviewed with patient.  Education per flowsheet.   Informed patient that staff will round on them every 2 hours but to use call light for any other needs they may have; informed of fall risk and fall precautions.  Patient verbalized understanding.  Call light within reach; bed siderails up x3.  Opportunity given for questions and questions answered.  Admission assessment questions answered.  Patient denies complaints or any needs at this time. Instructed to call for assistance.  Will cont to monitor and intervene as needed.    Labs, notes, orders, and careplan initiated.       Problem: Adult Inpatient Plan of Care  Goal: Plan of Care Review  Outcome: Progressing  Goal: Patient-Specific Goal (Individualized)  Outcome: Progressing      12/23/24 2037   Nurse Notification   Bedside Nurse Notified? Yes   Name of Bedside Nurse Geraldine   Nurse Notfication Method Secure Chat   Nurse Notified Of Other  (Admission profile is completed)   Admission   Initial VN Admission Questions Complete   Communication Issues? None   Shift   Virtual Nurse - Rounding Complete   Pain Management Interventions pain management plan reviewed with patient/caregiver   Virtual Nurse - Patient Verbalized Approval Of Camera Use;VN Rounding   Type of Frequent Check   Type Patient Rounds   Safety/Activity   Patient Rounds bed in low position;placement of personal items at bedside;call light in patient/parent reach;clutter free environment maintained;visualized patient   Safety Promotion/Fall Prevention Fall Risk reviewed with patient/family   Positioning   Body Position position changed independently;neutral body alignment;neutral head position   Head of Bed (HOB) Positioning  HOB at 60 degrees   Pain/Comfort/Sleep   Preferred Pain Scale number (Numeric Rating Pain Scale)   Comfort/Acceptable Pain Level 0   Pain Rating (0-10): Rest 0   Pain Rating (0-10): Activity 0   Sleep/Rest/Relaxation awake

## 2024-12-24 NOTE — PROVIDER TRANSFER
Outside Transfer Acceptance Note / Regional Referral Center    Referring facility: [facility]   Referring provider: RICHARD ROONEY  Accepting facility: [facility]  Accepting provider: MYRON VILLATORO  Admitting provider: Myron Villatoro  Reason for transfer:  HLOC/Dermatology evaluation.   Transfer diagnosis: Rash  Transfer specialty requested: Dermatology  Transfer specialty notified: Yes  Transfer level: NUMBER 1-5: 2  Bed type requested: Med/Surg  Isolation status: No active isolations   Admission class or status: IP- Inpatient      Narrative   This is a 29-year-old male with a past medical history of foot osteomyelitis who presents the [place] ED for evaluation of rash.    Patient presented on 12/23 for evaluation of nausea, vomiting, decreased po intake, body aches, and diffuse body rash.  He has a history of left achilles tendon rupture s/p repair on 07/14/23 and osteomyelitis of of left calcaneus on 11/20/24 s/p I&D 12/4/24 by Dr. Garcia and has been maintained on Bactrim and Flagyl (ED: 1/1/2025).  He presented to the ED on 12/14 and 12/20 for evaluation of similar symptoms and was treated symptomatically. Rash is described as >50% BSA covered in stereotypical morbiliform. Per referring provider, there is no involvement of mucous membranes, and mental status is intact.    In the ED, the patient was tachycardic (120s> 90s), tachypneic (20s), but otherwise hemodynamically stable.  Labs were remarkable for leukocytosis (28.3, with 12% eosinophilia), elevated INR (2.1), elevated creatinine (1.9- from a previous 1.4), hyponatremia (125), elevated LFTs (AST:  384, ALT: 635, T bili: 4.1, ALP:  341), elevated lactic acid (2.7).  VBG showed a pH of 7.34, pCO2 of 49.7 and HC03 of 22.9.  Retroperitoneal ultrasound showed no acute process.  CT abdomen and pelvis (12/20) showed no acute findings.  Referring provider is concerned about DRESS syndrome secondary to Bactrim and requested transfer to  [facility] for Dematology evaluation.     Objective     Vitals: Temp: 100 °F (37.8 °C) (12/23/24 1953)  Pulse: 94 (12/23/24 1953)  Resp: 18 (12/23/24 1953)  BP: (!) 109/57 (12/23/24 1953)  SpO2: 98 % (12/23/24 1953)  Recent Labs: All pertinent labs within the past 24 hours have been reviewed.  Recent imaging: above   Airway:     Vent settings:         IV access:        Peripheral IV - Single Lumen 12/23/24 1100 20 G Left Antecubital (Active)   Site Assessment Clean;Dry;Intact;No redness;No swelling;No warmth;No drainage 12/23/24 1100   Extremity Assessment Distal to IV No abnormal discoloration;No swelling;No redness;No warmth 12/23/24 1100   Dressing Status Clean;Dry;Intact 12/23/24 1100   Dressing Intervention First dressing 12/23/24 1100     Infusions: N/A  Allergies:   Review of patient's allergies indicates:   Allergen Reactions    Bactrim [sulfamethoxazole-trimethoprim] Rash     Developed DRESS      NPO: No    Anticoagulation:   Anticoagulants       Ordered     Route Frequency Start Stop    12/23/24 1255  enoxaparin  (VTE Pharmacological Prophylaxis Orders - High Risk)         SubQ Daily 12/23/24 1700 --             Instructions      [facility]-  Admit to Hospital Medicine  Upon patient arrival to floor, please send SecureChat to [facility] or call extension [phone] (if no answer, do NOT leave a callback number after the beep, rather please send a SecureChat to [facility]), for Hospital Medicine admit team assignment and for additional admit orders for the patient.  Do not page the attending physician associated with the patient on arrival (this physician may not be on duty at the time of arrival).  Rather, always send a SecureChat to [facility] or call [phone] to reach the triage physician for orders and team assignment.

## 2024-12-25 PROBLEM — K76.89 LIVER DYSFUNCTION: Status: ACTIVE | Noted: 2024-12-24

## 2024-12-25 PROBLEM — A41.9 SEPSIS: Status: ACTIVE | Noted: 2024-12-25

## 2024-12-25 PROBLEM — R65.10 SEVERE SYSTEMIC INFLAMMATORY RESPONSE SYNDROME (SIRS): Status: ACTIVE | Noted: 2024-12-25

## 2024-12-25 LAB
ALBUMIN SERPL BCP-MCNC: 2 G/DL (ref 3.5–5.2)
ALP SERPL-CCNC: 319 U/L (ref 40–150)
ALT SERPL W/O P-5'-P-CCNC: 694 U/L (ref 10–44)
ANION GAP SERPL CALC-SCNC: 8 MMOL/L (ref 8–16)
ANISOCYTOSIS BLD QL SMEAR: SLIGHT
AST SERPL-CCNC: 347 U/L (ref 10–40)
BASOPHILS # BLD AUTO: 0.27 K/UL (ref 0–0.2)
BASOPHILS NFR BLD: 0.9 % (ref 0–1.9)
BILIRUB SERPL-MCNC: 2.4 MG/DL (ref 0.1–1)
BUN SERPL-MCNC: 21 MG/DL (ref 6–20)
CALCIUM SERPL-MCNC: 7.7 MG/DL (ref 8.7–10.5)
CHLORIDE SERPL-SCNC: 97 MMOL/L (ref 95–110)
CO2 SERPL-SCNC: 22 MMOL/L (ref 23–29)
CREAT SERPL-MCNC: 1.4 MG/DL (ref 0.5–1.4)
CREAT UR-MCNC: 161 MG/DL (ref 23–375)
DIFFERENTIAL METHOD BLD: ABNORMAL
EOSINOPHIL # BLD AUTO: 7 K/UL (ref 0–0.5)
EOSINOPHIL NFR BLD: 24.6 % (ref 0–8)
ERYTHROCYTE [DISTWIDTH] IN BLOOD BY AUTOMATED COUNT: 14.2 % (ref 11.5–14.5)
EST. GFR  (NO RACE VARIABLE): >60 ML/MIN/1.73 M^2
GLUCOSE SERPL-MCNC: 100 MG/DL (ref 70–110)
HCT VFR BLD AUTO: 42.2 % (ref 40–54)
HGB BLD-MCNC: 15 G/DL (ref 14–18)
IMM GRANULOCYTES # BLD AUTO: 0.29 K/UL (ref 0–0.04)
IMM GRANULOCYTES NFR BLD AUTO: 1 % (ref 0–0.5)
INR PPP: 1.5 (ref 0.8–1.2)
LACTATE SERPL-SCNC: 2.8 MMOL/L (ref 0.5–2.2)
LYMPHOCYTES # BLD AUTO: 7.2 K/UL (ref 1–4.8)
LYMPHOCYTES NFR BLD: 25 % (ref 18–48)
MAGNESIUM SERPL-MCNC: 1.8 MG/DL (ref 1.6–2.6)
MCH RBC QN AUTO: 28.6 PG (ref 27–31)
MCHC RBC AUTO-ENTMCNC: 35.5 G/DL (ref 32–36)
MCV RBC AUTO: 80 FL (ref 82–98)
MONOCYTES # BLD AUTO: 1.1 K/UL (ref 0.3–1)
MONOCYTES NFR BLD: 3.8 % (ref 4–15)
NEUTROPHILS # BLD AUTO: 12.7 K/UL (ref 1.8–7.7)
NEUTROPHILS NFR BLD: 44.7 % (ref 38–73)
NRBC BLD-RTO: 0 /100 WBC
PHOSPHATE SERPL-MCNC: 2.9 MG/DL (ref 2.7–4.5)
PLATELET # BLD AUTO: 239 K/UL (ref 150–450)
PLATELET BLD QL SMEAR: ABNORMAL
PMV BLD AUTO: 11.2 FL (ref 9.2–12.9)
POIKILOCYTOSIS BLD QL SMEAR: SLIGHT
POTASSIUM SERPL-SCNC: 4.7 MMOL/L (ref 3.5–5.1)
PROT SERPL-MCNC: 6 G/DL (ref 6–8.4)
PROT UR-MCNC: 38 MG/DL (ref 0–15)
PROT/CREAT UR: 0.24 MG/G{CREAT} (ref 0–0.2)
PROTHROMBIN TIME: 15.6 SEC (ref 9–12.5)
RBC # BLD AUTO: 5.25 M/UL (ref 4.6–6.2)
SODIUM SERPL-SCNC: 127 MMOL/L (ref 136–145)
WBC # BLD AUTO: 28.58 K/UL (ref 3.9–12.7)

## 2024-12-25 PROCEDURE — 84156 ASSAY OF PROTEIN URINE: CPT | Performed by: HOSPITALIST

## 2024-12-25 PROCEDURE — 25000003 PHARM REV CODE 250: Performed by: HOSPITALIST

## 2024-12-25 PROCEDURE — 83605 ASSAY OF LACTIC ACID: CPT | Performed by: HOSPITALIST

## 2024-12-25 PROCEDURE — 85610 PROTHROMBIN TIME: CPT | Performed by: HOSPITALIST

## 2024-12-25 PROCEDURE — 63600175 PHARM REV CODE 636 W HCPCS: Performed by: HOSPITALIST

## 2024-12-25 PROCEDURE — 36415 COLL VENOUS BLD VENIPUNCTURE: CPT | Performed by: HOSPITALIST

## 2024-12-25 PROCEDURE — 80053 COMPREHEN METABOLIC PANEL: CPT | Performed by: HOSPITALIST

## 2024-12-25 PROCEDURE — 99223 1ST HOSP IP/OBS HIGH 75: CPT | Mod: ,,, | Performed by: INTERNAL MEDICINE

## 2024-12-25 PROCEDURE — 11000001 HC ACUTE MED/SURG PRIVATE ROOM

## 2024-12-25 PROCEDURE — 85025 COMPLETE CBC W/AUTO DIFF WBC: CPT | Performed by: HOSPITALIST

## 2024-12-25 PROCEDURE — 84100 ASSAY OF PHOSPHORUS: CPT | Performed by: HOSPITALIST

## 2024-12-25 PROCEDURE — 83735 ASSAY OF MAGNESIUM: CPT | Performed by: HOSPITALIST

## 2024-12-25 RX ORDER — PREDNISONE 10 MG/1
10 TABLET ORAL ONCE
Status: DISCONTINUED | OUTPATIENT
Start: 2024-12-25 | End: 2024-12-25

## 2024-12-25 RX ORDER — PREDNISONE 20 MG/1
60 TABLET ORAL DAILY
Status: DISCONTINUED | OUTPATIENT
Start: 2024-12-26 | End: 2024-12-25

## 2024-12-25 RX ORDER — TRIAMCINOLONE ACETONIDE 1 MG/G
CREAM TOPICAL 2 TIMES DAILY
Status: DISCONTINUED | OUTPATIENT
Start: 2024-12-25 | End: 2024-12-27

## 2024-12-25 RX ORDER — ACETAMINOPHEN 325 MG/1
650 TABLET ORAL EVERY 6 HOURS PRN
Status: DISCONTINUED | OUTPATIENT
Start: 2024-12-25 | End: 2024-12-25

## 2024-12-25 RX ORDER — PANTOPRAZOLE SODIUM 40 MG/10ML
40 INJECTION, POWDER, LYOPHILIZED, FOR SOLUTION INTRAVENOUS ONCE
Status: COMPLETED | OUTPATIENT
Start: 2024-12-25 | End: 2024-12-25

## 2024-12-25 RX ORDER — SODIUM CHLORIDE 9 MG/ML
INJECTION, SOLUTION INTRAVENOUS CONTINUOUS
Status: ACTIVE | OUTPATIENT
Start: 2024-12-25 | End: 2024-12-27

## 2024-12-25 RX ORDER — FAMOTIDINE 20 MG/1
20 TABLET, FILM COATED ORAL 2 TIMES DAILY
Status: DISCONTINUED | OUTPATIENT
Start: 2024-12-25 | End: 2025-01-03 | Stop reason: HOSPADM

## 2024-12-25 RX ORDER — ACETAMINOPHEN 325 MG/1
650 TABLET ORAL EVERY 8 HOURS PRN
Status: DISCONTINUED | OUTPATIENT
Start: 2024-12-25 | End: 2025-01-03 | Stop reason: HOSPADM

## 2024-12-25 RX ADMIN — SODIUM CHLORIDE: 9 INJECTION, SOLUTION INTRAVENOUS at 06:12

## 2024-12-25 RX ADMIN — DIPHENHYDRAMINE HYDROCHLORIDE 25 MG: 25 CAPSULE ORAL at 12:12

## 2024-12-25 RX ADMIN — TRIAMCINOLONE ACETONIDE: 1 CREAM TOPICAL at 09:12

## 2024-12-25 RX ADMIN — SODIUM CHLORIDE: 9 INJECTION, SOLUTION INTRAVENOUS at 10:12

## 2024-12-25 RX ADMIN — PANTOPRAZOLE SODIUM 40 MG: 40 INJECTION, POWDER, FOR SOLUTION INTRAVENOUS at 01:12

## 2024-12-25 RX ADMIN — POLYETHYLENE GLYCOL 3350 17 G: 17 POWDER, FOR SOLUTION ORAL at 08:12

## 2024-12-25 RX ADMIN — SENNOSIDES AND DOCUSATE SODIUM 1 TABLET: 50; 8.6 TABLET ORAL at 09:12

## 2024-12-25 RX ADMIN — METHYLPREDNISOLONE SODIUM SUCCINATE 50 MG: 40 INJECTION, POWDER, FOR SOLUTION INTRAMUSCULAR; INTRAVENOUS at 01:12

## 2024-12-25 RX ADMIN — FAMOTIDINE 20 MG: 20 TABLET ORAL at 09:12

## 2024-12-25 RX ADMIN — TRIAMCINOLONE ACETONIDE: 1 CREAM TOPICAL at 10:12

## 2024-12-25 RX ADMIN — Medication 6 MG: at 09:12

## 2024-12-25 RX ADMIN — ONDANSETRON 4 MG: 2 INJECTION INTRAMUSCULAR; INTRAVENOUS at 10:12

## 2024-12-25 RX ADMIN — CLOBETASOL PROPIONATE: 0.5 CREAM TOPICAL at 08:12

## 2024-12-25 RX ADMIN — PREDNISONE 50 MG: 20 TABLET ORAL at 08:12

## 2024-12-25 RX ADMIN — SENNOSIDES AND DOCUSATE SODIUM 1 TABLET: 50; 8.6 TABLET ORAL at 08:12

## 2024-12-25 RX ADMIN — DIPHENHYDRAMINE HYDROCHLORIDE 25 MG: 25 CAPSULE ORAL at 01:12

## 2024-12-25 RX ADMIN — DIPHENHYDRAMINE HYDROCHLORIDE 25 MG: 25 CAPSULE ORAL at 09:12

## 2024-12-25 NOTE — SUBJECTIVE & OBJECTIVE
Past Medical History:   Diagnosis Date    Achilles tendon rupture 07/2023    left achilles tendon rupture s/p repair on 07/14/23 complicated by osteomyelitis of left calcaneus s/p I&D 12/4/24    Acute osteomyelitis of calcaneum, left 12/2024    osteomyelitis of left calcaneus s/p I&D 12/4/24       Past Surgical History:   Procedure Laterality Date    ANTERIOR CRUCIATE LIGAMENT REPAIR Right     2018    APPLICATION OF SPLINT Left 7/14/2023    Procedure: APPLICATION, SPLINT;  Surgeon: Lenore Pearl DPM;  Location: [facility] OR;  Service: Podiatry;  Laterality: Left;    INSERTION, ANTIBIOTIC SPACER, LOWER EXTREMITY Left 12/4/2024    Procedure: INSERTION, ANTIBIOTIC SPACER, LOWER EXTREMITY;  Surgeon: Jaswinder Garcia DPM;  Location: [facility] OR;  Service: Podiatry;  Laterality: Left;    IRRIGATION AND DEBRIDEMENT Left 12/4/2024    Procedure: IRRIGATION AND DEBRIDEMENT;  Surgeon: Jaswinder Garcia DPM;  Location: [facility] OR;  Service: Podiatry;  Laterality: Left;  mini c-arm, Stimulan jr tobramycin/Vancomycin    REPAIR OF ACHILLES TENDON Left 7/14/2023    Procedure: REPAIR, TENDON, ACHILLES;  Surgeon: Lenore Pearl DPM;  Location: [facility] OR;  Service: Podiatry;  Laterality: Left;       Review of patient's allergies indicates:   Allergen Reactions    Bactrim [sulfamethoxazole-trimethoprim] Rash     Developed DRESS       Medications:  Facility-Administered Medications Prior to Admission   Medication    [DISCONTINUED] sodium chloride 0.9% flush 10 mL     Medications Prior to Admission   Medication Sig    clobetasoL (TEMOVATE) 0.05 % cream Apply topically 2 (two) times daily.    ondansetron (ZOFRAN-ODT) 4 MG TbDL Take 1 tablet (4 mg total) by mouth every 8 (eight) hours as needed.    predniSONE (DELTASONE) 50 MG Tab Take 1 tablet (50 mg total) by mouth once daily.     Antibiotics (From admission, onward)      None          Antifungals (From admission, onward)      None          Antivirals (From admission, onward)      None                There is no immunization history on file for this patient.    Family History    None       Social History     Socioeconomic History    Marital status: Single   Tobacco Use    Smoking status: Never    Smokeless tobacco: Never   Substance and Sexual Activity    Alcohol use: Yes     Comment: rarely    Drug use: Yes     Types: Marijuana    Sexual activity: Yes     Partners: Female     Social Drivers of Health     Financial Resource Strain: Low Risk  (12/25/2024)    Overall Financial Resource Strain (CARDIA)     Difficulty of Paying Living Expenses: Not very hard   Food Insecurity: No Food Insecurity (12/25/2024)    Hunger Vital Sign     Worried About Running Out of Food in the Last Year: Never true     Ran Out of Food in the Last Year: Never true   Transportation Needs: No Transportation Needs (12/25/2024)    TRANSPORTATION NEEDS     Transportation : No   Recent Concern: Transportation Needs - Unmet Transportation Needs (11/19/2024)    TRANSPORTATION NEEDS     Transportation : Yes, it has kept me from non-medical meetings, appointments, work or from getting things that I need.   Physical Activity: Patient Unable To Answer (12/25/2024)    Exercise Vital Sign     Days of Exercise per Week: Patient unable to answer     Minutes of Exercise per Session: Patient unable to answer   Stress: Stress Concern Present (12/25/2024)    [employer] - Occupational Stress Questionnaire     Feeling of Stress : To some extent   Housing Stability: Low Risk  (12/25/2024)    Housing Stability Vital Sign     Unable to Pay for Housing in the Last Year: No     Homeless in the Last Year: No     Review of Systems   Gastrointestinal:  Positive for nausea and vomiting.   Skin:  Positive for rash.   All other systems reviewed and are negative.    Objective:     Vital Signs (Most Recent):  Temp: 98.6 °F (37 °C) (12/25/24 1634)  Pulse: 88 (12/25/24 1634)  Resp: 18 (12/25/24 1634)  BP: 117/74 (12/25/24 1634)  SpO2: 99  % (12/25/24 1634) Vital Signs (24h Range):  Temp:  [98.4 °F (36.9 °C)-101.5 °F (38.6 °C)] 98.6 °F (37 °C)  Pulse:  [] 88  Resp:  [16-18] 18  SpO2:  [95 %-100 %] 99 %  BP: (109-128)/(55-81) 117/74     Weight: 72 kg (158 lb 11.7 oz)  Body mass index is 20.94 kg/m².    Estimated Creatinine Clearance: 77.9 mL/min (based on SCr of 1.4 mg/dL).     Physical Exam  Vitals and nursing note reviewed.   Constitutional:       General: He is not in acute distress.     Appearance: He is well-developed. He is not diaphoretic.   HENT:      Head: Normocephalic and atraumatic.      Right Ear: External ear normal.      Left Ear: External ear normal.      Nose: Nose normal.      Mouth/Throat:      Pharynx: No oropharyngeal exudate.   Eyes:      General: No scleral icterus.        Right eye: No discharge.         Left eye: No discharge.      Conjunctiva/sclera: Conjunctivae normal.      Pupils: Pupils are equal, round, and reactive to light.   Neck:      Thyroid: No thyromegaly.      Vascular: No JVD.      Trachea: No tracheal deviation.   Cardiovascular:      Rate and Rhythm: Normal rate and regular rhythm.      Heart sounds: No murmur heard.     No friction rub. No gallop.   Pulmonary:      Effort: Pulmonary effort is normal. No respiratory distress.      Breath sounds: Normal breath sounds. No stridor. No wheezing or rales.   Chest:      Chest wall: No tenderness.   Abdominal:      General: Bowel sounds are normal. There is no distension.      Palpations: Abdomen is soft. There is no mass.      Tenderness: There is no abdominal tenderness. There is no guarding or rebound.   Musculoskeletal:         General: No tenderness. Normal range of motion.      Cervical back: Normal range of motion and neck supple.   Lymphadenopathy:      Cervical: No cervical adenopathy.   Skin:     General: Skin is warm.      Coloration: Skin is not pale.      Findings: Rash present. No erythema.   Neurological:      Mental Status: He is alert and  oriented to person, place, and time.      Cranial Nerves: No cranial nerve deficit.      Motor: No abnormal muscle tone.      Coordination: Coordination normal.      Deep Tendon Reflexes: Reflexes are normal and symmetric. Reflexes normal.   Psychiatric:         Behavior: Behavior normal.         Thought Content: Thought content normal.         Judgment: Judgment normal.          Significant Labs:   Microbiology Results (last 7 days)       ** No results found for the last 168 hours. **            Significant Imaging: I have reviewed all pertinent imaging results/findings within the past 24 hours.

## 2024-12-25 NOTE — ASSESSMENT & PLAN NOTE
30 yo man presents with >50% BSA of erythematous morbilliform rash with associated eosinophilia, leukocytosis, elevated LFTs, and previous LINK (now improving) with prodrome onset with subjective fever/chills and nausea/vomiting ~12/12 with rash onset ~12/20. Clinical features and lab findings are most consistent with DRESS. Regiscar score: 3. Most likely implicated medication is bactrim; however, cannot entirely rule out flagyl as a potential cause.    Labs 12/23 notable for normal ESR, elevated CRP, elevated lactic acid, elevated PT and INR, CMV quantitative not detected, troponin <0.006.  Acute hepatitis panel from 12/15 Hep B surface Ag non-reactive, hep B core IgM non-reactive, Hep A IgM non-reactive, Hep C ab non-reactive.     Plan:  - Continue avoidance of Bactrim and Flagyl. Bactrim has been listed as a drug allergy.  - Recommend daily CBC with diff and CMP  - Recommend UA, CPK, LDH, TSH and free T4, ferritin, fasting lipids, CXR, EKG, and cardiac enzymes   - DRESS can affect multiple organ systems including liver, kidneys, heart, and endocrine   - Continue following blood cultures from 12/23  - Increase to prednisone 60 mg daily  - Start triamcinolone 0.1% cream BID to rash on trunk and limbs. Please special request 454 g jar from pharmacy to adequately cover affected body surface area  - May consider hepatology consult

## 2024-12-25 NOTE — ASSESSMENT & PLAN NOTE
Patient has fever, tachycardia, leukocytosis with abnormal liver function and lactic acidosis on admission.  Although he has a bone infection it is subacute and doubt it is causing these systemic symptoms and signs.  -- monitor for infection, treat DRESS  -- IVF ordered for 24 hours

## 2024-12-25 NOTE — ASSESSMENT & PLAN NOTE
Pt. On long term bactrim therapy presenting with maculopapular rash involving trunk and extremities, >50% BSA, elevated LFTs, LINK, leukocytosis, Eosinophilia, consistent concerning DRESS syndrome  -Dermatology consulted for biopsy and assistance with diagnosis confirmation  -LFTs, renal function labs improving with IV fluids and prednisone, will continue with this for treatment, hold bactrim going forth (listed as allergy)

## 2024-12-25 NOTE — SUBJECTIVE & OBJECTIVE
Past Medical History:   Diagnosis Date    Achilles tendon rupture 07/2023    left achilles tendon rupture s/p repair on 07/14/23 complicated by osteomyelitis of left calcaneus s/p I&D 12/4/24    Acute osteomyelitis of calcaneum, left 12/2024    osteomyelitis of left calcaneus s/p I&D 12/4/24       Past Surgical History:   Procedure Laterality Date    ANTERIOR CRUCIATE LIGAMENT REPAIR Right     2018    APPLICATION OF SPLINT Left 7/14/2023    Procedure: APPLICATION, SPLINT;  Surgeon: Lenore Pearl DPM;  Location: [facility] OR;  Service: Podiatry;  Laterality: Left;    INSERTION, ANTIBIOTIC SPACER, LOWER EXTREMITY Left 12/4/2024    Procedure: INSERTION, ANTIBIOTIC SPACER, LOWER EXTREMITY;  Surgeon: Jaswinder Garcia DPM;  Location: [facility] OR;  Service: Podiatry;  Laterality: Left;    IRRIGATION AND DEBRIDEMENT Left 12/4/2024    Procedure: IRRIGATION AND DEBRIDEMENT;  Surgeon: Jaswinder Garcia DPM;  Location: [facility] OR;  Service: Podiatry;  Laterality: Left;  mini c-arm, Stimulan jr tobramycin/Vancomycin    REPAIR OF ACHILLES TENDON Left 7/14/2023    Procedure: REPAIR, TENDON, ACHILLES;  Surgeon: Lenore Pearl DPM;  Location: [facility] OR;  Service: Podiatry;  Laterality: Left;     Family History    None       Tobacco Use    Smoking status: Never    Smokeless tobacco: Never   Substance and Sexual Activity    Alcohol use: Yes     Comment: rarely    Drug use: Yes     Types: Marijuana    Sexual activity: Yes     Partners: Female       Review of patient's allergies indicates:   Allergen Reactions    Bactrim [sulfamethoxazole-trimethoprim] Rash     Developed DRESS       Medications:  Continuous Infusions:  Scheduled Meds:   enoxparin  40 mg Subcutaneous Daily    polyethylene glycol  17 g Oral Daily    predniSONE  10 mg Oral Once    [START ON 12/26/2024] predniSONE  60 mg Oral Daily    senna-docusate 8.6-50 mg  1 tablet Oral BID    triamcinolone acetonide 0.1%   Topical (Top) BID     PRN Meds:  Current Facility-Administered  Medications:     acetaminophen, 650 mg, Oral, Q8H PRN    dextrose 50%, 12.5 g, Intravenous, PRN    dextrose 50%, 25 g, Intravenous, PRN    diphenhydrAMINE, 25 mg, Oral, Q6H PRN    glucagon (human recombinant), 1 mg, Intramuscular, PRN    glucose, 16 g, Oral, PRN    glucose, 24 g, Oral, PRN    melatonin, 6 mg, Oral, Nightly PRN    naloxone, 0.02 mg, Intravenous, PRN    ondansetron, 4 mg, Intravenous, Q8H PRN    sodium chloride 0.9%, 5 mL, Intravenous, PRN    Review of Systems   Constitutional:  Positive for fever, chills and malaise.   HENT:  Negative for congestion, rhinorrhea, mouth sores, trouble swallowing and lip swelling.    Eyes:  Negative for itching and eye irritation.   Respiratory:  Negative for cough and shortness of breath.    Gastrointestinal:  Positive for nausea. Negative for diarrhea.   Genitourinary:  Negative for genital sores.   Skin:  Positive for itching and rash.   Hematologic/Lymphatic: Negative for adenopathy.     Objective:     Vital Signs (Most Recent):  Temp: 98.4 °F (36.9 °C) (12/25/24 0848)  Pulse: (!) 125 (12/25/24 0848)  Resp: 18 (12/25/24 0848)  BP: (!) 113/57 (12/25/24 0748)  SpO2: 97 % (12/25/24 0848) Vital Signs (24h Range):  Temp:  [98.4 °F (36.9 °C)-100.4 °F (38 °C)] 98.4 °F (36.9 °C)  Pulse:  [] 125  Resp:  [16-18] 18  SpO2:  [95 %-100 %] 97 %  BP: (109-128)/(57-81) 113/57     Weight: 72 kg (158 lb 11.7 oz)  Body mass index is 20.94 kg/m².     Physical Exam   Constitutional: He appears well-developed and well-nourished.   Musculoskeletal: Lymphadenopathy:      Head:      Right side of head: No submental, submandibular, preauricular or posterior auricular adenopathy.      Left side of head: No submental, submandibular, preauricular or posterior auricular adenopathy.      Cervical: No cervical adenopathy.      Upper Body:      Right upper body: No supraclavicular or axillary adenopathy.      Left upper body: No supraclavicular or axillary adenopathy.     Lymphadenopathy:      "   Head (right side): No submental, no submandibular, no preauricular and no posterior auricular adenopathy present.        Head (left side): No submental, no submandibular, no preauricular and no posterior auricular adenopathy present.     He has no cervical adenopathy.        Right: No supraclavicular adenopathy present.        Left: No supraclavicular adenopathy present.   Neurological: He is alert and oriented to person, place, and time.   Psychiatric: He has a normal mood and affect.   Skin:   Areas Examined (abnormalities noted in diagram):   Scalp / Hair Palpated and Inspected  Head / Face Inspection Performed  Neck Inspection Performed  Chest / Axilla Inspection Performed  Abdomen Inspection Performed  Genitals / Buttocks / Groin Inspection Performed  Back Inspection Performed  RUE Inspected  LUE Inspection Performed  RLE Inspected  LLE Inspection Performed  Nails and Digits Inspection Performed  Gland Inspection Performed                                             Significant Labs: Blood Culture:   Recent Labs   Lab 12/23/24  1306 12/23/24  1307   LABBLOO No Growth to date  No Growth to date No Growth to date  No Growth to date     CBC:   Recent Labs   Lab 12/23/24  1059 12/24/24  0523 12/25/24  0504   WBC 28.36* 30.59* 28.58*   HGB 15.6 14.6 15.0   HCT 43.9 42.1 42.2    257 239     CMP:   Recent Labs   Lab 12/23/24  1059 12/24/24  0523 12/25/24  0504   * 128* 127*   K 4.6 5.1 4.7   CL 92* 96 97   CO2 21* 20* 22*   * 110 100   BUN 19 23* 21*   CREATININE 1.9* 1.5* 1.4   CALCIUM 8.0* 8.0* 7.7*   PROT 6.6 6.0 6.0   ALBUMIN 2.3* 2.0* 2.0*   BILITOT 4.1* 2.5* 2.4*   ALKPHOS 341* 307* 319*   * 318* 347*   * 611* 694*   ANIONGAP 12 12 8     Coagulation:   Recent Labs   Lab 12/23/24  1352   INR 2.1*     HIV 1/2 Antibodies: No results for input(s): "[id number]" in the last 48 hours.  TSH:   Recent Labs   Lab 11/11/24  1109   TSH 0.925     All pertinent labs within the past 24 " hours have been reviewed.    Significant Imaging: I have reviewed all pertinent imaging results/findings within the past 24 hours.

## 2024-12-25 NOTE — ASSESSMENT & PLAN NOTE
Cr now 1.5, prior labs in system 11/2024 show Cr 1.4 which leads me to believe this is patient's baseline although unclear etiology of CKD, pt. Without prior diagnosis  Renal US unremarkable.  -Continue to monitor while admitted, pt. Will need PCP and/or nephrologist at discharge

## 2024-12-25 NOTE — HPI
30 yo M with PMH of left achilles tendon rupture s/p repair (7/2023) and osteomyelitis of the left calcaneus (11/20/24) on prolonged course of PO bactrim and flagyl who presented to [facility] 12/23 for evaluation of rash and inability to tolerate PO intake. Patient reports prior to going to the hospital in [place] he was having nausea, vomiting, decreased PO intake, and subjective fever/chills for 1-2 weeks (starting around 12/12). He had presented to the ED in [place] 12/14 and 12/20 for evaluation of these symptoms and was given fluids. He notes he had feeling like his skin was itchy as well and on 12/20 noted that he had an extensive red, bumpy rash to the body. Notes his face feels sensitive but not swollen. Rash is itchy rather than painful. Denies oral erosions or ulcers. Denies pain with urination or genital erosions. Denies visual changes or conjunctivitis. Does note his eyes looked jaundiced previously. Denies noting facial swelling or swollen lymph nodes.  Reports started bactrim 11/20 for concern for osteomyelitis and then around 11/25 flagyl was added.     Patient was admitted at [facility] 12/23 for concern for DRESS in the setting of leukocytosis and eosinophilia, LINK with elevated creatinine to 1.9 from 1.4 baseline, and elevated LFTs. Retroperitoneal ultrasound 12/23 showed no acute process.  CT abdomen and pelvis (12/20) showed no acute findings.  Due to concern for DRESS syndrome patient was transferred to [facility] for Dermatology evaluation.   While awaiting bed at [facility], he was started on PO prednisone 50 mg daily and clobetasol cream.      New Medications  Bactrim started 11/20  Flagyl started 11/25    Prodrome with subjective fever/chills, nausea/vomiting, decreased PO intake ~12/12  Rash onset ~12/20

## 2024-12-25 NOTE — ASSESSMENT & PLAN NOTE
-hold all antibiotics for time being given Drug rash, will order ID consult for assistance with management of antibiotic regimen going forth

## 2024-12-25 NOTE — CONSULTS
[facility]  Infectious Disease  Consult Note    Patient Name: Alonzo Nascimento Jr.  MRN: [id number]  Admission Date: 12/24/2024  Hospital Length of Stay: 1 days  Attending Physician: Norberto Lo MD  Primary Care Provider: Sohan Strange MD     Isolation Status: No active isolations    Patient information was obtained from patient, past medical records, and ER records.      Inpatient consult to Infectious Diseases  Consult performed by: Jerman Avitia MD  Consult ordered by: Salvador Haque MD        Assessment/Plan:     ID  Acute osteomyelitis of left calcaneus  29 year old male who was started on bactrim and metronidazole for osteomyelitis of the left calcaneus.  He has now been admitted to the hospital for DRESS.      Plan  I agree with discontinuing his antibiotics.  Will hold off on starting any antibiotics for now pending resolution of his symptoms.  Arrange follow up with [facility] in [place] (by the [facility] in [place]) to discussed antibiotics.    Oncology  * DRESS syndrome  Management per dermatology.        Thank you for your consult. I will sign off. Please contact us if you have any additional questions.    Jerman Avitia MD  Infectious Disease  [facility]    Subjective:     Principal Problem: DRESS syndrome    HPI: 29 year old male with a history of left achilles tendon rupture s/p surgical repair in July 2023.  He developed a wound in the area.  MRI of the wound showed possible but not definitive osteomyelitis.  Cultures of the wound were obtained in November 2024 and returned positive for Citrobacter and Prevotella.  Patient was started on Bactrim and Metronidazole to complete a 6 week course.  He then underwent surgical debridement of the wound with removal of hardware and devitalized bone on 12/4/24.  He continued on bactrim and metronidazole.  He developed skin itching and nausea/vomiting.  He presented to the hospital for evaluation.  His LFTs were noted to be  elevated in the ER.  He was evaluated by dermatology and diagnosed with DRESS.  ID is consulted for antibiotic recommendations.    Past Medical History:   Diagnosis Date    Achilles tendon rupture 07/2023    left achilles tendon rupture s/p repair on 07/14/23 complicated by osteomyelitis of left calcaneus s/p I&D 12/4/24    Acute osteomyelitis of calcaneum, left 12/2024    osteomyelitis of left calcaneus s/p I&D 12/4/24       Past Surgical History:   Procedure Laterality Date    ANTERIOR CRUCIATE LIGAMENT REPAIR Right     2018    APPLICATION OF SPLINT Left 7/14/2023    Procedure: APPLICATION, SPLINT;  Surgeon: Lenore Pearl DPM;  Location: [facility] OR;  Service: Podiatry;  Laterality: Left;    INSERTION, ANTIBIOTIC SPACER, LOWER EXTREMITY Left 12/4/2024    Procedure: INSERTION, ANTIBIOTIC SPACER, LOWER EXTREMITY;  Surgeon: Jaswinder Garcia DPM;  Location: [facility] OR;  Service: Podiatry;  Laterality: Left;    IRRIGATION AND DEBRIDEMENT Left 12/4/2024    Procedure: IRRIGATION AND DEBRIDEMENT;  Surgeon: Jaswinder Garcia DPM;  Location: [facility] OR;  Service: Podiatry;  Laterality: Left;  mini c-arm, Stimulan jr tobramycin/Vancomycin    REPAIR OF ACHILLES TENDON Left 7/14/2023    Procedure: REPAIR, TENDON, ACHILLES;  Surgeon: Lenore Pearl DPM;  Location: [facility] OR;  Service: Podiatry;  Laterality: Left;       Review of patient's allergies indicates:   Allergen Reactions    Bactrim [sulfamethoxazole-trimethoprim] Rash     Developed DRESS       Medications:  Facility-Administered Medications Prior to Admission   Medication    [DISCONTINUED] sodium chloride 0.9% flush 10 mL     Medications Prior to Admission   Medication Sig    clobetasoL (TEMOVATE) 0.05 % cream Apply topically 2 (two) times daily.    ondansetron (ZOFRAN-ODT) 4 MG TbDL Take 1 tablet (4 mg total) by mouth every 8 (eight) hours as needed.    predniSONE (DELTASONE) 50 MG Tab Take 1 tablet (50 mg total) by mouth once daily.     Antibiotics (From admission,  onward)      None          Antifungals (From admission, onward)      None          Antivirals (From admission, onward)      None               There is no immunization history on file for this patient.    Family History    None       Social History     Socioeconomic History    Marital status: Single   Tobacco Use    Smoking status: Never    Smokeless tobacco: Never   Substance and Sexual Activity    Alcohol use: Yes     Comment: rarely    Drug use: Yes     Types: Marijuana    Sexual activity: Yes     Partners: Female     Social Drivers of Health     Financial Resource Strain: Low Risk  (12/25/2024)    Overall Financial Resource Strain (CARDIA)     Difficulty of Paying Living Expenses: Not very hard   Food Insecurity: No Food Insecurity (12/25/2024)    Hunger Vital Sign     Worried About Running Out of Food in the Last Year: Never true     Ran Out of Food in the Last Year: Never true   Transportation Needs: No Transportation Needs (12/25/2024)    TRANSPORTATION NEEDS     Transportation : No   Recent Concern: Transportation Needs - Unmet Transportation Needs (11/19/2024)    TRANSPORTATION NEEDS     Transportation : Yes, it has kept me from non-medical meetings, appointments, work or from getting things that I need.   Physical Activity: Patient Unable To Answer (12/25/2024)    Exercise Vital Sign     Days of Exercise per Week: Patient unable to answer     Minutes of Exercise per Session: Patient unable to answer   Stress: Stress Concern Present (12/25/2024)    [employer] [place] [employer] - Occupational Stress Questionnaire     Feeling of Stress : To some extent   Housing Stability: Low Risk  (12/25/2024)    Housing Stability Vital Sign     Unable to Pay for Housing in the Last Year: No     Homeless in the Last Year: No     Review of Systems   Gastrointestinal:  Positive for nausea and vomiting.   Skin:  Positive for rash.   All other systems reviewed and are negative.    Objective:     Vital Signs (Most  Recent):  Temp: 98.6 °F (37 °C) (12/25/24 1634)  Pulse: 88 (12/25/24 1634)  Resp: 18 (12/25/24 1634)  BP: 117/74 (12/25/24 1634)  SpO2: 99 % (12/25/24 1634) Vital Signs (24h Range):  Temp:  [98.4 °F (36.9 °C)-101.5 °F (38.6 °C)] 98.6 °F (37 °C)  Pulse:  [] 88  Resp:  [16-18] 18  SpO2:  [95 %-100 %] 99 %  BP: (109-128)/(55-81) 117/74     Weight: 72 kg (158 lb 11.7 oz)  Body mass index is 20.94 kg/m².    Estimated Creatinine Clearance: 77.9 mL/min (based on SCr of 1.4 mg/dL).     Physical Exam  Vitals and nursing note reviewed.   Constitutional:       General: He is not in acute distress.     Appearance: He is well-developed. He is not diaphoretic.   HENT:      Head: Normocephalic and atraumatic.      Right Ear: External ear normal.      Left Ear: External ear normal.      Nose: Nose normal.      Mouth/Throat:      Pharynx: No oropharyngeal exudate.   Eyes:      General: No scleral icterus.        Right eye: No discharge.         Left eye: No discharge.      Conjunctiva/sclera: Conjunctivae normal.      Pupils: Pupils are equal, round, and reactive to light.   Neck:      Thyroid: No thyromegaly.      Vascular: No JVD.      Trachea: No tracheal deviation.   Cardiovascular:      Rate and Rhythm: Normal rate and regular rhythm.      Heart sounds: No murmur heard.     No friction rub. No gallop.   Pulmonary:      Effort: Pulmonary effort is normal. No respiratory distress.      Breath sounds: Normal breath sounds. No stridor. No wheezing or rales.   Chest:      Chest wall: No tenderness.   Abdominal:      General: Bowel sounds are normal. There is no distension.      Palpations: Abdomen is soft. There is no mass.      Tenderness: There is no abdominal tenderness. There is no guarding or rebound.   Musculoskeletal:         General: No tenderness. Normal range of motion.      Cervical back: Normal range of motion and neck supple.   Lymphadenopathy:      Cervical: No cervical adenopathy.   Skin:     General: Skin is  warm.      Coloration: Skin is not pale.      Findings: Rash present. No erythema.   Neurological:      Mental Status: He is alert and oriented to person, place, and time.      Cranial Nerves: No cranial nerve deficit.      Motor: No abnormal muscle tone.      Coordination: Coordination normal.      Deep Tendon Reflexes: Reflexes are normal and symmetric. Reflexes normal.   Psychiatric:         Behavior: Behavior normal.         Thought Content: Thought content normal.         Judgment: Judgment normal.          Significant Labs:   Microbiology Results (last 7 days)       ** No results found for the last 168 hours. **            Significant Imaging: I have reviewed all pertinent imaging results/findings within the past 24 hours.

## 2024-12-25 NOTE — HPI
29 year old male with a history of left achilles tendon rupture s/p surgical repair in July 2023.  He developed a wound in the area.  MRI of the wound showed possible but not definitive osteomyelitis.  Cultures of the wound were obtained in November 2024 and returned positive for Citrobacter and Prevotella.  Patient was started on Bactrim and Metronidazole to complete a 6 week course.  He then underwent surgical debridement of the wound with removal of hardware and devitalized bone on 12/4/24.  He continued on bactrim and metronidazole.  He developed skin itching and nausea/vomiting.  He presented to the hospital for evaluation.  His LFTs were noted to be elevated in the ER.  He was evaluated by dermatology and diagnosed with DRESS.  ID is consulted for antibiotic recommendations.

## 2024-12-25 NOTE — ASSESSMENT & PLAN NOTE
- appreciate ID consult .  No antibiotics for now. Arrange follow up with [facility] at [facility] after his discharge to discuss antibiotic options.

## 2024-12-25 NOTE — PROGRESS NOTES
"[facility] Medicine  Progress Note    Patient Name: Alonzo Nascimento Jr.  MRN: [id number]  Patient Class: IP- Inpatient   Admission Date: 12/24/2024  Length of Stay: 1 days  Attending Physician: Norberto Lo MD  Primary Care Provider: Sohan Strange MD        Subjective     Principal Problem:DRESS syndrome        HPI:  28 yo M with PMHx foot osteomyelitis on PO bactrim and flagyl who presented to [facility] 12/23 for evaluation of rash. Pt reported having nausea, vomiting, decreased po intake, body aches, for 1-2 weeks. Pt. Has a history of left achilles tendon rupture s/p repair on 07/14/23 complicated by osteomyelitis of left calcaneus s/p I&D 12/4/24. He was prescribed PO Bactrim and Flagy x 6 weeks after this most recent procedure with end date 1/1/2025.  He initially presented to [facility]  E on 12/14 and 12/20 for evaluation of the nausea/dehydaration and was discharged from ED after IV fluids on both occasions. About 2-3 days ago, shortly after his ED vist 12/20, the patient reports developing a full body rash. The Rash is described as >50% BSA covered in stereotypical morbiliform. No mucous membrane involvement.     In the ED, the patient was tachycardic (120s> 90s), tachypneic (20s), but otherwise hemodynamically stable.  Labs were remarkable for leukocytosis (28.3, with 12% eosinophilia), elevated INR (2.1), elevated creatinine (1.9- from a previous 1.4), hyponatremia (125), elevated LFTs (AST:  384, ALT: 635, T bili: 4.1, ALP:  341), elevated lactic acid (2.7).  VBG showed a pH of 7.34, pCO2 of 49.7 and HC03 of 22.9.  Retroperitoneal ultrasound showed no acute process.  CT abdomen and pelvis (12/20) showed no acute findings.  Referring provider is concerned about DRESS syndrome secondary to Bactrim, so patient transferred to [facility] for Dematology evaluation.     Pt. Was admitted to [facility] while awaiting bed, per [facility] medicine team, "He was started on oral prednisone 50 mg " "daily and clobetasol cream...... Patient's Lfts and LINK were improving on 12/24. He was tolerating a soft diet"    Overview/Hospital Course:  See problem list    Interval History:   Symptoms:   Same rash, not better.  Diet:  poor appetite.  May have vomited his am meds.  Activity level:   Returning to normal.  Pain:  No pain.       Review of Systems   Constitutional:  Positive for fatigue.   Respiratory:  Negative for shortness of breath.    Cardiovascular:  Negative for chest pain.   Gastrointestinal:  Positive for nausea and vomiting. Negative for abdominal pain.   Skin:  Positive for rash.     Objective:     Vital Signs (Most Recent):  Temp: (!) 101.5 °F (38.6 °C) (12/25/24 1141)  Pulse: (!) 122 (12/25/24 1141)  Resp: 18 (12/25/24 1141)  BP: (!) 109/55 (12/25/24 1141)  SpO2: 97 % (12/25/24 1141) Vital Signs (24h Range):  Temp:  [98.4 °F (36.9 °C)-101.5 °F (38.6 °C)] 101.5 °F (38.6 °C)  Pulse:  [] 122  Resp:  [16-18] 18  SpO2:  [95 %-100 %] 97 %  BP: (109-128)/(55-81) 109/55     Weight: 72 kg (158 lb 11.7 oz)  Body mass index is 20.94 kg/m².    Intake/Output Summary (Last 24 hours) at 12/25/2024 1254  Last data filed at 12/25/2024 0945  Gross per 24 hour   Intake 1340 ml   Output 300 ml   Net 1040 ml         Physical Exam  Constitutional:       General: He is in acute distress.      Appearance: He is not ill-appearing.   Cardiovascular:      Rate and Rhythm: Tachycardia present.      Heart sounds: No murmur heard.  Pulmonary:      Effort: No respiratory distress.      Breath sounds: No wheezing.   Abdominal:      General: Bowel sounds are normal. There is no distension.      Tenderness: There is no abdominal tenderness.   Musculoskeletal:      Right lower leg: No edema.      Left lower leg: No edema.   Skin:     Comments: Diffuse maculopapular rash   Neurological:      Mental Status: He is alert and oriented to person, place, and time.   Psychiatric:         Mood and Affect: Mood normal.         "     Significant Labs: All pertinent labs within the past 24 hours have been reviewed.    Significant Imaging: I have reviewed all pertinent imaging results/findings within the past 24 hours.    Assessment and Plan     * DRESS syndrome  Pt. On long term bactrim therapy presenting with maculopapular rash involving trunk and extremities, >50% BSA, elevated LFTs, LINK, leukocytosis, Eosinophilia, consistent concerning DRESS syndrome  -Dermatology consulted for biopsy and assistance with diagnosis confirmation  -LFTs, renal function labs improving with IV fluids and prednisone, will continue with this for treatment, hold bactrim going forth (listed as allergy)    Severe systemic inflammatory response syndrome (SIRS)  Patient has fever, tachycardia, leukocytosis with abnormal liver function and lactic acidosis on admission.  Although he has a bone infection it is subacute and doubt it is causing these systemic symptoms and signs.  -- monitor for infection, treat DRESS  -- IVF ordered for 24 hours      Liver dysfunction  Elevated LFTs (bili, INR) suspected 2/2 DRESS syndrome, improving with drug removal, steroids.   Acute viral panel negative 12/15.  CT abd unremarkable liver.  -- Continue to trend      Stage 3 chronic kidney disease  Cr now 1.5, prior labs in system 11/2024 show Cr 1.4 which leads me to believe this is patient's baseline although unclear etiology of CKD, pt. Without prior diagnosis  Renal US unremarkable.  -Continue to monitor while admitted, pt. Will need PCP and/or nephrologist at discharge    Acute osteomyelitis of left calcaneus  - appreciate ID consult .  No antibiotics for now. Arrange follow up with [facility] ID clinic at [facility] after his discharge to discuss antibiotic options.   -- 12/23 podiatry note reviewed and pt confirmed plan.  Needs weekly dressing change next due 12/30.        VTE Risk Mitigation (From admission, onward)           Ordered     enoxaparin injection 40 mg  Daily          12/24/24 1849     Place sequential compression device  Until discontinued         12/24/24 1849                    Discharge Planning   GARETH: 12/27/2024     Code Status: Full Code   Medical Readiness for Discharge Date:                            Norberto Lo MD  Department of Hospital Medicine   [facility]

## 2024-12-25 NOTE — ASSESSMENT & PLAN NOTE
Elevated LFTs (bili, INR) suspected 2/2 DRESS syndrome, improving with drug removal, steroids.   Acute viral panel negative 12/15.  CT abd unremarkable liver.  -- Continue to trend

## 2024-12-25 NOTE — ASSESSMENT & PLAN NOTE
-Cr now 1.5, prior labs in system 11/2024 show Cr 1.4 which leads me to believe this is patient's baseline although unclear etiology of CKD, pt. Without prior diagnosis  -Continue to monitor while admitted, pt. Will need PCP and/or nephrologist at discharge

## 2024-12-25 NOTE — HPI
"Per admitting physician (12/24):  30 yo M with PMHx foot osteomyelitis on PO bactrim and flagyl who presented to [facility] 12/23 for evaluation of rash. Pt reported having nausea, vomiting, decreased po intake, body aches, for 1-2 weeks. Pt. Has a history of left achilles tendon rupture s/p repair on 07/14/23 complicated by osteomyelitis of left calcaneus s/p I&D 12/4/24. He was prescribed PO Bactrim and Flagy x 6 weeks after this most recent procedure with end date 1/1/2025.  He initially presented to [facility]  E on 12/14 and 12/20 for evaluation of the nausea/dehydaration and was discharged from ED after IV fluids on both occasions. About 2-3 days ago, shortly after his ED vist 12/20, the patient reports developing a full body rash. The Rash is described as >50% BSA covered in stereotypical morbiliform. No mucous membrane involvement.     In the ED, the patient was tachycardic (120s> 90s), tachypneic (20s), but otherwise hemodynamically stable.  Labs were remarkable for leukocytosis (28.3, with 12% eosinophilia), elevated INR (2.1), elevated creatinine (1.9- from a previous 1.4), hyponatremia (125), elevated LFTs (AST:  384, ALT: 635, T bili: 4.1, ALP:  341), elevated lactic acid (2.7).  VBG showed a pH of 7.34, pCO2 of 49.7 and HC03 of 22.9.  Retroperitoneal ultrasound showed no acute process.  CT abdomen and pelvis (12/20) showed no acute findings.  Referring provider is concerned about DRESS syndrome secondary to Bactrim, so patient transferred to [facility] for Dematology evaluation.     Pt. Was admitted to [facility] while awaiting bed, per [facility] medicine team, "He was started on oral prednisone 50 mg daily and clobetasol cream...... Patient's Lfts and LINK were improving on 12/24. He was tolerating a soft diet"  "

## 2024-12-25 NOTE — CONSULTS
[facility] - Med Surg  Dermatology  Consult Note    Patient Name: Alonzo Nascimento Jr.  MRN: [id number]  Admission Date: 12/24/2024  Hospital Length of Stay: 1 days  Attending Physician: Norberto Lo MD  Primary Care Provider: Sohan Strange MD     Inpatient consult to Dermatology  Consult performed by: Riky Bang MD  Consult ordered by: Salvador Haque MD        Subjective:     Principal Problem:DRESS syndrome    HPI:  28 yo M with PMH of left achilles tendon rupture s/p repair (7/2023) and osteomyelitis of the left calcaneus (11/20/24) on prolonged course of PO bactrim and flagyl who presented to [facility] 12/23 for evaluation of rash and inability to tolerate PO intake. Patient reports prior to going to the hospital in [place] he was having nausea, vomiting, decreased PO intake, and subjective fever/chills for 1-2 weeks (starting around 12/12). He had presented to the ED in [place] 12/14 and 12/20 for evaluation of these symptoms and was given fluids. He notes he had feeling like his skin was itchy as well and on 12/20 noted that he had an extensive red, bumpy rash to the body. Notes his face feels sensitive but not swollen. Rash is itchy rather than painful. Denies oral erosions or ulcers. Denies pain with urination or genital erosions. Denies visual changes or conjunctivitis. Does note his eyes looked jaundiced previously. Denies noting facial swelling or swollen lymph nodes.  Reports started bactrim 11/20 for concern for osteomyelitis and then around 11/25 flagyl was added.     Patient was admitted at [facility] 12/23 for concern for DRESS in the setting of leukocytosis and eosinophilia, LINK with elevated creatinine to 1.9 from 1.4 baseline, and elevated LFTs. Retroperitoneal ultrasound 12/23 showed no acute process.  CT abdomen and pelvis (12/20) showed no acute findings.  Due to concern for DRESS syndrome patient was transferred to [facility] for Dermatology evaluation.   While awaiting  bed at [facility], he was started on PO prednisone 50 mg daily and clobetasol cream.      New Medications  Bactrim started 11/20  Flagyl started 11/25    Prodrome with subjective fever/chills, nausea/vomiting, decreased PO intake ~12/12  Rash onset ~12/20      Past Medical History:   Diagnosis Date    Achilles tendon rupture 07/2023    left achilles tendon rupture s/p repair on 07/14/23 complicated by osteomyelitis of left calcaneus s/p I&D 12/4/24    Acute osteomyelitis of calcaneum, left 12/2024    osteomyelitis of left calcaneus s/p I&D 12/4/24       Past Surgical History:   Procedure Laterality Date    ANTERIOR CRUCIATE LIGAMENT REPAIR Right     2018    APPLICATION OF SPLINT Left 7/14/2023    Procedure: APPLICATION, SPLINT;  Surgeon: Lenore Pearl DPM;  Location: [facility] OR;  Service: Podiatry;  Laterality: Left;    INSERTION, ANTIBIOTIC SPACER, LOWER EXTREMITY Left 12/4/2024    Procedure: INSERTION, ANTIBIOTIC SPACER, LOWER EXTREMITY;  Surgeon: Jaswinder Garcia DPM;  Location: [facility] OR;  Service: Podiatry;  Laterality: Left;    IRRIGATION AND DEBRIDEMENT Left 12/4/2024    Procedure: IRRIGATION AND DEBRIDEMENT;  Surgeon: Jaswinder Garcia DPM;  Location: [facility] OR;  Service: Podiatry;  Laterality: Left;  mini c-arm, Stimulan jr tobramycin/Vancomycin    REPAIR OF ACHILLES TENDON Left 7/14/2023    Procedure: REPAIR, TENDON, ACHILLES;  Surgeon: Lenore Pearl DPM;  Location: [facility] OR;  Service: Podiatry;  Laterality: Left;     Family History    None       Tobacco Use    Smoking status: Never    Smokeless tobacco: Never   Substance and Sexual Activity    Alcohol use: Yes     Comment: rarely    Drug use: Yes     Types: Marijuana    Sexual activity: Yes     Partners: Female       Review of patient's allergies indicates:   Allergen Reactions    Bactrim [sulfamethoxazole-trimethoprim] Rash     Developed DRESS       Medications:  Continuous Infusions:  Scheduled Meds:   enoxparin  40 mg Subcutaneous Daily    polyethylene  glycol  17 g Oral Daily    predniSONE  10 mg Oral Once    [START ON 12/26/2024] predniSONE  60 mg Oral Daily    senna-docusate 8.6-50 mg  1 tablet Oral BID    triamcinolone acetonide 0.1%   Topical (Top) BID     PRN Meds:  Current Facility-Administered Medications:     acetaminophen, 650 mg, Oral, Q8H PRN    dextrose 50%, 12.5 g, Intravenous, PRN    dextrose 50%, 25 g, Intravenous, PRN    diphenhydrAMINE, 25 mg, Oral, Q6H PRN    glucagon (human recombinant), 1 mg, Intramuscular, PRN    glucose, 16 g, Oral, PRN    glucose, 24 g, Oral, PRN    melatonin, 6 mg, Oral, Nightly PRN    naloxone, 0.02 mg, Intravenous, PRN    ondansetron, 4 mg, Intravenous, Q8H PRN    sodium chloride 0.9%, 5 mL, Intravenous, PRN    Review of Systems   Constitutional:  Positive for fever, chills and malaise.   HENT:  Negative for congestion, rhinorrhea, mouth sores, trouble swallowing and lip swelling.    Eyes:  Negative for itching and eye irritation.   Respiratory:  Negative for cough and shortness of breath.    Gastrointestinal:  Positive for nausea. Negative for diarrhea.   Genitourinary:  Negative for genital sores.   Skin:  Positive for itching and rash.   Hematologic/Lymphatic: Negative for adenopathy.     Objective:     Vital Signs (Most Recent):  Temp: 98.4 °F (36.9 °C) (12/25/24 0848)  Pulse: (!) 125 (12/25/24 0848)  Resp: 18 (12/25/24 0848)  BP: (!) 113/57 (12/25/24 0748)  SpO2: 97 % (12/25/24 0848) Vital Signs (24h Range):  Temp:  [98.4 °F (36.9 °C)-100.4 °F (38 °C)] 98.4 °F (36.9 °C)  Pulse:  [] 125  Resp:  [16-18] 18  SpO2:  [95 %-100 %] 97 %  BP: (109-128)/(57-81) 113/57     Weight: 72 kg (158 lb 11.7 oz)  Body mass index is 20.94 kg/m².     Physical Exam   Constitutional: He appears well-developed and well-nourished.   Musculoskeletal: Lymphadenopathy:      Head:      Right side of head: No submental, submandibular, preauricular or posterior auricular adenopathy.      Left side of head: No submental, submandibular,  preauricular or posterior auricular adenopathy.      Cervical: No cervical adenopathy.      Upper Body:      Right upper body: No supraclavicular or axillary adenopathy.      Left upper body: No supraclavicular or axillary adenopathy.     Lymphadenopathy:        Head (right side): No submental, no submandibular, no preauricular and no posterior auricular adenopathy present.        Head (left side): No submental, no submandibular, no preauricular and no posterior auricular adenopathy present.     He has no cervical adenopathy.        Right: No supraclavicular adenopathy present.        Left: No supraclavicular adenopathy present.   Neurological: He is alert and oriented to person, place, and time.   Psychiatric: He has a normal mood and affect.   Skin:   Areas Examined (abnormalities noted in diagram):   Scalp / Hair Palpated and Inspected  Head / Face Inspection Performed  Neck Inspection Performed  Chest / Axilla Inspection Performed  Abdomen Inspection Performed  Genitals / Buttocks / Groin Inspection Performed  Back Inspection Performed  RUE Inspected  LUE Inspection Performed  RLE Inspected  LLE Inspection Performed  Nails and Digits Inspection Performed  Gland Inspection Performed                                             Significant Labs: Blood Culture:   Recent Labs   Lab 12/23/24  1306 12/23/24  1307   LABBLOO No Growth to date  No Growth to date No Growth to date  No Growth to date     CBC:   Recent Labs   Lab 12/23/24  1059 12/24/24  0523 12/25/24  0504   WBC 28.36* 30.59* 28.58*   HGB 15.6 14.6 15.0   HCT 43.9 42.1 42.2    257 239     CMP:   Recent Labs   Lab 12/23/24  1059 12/24/24  0523 12/25/24  0504   * 128* 127*   K 4.6 5.1 4.7   CL 92* 96 97   CO2 21* 20* 22*   * 110 100   BUN 19 23* 21*   CREATININE 1.9* 1.5* 1.4   CALCIUM 8.0* 8.0* 7.7*   PROT 6.6 6.0 6.0   ALBUMIN 2.3* 2.0* 2.0*   BILITOT 4.1* 2.5* 2.4*   ALKPHOS 341* 307* 319*   * 318* 347*   * 611* 694*  "  ANIONGAP 12 12 8     Coagulation:   Recent Labs   Lab 12/23/24  1352   INR 2.1*     HIV 1/2 Antibodies: No results for input(s): "[id number]" in the last 48 hours.  TSH:   Recent Labs   Lab 11/11/24  1109   TSH 0.925     All pertinent labs within the past 24 hours have been reviewed.    Significant Imaging: I have reviewed all pertinent imaging results/findings within the past 24 hours.      Assessment/Plan:     Oncology  * DRESS syndrome  30 yo man presents with >50% BSA of erythematous morbilliform rash with associated eosinophilia, leukocytosis, elevated LFTs, and previous LINK (now improving) with prodrome onset with subjective fever/chills and nausea/vomiting ~12/12 with rash onset ~12/20. Clinical features and lab findings are most consistent with DRESS. Regiscar score: 3. Most likely implicated medication is bactrim; however, cannot entirely rule out flagyl as a potential cause.    Labs 12/23 notable for normal ESR, elevated CRP, elevated lactic acid, elevated PT and INR, CMV quantitative not detected, troponin <0.006.  Acute hepatitis panel from 12/15 Hep B surface Ag non-reactive, hep B core IgM non-reactive, Hep A IgM non-reactive, Hep C ab non-reactive.     Plan:  - Continue avoidance of Bactrim and Flagyl. Bactrim has been listed as a drug allergy and counseled patient he should not take this medication again.  - Recommend daily CBC with diff and CMP  - Recommend UA, CPK, LDH, TSH and free T4, ferritin, fasting lipids, CXR, EKG, and cardiac enzymes since DRESS can affect multiple organ systems including liver, kidneys, heart, and endocrine   - Continue following blood cultures and EBV from 12/23  - Increase to prednisone 60 mg daily  - Start triamcinolone 0.1% cream BID to rash on trunk and limbs. Please special request [phone] g jar from pharmacy to adequately cover affected body surface area  - May consider hepatology consult                 Thank you for your consult. I will follow-up with patient. Please " contact us if you have any additional questions.    Riky Bang MD  Dermatology  [facility]

## 2024-12-25 NOTE — SUBJECTIVE & OBJECTIVE
No past medical history on file.    Past Surgical History:   Procedure Laterality Date    ANTERIOR CRUCIATE LIGAMENT REPAIR Right     2018    APPLICATION OF SPLINT Left 7/14/2023    Procedure: APPLICATION, SPLINT;  Surgeon: Lenore Pearl DPM;  Location: [facility] OR;  Service: Podiatry;  Laterality: Left;    INSERTION, ANTIBIOTIC SPACER, LOWER EXTREMITY Left 12/4/2024    Procedure: INSERTION, ANTIBIOTIC SPACER, LOWER EXTREMITY;  Surgeon: Jaswinder Garcia DPM;  Location: [facility] OR;  Service: Podiatry;  Laterality: Left;    IRRIGATION AND DEBRIDEMENT Left 12/4/2024    Procedure: IRRIGATION AND DEBRIDEMENT;  Surgeon: Jaswinder Garcia DPM;  Location: [facility] OR;  Service: Podiatry;  Laterality: Left;  mini c-arm, Stimulan jr tobramycin/Vancomycin    REPAIR OF ACHILLES TENDON Left 7/14/2023    Procedure: REPAIR, TENDON, ACHILLES;  Surgeon: Lenore Pearl DPM;  Location: [facility] OR;  Service: Podiatry;  Laterality: Left;       Review of patient's allergies indicates:   Allergen Reactions    Bactrim [sulfamethoxazole-trimethoprim] Rash     Developed DRESS       Current Facility-Administered Medications on File Prior to Encounter   Medication    [COMPLETED] diphenhydrAMINE capsule 25 mg    [DISCONTINUED] clobetasoL 0.05 % cream    [DISCONTINUED] dextrose 50% injection 12.5 g    [DISCONTINUED] dextrose 50% injection 25 g    [DISCONTINUED] enoxaparin injection 40 mg    [DISCONTINUED] glucagon (human recombinant) injection 1 mg    [DISCONTINUED] glucose chewable tablet 16 g    [DISCONTINUED] glucose chewable tablet 24 g    [DISCONTINUED] melatonin tablet 6 mg    [DISCONTINUED] naloxone 0.4 mg/mL injection 0.02 mg    [DISCONTINUED] ondansetron injection 4 mg    [DISCONTINUED] polyethylene glycol packet 17 g    [DISCONTINUED] predniSONE tablet 50 mg    [DISCONTINUED] senna-docusate 8.6-50 mg per tablet 1 tablet    [DISCONTINUED] sodium chloride 0.9% flush 5 mL     Current Outpatient Medications on File Prior to Encounter    Medication Sig    clobetasoL (TEMOVATE) 0.05 % cream Apply topically 2 (two) times daily.    ondansetron (ZOFRAN-ODT) 4 MG TbDL Take 1 tablet (4 mg total) by mouth every 8 (eight) hours as needed.    [START ON 12/25/2024] predniSONE (DELTASONE) 50 MG Tab Take 1 tablet (50 mg total) by mouth once daily.    [DISCONTINUED] HYDROcodone-acetaminophen (NORCO) 7.5-325 mg per tablet Take 1 tablet by mouth every 4 (four) hours as needed for Pain. (Patient not taking: Reported on 12/23/2024)    [DISCONTINUED] metoclopramide HCl (REGLAN) 10 MG tablet Take 1 tablet (10 mg total) by mouth every 6 (six) hours. (Patient not taking: Reported on 12/23/2024)    [DISCONTINUED] metroNIDAZOLE (FLAGYL) 500 MG tablet Take 1 tablet (500 mg total) by mouth every 8 (eight) hours. (Patient not taking: Reported on 12/23/2024)     Family History    None       Tobacco Use    Smoking status: Never    Smokeless tobacco: Never   Substance and Sexual Activity    Alcohol use: Yes     Comment: rarely    Drug use: Yes     Types: Marijuana    Sexual activity: Yes     Partners: Female     Review of Systems   Constitutional:  Negative for activity change, chills, fever and unexpected weight change.   HENT:  Negative for congestion and sore throat.    Respiratory:  Negative for cough, shortness of breath and wheezing.    Cardiovascular:  Negative for chest pain, palpitations and leg swelling.   Gastrointestinal:  Positive for nausea and vomiting. Negative for abdominal pain and blood in stool.   Genitourinary:  Negative for dysuria and hematuria.   Musculoskeletal:  Negative for arthralgias and neck pain.   Skin:  Positive for rash. Negative for color change.   Neurological:  Negative for dizziness, seizures and numbness.   Psychiatric/Behavioral:  Negative for hallucinations and suicidal ideas.      Objective:     Vital Signs (Most Recent):  Temp: 98.6 °F (37 °C) (12/24/24 2005)  Pulse: 84 (12/24/24 2005)  Resp: 16 (12/24/24 2005)  BP: 118/79 (12/24/24  2005)  SpO2: 99 % (12/24/24 2005) Vital Signs (24h Range):  Temp:  [97.9 °F (36.6 °C)-99.2 °F (37.3 °C)] 98.6 °F (37 °C)  Pulse:  [] 84  Resp:  [16-18] 16  SpO2:  [95 %-100 %] 99 %  BP: (106-118)/(57-79) 118/79     Weight: 72 kg (158 lb 11.7 oz)  Body mass index is 20.94 kg/m².     Physical Exam  Vitals reviewed.   Constitutional:       General: He is not in acute distress.     Appearance: He is well-developed.   HENT:      Head: Normocephalic and atraumatic.   Eyes:      Extraocular Movements: Extraocular movements intact.      Pupils: Pupils are equal, round, and reactive to light.   Neck:      Vascular: No JVD.      Trachea: No tracheal deviation.   Cardiovascular:      Rate and Rhythm: Normal rate and regular rhythm.      Heart sounds: No murmur heard.     No friction rub. No gallop.   Pulmonary:      Effort: No respiratory distress.      Breath sounds: Normal breath sounds. No wheezing or rales.   Abdominal:      General: Bowel sounds are normal. There is no distension.      Palpations: Abdomen is soft. There is no mass.      Tenderness: There is no abdominal tenderness.   Musculoskeletal:         General: No deformity.      Cervical back: Neck supple.   Lymphadenopathy:      Cervical: No cervical adenopathy.   Skin:     General: Skin is warm and dry.      Findings: Erythema and lesion present. No rash.      Comments: whole body maculopapular rash with erythema   Neurological:      Mental Status: He is alert and oriented to person, place, and time.              CRANIAL NERVES     CN III, IV, VI   Pupils are equal, round, and reactive to light.       Significant Labs: All pertinent labs within the past 24 hours have been reviewed.    Significant Imaging: I have reviewed all pertinent imaging results/findings within the past 24 hours.

## 2024-12-25 NOTE — ASSESSMENT & PLAN NOTE
29 year old male who was started on bactrim and metronidazole for osteomyelitis of the left calcaneus.  He has now been admitted to the hospital for DRESS.      Plan  I agree with discontinuing his antibiotics.  Will hold off on starting any antibiotics for now pending resolution of his symptoms.  Arrange follow up with [facility] in [place] (by the [facility] in [place]) to discussed antibiotics.

## 2024-12-25 NOTE — PLAN OF CARE
12/25/24 1442   Discharge Assessment   Assessment Type Discharge Planning Assessment   Confirmed/corrected address, phone number and insurance Yes   Confirmed Demographics Correct on Facesheet   Source of Information patient   Does patient/caregiver understand observation status Yes   Communicated GARETH with patient/caregiver Yes   Reason For Admission Rash   People in Home parent(s)   Facility Arrived From: home   Do you expect to return to your current living situation? Yes   Do you have help at home or someone to help you manage your care at home? Yes   Who are your caregiver(s) and their phone number(s)? ken mosqueda [phone]   Prior to hospitilization cognitive status: Alert/Oriented   Current cognitive status: Alert/Oriented   Walking or Climbing Stairs Difficulty no   Dressing/Bathing Difficulty no   Do you have any problems with: Errands/Grocery   Home Accessibility wheelchair accessible   Home Layout Able to live on 1st floor   Equipment Currently Used at Home none   Readmission within 30 days? No   Patient currently being followed by outpatient case management? No   Do you currently have service(s) that help you manage your care at home? No   Do you take prescription medications? Yes   Do you have prescription coverage? Yes   Coverage medicaid   Do you have any problems affording any of your prescribed medications? No   Is the patient taking medications as prescribed? yes   Who is going to help you get home at discharge? Ken at [phone]   How do you get to doctors appointments? health plan transportation   Are you on dialysis? No   Do you take coumadin? No   Discharge Plan A Hospital Transfer   Discharge Plan B Home   DME Needed Upon Discharge  none   Discharge Plan discussed with: Patient   Transition of Care Barriers None   Physical Activity   On average, how many days per week do you engage in moderate to strenuous exercise (like a brisk walk)? Pt Unable   On average, how many minutes do you  engage in exercise at this level? Pt Unable   Financial Resource Strain   How hard is it for you to pay for the very basics like food, housing, medical care, and heating? Not very   Housing Stability   In the last 12 months, was there a time when you were not able to pay the mortgage or rent on time? N   Transportation Needs   Has the lack of transportation kept you from medical appointments, meetings, work or from getting things needed for daily living? No   Food Insecurity   Within the past 12 months, you worried that your food would run out before you got the money to buy more. Never true   Within the past 12 months, the food you bought just didn't last and you didn't have money to get more. Never true   Stress   Do you feel stress - tense, restless, nervous, or anxious, or unable to sleep at night because your mind is troubled all the time - these days? To some exte   Social Isolation   How often do you feel lonely or isolated from those around you?  Never   Alcohol Use   Q1: How often do you have a drink containing alcohol? Pt Unable   Q2: How many drinks containing alcohol do you have on a typical day when you are drinking? Pt Unable   Q3: How often do you have six or more drinks on one occasion? Pt Unable   Utilities   In the past 12 months has the electric, gas, oil, or water company threatened to shut off services in your home? Pt Unable   Health Literacy   How often do you need to have someone help you when you read instructions, pamphlets, or other written material from your doctor or pharmacy? Often   OTHER   Name(s) of People in Home ken at [phone]      met with patient at bedside to complete discharge planning assessment.  Patient alert and oriented xs 4.  Patient verified all demographic information on facesheet is correct.  Patient verified PCP is Dr. Grigsby. Patient verified primary health insurance is medicare part a and B and blue cross /blue shield   .  Patient with NO home health but has  no listed DME.  Patient is a full code.  Patient not on dialysis or medication coumadin.  Patient with no 30 day admission.  Patient with no financial issues at this time.  Patient family will provide transportation upon discharge from facility.  Patient's has help at home,he lives with family .  Patient's family will provide transport at discharge.

## 2024-12-25 NOTE — ASSESSMENT & PLAN NOTE
-Pt. On long term bactrim therapy presenting with maculopapular rash involving trunk and extremities, >50% BSA, elevated LFTs, LINK, leukocytosis, Eosinophilia, consistent concerning DRESS syndrome  -Dermatology consulted for biopsy and assistance with diagnosis confirmation  -LFTs, renal function labs improving with IV fluids and prednisone, will continue with this for treatment, hold bactrim going forth (listed as allergy)

## 2024-12-25 NOTE — H&P
[facility]  History & Physical    Patient Name: Alonzo Nascimento Jr.  MRN: [id number]  Patient Class: IP- Inpatient  Admission Date: 12/24/2024  Attending Physician: Norberto Lo MD   Primary Care Provider: Sohan Strange MD         Patient information was obtained from patient, past medical records, and ER records.     Subjective:     Principal Problem:DRESS syndrome    Chief Complaint: No chief complaint on file.       HPI: 30 yo M with PMHx foot osteomyelitis on PO bactrim and flagyl who presented to [facility] 12/23 for evaluation of rash. Pt reported having nausea, vomiting, decreased po intake, body aches, for 1-2 weeks. Pt. Has a history of left achilles tendon rupture s/p repair on 07/14/23 complicated by osteomyelitis of left calcaneus s/p I&D 12/4/24. He was prescribed PO Bactrim and Flagy x 6 weeks after this most recent procedure with end date 1/1/2025.  He initially presented to [facility]  E on 12/14 and 12/20 for evaluation of the nausea/dehydaration and was discharged from ED after IV fluids on both occasions. About 2-3 days ago, shortly after his ED vist 12/20, the patient reports developing a full body rash. The Rash is described as >50% BSA covered in stereotypical morbiliform. No mucous membrane involvement.     In the ED, the patient was tachycardic (120s> 90s), tachypneic (20s), but otherwise hemodynamically stable.  Labs were remarkable for leukocytosis (28.3, with 12% eosinophilia), elevated INR (2.1), elevated creatinine (1.9- from a previous 1.4), hyponatremia (125), elevated LFTs (AST:  384, ALT: 635, T bili: 4.1, ALP:  341), elevated lactic acid (2.7).  VBG showed a pH of 7.34, pCO2 of 49.7 and HC03 of 22.9.  Retroperitoneal ultrasound showed no acute process.  CT abdomen and pelvis (12/20) showed no acute findings.  Referring provider is concerned about DRESS syndrome secondary to Bactrim, so patient transferred to [facility] for Dematology  "evaluation.     Pt. Was admitted to [facility] while awaiting bed, per [facility] medicine team, "He was started on oral prednisone 50 mg daily and clobetasol cream...... Patient's Lfts and LINK were improving on 12/24. He was tolerating a soft diet"    No past medical history on file.    Past Surgical History:   Procedure Laterality Date    ANTERIOR CRUCIATE LIGAMENT REPAIR Right     2018    APPLICATION OF SPLINT Left 7/14/2023    Procedure: APPLICATION, SPLINT;  Surgeon: Lenore Pearl DPM;  Location: [facility] OR;  Service: Podiatry;  Laterality: Left;    INSERTION, ANTIBIOTIC SPACER, LOWER EXTREMITY Left 12/4/2024    Procedure: INSERTION, ANTIBIOTIC SPACER, LOWER EXTREMITY;  Surgeon: Jaswinder Garcia DPM;  Location: [facility] OR;  Service: Podiatry;  Laterality: Left;    IRRIGATION AND DEBRIDEMENT Left 12/4/2024    Procedure: IRRIGATION AND DEBRIDEMENT;  Surgeon: Jaswinder Garcia DPM;  Location: [facility] OR;  Service: Podiatry;  Laterality: Left;  mini c-arm, Stimulan jr tobramycin/Vancomycin    REPAIR OF ACHILLES TENDON Left 7/14/2023    Procedure: REPAIR, TENDON, ACHILLES;  Surgeon: Lenore Pearl DPM;  Location: [facility] OR;  Service: Podiatry;  Laterality: Left;       Review of patient's allergies indicates:   Allergen Reactions    Bactrim [sulfamethoxazole-trimethoprim] Rash     Developed DRESS       Current Facility-Administered Medications on File Prior to Encounter   Medication    [COMPLETED] diphenhydrAMINE capsule 25 mg    [DISCONTINUED] clobetasoL 0.05 % cream    [DISCONTINUED] dextrose 50% injection 12.5 g    [DISCONTINUED] dextrose 50% injection 25 g    [DISCONTINUED] enoxaparin injection 40 mg    [DISCONTINUED] glucagon (human recombinant) injection 1 mg    [DISCONTINUED] glucose chewable tablet 16 g    [DISCONTINUED] glucose chewable tablet 24 g    [DISCONTINUED] melatonin tablet 6 mg    [DISCONTINUED] naloxone 0.4 mg/mL injection 0.02 mg    [DISCONTINUED] ondansetron injection 4 mg    [DISCONTINUED] polyethylene " glycol packet 17 g    [DISCONTINUED] predniSONE tablet 50 mg    [DISCONTINUED] senna-docusate 8.6-50 mg per tablet 1 tablet    [DISCONTINUED] sodium chloride 0.9% flush 5 mL     Current Outpatient Medications on File Prior to Encounter   Medication Sig    clobetasoL (TEMOVATE) 0.05 % cream Apply topically 2 (two) times daily.    ondansetron (ZOFRAN-ODT) 4 MG TbDL Take 1 tablet (4 mg total) by mouth every 8 (eight) hours as needed.    [START ON 12/25/2024] predniSONE (DELTASONE) 50 MG Tab Take 1 tablet (50 mg total) by mouth once daily.    [DISCONTINUED] HYDROcodone-acetaminophen (NORCO) 7.5-325 mg per tablet Take 1 tablet by mouth every 4 (four) hours as needed for Pain. (Patient not taking: Reported on 12/23/2024)    [DISCONTINUED] metoclopramide HCl (REGLAN) 10 MG tablet Take 1 tablet (10 mg total) by mouth every 6 (six) hours. (Patient not taking: Reported on 12/23/2024)    [DISCONTINUED] metroNIDAZOLE (FLAGYL) 500 MG tablet Take 1 tablet (500 mg total) by mouth every 8 (eight) hours. (Patient not taking: Reported on 12/23/2024)     Family History    None       Tobacco Use    Smoking status: Never    Smokeless tobacco: Never   Substance and Sexual Activity    Alcohol use: Yes     Comment: rarely    Drug use: Yes     Types: Marijuana    Sexual activity: Yes     Partners: Female     Review of Systems   Constitutional:  Negative for activity change, chills, fever and unexpected weight change.   HENT:  Negative for congestion and sore throat.    Respiratory:  Negative for cough, shortness of breath and wheezing.    Cardiovascular:  Negative for chest pain, palpitations and leg swelling.   Gastrointestinal:  Positive for nausea and vomiting. Negative for abdominal pain and blood in stool.   Genitourinary:  Negative for dysuria and hematuria.   Musculoskeletal:  Negative for arthralgias and neck pain.   Skin:  Positive for rash. Negative for color change.   Neurological:  Negative for dizziness, seizures and numbness.    Psychiatric/Behavioral:  Negative for hallucinations and suicidal ideas.      Objective:     Vital Signs (Most Recent):  Temp: 98.6 °F (37 °C) (12/24/24 2005)  Pulse: 84 (12/24/24 2005)  Resp: 16 (12/24/24 2005)  BP: 118/79 (12/24/24 2005)  SpO2: 99 % (12/24/24 2005) Vital Signs (24h Range):  Temp:  [97.9 °F (36.6 °C)-99.2 °F (37.3 °C)] 98.6 °F (37 °C)  Pulse:  [] 84  Resp:  [16-18] 16  SpO2:  [95 %-100 %] 99 %  BP: (106-118)/(57-79) 118/79     Weight: 72 kg (158 lb 11.7 oz)  Body mass index is 20.94 kg/m².     Physical Exam  Vitals reviewed.   Constitutional:       General: He is not in acute distress.     Appearance: He is well-developed.   HENT:      Head: Normocephalic and atraumatic.   Eyes:      Extraocular Movements: Extraocular movements intact.      Pupils: Pupils are equal, round, and reactive to light.   Neck:      Vascular: No JVD.      Trachea: No tracheal deviation.   Cardiovascular:      Rate and Rhythm: Normal rate and regular rhythm.      Heart sounds: No murmur heard.     No friction rub. No gallop.   Pulmonary:      Effort: No respiratory distress.      Breath sounds: Normal breath sounds. No wheezing or rales.   Abdominal:      General: Bowel sounds are normal. There is no distension.      Palpations: Abdomen is soft. There is no mass.      Tenderness: There is no abdominal tenderness.   Musculoskeletal:         General: No deformity.      Cervical back: Neck supple.   Lymphadenopathy:      Cervical: No cervical adenopathy.   Skin:     General: Skin is warm and dry.      Findings: Erythema and lesion present. No rash.      Comments: whole body maculopapular rash with erythema   Neurological:      Mental Status: He is alert and oriented to person, place, and time.              CRANIAL NERVES     CN III, IV, VI   Pupils are equal, round, and reactive to light.       Significant Labs: All pertinent labs within the past 24 hours have been reviewed.    Significant Imaging: I have reviewed all  pertinent imaging results/findings within the past 24 hours.  Assessment/Plan:     * DRESS syndrome  -Pt. On long term bactrim therapy presenting with maculopapular rash involving trunk and extremities, >50% BSA, elevated LFTs, LINK, leukocytosis, Eosinophilia, consistent concerning DRESS syndrome  -Dermatology consulted for biopsy and assistance with diagnosis confirmation  -LFTs, renal function labs improving with IV fluids and prednisone, will continue with this for treatment, hold bactrim going forth (listed as allergy)    Elevated LFTs  -Elevated LFTs suspected 2/2 DRESS syndrome, improving with drug removal, steroids. Continue to trend      Stage 3 chronic kidney disease  -Cr now 1.5, prior labs in system 11/2024 show Cr 1.4 which leads me to believe this is patient's baseline although unclear etiology of CKD, pt. Without prior diagnosis  -Continue to monitor while admitted, pt. Will need PCP and/or nephrologist at discharge    Acute osteomyelitis of left calcaneus  -hold all antibiotics for time being given Drug rash, will order ID consult for assistance with management of antibiotic regimen going forth        VTE Risk Mitigation (From admission, onward)           Ordered     enoxaparin injection 40 mg  Daily         12/24/24 1849     Place sequential compression device  Until discontinued         12/24/24 1849                                    Salvador Haque MD  Department of Hospital Medicine  [facility]

## 2024-12-25 NOTE — HOSPITAL COURSE
Admitted to hospital medicine for DRESS syndrome likely d/t Bactrim, however, can not entirely rule out Flagyl as source.  Dermatology consulted, s/p biopsy, started on IV and topical steroids.  Rash improved, leukocytosis and eosinophilia resolved, LINK resolved, GI symptoms resolved but pt with worsening liver function.  Hepatology consulted for acute liver injury, serologic workup ordered.  ID consulted for extensive infectious workup.  Worsening liver injury throughout admission, started on empiric acyclovir.  Interventional Radiology consulted, liver biopsy pending, tentatively 12/30.  Liver transplant authorization received.    12/31- appreciate dermatology recommendations and suggestion for immunology/allergy recommendations.  Overall liver function improving.  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 Liver enzymes, Tbi land INR improved.  Would defer liver transplant evaluation at this time and he will likely not need further evaluation.  We will clarify with ID regarding treatment for osteomyelitis.     1/1- doing we will.  No reported distress or fevers.  Liver transplant evaluation paused had given biopsy results and downtrending LFTs.    1/2- reports feeling better.  Ambulating better.  LFTs are trending down.  Discussion with hepatology on dermatology.  We will transitioned to oral methylprednisolone 60 mg starting tomorrow.  We will encourage for close follow up with the Rheumatology and hepatology.    1/3- LFTs trending down, discussed plan for discharge with hepatology and dermatology. Will discharge on prednisone and recommend close follow up.         Review of Systems   Constitutional:  Negative for chills and fever.   HENT:  Negative for congestion.    Respiratory:  Negative for cough and shortness of breath.    Cardiovascular:  Negative for palpitations.   Gastrointestinal:  Negative for constipation, diarrhea, nausea and vomiting.   Genitourinary: Negative.    Musculoskeletal: Negative.     Skin:  Positive for rash.   Neurological:  Negative for weakness and headaches.   Psychiatric/Behavioral:  Negative for suicidal ideas.          Vitals:    01/03/25 1122   BP: 118/71   Pulse: 89   Resp: 18   Temp: 98.4 °F (36.9 °C)       Objective:        Physical Exam  Constitutional:       General: He is not in acute distress.     Appearance: He is ill-appearing. He is not diaphoretic.   Eyes:      General: No scleral icterus.     Extraocular Movements: Extraocular movements intact.   Cardiovascular:      Rate and Rhythm: Normal rate and regular rhythm.      Heart sounds: No murmur heard.  Pulmonary:      Effort: No respiratory distress.   Abdominal:      General: Bowel sounds are normal. There is no distension.      Tenderness: There is no abdominal tenderness.   Musculoskeletal:      Cervical back: Neck supple.      Right lower leg: No edema.      Left lower leg: No edema.   Skin:     Comments: Diffuse rash, now peeling   Neurological:      Mental Status: He is alert and oriented to person, place, and time.   Psychiatric:         Mood and Affect: Mood normal.

## 2024-12-25 NOTE — ASSESSMENT & PLAN NOTE
-Elevated LFTs suspected 2/2 DRESS syndrome, improving with drug removal, steroids. Continue to trend

## 2024-12-25 NOTE — ASSESSMENT & PLAN NOTE
- appreciate ID consult .  No antibiotics for now. Arrange follow up with [facility] at [facility] after his discharge to discuss antibiotic options.   -- 12/23 podiatry note reviewed and pt confirmed plan.  Needs weekly dressing change next due 12/30.

## 2024-12-25 NOTE — SUBJECTIVE & OBJECTIVE
Interval History:   Symptoms:   Same rash, not better.  Diet:  poor appetite.  May have vomited his am meds.  Activity level:   Returning to normal.  Pain:  No pain.       Review of Systems   Constitutional:  Positive for fatigue.   Respiratory:  Negative for shortness of breath.    Cardiovascular:  Negative for chest pain.   Gastrointestinal:  Positive for nausea and vomiting. Negative for abdominal pain.   Skin:  Positive for rash.     Objective:     Vital Signs (Most Recent):  Temp: (!) 101.5 °F (38.6 °C) (12/25/24 1141)  Pulse: (!) 122 (12/25/24 1141)  Resp: 18 (12/25/24 1141)  BP: (!) 109/55 (12/25/24 1141)  SpO2: 97 % (12/25/24 1141) Vital Signs (24h Range):  Temp:  [98.4 °F (36.9 °C)-101.5 °F (38.6 °C)] 101.5 °F (38.6 °C)  Pulse:  [] 122  Resp:  [16-18] 18  SpO2:  [95 %-100 %] 97 %  BP: (109-128)/(55-81) 109/55     Weight: 72 kg (158 lb 11.7 oz)  Body mass index is 20.94 kg/m².    Intake/Output Summary (Last 24 hours) at 12/25/2024 1254  Last data filed at 12/25/2024 0945  Gross per 24 hour   Intake 1340 ml   Output 300 ml   Net 1040 ml         Physical Exam  Constitutional:       General: He is in acute distress.      Appearance: He is not ill-appearing.   Cardiovascular:      Rate and Rhythm: Tachycardia present.      Heart sounds: No murmur heard.  Pulmonary:      Effort: No respiratory distress.      Breath sounds: No wheezing.   Abdominal:      General: Bowel sounds are normal. There is no distension.      Tenderness: There is no abdominal tenderness.   Musculoskeletal:      Right lower leg: No edema.      Left lower leg: No edema.   Skin:     Comments: Diffuse maculopapular rash   Neurological:      Mental Status: He is alert and oriented to person, place, and time.   Psychiatric:         Mood and Affect: Mood normal.             Significant Labs: All pertinent labs within the past 24 hours have been reviewed.    Significant Imaging: I have reviewed all pertinent imaging results/findings within  the past 24 hours.

## 2024-12-26 PROBLEM — N17.9 ACUTE KIDNEY INJURY: Status: ACTIVE | Noted: 2024-12-24

## 2024-12-26 PROBLEM — E88.09 HYPOALBUMINEMIA: Status: ACTIVE | Noted: 2024-12-26

## 2024-12-26 LAB
ALBUMIN SERPL BCP-MCNC: 1.9 G/DL (ref 3.5–5.2)
ALP SERPL-CCNC: 236 U/L (ref 40–150)
ALT SERPL W/O P-5'-P-CCNC: 891 U/L (ref 10–44)
ANION GAP SERPL CALC-SCNC: 9 MMOL/L (ref 8–16)
ANISOCYTOSIS BLD QL SMEAR: SLIGHT
AST SERPL-CCNC: 411 U/L (ref 10–40)
BASOPHILS # BLD AUTO: 0.04 K/UL (ref 0–0.2)
BASOPHILS NFR BLD: 0.2 % (ref 0–1.9)
BILIRUB SERPL-MCNC: 1.8 MG/DL (ref 0.1–1)
BUN SERPL-MCNC: 23 MG/DL (ref 6–20)
CALCIUM SERPL-MCNC: 7.5 MG/DL (ref 8.7–10.5)
CHLORIDE SERPL-SCNC: 102 MMOL/L (ref 95–110)
CK SERPL-CCNC: 262 U/L (ref 20–200)
CO2 SERPL-SCNC: 20 MMOL/L (ref 23–29)
CREAT SERPL-MCNC: 1.4 MG/DL (ref 0.5–1.4)
DIFFERENTIAL METHOD BLD: ABNORMAL
EOSINOPHIL # BLD AUTO: 4.3 K/UL (ref 0–0.5)
EOSINOPHIL NFR BLD: 18 % (ref 0–8)
ERYTHROCYTE [DISTWIDTH] IN BLOOD BY AUTOMATED COUNT: 14.4 % (ref 11.5–14.5)
EST. GFR  (NO RACE VARIABLE): >60 ML/MIN/1.73 M^2
GLUCOSE SERPL-MCNC: 112 MG/DL (ref 70–110)
HCT VFR BLD AUTO: 36.5 % (ref 40–54)
HGB BLD-MCNC: 12.8 G/DL (ref 14–18)
IMM GRANULOCYTES # BLD AUTO: 0.21 K/UL (ref 0–0.04)
IMM GRANULOCYTES NFR BLD AUTO: 0.9 % (ref 0–0.5)
INR PPP: 1.4 (ref 0.8–1.2)
LACTATE SERPL-SCNC: 2.1 MMOL/L (ref 0.5–2.2)
LYMPHOCYTES # BLD AUTO: 6.4 K/UL (ref 1–4.8)
LYMPHOCYTES NFR BLD: 26.8 % (ref 18–48)
MAGNESIUM SERPL-MCNC: 2.1 MG/DL (ref 1.6–2.6)
MCH RBC QN AUTO: 28.6 PG (ref 27–31)
MCHC RBC AUTO-ENTMCNC: 35.1 G/DL (ref 32–36)
MCV RBC AUTO: 82 FL (ref 82–98)
MONOCYTES # BLD AUTO: 3 K/UL (ref 0.3–1)
MONOCYTES NFR BLD: 12.3 % (ref 4–15)
NEUTROPHILS # BLD AUTO: 10.1 K/UL (ref 1.8–7.7)
NEUTROPHILS NFR BLD: 41.8 % (ref 38–73)
NRBC BLD-RTO: 0 /100 WBC
OHS QRS DURATION: 90 MS
OHS QTC CALCULATION: 445 MS
PHOSPHATE SERPL-MCNC: 4.7 MG/DL (ref 2.7–4.5)
PLATELET # BLD AUTO: 203 K/UL (ref 150–450)
PMV BLD AUTO: 10.9 FL (ref 9.2–12.9)
POIKILOCYTOSIS BLD QL SMEAR: SLIGHT
POTASSIUM SERPL-SCNC: 5 MMOL/L (ref 3.5–5.1)
PROT SERPL-MCNC: 5.6 G/DL (ref 6–8.4)
PROTHROMBIN TIME: 15.4 SEC (ref 9–12.5)
RBC # BLD AUTO: 4.48 M/UL (ref 4.6–6.2)
SODIUM SERPL-SCNC: 131 MMOL/L (ref 136–145)
T4 FREE SERPL-MCNC: 0.83 NG/DL (ref 0.71–1.51)
TROPONIN I SERPL DL<=0.01 NG/ML-MCNC: <3 NG/L (ref 0–35)
TSH SERPL DL<=0.005 MIU/L-ACNC: 0.97 UIU/ML (ref 0.4–4)
WBC # BLD AUTO: 24.05 K/UL (ref 3.9–12.7)

## 2024-12-26 PROCEDURE — 25000003 PHARM REV CODE 250: Performed by: HOSPITALIST

## 2024-12-26 PROCEDURE — 82550 ASSAY OF CK (CPK): CPT | Performed by: HOSPITALIST

## 2024-12-26 PROCEDURE — 86015 ACTIN ANTIBODY EACH: CPT | Performed by: STUDENT IN AN ORGANIZED HEALTH CARE EDUCATION/TRAINING PROGRAM

## 2024-12-26 PROCEDURE — 0HBBXZX EXCISION OF RIGHT UPPER ARM SKIN, EXTERNAL APPROACH, DIAGNOSTIC: ICD-10-PCS | Performed by: STUDENT IN AN ORGANIZED HEALTH CARE EDUCATION/TRAINING PROGRAM

## 2024-12-26 PROCEDURE — 83605 ASSAY OF LACTIC ACID: CPT | Performed by: HOSPITALIST

## 2024-12-26 PROCEDURE — 84443 ASSAY THYROID STIM HORMONE: CPT | Performed by: HOSPITALIST

## 2024-12-26 PROCEDURE — 63600175 PHARM REV CODE 636 W HCPCS: Performed by: HOSPITALIST

## 2024-12-26 PROCEDURE — 93010 ELECTROCARDIOGRAM REPORT: CPT | Mod: ,,, | Performed by: INTERNAL MEDICINE

## 2024-12-26 PROCEDURE — 83735 ASSAY OF MAGNESIUM: CPT | Performed by: HOSPITALIST

## 2024-12-26 PROCEDURE — 86038 ANTINUCLEAR ANTIBODIES: CPT | Performed by: STUDENT IN AN ORGANIZED HEALTH CARE EDUCATION/TRAINING PROGRAM

## 2024-12-26 PROCEDURE — 99223 1ST HOSP IP/OBS HIGH 75: CPT | Mod: ,,, | Performed by: INTERNAL MEDICINE

## 2024-12-26 PROCEDURE — 82787 IGG 1 2 3 OR 4 EACH: CPT | Mod: 59 | Performed by: STUDENT IN AN ORGANIZED HEALTH CARE EDUCATION/TRAINING PROGRAM

## 2024-12-26 PROCEDURE — 88305 TISSUE EXAM BY PATHOLOGIST: CPT | Performed by: PATHOLOGY

## 2024-12-26 PROCEDURE — 80053 COMPREHEN METABOLIC PANEL: CPT | Performed by: HOSPITALIST

## 2024-12-26 PROCEDURE — 85610 PROTHROMBIN TIME: CPT | Performed by: HOSPITALIST

## 2024-12-26 PROCEDURE — 85025 COMPLETE CBC W/AUTO DIFF WBC: CPT | Performed by: HOSPITALIST

## 2024-12-26 PROCEDURE — 84484 ASSAY OF TROPONIN QUANT: CPT | Performed by: HOSPITALIST

## 2024-12-26 PROCEDURE — 25000003 PHARM REV CODE 250: Performed by: FAMILY MEDICINE

## 2024-12-26 PROCEDURE — 84100 ASSAY OF PHOSPHORUS: CPT | Performed by: HOSPITALIST

## 2024-12-26 PROCEDURE — 84439 ASSAY OF FREE THYROXINE: CPT | Performed by: HOSPITALIST

## 2024-12-26 PROCEDURE — 93005 ELECTROCARDIOGRAM TRACING: CPT

## 2024-12-26 PROCEDURE — 86381 MITOCHONDRIAL ANTIBODY EACH: CPT | Performed by: STUDENT IN AN ORGANIZED HEALTH CARE EDUCATION/TRAINING PROGRAM

## 2024-12-26 PROCEDURE — 36415 COLL VENOUS BLD VENIPUNCTURE: CPT | Performed by: HOSPITALIST

## 2024-12-26 PROCEDURE — 11000001 HC ACUTE MED/SURG PRIVATE ROOM

## 2024-12-26 PROCEDURE — 88305 TISSUE EXAM BY PATHOLOGIST: CPT | Mod: 26,,, | Performed by: PATHOLOGY

## 2024-12-26 RX ADMIN — TRIAMCINOLONE ACETONIDE: 1 CREAM TOPICAL at 09:12

## 2024-12-26 RX ADMIN — SENNOSIDES AND DOCUSATE SODIUM 1 TABLET: 50; 8.6 TABLET ORAL at 09:12

## 2024-12-26 RX ADMIN — SODIUM CHLORIDE: 9 INJECTION, SOLUTION INTRAVENOUS at 02:12

## 2024-12-26 RX ADMIN — METHYLPREDNISOLONE SODIUM SUCCINATE 50 MG: 40 INJECTION, POWDER, FOR SOLUTION INTRAMUSCULAR; INTRAVENOUS at 09:12

## 2024-12-26 RX ADMIN — DIPHENHYDRAMINE HYDROCHLORIDE 25 MG: 25 CAPSULE ORAL at 09:12

## 2024-12-26 RX ADMIN — POLYETHYLENE GLYCOL 3350 17 G: 17 POWDER, FOR SOLUTION ORAL at 09:12

## 2024-12-26 RX ADMIN — FAMOTIDINE 20 MG: 20 TABLET ORAL at 09:12

## 2024-12-26 RX ADMIN — SODIUM CHLORIDE: 9 INJECTION, SOLUTION INTRAVENOUS at 01:12

## 2024-12-26 RX ADMIN — SODIUM CHLORIDE: 9 INJECTION, SOLUTION INTRAVENOUS at 09:12

## 2024-12-26 RX ADMIN — Medication 6 MG: at 09:12

## 2024-12-26 RX ADMIN — ONDANSETRON 4 MG: 2 INJECTION INTRAMUSCULAR; INTRAVENOUS at 09:12

## 2024-12-26 NOTE — ASSESSMENT & PLAN NOTE
29M w/ PMHx s/f left achilles tendon rupture s/p repair on 07/14/23 complicated by osteomyelitis of left calcaneus s/p I&D 12/4/24 on PO bactrim and flagyl. He was prescribed PO Bactrim and Flagy x 6 weeks after this most recent procedure with end date 1/1/2025. He initially presented to [facility] on 12/14 and 12/20 for evaluation of the nausea/dehydaration and was discharged from ED after IV fluids on both occasions. About 2-3 days ago, shortly after his ED vist 12/20, the patient reports developing a full body rash; on 12/23, he presented to [facility] for evaluation of rash. Pt reported having nausea, vomiting, decreased po intake, body aches, for 1-2 weeks. The rash is described as >50% BSA covered in stereotypical morbiliform. No mucous membrane involvement.     Hepatology consulted for elevated LFTs in context of DRESS. On evaluation, patient without edema or ascites, abdominal pain (incl. RUQ pain), coagulopathy (INR <1.5; prev. >1.5), si/sx GI bleeding, or encephalopathy (AOx4).    Problem List:    DRESS.  Acute Liver Injury.    Recommendations:    - Agree with dermatology consult, fluids, steroids.  - Recommend U/S liver w/ doppler for further evaluation.  - Recommend serological workup to evaluate for alternate etiologies (ordered).  - Will continue to monitor for si/sx coagulopathy (INR prev. >1.5, now <1.5) and encephalopathy.

## 2024-12-26 NOTE — SUBJECTIVE & OBJECTIVE
Review of Systems   Reason unable to perform ROS: See HPI.       Past Medical History:   Diagnosis Date    Achilles tendon rupture 07/2023    left achilles tendon rupture s/p repair on 07/14/23 complicated by osteomyelitis of left calcaneus s/p I&D 12/4/24    Acute osteomyelitis of calcaneum, left 12/2024    osteomyelitis of left calcaneus s/p I&D 12/4/24       Past Surgical History:   Procedure Laterality Date    ANTERIOR CRUCIATE LIGAMENT REPAIR Right     2018    APPLICATION OF SPLINT Left 7/14/2023    Procedure: APPLICATION, SPLINT;  Surgeon: Lenore Pearl DPM;  Location: [facility] OR;  Service: Podiatry;  Laterality: Left;    INSERTION, ANTIBIOTIC SPACER, LOWER EXTREMITY Left 12/4/2024    Procedure: INSERTION, ANTIBIOTIC SPACER, LOWER EXTREMITY;  Surgeon: Jaswinder Garcia DPM;  Location: [facility] OR;  Service: Podiatry;  Laterality: Left;    IRRIGATION AND DEBRIDEMENT Left 12/4/2024    Procedure: IRRIGATION AND DEBRIDEMENT;  Surgeon: Jaswinder Garcia DPM;  Location: [facility] OR;  Service: Podiatry;  Laterality: Left;  mini c-arm, Stimulan jr tobramycin/Vancomycin    REPAIR OF ACHILLES TENDON Left 7/14/2023    Procedure: REPAIR, TENDON, ACHILLES;  Surgeon: Lenore Pearl DPM;  Location: [facility] OR;  Service: Podiatry;  Laterality: Left;       Family history of liver disease: No    Review of patient's allergies indicates:   Allergen Reactions    Bactrim [sulfamethoxazole-trimethoprim] Rash     Developed DRESS         Tobacco Use    Smoking status: Never    Smokeless tobacco: Never   Substance and Sexual Activity    Alcohol use: Yes     Comment: rarely    Drug use: Yes     Types: Marijuana    Sexual activity: Yes     Partners: Female       Facility-Administered Medications Prior to Admission   Medication Dose Route Frequency Provider Last Rate Last Admin    [DISCONTINUED] sodium chloride 0.9% flush 10 mL  10 mL Intravenous PRN Jaswinder Garcia DPM         Medications Prior to Admission   Medication Sig Dispense Refill  Last Dose/Taking    clobetasoL (TEMOVATE) 0.05 % cream Apply topically 2 (two) times daily.       ondansetron (ZOFRAN-ODT) 4 MG TbDL Take 1 tablet (4 mg total) by mouth every 8 (eight) hours as needed. 14 tablet 1     predniSONE (DELTASONE) 50 MG Tab Take 1 tablet (50 mg total) by mouth once daily.          Objective:     Vital Signs (Most Recent):  Temp: 98.6 °F (37 °C) (12/26/24 0728)  Pulse: 89 (12/26/24 0728)  Resp: 18 (12/26/24 0728)  BP: 130/60 (12/26/24 0728)  SpO2: 98 % (12/26/24 0728) Vital Signs (24h Range):  Temp:  [97 °F (36.1 °C)-101.5 °F (38.6 °C)] 98.6 °F (37 °C)  Pulse:  [] 89  Resp:  [18] 18  SpO2:  [97 %-100 %] 98 %  BP: (109-130)/(55-74) 130/60     Weight: 72 kg (158 lb 11.7 oz) (12/24/24 1859)  Body mass index is 20.94 kg/m².       Physical Exam  Vitals and nursing note reviewed.   Constitutional:       General: He is not in acute distress.     Appearance: He is well-developed. He is not diaphoretic.   HENT:      Head: Normocephalic and atraumatic.      Right Ear: External ear normal.      Left Ear: External ear normal.      Nose: Nose normal.      Mouth/Throat:      Pharynx: No oropharyngeal exudate.   Eyes:      General: No scleral icterus.        Right eye: No discharge.         Left eye: No discharge.      Conjunctiva/sclera: Conjunctivae normal.      Pupils: Pupils are equal, round, and reactive to light.   Neck:      Thyroid: No thyromegaly.      Vascular: No JVD.      Trachea: No tracheal deviation.   Cardiovascular:      Rate and Rhythm: Normal rate and regular rhythm.      Heart sounds: No murmur heard.     No friction rub. No gallop.   Pulmonary:      Effort: Pulmonary effort is normal. No respiratory distress.      Breath sounds: Normal breath sounds. No stridor. No wheezing or rales.   Chest:      Chest wall: No tenderness.   Abdominal:      General: Bowel sounds are normal. There is no distension.      Palpations: Abdomen is soft. There is no mass.      Tenderness: There is no  abdominal tenderness. There is no guarding or rebound.   Musculoskeletal:         General: No tenderness. Normal range of motion.      Cervical back: Normal range of motion and neck supple.   Lymphadenopathy:      Cervical: No cervical adenopathy.   Skin:     General: Skin is warm.      Coloration: Skin is not pale.      Findings: Rash present. No erythema.   Neurological:      Mental Status: He is alert and oriented to person, place, and time.      Cranial Nerves: No cranial nerve deficit.      Motor: No abnormal muscle tone.      Coordination: Coordination normal.      Deep Tendon Reflexes: Reflexes are normal and symmetric. Reflexes normal.   Psychiatric:         Behavior: Behavior normal.         Thought Content: Thought content normal.         Judgment: Judgment normal.            MELD 3.0: 22 at 12/26/2024  3:45 AM  MELD-Na: 21 at 12/26/2024  3:45 AM  Calculated from:  Serum Creatinine: 1.4 mg/dL at 12/26/2024  3:45 AM  Serum Sodium: 131 mmol/L at 12/26/2024  3:45 AM  Total Bilirubin: 1.8 mg/dL at 12/26/2024  3:45 AM  Serum Albumin: 1.9 g/dL at 12/26/2024  3:45 AM  INR(ratio): 1.4 at 12/26/2024  3:45 AM  Age at listing (hypothetical): 29 years  Sex: Male at 12/26/2024  3:45 AM      Significant Labs:  Labs within the past month have been reviewed.    Significant Imaging:  All relevant recent imaging reviewed.

## 2024-12-26 NOTE — PLAN OF CARE
Problem: Acute Kidney Injury/Impairment  Goal: Fluid and Electrolyte Balance  Outcome: Progressing  Intervention: Monitor and Manage Fluid and Electrolyte Balance  Flowsheets (Taken 12/25/2024 1900)  Fluid/Electrolyte Management:   intravenous fluid replacement initiated   oral rehydration therapy initiated  Goal: Improved Oral Intake  Outcome: Progressing  Intervention: Promote and Optimize Oral Intake  Flowsheets (Taken 12/25/2024 1900)  Oral Nutrition Promotion:   physical activity promoted   rest periods promoted   social interaction promoted  Nutrition Interventions:   food preferences provided   frequent small meals provided   meal set-up provided  Goal: Effective Renal Function  Outcome: Progressing  Intervention: Monitor and Support Renal Function  Flowsheets (Taken 12/25/2024 1900)  Stabilization Measures: fluid resuscitation initiated  Medication Review/Management:   medications reviewed   high-risk medications identified     Problem: Nausea and Vomiting  Goal: Nausea and Vomiting Relief  Outcome: Progressing  Intervention: Prevent and Manage Nausea and Vomiting  Flowsheets (Taken 12/25/2024 1900)  Nausea/Vomiting Interventions:   nausea triggers minimized   sips of clear liquids given  Fluid/Electrolyte Management:   intravenous fluid replacement initiated   oral rehydration therapy initiated  Environmental Support:   calm environment promoted   rest periods encouraged   environmental consistency promoted   personal routine supported  Oral Care:   oral rinse provided   teeth brushed     Problem: Electrolyte Imbalance  Goal: Electrolyte Balance  Outcome: Progressing  Intervention: Monitor and Manage Electrolyte Imbalance  Flowsheets (Taken 12/25/2024 1900)  Fluid/Electrolyte Management:   intravenous fluid replacement initiated   oral rehydration therapy initiated     Problem: Skin or Soft Tissue Infection  Goal: Absence of Infection Signs and Symptoms  Outcome: Progressing  Intervention: Minimize and  Manage Infection Progression  Flowsheets (Taken 12/25/2024 1900)  Infection Prevention:   equipment surfaces disinfected   hand hygiene promoted   personal protective equipment utilized   rest/sleep promoted   single patient room provided   visitors restricted/screened  Fever Reduction/Comfort Measures:   fluid intake increased   lightweight clothing  Infection Management: aseptic technique maintained  Isolation Precautions: protective  Intervention: Provide Meticulous Infection Site Care  Flowsheets (Taken 12/25/2024 1900)  Topical Inflammation Care: (Topical Steroid cream applied) other (see comments)

## 2024-12-26 NOTE — PROGRESS NOTES
[facility] S[facility]  Adult Nutrition  Progress Note    SUMMARY       Recommendations    1) Continune cardiac diet    2) RD monitor wt, nutritional status, skin, and labs,     Goals: meet % of EEN/EPN by next RD f/u    Nutrition Goal Status: new  Communication of RD Recs: other (comment) (POC)    Assessment and Plan    Nutrition Problem  Unintentional weight loss    Related to (etiology):   Decreased appetite >1 week     Signs and Symptoms (as evidenced by):   4% wt loss over the last 1.5 months     Interventions/Recommendations (treatment strategy):  Collaboration of nutritional care with other providers     Nutrition Diagnosis Status:   New        Reason for Assessment    Reason For Assessment: other (see comments) (MST)  Diagnosis:  (DRESS syndrome)    General Information Comments: 30 yo M with PMHx foot osteomyelitis on PO bactrim and flagyl who presented to [facility] 12/23 for evaluation of rash. Pt reported having nausea, vomiting, decreased po intake, body aches, for 1-2 weeks.     RD triggered for MST. Pt reports decreased appetite last week before admit but is now seeing improvements. Had 90% of BR (just did not eat toast). Pt reports he is NPO till 5 pm for an ultrasound. Pt was looking forward to his lunch so appetite appears to be back to normal. Pt lost 6lbs over the last month and a half (4% wt loss). Reports UBW ranges between 165-175lbs. Nasuea and vomtiing has resolved with medication.     Nutrition Discharge Planning: adequate oral intake    Nutrition Risk Screen    None identified     Nutrition/Diet History    Patient Reported Diet/Restrictions/Preferences: heart healthy  Spiritual, Cultural Beliefs, Anabaptism Practices, Values that Affect Care: no  Food Allergies: NKFA  Factors Affecting Nutritional Intake: None identified at this time    Anthropometrics    Temp: 98.5 °F (36.9 °C)  Weight: 72 kg (158 lb 11.7 oz)  Weight (lb): 158.73 lb  BMI (Calculated): 20.9        Lab/Procedures/Meds    Pertinent Labs Reviewed: reviewed     Labs:   Recent Labs   Lab 12/26/24  0345   *   K 5.0      CO2 20*   BUN 23*   CREATININE 1.4   *   CALCIUM 7.5*   PHOS 4.7*   MG 2.1       Pertinent Medications Reviewed: reviewed  Scheduled Meds:   enoxparin  40 mg Subcutaneous Daily    famotidine  20 mg Oral BID    methylPREDNISolone injection (PEDS and ADULTS)  50 mg Intravenous Daily    polyethylene glycol  17 g Oral Daily    senna-docusate 8.6-50 mg  1 tablet Oral BID    triamcinolone acetonide 0.1%   Topical (Top) BID       Estimated/Assessed Needs    Weight Used For Calorie Calculations: 72 kg (158 lb 11.7 oz)  Energy Calorie Requirements (kcal): 1800-2160kcal (25-30kcal/kg)  Energy Need Method: Kcal/kg  Protein Requirements: 72-108g (1.0-1.2g/kg)  Weight Used For Protein Calculations: 72 kg (158 lb 11.7 oz)  Fluid Requirements (mL): per MD  Estimated Fluid Requirement Method: RDA Method  RDA Method (mL): 1800         Nutrition Prescription Ordered    Current Diet Order: Cardiac Diet    Evaluation of Received Nutrient/Fluid Intake    I/O:   Intake/Output Summary (Last 24 hours) at 12/26/2024 1513  Last data filed at 12/26/2024 1238  Gross per 24 hour   Intake 1800 ml   Output 2750 ml   Net -950 ml       Energy Calories Required: meeting needs  Protein Required: meeting needs  Fluid Required: meeting needs  Tolerance: tolerating  % Intake of Estimated Energy Needs: 75 - 100 %  % Meal Intake: 75 - 100 %    Nutrition Risk    Level of Risk/Frequency of Follow-up: low     Monitor and Evaluation    Food and Nutrient Intake: energy intake  Physical Activity and Function: nutrition-related ADLs and IADLs  Anthropometric Measurements: weight, weight change, body mass index  Biochemical Data, Medical Tests and Procedures: electrolyte and renal panel, inflammatory profile, lipid profile, gastrointestinal profile, glucose/endocrine profile  Nutrition-Focused Physical Findings: overall  appearance     Nutrition Follow-Up    RD Follow-up?: Yes    Dilcia Webb, Registration Eligible, Provisional LDN , MS

## 2024-12-26 NOTE — PROGRESS NOTES
"[facility] Medicine  Progress Note    Patient Name: Alonzo Nascimento Jr.  MRN: [id number]  Patient Class: IP- Inpatient   Admission Date: 12/24/2024  Length of Stay: 2 days  Attending Physician: Dave Song III*  Primary Care Provider: Sohan Strange MD        Subjective     Principal Problem:DRESS syndrome        HPI:  30 yo M with PMHx foot osteomyelitis on PO bactrim and flagyl who presented to [facility] 12/23 for evaluation of rash. Pt reported having nausea, vomiting, decreased po intake, body aches, for 1-2 weeks. Pt. Has a history of left achilles tendon rupture s/p repair on 07/14/23 complicated by osteomyelitis of left calcaneus s/p I&D 12/4/24. He was prescribed PO Bactrim and Flagy x 6 weeks after this most recent procedure with end date 1/1/2025.  He initially presented to [facility]  E on 12/14 and 12/20 for evaluation of the nausea/dehydaration and was discharged from ED after IV fluids on both occasions. About 2-3 days ago, shortly after his ED vist 12/20, the patient reports developing a full body rash. The Rash is described as >50% BSA covered in stereotypical morbiliform. No mucous membrane involvement.     In the ED, the patient was tachycardic (120s> 90s), tachypneic (20s), but otherwise hemodynamically stable.  Labs were remarkable for leukocytosis (28.3, with 12% eosinophilia), elevated INR (2.1), elevated creatinine (1.9- from a previous 1.4), hyponatremia (125), elevated LFTs (AST:  384, ALT: 635, T bili: 4.1, ALP:  341), elevated lactic acid (2.7).  VBG showed a pH of 7.34, pCO2 of 49.7 and HC03 of 22.9.  Retroperitoneal ultrasound showed no acute process.  CT abdomen and pelvis (12/20) showed no acute findings.  Referring provider is concerned about DRESS syndrome secondary to Bactrim, so patient transferred to [facility] for Dematology evaluation.     Pt. Was admitted to [place] while awaiting bed, per [place] medicine team, "He was started on oral prednisone 50 " "mg daily and clobetasol cream...... Patient's Lfts and LINK were improving on 12/24. He was tolerating a soft diet"    Overview/Hospital Course:  Updated hospital course pending.    Subjective/Interval History:  Pt says rash on torso and lower extremities improving.  Overall, pruritus has improved.  Pt does report worsening scalp pruritus especially at night.  One episode of nausea and vomiting 12/25.  pt now tolerating diet.    Labs personally reviewed: WBC 24.05, Na 131, Cr 1.4,  from 319, albumin 1.9, bilirubin 1.8 from 2.4,  from 347,  from 694    Hepatology consulted:  Recommended ultrasound liver with Doppler and hepatic serologic workup.    Dermatology consulted:  Cont prednisone 60 mg or IV equivalent, cont Pepcid b.i.d., cont triamcinolone.    Objective:     Vital signs reviewed, stable.  T-max 101.5° 12/25 at 11:41 a.m.     Physical Exam  Constitutional:       General: He is not in acute distress.     Appearance: He is not ill-appearing.   Cardiovascular:      Rate and Rhythm: Normal rate and regular rhythm.      Heart sounds: No murmur heard.  Pulmonary:      Effort: No respiratory distress.   Abdominal:      General: Bowel sounds are normal. There is no distension.      Tenderness: There is no abdominal tenderness.   Musculoskeletal:      Right lower leg: No edema.      Left lower leg: No edema.   Skin:     Comments: Diffuse maculopapular rash, improved on torso and lower extremities   Neurological:      Mental Status: He is alert and oriented to person, place, and time.   Psychiatric:         Mood and Affect: Mood normal.             Significant Labs: All pertinent labs within the past 24 hours have been reviewed.    Significant Imaging: I have reviewed all pertinent imaging results/findings within the past 24 hours.    Assessment and Plan     * DRESS syndrome  Pt. On long term bactrim therapy presenting with maculopapular rash involving trunk and extremities, >50% BSA, elevated " LFTs, LINK, leukocytosis, Eosinophilia, consistent concerning DRESS syndrome  -Dermatology consulted: s/p biopsy 12/26.  cont systemic and topical steroids  -LFTs remain elevated, hepatology consulted:  Follow up U/S liver and serologic workup.    Hypoalbuminemia  Likely d/t liver dysfunction and acute illness  Monitor volume status while on IV fluids.  Cont to monitor on labs    Severe systemic inflammatory response syndrome (SIRS)  Patient has fever, tachycardia, leukocytosis with abnormal liver function and lactic acidosis on admission.  Although he has a bone infection it is subacute and doubt it is causing these systemic symptoms and signs.  -- monitor for infection, treat DRESS  -- IVF ordered for 24 hours      Liver dysfunction  Acute viral panel negative 12/15.  CT abd unremarkable liver.  -- AST/ALT trending up, hepatology consulted.  Follow up liver ultrasound and serologies.  Cont treatment for DRESS as above      Acute kidney injury  Unclear baseline, 1.9 on admission and improving  Renal US unremarkable.  -Continue to monitor while admitted, pt. Will need PCP and/or nephrologist at discharge    Hyponatremia  Likely related to decreased PO intake  Improving with IV fluids    Acute osteomyelitis of left calcaneus  - appreciate ID consult .  No antibiotics for now. Arrange follow up with [facility] ID clinic at [facility] after his discharge to discuss antibiotic options.   -- 12/23 podiatry note reviewed and pt confirmed plan.  Needs weekly dressing change next due 12/30.        VTE Risk Mitigation (From admission, onward)           Ordered     enoxaparin injection 40 mg  Daily         12/24/24 1849     Place sequential compression device  Until discontinued         12/24/24 1849                Discharge Planning   GARETH: 12/27/2024     Code Status: Full Code   Medical Readiness for Discharge Date:   Discharge Plan A: Hospital Transfer        Dave Song III, MD  Department of Hospital Medicine    [facility] - [facility] S[facility]

## 2024-12-26 NOTE — HPI
"28 yo M with PMHx foot osteomyelitis on PO bactrim and flagyl who presented to [facility] 12/23 for evaluation of rash. Pt reported having nausea, vomiting, decreased po intake, body aches, for 1-2 weeks. Pt. Has a history of left achilles tendon rupture s/p repair on 07/14/23 complicated by osteomyelitis of left calcaneus s/p I&D 12/4/24. He was prescribed PO Bactrim and Flagy x 6 weeks after this most recent procedure with end date 1/1/2025. He initially presented to [facility] on 12/14 and 12/20 for evaluation of the nausea/dehydaration and was discharged from ED after IV fluids on both occasions. About 2-3 days ago, shortly after his ED vist 12/20, the patient reports developing a full body rash. The Rash is described as >50% BSA covered in stereotypical morbiliform. No mucous membrane involvement.     Hepatology consulted for "DRESS likely d/t Bactrim vs flagyl. Liver enzymes worse today , ."  "

## 2024-12-26 NOTE — PLAN OF CARE
Recommendations     1) Continune cardiac diet     2) RD monitor wt, nutritional status, skin, and labs,      Goals: meet % of EEN/EPN by next RD f/u     Nutrition Goal Status: new

## 2024-12-26 NOTE — PROGRESS NOTES
[facility] - Med Surg  Dermatology  Progress Note    Patient Name: Alonzo Nascimento Jr.  MRN: [id number]  Admission Date: 12/24/2024  Hospital Length of Stay: 2 days  Attending Physician: Dave Song III*  Primary Care Provider: Sohan Strange MD     Subjective:     Principal Problem:DRESS syndrome    Interval History: Patient reports rash remains itchy but has not spread to involve additional areas. He has used triamcinolone and calomine lotion which help with itch. Notes he has a small tube of triamcinolone currently. No ocular, oral or genital mucosal involvement. Had some nausea and vomiting yesterday, causing him to throw up some medications. Denies subjective fever or chills overnight or this morning.     Medications:  Continuous Infusions:   0.9% NaCl   Intravenous Continuous 125 mL/hr at 12/26/24 0215 New Bag at 12/26/24 0215     Scheduled Meds:   enoxparin  40 mg Subcutaneous Daily    famotidine  20 mg Oral BID    methylPREDNISolone injection (PEDS and ADULTS)  50 mg Intravenous Daily    polyethylene glycol  17 g Oral Daily    senna-docusate 8.6-50 mg  1 tablet Oral BID    triamcinolone acetonide 0.1%   Topical (Top) BID     PRN Meds:  Current Facility-Administered Medications:     acetaminophen, 650 mg, Oral, Q8H PRN    dextrose 50%, 12.5 g, Intravenous, PRN    dextrose 50%, 25 g, Intravenous, PRN    diphenhydrAMINE, 25 mg, Oral, Q6H PRN    glucagon (human recombinant), 1 mg, Intramuscular, PRN    glucose, 16 g, Oral, PRN    glucose, 24 g, Oral, PRN    melatonin, 6 mg, Oral, Nightly PRN    naloxone, 0.02 mg, Intravenous, PRN    ondansetron, 4 mg, Intravenous, Q8H PRN    sodium chloride 0.9%, 5 mL, Intravenous, PRN    Review of Systems   Constitutional:  Negative for fever and chills.   HENT:  Negative for mouth sores.    Eyes:  Negative for eye irritation and visual change.   Genitourinary:  Negative for dysuria and genital sores.   Skin:  Positive for itching and rash.   Hematologic/Lymphatic:  Negative for adenopathy.     Objective:     Vital Signs (Most Recent):  Temp: 98.6 °F (37 °C) (12/26/24 0728)  Pulse: 89 (12/26/24 0728)  Resp: 18 (12/26/24 0728)  BP: 130/60 (12/26/24 0728)  SpO2: 98 % (12/26/24 0728) Vital Signs (24h Range):  Temp:  [97 °F (36.1 °C)-101.5 °F (38.6 °C)] 98.6 °F (37 °C)  Pulse:  [] 89  Resp:  [18] 18  SpO2:  [97 %-100 %] 98 %  BP: (109-130)/(55-74) 130/60     Weight: 72 kg (158 lb 11.7 oz)  Body mass index is 20.94 kg/m².     Physical Exam   Constitutional: He appears well-developed and well-nourished.   Musculoskeletal: Lymphadenopathy:      Head:      Right side of head: No submental, submandibular, preauricular or posterior auricular adenopathy.      Left side of head: No submental, submandibular, preauricular or posterior auricular adenopathy.      Cervical: No cervical adenopathy.      Upper Body:      Right upper body: No supraclavicular or axillary adenopathy.      Left upper body: No supraclavicular or axillary adenopathy.     Lymphadenopathy:        Head (right side): No submental, no submandibular, no preauricular and no posterior auricular adenopathy present.        Head (left side): No submental, no submandibular, no preauricular and no posterior auricular adenopathy present.     He has no cervical adenopathy.        Right: No supraclavicular adenopathy present.        Left: No supraclavicular adenopathy present.   Neurological: He is alert and oriented to person, place, and time.   Psychiatric: He has a normal mood and affect.   Skin:   Areas Examined (abnormalities noted in diagram):   Scalp / Hair Palpated and Inspected  Head / Face Inspection Performed  Neck Inspection Performed  Chest / Axilla Inspection Performed  Abdomen Inspection Performed  Genitals / Buttocks / Groin Inspection Performed  Back Inspection Performed  RUE Inspected  LUE Inspection Performed  RLE Inspected  LLE Inspection Performed  Nails and Digits Inspection Performed  Gland Inspection  "Performed                                       Significant Labs: Blood Culture: No results for input(s): "LABBLOO" in the last 48 hours.  CBC:   Recent Labs   Lab 12/25/24  0504 12/26/24  0345   WBC 28.58* 24.05*   HGB 15.0 12.8*   HCT 42.2 36.5*    203     CMP:   Recent Labs   Lab 12/25/24  0504 12/26/24  0345   * 131*   K 4.7 5.0   CL 97 102   CO2 22* 20*    112*   BUN 21* 23*   CREATININE 1.4 1.4   CALCIUM 7.7* 7.5*   PROT 6.0 5.6*   ALBUMIN 2.0* 1.9*   BILITOT 2.4* 1.8*   ALKPHOS 319* 236*   * 411*   * 891*   ANIONGAP 8 9     TSH:   Recent Labs   Lab 11/11/24  1109 12/26/24  0345   TSH 0.925 0.972     All pertinent labs within the past 24 hours have been reviewed.    Significant Imaging: I have reviewed all pertinent imaging results/findings within the past 24 hours.      Assessment/Plan:     Oncology  * DRESS syndrome  30 yo man presents with >50% BSA of erythematous morbilliform rash with associated eosinophilia, leukocytosis, elevated LFTs, and previous LINK (now improving) with prodrome onset with subjective fever/chills and nausea/vomiting ~12/12 with rash onset ~12/20. Clinical features and lab findings are most consistent with DRESS. Regiscar score: 3 (possible case). Most likely implicated medication is bactrim; however, cannot entirely rule out flagyl as a potential cause.    Labs  Labs 12/23 notable for normal ESR, elevated CRP, elevated lactic acid, elevated PT and INR, CMV quantitative not detected, troponin <0.006. EBV pending.   Acute hepatitis panel from 12/15 Hep B surface Ag non-reactive, hep B core IgM non-reactive, Hep A IgM non-reactive, Hep C ab non-reactive.   12/26 Labs with normal TSH and free T4, elevated CPK of 262, normal troponin. CBC with leukocytosis and eosinophilia mildly improved from previous day. CMP with increasing AST and ALT, but PT and INR slightly improved from yesterday. Creatine remains stable at 1.4.   12/23 Blood Cultures " NGTD.    Imaging  - CXR and Liver US with Doppler pending  - 12/23 Retroperitoneal US with no evidence of renal abnormalities. Echogenic debris in bladder possibly consistent with cystitis.  - 12/20 CT Abdomen and Pelvis without acute findings    Recommendations:  - Continue avoidance of Bactrim and Flagyl. Bactrim has been listed as a drug allergy.  - Recommend daily CBC with diff and CMP  - Continue following blood cultures from 12/23  - Continue prednisone 60 mg daily (or IV equivalent if patient unable to tolerate PO)  - Continue Pepcid BID for gastric protection while on steroids  - Continue triamcinolone 0.1% cream BID to rash on trunk and limbs. Please special request 454 g jar from pharmacy to adequately cover affected body surface area  - Appreciate hepatology input  - Punch biopsy performed at bedside today as below    Punch biopsy procedure note:  Punch biopsy performed after verbal consent obtained. Area marked and prepped with alcohol. Approximately 1cc of 1% lidocaine with epinephrine injected. 4 mm disposable punch used to remove lesion. Hemostasis obtained and biopsy site closed with gel foam.  Wound care instructions reviewed.    Biopsy Site Care:  Leave bandage in place for 24 hours. After 24 hours, remove bandage and clean with gentle soap and water. Apply Vaseline and a bandage. Repeat daily. Leave gelfoam in place and it will resorb on its own in the next few weeks.                                                   Thank you for your consult. I will follow-up with patient. Please contact us if you have any additional questions.    Riky Bang MD  Dermatology  [facility]    Dermatology staff   Pateint seen with Dr Bang on 12/25 and examined, notes reviewed    Agree with current plan,  needs to use tac cream on larger area to be effective.    No extension of eruption from exam yesterday.  Biopsied today for H and E.  Hepatology note appreciated, will need hepatic US ordered   Will  continue to follow, could consider increasing dose of prednisone to 80mg/day but would likely cause increase in GI distress.     Thank you  Geri Sharma M.D  Ext [phone]

## 2024-12-26 NOTE — CONSULTS
"[facility] Surg  Hepatology  Consult Note    Patient Name: Alonzo Nascimento Jr.  MRN: [id number]  Admission Date: 12/24/2024  Hospital Length of Stay: 2 days  Attending Provider: Dave Song III*   Primary Care Physician: Sohan Strange MD  Principal Problem:DRESS syndrome    Inpatient consult to Hepatology  Consult performed by: Stan Love MD  Consult ordered by: Dave Song III, MD  Reason for consult: DRESS likely d/t Bactrim vs flagyl. Liver enzymes worse today , .        Subjective:     Transplant status: No    HPI:  30 yo M with PMHx foot osteomyelitis on PO bactrim and flagyl who presented to [facility] 12/23 for evaluation of rash. Pt reported having nausea, vomiting, decreased po intake, body aches, for 1-2 weeks. Pt. Has a history of left achilles tendon rupture s/p repair on 07/14/23 complicated by osteomyelitis of left calcaneus s/p I&D 12/4/24. He was prescribed PO Bactrim and Flagy x 6 weeks after this most recent procedure with end date 1/1/2025. He initially presented to [facility] E on 12/14 and 12/20 for evaluation of the nausea/dehydaration and was discharged from ED after IV fluids on both occasions. About 2-3 days ago, shortly after his ED vist 12/20, the patient reports developing a full body rash. The Rash is described as >50% BSA covered in stereotypical morbiliform. No mucous membrane involvement.     Hepatology consulted for "DRESS likely d/t Bactrim vs flagyl. Liver enzymes worse today , ."    Review of Systems   Reason unable to perform ROS: See HPI.       Past Medical History:   Diagnosis Date    Achilles tendon rupture 07/2023    left achilles tendon rupture s/p repair on 07/14/23 complicated by osteomyelitis of left calcaneus s/p I&D 12/4/24    Acute osteomyelitis of calcaneum, left 12/2024    osteomyelitis of left calcaneus s/p I&D 12/4/24       Past Surgical History:   Procedure Laterality Date    ANTERIOR CRUCIATE LIGAMENT " REPAIR Right     2018    APPLICATION OF SPLINT Left 7/14/2023    Procedure: APPLICATION, SPLINT;  Surgeon: Lenore Pearl DPM;  Location: [facility] OR;  Service: Podiatry;  Laterality: Left;    INSERTION, ANTIBIOTIC SPACER, LOWER EXTREMITY Left 12/4/2024    Procedure: INSERTION, ANTIBIOTIC SPACER, LOWER EXTREMITY;  Surgeon: Jaswinder Garcia DPM;  Location: [facility] OR;  Service: Podiatry;  Laterality: Left;    IRRIGATION AND DEBRIDEMENT Left 12/4/2024    Procedure: IRRIGATION AND DEBRIDEMENT;  Surgeon: Jaswinder Garcia DPM;  Location: [facility] OR;  Service: Podiatry;  Laterality: Left;  mini c-arm, Stimulan jr tobramycin/Vancomycin    REPAIR OF ACHILLES TENDON Left 7/14/2023    Procedure: REPAIR, TENDON, ACHILLES;  Surgeon: Lenore Pearl DPM;  Location: [facility] OR;  Service: Podiatry;  Laterality: Left;       Family history of liver disease: No    Review of patient's allergies indicates:   Allergen Reactions    Bactrim [sulfamethoxazole-trimethoprim] Rash     Developed DRESS         Tobacco Use    Smoking status: Never    Smokeless tobacco: Never   Substance and Sexual Activity    Alcohol use: Yes     Comment: rarely    Drug use: Yes     Types: Marijuana    Sexual activity: Yes     Partners: Female       Facility-Administered Medications Prior to Admission   Medication Dose Route Frequency Provider Last Rate Last Admin    [DISCONTINUED] sodium chloride 0.9% flush 10 mL  10 mL Intravenous PRN Jaswinder Garcia DPM         Medications Prior to Admission   Medication Sig Dispense Refill Last Dose/Taking    clobetasoL (TEMOVATE) 0.05 % cream Apply topically 2 (two) times daily.       ondansetron (ZOFRAN-ODT) 4 MG TbDL Take 1 tablet (4 mg total) by mouth every 8 (eight) hours as needed. 14 tablet 1     predniSONE (DELTASONE) 50 MG Tab Take 1 tablet (50 mg total) by mouth once daily.          Objective:     Vital Signs (Most Recent):  Temp: 98.6 °F (37 °C) (12/26/24 0728)  Pulse: 89 (12/26/24 0728)  Resp: 18 (12/26/24  0728)  BP: 130/60 (12/26/24 0728)  SpO2: 98 % (12/26/24 0728) Vital Signs (24h Range):  Temp:  [97 °F (36.1 °C)-101.5 °F (38.6 °C)] 98.6 °F (37 °C)  Pulse:  [] 89  Resp:  [18] 18  SpO2:  [97 %-100 %] 98 %  BP: (109-130)/(55-74) 130/60     Weight: 72 kg (158 lb 11.7 oz) (12/24/24 1859)  Body mass index is 20.94 kg/m².       Physical Exam  Vitals and nursing note reviewed.   Constitutional:       General: He is not in acute distress.     Appearance: He is well-developed. He is not diaphoretic.   HENT:      Head: Normocephalic and atraumatic.      Right Ear: External ear normal.      Left Ear: External ear normal.      Nose: Nose normal.      Mouth/Throat:      Pharynx: No oropharyngeal exudate.   Eyes:      General: No scleral icterus.        Right eye: No discharge.         Left eye: No discharge.      Conjunctiva/sclera: Conjunctivae normal.      Pupils: Pupils are equal, round, and reactive to light.   Neck:      Thyroid: No thyromegaly.      Vascular: No JVD.      Trachea: No tracheal deviation.   Cardiovascular:      Rate and Rhythm: Normal rate and regular rhythm.      Heart sounds: No murmur heard.     No friction rub. No gallop.   Pulmonary:      Effort: Pulmonary effort is normal. No respiratory distress.      Breath sounds: Normal breath sounds. No stridor. No wheezing or rales.   Chest:      Chest wall: No tenderness.   Abdominal:      General: Bowel sounds are normal. There is no distension.      Palpations: Abdomen is soft. There is no mass.      Tenderness: There is no abdominal tenderness. There is no guarding or rebound.   Musculoskeletal:         General: No tenderness. Normal range of motion.      Cervical back: Normal range of motion and neck supple.   Lymphadenopathy:      Cervical: No cervical adenopathy.   Skin:     General: Skin is warm.      Coloration: Skin is not pale.      Findings: Rash present. No erythema.   Neurological:      Mental Status: He is alert and oriented to person,  place, and time.      Cranial Nerves: No cranial nerve deficit.      Motor: No abnormal muscle tone.      Coordination: Coordination normal.      Deep Tendon Reflexes: Reflexes are normal and symmetric. Reflexes normal.   Psychiatric:         Behavior: Behavior normal.         Thought Content: Thought content normal.         Judgment: Judgment normal.            MELD 3.0: 22 at 12/26/2024  3:45 AM  MELD-Na: 21 at 12/26/2024  3:45 AM  Calculated from:  Serum Creatinine: 1.4 mg/dL at 12/26/2024  3:45 AM  Serum Sodium: 131 mmol/L at 12/26/2024  3:45 AM  Total Bilirubin: 1.8 mg/dL at 12/26/2024  3:45 AM  Serum Albumin: 1.9 g/dL at 12/26/2024  3:45 AM  INR(ratio): 1.4 at 12/26/2024  3:45 AM  Age at listing (hypothetical): 29 years  Sex: Male at 12/26/2024  3:45 AM      Significant Labs:  Labs within the past month have been reviewed.    Significant Imaging:  All relevant recent imaging reviewed.  Assessment/Plan:     Oncology  * DRESS syndrome  29M w/ PMHx s/f left achilles tendon rupture s/p repair on 07/14/23 complicated by osteomyelitis of left calcaneus s/p I&D 12/4/24 on PO bactrim and flagyl. He was prescribed PO Bactrim and Flagy x 6 weeks after this most recent procedure with end date 1/1/2025. He initially presented to [facility] E on 12/14 and 12/20 for evaluation of the nausea/dehydaration and was discharged from ED after IV fluids on both occasions. About 2-3 days ago, shortly after his ED vist 12/20, the patient reports developing a full body rash; on 12/23, he presented to [facility] for evaluation of rash. Pt reported having nausea, vomiting, decreased po intake, body aches, for 1-2 weeks. The rash is described as >50% BSA covered in stereotypical morbiliform. No mucous membrane involvement.     Hepatology consulted for elevated LFTs in context of DRESS. On evaluation, patient without edema or ascites, abdominal pain (incl. RUQ pain), coagulopathy (INR <1.5; prev. >1.5), si/sx GI bleeding, or  encephalopathy (AOx4).    Problem List:    DRESS.  Acute Liver Injury.    Recommendations:    - Agree with dermatology consult, fluids, steroids.  - Recommend U/S liver w/ doppler for further evaluation.  - Recommend serological workup to evaluate for alternate etiologies (ordered).  - Will continue to monitor for si/sx coagulopathy (INR prev. >1.5, now <1.5) and encephalopathy.         Thank you for your consult. I will follow-up with patient. Please contact us if you have any additional questions.    Stan Love MD  Hepatology  [facility] S[facility]

## 2024-12-26 NOTE — PLAN OF CARE
Pt. waiting for ultrasound of liver today. Pt. is a readmit/transfer from [facility]. Pt. lives with his mother in their apartment in [place], [place]. The apartment is 2 stories with bedrooms/bathrooms upstairs. DME: crutches. No home health, dialysis or Coumadin. Pt. Drives, but he may need transportation home upon discharge. Pt. states his mother is out-of-town and does not return until 12/30/24. GARETH: 12/27/24 to home, pending test results.     Mirza Hobbs LMSW

## 2024-12-26 NOTE — SUBJECTIVE & OBJECTIVE
Subjective:     Principal Problem:DRESS syndrome    Interval History: Patient reports rash remains itchy but has not spread to involve additional areas. He has used triamcinolone and calomine lotion which help with itch. Notes he has a small tube of triamcinolone currently. No ocular, oral or genital mucosal involvement. Had some nausea and vomiting yesterday, causing him to throw up some medications. Denies subjective fever or chills overnight or this morning.     Medications:  Continuous Infusions:   0.9% NaCl   Intravenous Continuous 125 mL/hr at 12/26/24 0215 New Bag at 12/26/24 0215     Scheduled Meds:   enoxparin  40 mg Subcutaneous Daily    famotidine  20 mg Oral BID    methylPREDNISolone injection (PEDS and ADULTS)  50 mg Intravenous Daily    polyethylene glycol  17 g Oral Daily    senna-docusate 8.6-50 mg  1 tablet Oral BID    triamcinolone acetonide 0.1%   Topical (Top) BID     PRN Meds:  Current Facility-Administered Medications:     acetaminophen, 650 mg, Oral, Q8H PRN    dextrose 50%, 12.5 g, Intravenous, PRN    dextrose 50%, 25 g, Intravenous, PRN    diphenhydrAMINE, 25 mg, Oral, Q6H PRN    glucagon (human recombinant), 1 mg, Intramuscular, PRN    glucose, 16 g, Oral, PRN    glucose, 24 g, Oral, PRN    melatonin, 6 mg, Oral, Nightly PRN    naloxone, 0.02 mg, Intravenous, PRN    ondansetron, 4 mg, Intravenous, Q8H PRN    sodium chloride 0.9%, 5 mL, Intravenous, PRN    Review of Systems   Constitutional:  Negative for fever and chills.   HENT:  Negative for mouth sores.    Eyes:  Negative for eye irritation and visual change.   Genitourinary:  Negative for dysuria and genital sores.   Skin:  Positive for itching and rash.   Hematologic/Lymphatic: Negative for adenopathy.     Objective:     Vital Signs (Most Recent):  Temp: 98.6 °F (37 °C) (12/26/24 0728)  Pulse: 89 (12/26/24 0728)  Resp: 18 (12/26/24 0728)  BP: 130/60 (12/26/24 0728)  SpO2: 98 % (12/26/24 0728) Vital Signs (24h Range):  Temp:  [97 °F  "(36.1 °C)-101.5 °F (38.6 °C)] 98.6 °F (37 °C)  Pulse:  [] 89  Resp:  [18] 18  SpO2:  [97 %-100 %] 98 %  BP: (109-130)/(55-74) 130/60     Weight: 72 kg (158 lb 11.7 oz)  Body mass index is 20.94 kg/m².     Physical Exam   Constitutional: He appears well-developed and well-nourished.   Musculoskeletal: Lymphadenopathy:      Head:      Right side of head: No submental, submandibular, preauricular or posterior auricular adenopathy.      Left side of head: No submental, submandibular, preauricular or posterior auricular adenopathy.      Cervical: No cervical adenopathy.      Upper Body:      Right upper body: No supraclavicular or axillary adenopathy.      Left upper body: No supraclavicular or axillary adenopathy.     Lymphadenopathy:        Head (right side): No submental, no submandibular, no preauricular and no posterior auricular adenopathy present.        Head (left side): No submental, no submandibular, no preauricular and no posterior auricular adenopathy present.     He has no cervical adenopathy.        Right: No supraclavicular adenopathy present.        Left: No supraclavicular adenopathy present.   Neurological: He is alert and oriented to person, place, and time.   Psychiatric: He has a normal mood and affect.   Skin:   Areas Examined (abnormalities noted in diagram):   Scalp / Hair Palpated and Inspected  Head / Face Inspection Performed  Neck Inspection Performed  Chest / Axilla Inspection Performed  Abdomen Inspection Performed  Genitals / Buttocks / Groin Inspection Performed  Back Inspection Performed  RUE Inspected  LUE Inspection Performed  RLE Inspected  LLE Inspection Performed  Nails and Digits Inspection Performed  Gland Inspection Performed                                       Significant Labs: Blood Culture: No results for input(s): "LABBLOO" in the last 48 hours.  CBC:   Recent Labs   Lab 12/25/24  0504 12/26/24  0345   WBC 28.58* 24.05*   HGB 15.0 12.8*   HCT 42.2 36.5*    203 "     CMP:   Recent Labs   Lab 12/25/24  0504 12/26/24  0345   * 131*   K 4.7 5.0   CL 97 102   CO2 22* 20*    112*   BUN 21* 23*   CREATININE 1.4 1.4   CALCIUM 7.7* 7.5*   PROT 6.0 5.6*   ALBUMIN 2.0* 1.9*   BILITOT 2.4* 1.8*   ALKPHOS 319* 236*   * 411*   * 891*   ANIONGAP 8 9     TSH:   Recent Labs   Lab 11/11/24  1109 12/26/24  0345   TSH 0.925 0.972     All pertinent labs within the past 24 hours have been reviewed.    Significant Imaging: I have reviewed all pertinent imaging results/findings within the past 24 hours.

## 2024-12-26 NOTE — ASSESSMENT & PLAN NOTE
28 yo man presents with >50% BSA of erythematous morbilliform rash with associated eosinophilia, leukocytosis, elevated LFTs, and previous LINK (now improving) with prodrome onset with subjective fever/chills and nausea/vomiting ~12/12 with rash onset ~12/20. Clinical features and lab findings are most consistent with DRESS. Regiscar score: 3 (possible case). Most likely implicated medication is bactrim; however, cannot entirely rule out flagyl as a potential cause.    Labs  Labs 12/23 notable for normal ESR, elevated CRP, elevated lactic acid, elevated PT and INR, CMV quantitative not detected, troponin <0.006. EBV pending.   Acute hepatitis panel from 12/15 Hep B surface Ag non-reactive, hep B core IgM non-reactive, Hep A IgM non-reactive, Hep C ab non-reactive.   12/26 Labs with normal TSH and free T4, elevated CPK of 262, normal troponin. CBC with leukocytosis and eosinophilia mildly improved from previous day. CMP with increasing AST and ALT, but PT and INR slightly improved from yesterday. Creatine remains stable at 1.4.   12/23 Blood Cultures NGTD.    Imaging  - CXR and Liver US with Doppler pending  - 12/23 Retroperitoneal US with no evidence of renal abnormalities. Echogenic debris in bladder possibly consistent with cystitis.  - 12/20 CT Abdomen and Pelvis without acute findings    Recommendations:  - Continue avoidance of Bactrim and Flagyl. Bactrim has been listed as a drug allergy.  - Recommend daily CBC with diff and CMP  - Continue following blood cultures from 12/23  - Continue prednisone 60 mg daily (or IV equivalent if patient unable to tolerate PO)  - Continue Pepcid BID for gastric protection while on steroids  - Continue triamcinolone 0.1% cream BID to rash on trunk and limbs. Please special request 454 g jar from pharmacy to adequately cover affected body surface area  - Appreciate hepatology input  - Punch biopsy performed at bedside today as below    Punch biopsy procedure note:  Punch biopsy  performed after verbal consent obtained. Area marked and prepped with alcohol. Approximately 1cc of 1% lidocaine with epinephrine injected. 4 mm disposable punch used to remove lesion. Hemostasis obtained and biopsy site closed with gel foam.  Wound care instructions reviewed.    Biopsy Site Care:  Leave bandage in place for 24 hours. After 24 hours, remove bandage and clean with gentle soap and water. Apply Vaseline and a bandage. Repeat daily. Leave gelfoam in place and it will resorb on its own in the next few weeks.

## 2024-12-27 PROBLEM — S36.119A LIVER INJURY: Status: ACTIVE | Noted: 2024-12-24

## 2024-12-27 LAB
ALBUMIN SERPL BCP-MCNC: 1.9 G/DL (ref 3.5–5.2)
ALP SERPL-CCNC: 249 U/L (ref 40–150)
ALT SERPL W/O P-5'-P-CCNC: 1334 U/L (ref 10–44)
ANA SER QL IF: NORMAL
ANION GAP SERPL CALC-SCNC: 6 MMOL/L (ref 8–16)
ANISOCYTOSIS BLD QL SMEAR: SLIGHT
ASCENDING AORTA: 2.63 CM
AST SERPL-CCNC: 762 U/L (ref 10–40)
AV AREA BY CONTINUOUS VTI: 3.5 CM2
AV INDEX (PROSTH): 0.87
AV LVOT MEAN GRADIENT: 2 MMHG
AV LVOT PEAK GRADIENT: 4 MMHG
AV MEAN GRADIENT: 3 MMHG
AV PEAK GRADIENT: 6.8 MMHG
AV VALVE AREA BY VELOCITY RATIO: 3.5 CM²
AV VALVE AREA: 3.6 CM2
AV VELOCITY RATIO: 0.85
BASOPHILS # BLD AUTO: ABNORMAL K/UL (ref 0–0.2)
BASOPHILS NFR BLD: 0 % (ref 0–1.9)
BILIRUB SERPL-MCNC: 2.1 MG/DL (ref 0.1–1)
BNP SERPL-MCNC: 27 PG/ML (ref 0–99)
BSA FOR ECHO PROCEDURE: 1.93 M2
BUN SERPL-MCNC: 19 MG/DL (ref 6–20)
CALCIUM SERPL-MCNC: 7.6 MG/DL (ref 8.7–10.5)
CHLORIDE SERPL-SCNC: 103 MMOL/L (ref 95–110)
CHOLEST SERPL-MCNC: 111 MG/DL (ref 120–199)
CHOLEST/HDLC SERPL: 8.5 {RATIO} (ref 2–5)
CO2 SERPL-SCNC: 22 MMOL/L (ref 23–29)
CREAT SERPL-MCNC: 1.2 MG/DL (ref 0.5–1.4)
CV ECHO LV RWT: 0.52 CM
DIFFERENTIAL METHOD BLD: ABNORMAL
DOP CALC AO PEAK VEL: 1.3 M/S
DOP CALC AO VTI: 20.5 CM
DOP CALC LVOT AREA: 4.2 CM2
DOP CALC LVOT DIAMETER: 2.3 CM
DOP CALC LVOT PEAK VEL: 1.1 M/S
DOP CALC LVOT STROKE VOLUME: 73.9 CM3
DOP CALCLVOT PEAK VEL VTI: 17.8 CM
E WAVE DECELERATION TIME: 153.06 MS
E/A RATIO: 1.81
E/E' RATIO: 4.3 M/S
ECHO EF ESTIMATED: 65 %
ECHO LV POSTERIOR WALL: 1.1 CM (ref 0.6–1.1)
EOSINOPHIL # BLD AUTO: ABNORMAL K/UL (ref 0–0.5)
EOSINOPHIL NFR BLD: 14 % (ref 0–8)
ERYTHROCYTE [DISTWIDTH] IN BLOOD BY AUTOMATED COUNT: 14.5 % (ref 11.5–14.5)
EST. GFR  (NO RACE VARIABLE): >60 ML/MIN/1.73 M^2
FERRITIN SERPL-MCNC: 1896 NG/ML (ref 20–300)
FINAL PATHOLOGIC DIAGNOSIS: NORMAL
FRACTIONAL SHORTENING: 31 % (ref 28–44)
GIANT PLATELETS BLD QL SMEAR: PRESENT
GLUCOSE SERPL-MCNC: 93 MG/DL (ref 70–110)
GROSS: NORMAL
HAV IGM SERPL QL IA: NORMAL
HBV CORE IGM SERPL QL IA: NORMAL
HBV SURFACE AG SERPL QL IA: NORMAL
HCT VFR BLD AUTO: 36.8 % (ref 40–54)
HCV AB SERPL QL IA: NORMAL
HDLC SERPL-MCNC: 13 MG/DL (ref 40–75)
HDLC SERPL: 11.7 % (ref 20–50)
HGB BLD-MCNC: 12.8 G/DL (ref 14–18)
HYPOCHROMIA BLD QL SMEAR: ABNORMAL
IMM GRANULOCYTES # BLD AUTO: ABNORMAL K/UL (ref 0–0.04)
IMM GRANULOCYTES NFR BLD AUTO: ABNORMAL % (ref 0–0.5)
INR PPP: 1.4 (ref 0.8–1.2)
INTERVENTRICULAR SEPTUM: 0.6 CM (ref 0.6–1.1)
IVC DIAMETER: 1.86 CM
IVRT: 71.36 MS
LA MAJOR: 3.93 CM
LA MINOR: 3.83 CM
LA WIDTH: 4.23 CM
LDH SERPL L TO P-CCNC: 1112 U/L (ref 110–260)
LDLC SERPL CALC-MCNC: 52.8 MG/DL (ref 63–159)
LEFT ATRIUM SIZE: 2.2 CM
LEFT ATRIUM VOLUME INDEX MOD: 24.5 ML/M2
LEFT ATRIUM VOLUME INDEX: 15.7 ML/M2
LEFT ATRIUM VOLUME MOD: 47.72 ML
LEFT ATRIUM VOLUME: 30.69 CM3
LEFT INTERNAL DIMENSION IN SYSTOLE: 2.9 CM (ref 2.1–4)
LEFT VENTRICLE DIASTOLIC VOLUME INDEX: 47.87 ML/M2
LEFT VENTRICLE DIASTOLIC VOLUME: 93.35 ML
LEFT VENTRICLE MASS INDEX: 56.3 G/M2
LEFT VENTRICLE SYSTOLIC VOLUME INDEX: 16.7 ML/M2
LEFT VENTRICLE SYSTOLIC VOLUME: 32.58 ML
LEFT VENTRICULAR INTERNAL DIMENSION IN DIASTOLE: 4.2 CM (ref 3.5–6)
LEFT VENTRICULAR MASS: 109.8 G
LV LATERAL E/E' RATIO: 3.87
LV SEPTAL E/E' RATIO: 4.83
LYMPHOCYTES # BLD AUTO: ABNORMAL K/UL (ref 1–4.8)
LYMPHOCYTES NFR BLD: 12 % (ref 18–48)
Lab: 28 ML
Lab: NORMAL
MAGNESIUM SERPL-MCNC: 1.9 MG/DL (ref 1.6–2.6)
MCH RBC QN AUTO: 28.3 PG (ref 27–31)
MCHC RBC AUTO-ENTMCNC: 34.8 G/DL (ref 32–36)
MCV RBC AUTO: 81 FL (ref 82–98)
METAMYELOCYTES NFR BLD MANUAL: 1 %
MICROSCOPIC EXAM: NORMAL
MITOCHONDRIA AB TITR SER IF: NORMAL {TITER}
MONOCYTES # BLD AUTO: ABNORMAL K/UL (ref 0.3–1)
MONOCYTES NFR BLD: 9 % (ref 4–15)
MV A" WAVE DURATION": 144.62 MS
MV PEAK A VEL: 0.32 M/S
MV PEAK E VEL: 0.58 M/S
MYELOCYTES NFR BLD MANUAL: 1 %
NEUTROPHILS NFR BLD: 61 % (ref 38–73)
NEUTS BAND NFR BLD MANUAL: 2 %
NONHDLC SERPL-MCNC: 98 MG/DL
NRBC BLD-RTO: 0 /100 WBC
OHS CV RV/LV RATIO: 0.9 CM
OHS QRS DURATION: 82 MS
OHS QTC CALCULATION: 419 MS
OSMOLALITY SERPL: 283 MOSM/KG (ref 280–300)
OVALOCYTES BLD QL SMEAR: ABNORMAL
PHOSPHATE SERPL-MCNC: 2.9 MG/DL (ref 2.7–4.5)
PISA TR MAX VEL: 2.43 M/S
PLATELET # BLD AUTO: 195 K/UL (ref 150–450)
PLATELET BLD QL SMEAR: ABNORMAL
PMV BLD AUTO: 10.5 FL (ref 9.2–12.9)
POIKILOCYTOSIS BLD QL SMEAR: SLIGHT
POTASSIUM SERPL-SCNC: 5 MMOL/L (ref 3.5–5.1)
PROT SERPL-MCNC: 5.8 G/DL (ref 6–8.4)
PROTHROMBIN TIME: 15.1 SEC (ref 9–12.5)
PULM VEIN A" WAVE DURATION": 144.62 MS
PULM VEIN S/D RATIO: 0.7
PULMONIC VEIN PEAK A VELOCITY: 0.3 M/S
PV PEAK D VEL: 0.56 M/S
PV PEAK S VEL: 0.39 M/S
RA MAJOR: 3.59 CM
RA PRESSURE ESTIMATED: 8 MMHG
RA WIDTH: 3.71 CM
RBC # BLD AUTO: 4.52 M/UL (ref 4.6–6.2)
RIGHT ATRIAL AREA: 12.8 CM2
RIGHT VENTRICLE DIASTOLIC BASEL DIMENSION: 3.8 CM
RV TB RVSP: 10 MMHG
RV TISSUE DOPPLER FREE WALL SYSTOLIC VELOCITY 1 (APICAL 4 CHAMBER VIEW): 15.62 CM/S
SINUS: 3.19 CM
SMUDGE CELLS BLD QL SMEAR: PRESENT
SODIUM SERPL-SCNC: 131 MMOL/L (ref 136–145)
SPHEROCYTES BLD QL SMEAR: ABNORMAL
STJ: 2.45 CM
TARGETS BLD QL SMEAR: ABNORMAL
TDI LATERAL: 0.15 M/S
TDI SEPTAL: 0.12 M/S
TDI: 0.14 M/S
TR MAX PG: 24 MMHG
TRICUSPID ANNULAR PLANE SYSTOLIC EXCURSION: 2.91 CM
TRIGL SERPL-MCNC: 226 MG/DL (ref 30–150)
TV PEAK GRADIENT: 24 MMHG
TV REST PULMONARY ARTERY PRESSURE: 32 MMHG
WBC # BLD AUTO: 19.14 K/UL (ref 3.9–12.7)
WBC TOXIC VACUOLES BLD QL SMEAR: PRESENT
Z-SCORE OF LEFT VENTRICULAR DIMENSION IN END DIASTOLE: -2.8
Z-SCORE OF LEFT VENTRICULAR DIMENSION IN END SYSTOLE: -1.3

## 2024-12-27 PROCEDURE — 83930 ASSAY OF BLOOD OSMOLALITY: CPT | Performed by: HOSPITALIST

## 2024-12-27 PROCEDURE — 83735 ASSAY OF MAGNESIUM: CPT | Performed by: HOSPITALIST

## 2024-12-27 PROCEDURE — 25000003 PHARM REV CODE 250: Performed by: HOSPITALIST

## 2024-12-27 PROCEDURE — 80074 ACUTE HEPATITIS PANEL: CPT | Performed by: STUDENT IN AN ORGANIZED HEALTH CARE EDUCATION/TRAINING PROGRAM

## 2024-12-27 PROCEDURE — 85027 COMPLETE CBC AUTOMATED: CPT | Performed by: HOSPITALIST

## 2024-12-27 PROCEDURE — 80053 COMPREHEN METABOLIC PANEL: CPT | Performed by: HOSPITALIST

## 2024-12-27 PROCEDURE — 83615 LACTATE (LD) (LDH) ENZYME: CPT | Performed by: FAMILY MEDICINE

## 2024-12-27 PROCEDURE — 36415 COLL VENOUS BLD VENIPUNCTURE: CPT | Performed by: STUDENT IN AN ORGANIZED HEALTH CARE EDUCATION/TRAINING PROGRAM

## 2024-12-27 PROCEDURE — 83880 ASSAY OF NATRIURETIC PEPTIDE: CPT | Performed by: FAMILY MEDICINE

## 2024-12-27 PROCEDURE — 63600175 PHARM REV CODE 636 W HCPCS: Performed by: HOSPITALIST

## 2024-12-27 PROCEDURE — 80061 LIPID PANEL: CPT | Performed by: FAMILY MEDICINE

## 2024-12-27 PROCEDURE — 85610 PROTHROMBIN TIME: CPT | Performed by: HOSPITALIST

## 2024-12-27 PROCEDURE — 25000003 PHARM REV CODE 250: Performed by: FAMILY MEDICINE

## 2024-12-27 PROCEDURE — 84100 ASSAY OF PHOSPHORUS: CPT | Performed by: HOSPITALIST

## 2024-12-27 PROCEDURE — 11000001 HC ACUTE MED/SURG PRIVATE ROOM

## 2024-12-27 PROCEDURE — 85007 BL SMEAR W/DIFF WBC COUNT: CPT | Performed by: HOSPITALIST

## 2024-12-27 PROCEDURE — 82728 ASSAY OF FERRITIN: CPT | Performed by: FAMILY MEDICINE

## 2024-12-27 PROCEDURE — 36415 COLL VENOUS BLD VENIPUNCTURE: CPT | Performed by: HOSPITALIST

## 2024-12-27 RX ORDER — PREDNISONE 20 MG/1
60 TABLET ORAL DAILY
Status: DISCONTINUED | OUTPATIENT
Start: 2024-12-28 | End: 2024-12-28

## 2024-12-27 RX ORDER — TRIAMCINOLONE ACETONIDE 1 MG/G
CREAM TOPICAL 2 TIMES DAILY
Status: DISCONTINUED | OUTPATIENT
Start: 2024-12-27 | End: 2025-01-03 | Stop reason: HOSPADM

## 2024-12-27 RX ORDER — PREDNISONE 20 MG/1
60 TABLET ORAL DAILY
Status: DISCONTINUED | OUTPATIENT
Start: 2024-12-27 | End: 2024-12-27

## 2024-12-27 RX ADMIN — TRIAMCINOLONE ACETONIDE: 1 CREAM TOPICAL at 09:12

## 2024-12-27 RX ADMIN — TRIAMCINOLONE ACETONIDE: 1 CREAM TOPICAL at 08:12

## 2024-12-27 RX ADMIN — SODIUM CHLORIDE: 9 INJECTION, SOLUTION INTRAVENOUS at 04:12

## 2024-12-27 RX ADMIN — METHYLPREDNISOLONE SODIUM SUCCINATE 50 MG: 40 INJECTION, POWDER, FOR SOLUTION INTRAMUSCULAR; INTRAVENOUS at 09:12

## 2024-12-27 RX ADMIN — DIPHENHYDRAMINE HYDROCHLORIDE 25 MG: 25 CAPSULE ORAL at 08:12

## 2024-12-27 RX ADMIN — FAMOTIDINE 20 MG: 20 TABLET ORAL at 09:12

## 2024-12-27 RX ADMIN — FAMOTIDINE 20 MG: 20 TABLET ORAL at 08:12

## 2024-12-27 RX ADMIN — Medication 6 MG: at 08:12

## 2024-12-27 NOTE — ASSESSMENT & PLAN NOTE
Acute viral panel negative 12/15.  CT abd unremarkable liver.  -- AST/ALT trending up, hepatology consulted.  Follow up hepatic serologies.  Cont treatment for DRESS as above

## 2024-12-27 NOTE — ASSESSMENT & PLAN NOTE
30 yo man presents with >50% BSA of erythematous morbilliform rash with associated eosinophilia, leukocytosis, elevated LFTs, and previous LINK (now improving) with prodrome onset with subjective fever/chills and nausea/vomiting ~12/12 with rash onset ~12/20. Started bactrim 11/20 and flagyl ~11/25.   Clinical features and lab findings are most consistent with DRESS. Regiscar score: 3 (possible case). Most likely implicated medication is bactrim; however, cannot entirely rule out flagyl as a potential cause.    Labs  - 12/23 labs notable for normal ESR, elevated CRP, elevated lactic acid, elevated PT and INR, CMV quantitative not detected, troponin <0.006. EBV pending.   - 12/23 Blood Cultures NGTD.  - 12/15 Acute hepatitis panel with Hep B surface Ag non-reactive, hep B core IgM non-reactive, Hep A IgM non-reactive, Hep C ab non-reactive.   - 12/26 labs with normal TSH and free T4, elevated CPK of 262, normal troponin. CBC with leukocytosis and eosinophilia mildly improved from previous day. CMP with increasing AST and ALT, but PT and INR slightly improved from yesterday. Creatine remains stable at 1.4. EKG performed.  - 12/27 labs with slightly improved leukocytosis and eosinophilia. CMP with increasing AST and ALT (762, 1334). PT and INR stably elevated. Creatinine improved to 1.2.  LDH and ferritin elevated.      Imaging  - 12/26 CXR without acute findings  - 12/26 Liver US with Doppler with hepatosplenomegaly  - 12/23 Retroperitoneal US with no evidence of renal abnormalities. Echogenic debris in bladder possibly consistent with cystitis.  - 12/20 CT Abdomen and Pelvis without acute findings      Recommendations:  - Continue avoidance of Bactrim and Flagyl. Bactrim has been listed as a drug allergy.  - Recommend daily CBC with diff and CMP  - Continue following blood cultures from 12/23  - Continue prednisone 60 mg daily (or IV equivalent if patient unable to tolerate PO). Could consider increasing dose to  prednisone equivalent of 80mg/day but may cause increase in GI distress.   - Continue Pepcid BID for gastric protection while on steroids.   - Continue triamcinolone 0.1% cream BID to rash on trunk and limbs. Please special request 454 g jar from pharmacy to adequately cover affected body surface area  - Appreciate hepatology input  - s/p punch biopsy performed at bedside 12/26  - Will follow Echo results      Biopsy Site Care:  Remove bandage and clean with gentle soap and water. Apply Vaseline and a bandage. Repeat daily. Leave gelfoam in place and it will resorb on its own in the next few weeks.

## 2024-12-27 NOTE — TREATMENT PLAN
Hepatology Treatment Plan    Alonzo Nascimento Jr. is a 29 y.o. male admitted to hospital 12/24/2024 (Hospital Day: 4) due to DRESS syndrome.     Interval History    LFT's worse. LINK better.     Objective  Temp:  [97.6 °F (36.4 °C)-100.9 °F (38.3 °C)] 99 °F (37.2 °C) (12/27 1155)  Pulse:  [] 95 (12/27 1155)  BP: (110-127)/(56-67) 114/56 (12/27 1155)  Resp:  [18] 18 (12/27 1155)  SpO2:  [95 %-100 %] 97 % (12/27 1155)    Laboratory    Lab Results   Component Value Date    WBC 19.14 (H) 12/27/2024    HGB 12.8 (L) 12/27/2024    HCT 36.8 (L) 12/27/2024    MCV 81 (L) 12/27/2024     12/27/2024       Lab Results   Component Value Date     (L) 12/27/2024    K 5.0 12/27/2024     12/27/2024    CO2 22 (L) 12/27/2024    BUN 19 12/27/2024    CREATININE 1.2 12/27/2024    CALCIUM 7.6 (L) 12/27/2024       Lab Results   Component Value Date    ALBUMIN 1.9 (L) 12/27/2024    ALT 1,334 (H) 12/27/2024     (H) 12/27/2024    ALKPHOS 249 (H) 12/27/2024    BILITOT 2.1 (H) 12/27/2024       Lab Results   Component Value Date    INR 1.4 (H) 12/27/2024    INR 1.4 (H) 12/26/2024    INR 1.5 (H) 12/25/2024       MELD 3.0: 21 at 12/27/2024  2:46 AM  MELD-Na: 20 at 12/27/2024  2:46 AM  Calculated from:  Serum Creatinine: 1.2 mg/dL at 12/27/2024  2:46 AM  Serum Sodium: 131 mmol/L at 12/27/2024  2:46 AM  Total Bilirubin: 2.1 mg/dL at 12/27/2024  2:46 AM  Serum Albumin: 1.9 g/dL at 12/27/2024  2:46 AM  INR(ratio): 1.4 at 12/27/2024  2:46 AM  Age at listing (hypothetical): 29 years  Sex: Male at 12/27/2024  2:46 AM      Assessment    29M w/ PMHx s/f left achilles tendon rupture s/p repair on 07/14/23 complicated by osteomyelitis of left calcaneus s/p I&D 12/4/24 on PO bactrim and flagyl. He was prescribed PO Bactrim and Flagy x 6 weeks after this most recent procedure with end date 1/1/2025. He initially presented to [facility] on 12/14 and 12/20 for evaluation of the nausea/dehydaration and was discharged from ED after IV  fluids on both occasions. About 2-3 days ago, shortly after his ED vist 12/20, the patient reports developing a full body rash; on 12/23, he presented to [facility] for evaluation of rash. Pt reported having nausea, vomiting, decreased po intake, body aches, for 1-2 weeks. The rash is described as >50% BSA covered in stereotypical morbiliform. No mucous membrane involvement. Hepatology consulted for elevated LFTs in context of DRESS. On evaluation, patient without edema or ascites, abdominal pain (incl. RUQ pain), coagulopathy (INR <1.5; prev. >1.5), si/sx GI bleeding, or encephalopathy (AOx4).    Problem List:  DRESS Syndrome  Acute Liver Injury   LINK    Recommendations:   - Agree with dermatology consult, management of DRESS per dermatology   - U/S liver w/ doppler obtained and reviewed   - Pending IgG, ASMA, JODY and AMA   - Re- check acute hep panel out of an abundance of caution; ordered   - Obtain 2D Echo; ordered     Thank you for involving us in the care of Alonzo Nascimento Jr.. Please call with any additional concerns or questions.    Juan Carlos Rogel DO   Gastroenterology Fellow PGY- V  [facility]

## 2024-12-27 NOTE — ASSESSMENT & PLAN NOTE
RESOLVED  -Unclear baseline, 1.9 on admission and improving  -Renal US unremarkable.  -Continue to monitor while admitted, pt. Will need PCP and/or nephrologist at discharge  -S/p IV fluids, encourage PO intake

## 2024-12-27 NOTE — ASSESSMENT & PLAN NOTE
Acute viral panel negative 12/15.  CT abd unremarkable liver.  -- AST/ALT trending up, hepatology consulted.  Follow up liver ultrasound and serologies.  Cont treatment for DRESS as above

## 2024-12-27 NOTE — SUBJECTIVE & OBJECTIVE
Subjective:     Principal Problem:DRESS syndrome    Interval History: Patient reports overall rash remains stable. Denies any new areas of involvement. Notes creams help with itch but still only has small tube of triamcinolone. Notes lips are dry but has not noted erosions or mucosal lesions. Denies ocular or genitourinary symptoms or lesions. Notes he was able to tolerate more PO intake yesterday without vomiting. Denies abdominal pain. Notes he was told this morning his temperature was 100.9 F.    Medications:  Continuous Infusions:   0.9% NaCl   Intravenous Continuous 125 mL/hr at 12/27/24 0456 New Bag at 12/27/24 0456     Scheduled Meds:   enoxparin  40 mg Subcutaneous Daily    famotidine  20 mg Oral BID    methylPREDNISolone injection (PEDS and ADULTS)  50 mg Intravenous Daily    polyethylene glycol  17 g Oral Daily    senna-docusate 8.6-50 mg  1 tablet Oral BID    triamcinolone acetonide 0.1%   Topical (Top) BID     PRN Meds:  Current Facility-Administered Medications:     acetaminophen, 650 mg, Oral, Q8H PRN    dextrose 50%, 12.5 g, Intravenous, PRN    dextrose 50%, 25 g, Intravenous, PRN    diphenhydrAMINE, 25 mg, Oral, Q6H PRN    glucagon (human recombinant), 1 mg, Intramuscular, PRN    glucose, 16 g, Oral, PRN    glucose, 24 g, Oral, PRN    melatonin, 6 mg, Oral, Nightly PRN    naloxone, 0.02 mg, Intravenous, PRN    ondansetron, 4 mg, Intravenous, Q8H PRN    sodium chloride 0.9%, 5 mL, Intravenous, PRN    Review of Systems   HENT:  Negative for mouth sores.    Eyes:  Negative for eye irritation and visual change.   Gastrointestinal:  Negative for vomiting and abdominal pain.   Genitourinary:  Negative for dysuria and genital sores.   Skin:  Positive for itching, rash and dry lips.     Objective:     Vital Signs (Most Recent):  Temp: (!) 100.9 °F (38.3 °C) (12/27/24 0734)  Pulse: 104 (12/27/24 0734)  Resp: 18 (12/27/24 0734)  BP: (!) 127/57 (12/27/24 0734)  SpO2: 98 % (12/27/24 0734) Vital Signs (24h  "Range):  Temp:  [97.6 °F (36.4 °C)-100.9 °F (38.3 °C)] 100.9 °F (38.3 °C)  Pulse:  [] 104  Resp:  [18] 18  SpO2:  [95 %-100 %] 98 %  BP: (105-127)/(57-67) 127/57     Weight: 72 kg (158 lb 11.7 oz)  Body mass index is 20.94 kg/m².     Physical Exam   Constitutional: He appears well-developed and well-nourished.   Musculoskeletal: Lymphadenopathy:      Head:      Right side of head: No submental, submandibular, preauricular or posterior auricular adenopathy.      Left side of head: No submental, submandibular, preauricular or posterior auricular adenopathy.      Cervical: No cervical adenopathy.      Upper Body:      Right upper body: No supraclavicular or axillary adenopathy.      Left upper body: No supraclavicular or axillary adenopathy.     Lymphadenopathy:        Head (right side): No submental, no submandibular, no preauricular and no posterior auricular adenopathy present.        Head (left side): No submental, no submandibular, no preauricular and no posterior auricular adenopathy present.     He has no cervical adenopathy.        Right: No supraclavicular adenopathy present.        Left: No supraclavicular adenopathy present.   Neurological: He is alert and oriented to person, place, and time.   Psychiatric: He has a normal mood and affect.   Skin:   Areas Examined (abnormalities noted in diagram):   Scalp / Hair Palpated and Inspected  Head / Face Inspection Performed  Neck Inspection Performed  Chest / Axilla Inspection Performed  Abdomen Inspection Performed  Genitals / Buttocks / Groin Inspection Performed  Back Inspection Performed  RUE Inspected  LUE Inspection Performed  RLE Inspected  LLE Inspection Performed  Nails and Digits Inspection Performed  Gland Inspection Performed                             Significant Labs: Blood Culture: No results for input(s): "LABBLOO" in the last 48 hours.  CBC:   Recent Labs   Lab 12/26/24  0345 12/27/24  0246   WBC 24.05* 19.14*   HGB 12.8* 12.8*   HCT 36.5* " 36.8*    195     CMP:   Recent Labs   Lab 12/26/24  0345 12/27/24  0246   * 131*   K 5.0 5.0    103   CO2 20* 22*   * 93   BUN 23* 19   CREATININE 1.4 1.2   CALCIUM 7.5* 7.6*   PROT 5.6* 5.8*   ALBUMIN 1.9* 1.9*   BILITOT 1.8* 2.1*   ALKPHOS 236* 249*   * 762*   * 1,334*   ANIONGAP 9 6*     Coagulation:   Recent Labs   Lab 12/25/24  0902 12/26/24  0345 12/27/24  0246   INR 1.5* 1.4* 1.4*     Lipid Panel:   Recent Labs   Lab 12/27/24  0246   CHOL 111*   HDL 13*   LDLCALC 52.8*   TRIG 226*   CHOLHDL 11.7*     All pertinent labs within the past 24 hours have been reviewed.    Significant Imaging: X-Ray: I have reviewed all pertinent results/findings within the past 24 hours.  U/S: I have reviewed all pertinent results/findings within the past 24 hours.

## 2024-12-27 NOTE — SUBJECTIVE & OBJECTIVE
Subjective/Interval History:  Pt says rash is about the same today as yesterday.  Does note that topical triamcinolone is helping.  No urinary symptoms.  No change to vision.  Low-grade temperature this morning 100.9° F, asymptomatic.    Labs personally reviewed:  WBC 19.14, Na 131, serum osmolality normal at 283, K 5.0, Cr 1.2, , total bilirubin 2.1, , ALT 1334    Hepatology consulted:  Hepatic serologic workup pending, follow up acute hepatitis results follow up echo results.    Dermatology consulted:  Cont prednisone, Pepcid, triamcinolone    Objective:     Vital signs reviewed, stable.  T-max 100.9° 12/27 at 7:34 a.m.     Physical Exam  Constitutional:       General: He is not in acute distress.     Appearance: He is not ill-appearing.   Cardiovascular:      Rate and Rhythm: Normal rate and regular rhythm.      Heart sounds: No murmur heard.  Pulmonary:      Effort: No respiratory distress.   Abdominal:      General: Bowel sounds are normal. There is no distension.      Tenderness: There is no abdominal tenderness.   Musculoskeletal:      Right lower leg: No edema.      Left lower leg: No edema.   Skin:     Comments: Diffuse maculopapular rash, see media   Neurological:      Mental Status: He is alert and oriented to person, place, and time.   Psychiatric:         Mood and Affect: Mood normal.             Significant Labs: All pertinent labs within the past 24 hours have been reviewed.    Significant Imaging: I have reviewed all pertinent imaging results/findings within the past 24 hours.

## 2024-12-27 NOTE — ASSESSMENT & PLAN NOTE
Likely d/t liver dysfunction and acute illness  Monitor volume status while on IV fluids.  Cont to monitor on labs

## 2024-12-27 NOTE — ASSESSMENT & PLAN NOTE
Pt. On long term bactrim therapy presenting with maculopapular rash involving trunk and extremities, >50% BSA, elevated LFTs, LINK, leukocytosis, Eosinophilia, consistent concerning DRESS syndrome  -Dermatology consulted: s/p biopsy 12/26.  cont systemic and topical steroids  -LFTs remain elevated, hepatology consulted:  Liver ultrasound with hepatosplenomegaly.  Follow up hepatic serologic workup and follow up echo.

## 2024-12-27 NOTE — PROGRESS NOTES
[facility] - Med S[facility]  Dermatology  Progress Note    Patient Name: Alonzo Nascimento Jr.  MRN: [id number]  Admission Date: 12/24/2024  Hospital Length of Stay: 3 days  Attending Physician: Dave Song III*  Primary Care Provider: Sohan Strange MD     Subjective:     Principal Problem:DRESS syndrome    Interval History: Patient reports overall rash remains stable. Denies any new areas of involvement. Notes creams help with itch but still only has small tube of triamcinolone. Notes lips are dry but has not noted erosions or mucosal lesions. Denies ocular or genitourinary symptoms or lesions. Notes he was able to tolerate more PO intake yesterday without vomiting. Denies abdominal pain. Notes he was told this morning his temperature was 100.9 F.    Medications:  Continuous Infusions:   0.9% NaCl   Intravenous Continuous 125 mL/hr at 12/27/24 0456 New Bag at 12/27/24 0456     Scheduled Meds:   enoxparin  40 mg Subcutaneous Daily    famotidine  20 mg Oral BID    methylPREDNISolone injection (PEDS and ADULTS)  50 mg Intravenous Daily    polyethylene glycol  17 g Oral Daily    senna-docusate 8.6-50 mg  1 tablet Oral BID    triamcinolone acetonide 0.1%   Topical (Top) BID     PRN Meds:  Current Facility-Administered Medications:     acetaminophen, 650 mg, Oral, Q8H PRN    dextrose 50%, 12.5 g, Intravenous, PRN    dextrose 50%, 25 g, Intravenous, PRN    diphenhydrAMINE, 25 mg, Oral, Q6H PRN    glucagon (human recombinant), 1 mg, Intramuscular, PRN    glucose, 16 g, Oral, PRN    glucose, 24 g, Oral, PRN    melatonin, 6 mg, Oral, Nightly PRN    naloxone, 0.02 mg, Intravenous, PRN    ondansetron, 4 mg, Intravenous, Q8H PRN    sodium chloride 0.9%, 5 mL, Intravenous, PRN    Review of Systems   HENT:  Negative for mouth sores.    Eyes:  Negative for eye irritation and visual change.   Gastrointestinal:  Negative for vomiting and abdominal pain.   Genitourinary:  Negative for dysuria and genital sores.   Skin:  Positive for  itching, rash and dry lips.     Objective:     Vital Signs (Most Recent):  Temp: (!) 100.9 °F (38.3 °C) (12/27/24 0734)  Pulse: 104 (12/27/24 0734)  Resp: 18 (12/27/24 0734)  BP: (!) 127/57 (12/27/24 0734)  SpO2: 98 % (12/27/24 0734) Vital Signs (24h Range):  Temp:  [97.6 °F (36.4 °C)-100.9 °F (38.3 °C)] 100.9 °F (38.3 °C)  Pulse:  [] 104  Resp:  [18] 18  SpO2:  [95 %-100 %] 98 %  BP: (105-127)/(57-67) 127/57     Weight: 72 kg (158 lb 11.7 oz)  Body mass index is 20.94 kg/m².     Physical Exam   Constitutional: He appears well-developed and well-nourished.   Musculoskeletal: Lymphadenopathy:      Head:      Right side of head: No submental, submandibular, preauricular or posterior auricular adenopathy.      Left side of head: No submental, submandibular, preauricular or posterior auricular adenopathy.      Cervical: No cervical adenopathy.      Upper Body:      Right upper body: No supraclavicular or axillary adenopathy.      Left upper body: No supraclavicular or axillary adenopathy.     Lymphadenopathy:        Head (right side): No submental, no submandibular, no preauricular and no posterior auricular adenopathy present.        Head (left side): No submental, no submandibular, no preauricular and no posterior auricular adenopathy present.     He has no cervical adenopathy.        Right: No supraclavicular adenopathy present.        Left: No supraclavicular adenopathy present.   Neurological: He is alert and oriented to person, place, and time.   Psychiatric: He has a normal mood and affect.   Skin:   Areas Examined (abnormalities noted in diagram):   Scalp / Hair Palpated and Inspected  Head / Face Inspection Performed  Neck Inspection Performed  Chest / Axilla Inspection Performed  Abdomen Inspection Performed  Genitals / Buttocks / Groin Inspection Performed  Back Inspection Performed  RUE Inspected  LUE Inspection Performed  RLE Inspected  LLE Inspection Performed  Nails and Digits Inspection  "Performed  Gland Inspection Performed                             Significant Labs: Blood Culture: No results for input(s): "LABBLOO" in the last 48 hours.  CBC:   Recent Labs   Lab 12/26/24  0345 12/27/24  0246   WBC 24.05* 19.14*   HGB 12.8* 12.8*   HCT 36.5* 36.8*    195     CMP:   Recent Labs   Lab 12/26/24  0345 12/27/24  0246   * 131*   K 5.0 5.0    103   CO2 20* 22*   * 93   BUN 23* 19   CREATININE 1.4 1.2   CALCIUM 7.5* 7.6*   PROT 5.6* 5.8*   ALBUMIN 1.9* 1.9*   BILITOT 1.8* 2.1*   ALKPHOS 236* 249*   * 762*   * 1,334*   ANIONGAP 9 6*     Coagulation:   Recent Labs   Lab 12/25/24  0902 12/26/24  0345 12/27/24  0246   INR 1.5* 1.4* 1.4*     Lipid Panel:   Recent Labs   Lab 12/27/24  0246   CHOL 111*   HDL 13*   LDLCALC 52.8*   TRIG 226*   CHOLHDL 11.7*     All pertinent labs within the past 24 hours have been reviewed.    Significant Imaging: X-Ray: I have reviewed all pertinent results/findings within the past 24 hours.  U/S: I have reviewed all pertinent results/findings within the past 24 hours.      Assessment/Plan:     Oncology  * DRESS syndrome  30 yo man presents with >50% BSA of erythematous morbilliform rash with associated eosinophilia, leukocytosis, elevated LFTs, and previous LINK (now improving) with prodrome onset with subjective fever/chills and nausea/vomiting ~12/12 with rash onset ~12/20. Started bactrim 11/20 and flagyl ~11/25.   Clinical features and lab findings are most consistent with DRESS. Regiscar score: 3 (possible case). Most likely implicated medication is bactrim; however, cannot entirely rule out flagyl as a potential cause.    Labs  - 12/23 labs notable for normal ESR, elevated CRP, elevated lactic acid, elevated PT and INR, CMV quantitative not detected, troponin <0.006. EBV pending.   - 12/23 Blood Cultures NGTD.  - 12/15 Acute hepatitis panel with Hep B surface Ag non-reactive, hep B core IgM non-reactive, Hep A IgM non-reactive, Hep C " ab non-reactive.   - 12/26 labs with normal TSH and free T4, elevated CPK of 262, normal troponin. CBC with leukocytosis and eosinophilia mildly improved from previous day. CMP with increasing AST and ALT, but PT and INR slightly improved from yesterday. Creatine remains stable at 1.4. EKG performed.  - 12/27 labs with slightly improved leukocytosis and eosinophilia. CMP with increasing AST and ALT (762, 1334). PT and INR stably elevated. Creatinine improved to 1.2.  LDH and ferritin elevated.      Imaging  - 12/26 CXR without acute findings  - 12/26 Liver US with Doppler with hepatosplenomegaly  - 12/23 Retroperitoneal US with no evidence of renal abnormalities. Echogenic debris in bladder possibly consistent with cystitis.  - 12/20 CT Abdomen and Pelvis without acute findings      Recommendations:  - Continue avoidance of Bactrim and Flagyl. Bactrim has been listed as a drug allergy.  - Recommend daily CBC with diff and CMP  - Continue following blood cultures from 12/23  - Continue prednisone 60 mg daily (or IV equivalent if patient unable to tolerate PO). Could consider increasing dose to prednisone equivalent of 80mg/day but may cause increase in GI distress.   - Continue Pepcid BID for gastric protection while on steroids.   - Continue triamcinolone 0.1% cream BID to rash on trunk and limbs. Please special request 454 g jar from pharmacy to adequately cover affected body surface area  - s/p punch biopsy performed at bedside 12/26  - Appreciate hepatology input  - Will follow Echo results  - Will follow repeat Hepatitis panel      Biopsy Site Care:  Remove bandage and clean with gentle soap and water. Apply Vaseline and a bandage. Repeat daily. Leave gelfoam in place and it will resorb on its own in the next few weeks.               Thank you for your consult. I will follow-up with patient. Please contact us if you have any additional questions.    Riky Bang MD  Dermatology  [facility]  Surg    Dermatology staff note 3pm   Records reviewed, consults from hepatology, gastro, infectious disease and nutrition read and appreciated.  EKG was not changed from ones previously, troponin normal.   He has been changed to IV medrol 50mg from prednisone due to gi intolerance.  Will need to monitor dose for improvement.   His biopsy was read today and is c/w dress:  Skin, right arm, punch biopsy:   -DERMATITIS WITH SCATTERED NEUTROPHILS AND EOSINOPHILS, see comment     COMMENT:  Clinical images were reviewed in epic and differential diagnosis is noted.  The histological findings (see microscopic description) are supportive of a drug reaction (DRESS syndrome in the correct clinical / laboratory contex     Agree with Dr Das assessment and recommendations above    We will continue to follow.       Geri Sharma MD  [phone]

## 2024-12-27 NOTE — ASSESSMENT & PLAN NOTE
Unclear baseline, 1.9 on admission and improving  Renal US unremarkable.  -Continue to monitor while admitted, pt. Will need PCP and/or nephrologist at discharge

## 2024-12-27 NOTE — ASSESSMENT & PLAN NOTE
Patient has fever, tachycardia, leukocytosis with abnormal liver function and lactic acidosis on admission.  Although he has a bone infection it is subacute and doubt it is causing these systemic symptoms and signs.  -- monitor for infection, treat DRESS  -- s/p IV fluids, encourage PO intake

## 2024-12-27 NOTE — SUBJECTIVE & OBJECTIVE
Subjective/Interval History:  Pt says rash on torso and lower extremities improving.  Overall, pruritus has improved.  Pt does report worsening scalp pruritus especially at night.  One episode of nausea and vomiting 12/25.  pt now tolerating diet.    Labs personally reviewed: WBC 24.05, Na 131, Cr 1.4,  from 319, albumin 1.9, bilirubin 1.8 from 2.4,  from 347,  from 694    Hepatology consulted:  Recommended ultrasound liver with Doppler and hepatic serologic workup.    Dermatology consulted:  Cont prednisone 60 mg or IV equivalent, cont Pepcid b.i.d., cont triamcinolone.    Objective:     Vital signs reviewed, stable.  T-max 101.5° 12/25 at 11:41 a.m.     Physical Exam  Constitutional:       General: He is not in acute distress.     Appearance: He is not ill-appearing.   Cardiovascular:      Rate and Rhythm: Normal rate and regular rhythm.      Heart sounds: No murmur heard.  Pulmonary:      Effort: No respiratory distress.   Abdominal:      General: Bowel sounds are normal. There is no distension.      Tenderness: There is no abdominal tenderness.   Musculoskeletal:      Right lower leg: No edema.      Left lower leg: No edema.   Skin:     Comments: Diffuse maculopapular rash, improved on torso and lower extremities   Neurological:      Mental Status: He is alert and oriented to person, place, and time.   Psychiatric:         Mood and Affect: Mood normal.             Significant Labs: All pertinent labs within the past 24 hours have been reviewed.    Significant Imaging: I have reviewed all pertinent imaging results/findings within the past 24 hours.

## 2024-12-27 NOTE — PROGRESS NOTES
"[facility] Medicine  Progress Note    Patient Name: Alonzo Nascimento Jr.  MRN: [id number]  Patient Class: IP- Inpatient   Admission Date: 12/24/2024  Length of Stay: 3 days  Attending Physician: Dave Song III*  Primary Care Provider: Sohan Strange MD        Subjective     Principal Problem:DRESS syndrome        HPI:  28 yo M with PMHx foot osteomyelitis on PO bactrim and flagyl who presented to [facility] 12/23 for evaluation of rash. Pt reported having nausea, vomiting, decreased po intake, body aches, for 1-2 weeks. Pt. Has a history of left achilles tendon rupture s/p repair on 07/14/23 complicated by osteomyelitis of left calcaneus s/p I&D 12/4/24. He was prescribed PO Bactrim and Flagy x 6 weeks after this most recent procedure with end date 1/1/2025.  He initially presented to [facility]  E on 12/14 and 12/20 for evaluation of the nausea/dehydaration and was discharged from ED after IV fluids on both occasions. About 2-3 days ago, shortly after his ED vist 12/20, the patient reports developing a full body rash. The Rash is described as >50% BSA covered in stereotypical morbiliform. No mucous membrane involvement.     In the ED, the patient was tachycardic (120s> 90s), tachypneic (20s), but otherwise hemodynamically stable.  Labs were remarkable for leukocytosis (28.3, with 12% eosinophilia), elevated INR (2.1), elevated creatinine (1.9- from a previous 1.4), hyponatremia (125), elevated LFTs (AST:  384, ALT: 635, T bili: 4.1, ALP:  341), elevated lactic acid (2.7).  VBG showed a pH of 7.34, pCO2 of 49.7 and HC03 of 22.9.  Retroperitoneal ultrasound showed no acute process.  CT abdomen and pelvis (12/20) showed no acute findings.  Referring provider is concerned about DRESS syndrome secondary to Bactrim, so patient transferred to [facility] for Dematology evaluation.     Pt. Was admitted to [facility] while awaiting bed, per [facility] medicine team, "He was started on oral prednisone 50 " "mg daily and clobetasol cream...... Patient's Lfts and LINK were improving on 12/24. He was tolerating a soft diet"    Overview/Hospital Course:  Updated hospital course pending.    Subjective/Interval History:  Pt says rash is about the same today as yesterday.  Does note that topical triamcinolone is helping.  No urinary symptoms.  No change to vision.  Low-grade temperature this morning 100.9° F, asymptomatic.    Labs personally reviewed:  WBC 19.14, Na 131, serum osmolality normal at 283, K 5.0, Cr 1.2, , total bilirubin 2.1, , ALT 1334    Hepatology consulted:  Hepatic serologic workup pending, follow up acute hepatitis results follow up echo results.    Dermatology consulted:  Cont prednisone, Pepcid, triamcinolone    Objective:     Vital signs reviewed, stable.  T-max 100.9° 12/27 at 7:34 a.m.     Physical Exam  Constitutional:       General: He is not in acute distress.     Appearance: He is not ill-appearing.   Cardiovascular:      Rate and Rhythm: Normal rate and regular rhythm.      Heart sounds: No murmur heard.  Pulmonary:      Effort: No respiratory distress.   Abdominal:      General: Bowel sounds are normal. There is no distension.      Tenderness: There is no abdominal tenderness.   Musculoskeletal:      Right lower leg: No edema.      Left lower leg: No edema.   Skin:     Comments: Diffuse maculopapular rash, see media   Neurological:      Mental Status: He is alert and oriented to person, place, and time.   Psychiatric:         Mood and Affect: Mood normal.             Significant Labs: All pertinent labs within the past 24 hours have been reviewed.    Significant Imaging: I have reviewed all pertinent imaging results/findings within the past 24 hours.      Assessment and Plan     * DRESS syndrome  Pt. On long term bactrim therapy presenting with maculopapular rash involving trunk and extremities, >50% BSA, elevated LFTs, LINK, leukocytosis, Eosinophilia, consistent concerning DRESS " syndrome  -Dermatology consulted: s/p biopsy 12/26.  cont systemic and topical steroids  -LFTs remain elevated, hepatology consulted:  Liver ultrasound with hepatosplenomegaly.  Follow up hepatic serologic workup and follow up echo.    Liver injury  Acute viral panel negative 12/15.  CT abd unremarkable liver.  -- AST/ALT trending up, hepatology consulted.  Follow up hepatic serologies.  Cont treatment for DRESS as above      Hypoalbuminemia  Likely d/t liver dysfunction and acute illness  Monitor volume status while on IV fluids.  Cont to monitor on labs    Severe systemic inflammatory response syndrome (SIRS)  Patient has fever, tachycardia, leukocytosis with abnormal liver function and lactic acidosis on admission.  Although he has a bone infection it is subacute and doubt it is causing these systemic symptoms and signs.  -- monitor for infection, treat DRESS  -- s/p IV fluids, encourage PO intake    Acute kidney injury  RESOLVED  -Unclear baseline, 1.9 on admission and improving  -Renal US unremarkable.  -Continue to monitor while admitted, pt. Will need PCP and/or nephrologist at discharge  -S/p IV fluids, encourage PO intake    Hyponatremia  Normal serum osmolality indicates pseudohyponatremia  Cont to monitor on daily labs.    Acute osteomyelitis of left calcaneus  - appreciate ID consult .  No antibiotics for now. Arrange follow up with [facility] ID clinic at [facility] after his discharge to discuss antibiotic options.   -- 12/23 podiatry note reviewed and pt confirmed plan.  Needs weekly dressing change next due 12/30.        VTE Risk Mitigation (From admission, onward)           Ordered     enoxaparin injection 40 mg  Daily         12/24/24 1849     Place sequential compression device  Until discontinued         12/24/24 1849                    Discharge Planning   GARETH: 12/27/2024     Code Status: Full Code   Medical Readiness for Discharge Date:   Discharge Plan A: Hospital Transfer        Dave  ADRY Song III, MD  Department of Hospital Medicine   [facility]

## 2024-12-27 NOTE — ASSESSMENT & PLAN NOTE
Pt. On long term bactrim therapy presenting with maculopapular rash involving trunk and extremities, >50% BSA, elevated LFTs, LINK, leukocytosis, Eosinophilia, consistent concerning DRESS syndrome  -Dermatology consulted: s/p biopsy 12/26.  cont systemic and topical steroids  -LFTs remain elevated, hepatology consulted:  Follow up U/S liver and serologic workup.

## 2024-12-27 NOTE — PLAN OF CARE
Problem: Adult Inpatient Plan of Care  Goal: Plan of Care Review  Outcome: Progressing  Goal: Patient-Specific Goal (Individualized)  Outcome: Progressing  Goal: Absence of Hospital-Acquired Illness or Injury  Outcome: Progressing  Goal: Optimal Comfort and Wellbeing  Outcome: Progressing  Goal: Readiness for Transition of Care  Outcome: Progressing     Problem: Acute Kidney Injury/Impairment  Goal: Fluid and Electrolyte Balance  Outcome: Progressing  Goal: Improved Oral Intake  Outcome: Progressing  Goal: Effective Renal Function  Outcome: Progressing     Problem: Wound  Goal: Optimal Coping  Outcome: Progressing  Goal: Optimal Functional Ability  Outcome: Progressing  Goal: Absence of Infection Signs and Symptoms  Outcome: Progressing  Goal: Improved Oral Intake  Outcome: Progressing  Goal: Optimal Pain Control and Function  Outcome: Progressing  Goal: Skin Health and Integrity  Outcome: Progressing  Goal: Optimal Wound Healing  Outcome: Progressing     Problem: Fall Injury Risk  Goal: Absence of Fall and Fall-Related Injury  Outcome: Progressing     Problem: Sepsis/Septic Shock  Goal: Optimal Coping  Outcome: Progressing  Goal: Absence of Bleeding  Outcome: Progressing  Goal: Blood Glucose Level Within Targeted Range  Outcome: Progressing  Goal: Absence of Infection Signs and Symptoms  Outcome: Progressing  Goal: Optimal Nutrition Intake  Outcome: Progressing     Problem: Nausea and Vomiting  Goal: Nausea and Vomiting Relief  Outcome: Progressing     Problem: Electrolyte Imbalance  Goal: Electrolyte Balance  Outcome: Progressing     Problem: Skin or Soft Tissue Infection  Goal: Absence of Infection Signs and Symptoms  Outcome: Progressing

## 2024-12-28 PROBLEM — R50.9 FEVER: Status: ACTIVE | Noted: 2024-12-28

## 2024-12-28 LAB
ALBUMIN SERPL BCP-MCNC: 2.1 G/DL (ref 3.5–5.2)
ALP SERPL-CCNC: 302 U/L (ref 40–150)
ALT SERPL W/O P-5'-P-CCNC: 3113 U/L (ref 10–44)
ANION GAP SERPL CALC-SCNC: 10 MMOL/L (ref 8–16)
ANISOCYTOSIS BLD QL SMEAR: SLIGHT
AST SERPL-CCNC: 2163 U/L (ref 10–40)
BACTERIA BLD CULT: NORMAL
BACTERIA BLD CULT: NORMAL
BASOPHILS # BLD AUTO: 0.06 K/UL (ref 0–0.2)
BASOPHILS NFR BLD: 0.5 % (ref 0–1.9)
BILIRUB SERPL-MCNC: 3 MG/DL (ref 0.1–1)
BUN SERPL-MCNC: 15 MG/DL (ref 6–20)
CALCIUM SERPL-MCNC: 7.6 MG/DL (ref 8.7–10.5)
CHLORIDE SERPL-SCNC: 99 MMOL/L (ref 95–110)
CO2 SERPL-SCNC: 22 MMOL/L (ref 23–29)
CREAT SERPL-MCNC: 1.3 MG/DL (ref 0.5–1.4)
DIFFERENTIAL METHOD BLD: ABNORMAL
EBV DNA SPEC QL NAA+PROBE: DETECTED
EOSINOPHIL # BLD AUTO: 2.1 K/UL (ref 0–0.5)
EOSINOPHIL NFR BLD: 17 % (ref 0–8)
EPSTEIN BARR VIRUS SOURCE: ABNORMAL
ERYTHROCYTE [DISTWIDTH] IN BLOOD BY AUTOMATED COUNT: 14.6 % (ref 11.5–14.5)
EST. GFR  (NO RACE VARIABLE): >60 ML/MIN/1.73 M^2
GLUCOSE SERPL-MCNC: 110 MG/DL (ref 70–110)
HCT VFR BLD AUTO: 35.5 % (ref 40–54)
HGB BLD-MCNC: 12.4 G/DL (ref 14–18)
IMM GRANULOCYTES # BLD AUTO: 0.29 K/UL (ref 0–0.04)
IMM GRANULOCYTES NFR BLD AUTO: 2.3 % (ref 0–0.5)
INR PPP: 1.5 (ref 0.8–1.2)
LYMPHOCYTES # BLD AUTO: 3.4 K/UL (ref 1–4.8)
LYMPHOCYTES NFR BLD: 27.6 % (ref 18–48)
MAGNESIUM SERPL-MCNC: 2 MG/DL (ref 1.6–2.6)
MCH RBC QN AUTO: 28.7 PG (ref 27–31)
MCHC RBC AUTO-ENTMCNC: 34.9 G/DL (ref 32–36)
MCV RBC AUTO: 82 FL (ref 82–98)
MONOCYTES # BLD AUTO: 0.7 K/UL (ref 0.3–1)
MONOCYTES NFR BLD: 5.3 % (ref 4–15)
NEUTROPHILS # BLD AUTO: 5.9 K/UL (ref 1.8–7.7)
NEUTROPHILS NFR BLD: 47.3 % (ref 38–73)
NRBC BLD-RTO: 0 /100 WBC
PHOSPHATE SERPL-MCNC: 3.6 MG/DL (ref 2.7–4.5)
PLATELET # BLD AUTO: 164 K/UL (ref 150–450)
PLATELET BLD QL SMEAR: ABNORMAL
PMV BLD AUTO: 10 FL (ref 9.2–12.9)
POTASSIUM SERPL-SCNC: 4.6 MMOL/L (ref 3.5–5.1)
PROT SERPL-MCNC: 6.5 G/DL (ref 6–8.4)
PROTHROMBIN TIME: 16 SEC (ref 9–12.5)
RBC # BLD AUTO: 4.32 M/UL (ref 4.6–6.2)
SMUDGE CELLS BLD QL SMEAR: PRESENT
SODIUM SERPL-SCNC: 131 MMOL/L (ref 136–145)
WBC # BLD AUTO: 12.37 K/UL (ref 3.9–12.7)

## 2024-12-28 PROCEDURE — 25000003 PHARM REV CODE 250: Performed by: INTERNAL MEDICINE

## 2024-12-28 PROCEDURE — 86790 VIRUS ANTIBODY NOS: CPT | Performed by: STUDENT IN AN ORGANIZED HEALTH CARE EDUCATION/TRAINING PROGRAM

## 2024-12-28 PROCEDURE — 85610 PROTHROMBIN TIME: CPT | Performed by: HOSPITALIST

## 2024-12-28 PROCEDURE — 25000003 PHARM REV CODE 250: Performed by: HOSPITALIST

## 2024-12-28 PROCEDURE — 94761 N-INVAS EAR/PLS OXIMETRY MLT: CPT

## 2024-12-28 PROCEDURE — 11000001 HC ACUTE MED/SURG PRIVATE ROOM

## 2024-12-28 PROCEDURE — 84100 ASSAY OF PHOSPHORUS: CPT | Performed by: HOSPITALIST

## 2024-12-28 PROCEDURE — 87529 HSV DNA AMP PROBE: CPT | Performed by: STUDENT IN AN ORGANIZED HEALTH CARE EDUCATION/TRAINING PROGRAM

## 2024-12-28 PROCEDURE — 80053 COMPREHEN METABOLIC PANEL: CPT | Performed by: HOSPITALIST

## 2024-12-28 PROCEDURE — 25000003 PHARM REV CODE 250: Performed by: FAMILY MEDICINE

## 2024-12-28 PROCEDURE — 87040 BLOOD CULTURE FOR BACTERIA: CPT | Performed by: FAMILY MEDICINE

## 2024-12-28 PROCEDURE — 63600175 PHARM REV CODE 636 W HCPCS: Performed by: FAMILY MEDICINE

## 2024-12-28 PROCEDURE — 83735 ASSAY OF MAGNESIUM: CPT | Performed by: HOSPITALIST

## 2024-12-28 PROCEDURE — 85025 COMPLETE CBC W/AUTO DIFF WBC: CPT | Performed by: HOSPITALIST

## 2024-12-28 PROCEDURE — 36415 COLL VENOUS BLD VENIPUNCTURE: CPT | Performed by: STUDENT IN AN ORGANIZED HEALTH CARE EDUCATION/TRAINING PROGRAM

## 2024-12-28 PROCEDURE — 99232 SBSQ HOSP IP/OBS MODERATE 35: CPT | Mod: ,,, | Performed by: DERMATOLOGY

## 2024-12-28 RX ORDER — ACETAMINOPHEN 325 MG/1
325 TABLET ORAL ONCE
Status: COMPLETED | OUTPATIENT
Start: 2024-12-28 | End: 2024-12-28

## 2024-12-28 RX ORDER — PREDNISONE 20 MG/1
80 TABLET ORAL DAILY
Status: DISCONTINUED | OUTPATIENT
Start: 2024-12-29 | End: 2024-12-29

## 2024-12-28 RX ORDER — PREDNISONE 20 MG/1
20 TABLET ORAL ONCE
Status: COMPLETED | OUTPATIENT
Start: 2024-12-28 | End: 2024-12-28

## 2024-12-28 RX ORDER — IBUPROFEN 400 MG/1
400 TABLET ORAL EVERY 6 HOURS PRN
Status: DISCONTINUED | OUTPATIENT
Start: 2024-12-28 | End: 2024-12-28

## 2024-12-28 RX ORDER — SODIUM CHLORIDE, SODIUM LACTATE, POTASSIUM CHLORIDE, CALCIUM CHLORIDE 600; 310; 30; 20 MG/100ML; MG/100ML; MG/100ML; MG/100ML
INJECTION, SOLUTION INTRAVENOUS CONTINUOUS
Status: ACTIVE | OUTPATIENT
Start: 2024-12-29 | End: 2024-12-29

## 2024-12-28 RX ORDER — HYDROCORTISONE 25 MG/G
CREAM TOPICAL 2 TIMES DAILY
Status: DISCONTINUED | OUTPATIENT
Start: 2024-12-28 | End: 2025-01-03 | Stop reason: HOSPADM

## 2024-12-28 RX ADMIN — ACETAMINOPHEN 325 MG: 325 TABLET ORAL at 06:12

## 2024-12-28 RX ADMIN — HYDROCORTISONE: 25 CREAM TOPICAL at 03:12

## 2024-12-28 RX ADMIN — PREDNISONE 60 MG: 20 TABLET ORAL at 08:12

## 2024-12-28 RX ADMIN — PREDNISONE 20 MG: 20 TABLET ORAL at 02:12

## 2024-12-28 RX ADMIN — ACYCLOVIR SODIUM 800 MG: 50 INJECTION, SOLUTION INTRAVENOUS at 04:12

## 2024-12-28 RX ADMIN — ACETAMINOPHEN 650 MG: 325 TABLET ORAL at 11:12

## 2024-12-28 RX ADMIN — ACETAMINOPHEN 650 MG: 325 TABLET ORAL at 12:12

## 2024-12-28 RX ADMIN — SENNOSIDES AND DOCUSATE SODIUM 1 TABLET: 50; 8.6 TABLET ORAL at 08:12

## 2024-12-28 RX ADMIN — ACETAMINOPHEN 650 MG: 325 TABLET ORAL at 08:12

## 2024-12-28 RX ADMIN — Medication 6 MG: at 08:12

## 2024-12-28 RX ADMIN — FAMOTIDINE 20 MG: 20 TABLET ORAL at 08:12

## 2024-12-28 RX ADMIN — TRIAMCINOLONE ACETONIDE: 1 CREAM TOPICAL at 08:12

## 2024-12-28 RX ADMIN — POLYETHYLENE GLYCOL 3350 17 G: 17 POWDER, FOR SOLUTION ORAL at 08:12

## 2024-12-28 RX ADMIN — HYDROCORTISONE: 25 CREAM TOPICAL at 08:12

## 2024-12-28 NOTE — PROGRESS NOTES
[facility] Med S[facility]  Dermatology  Progress Note    Patient Name: Alonzo Nascimento Jr.  MRN: [id number]  Admission Date: 12/24/2024  Hospital Length of Stay: 4 days  Attending Physician: Dave Song III*  Primary Care Provider: Sohan Strange MD     Subjective:     Principal Problem:DRESS syndrome    Interval History: Patient reports rash has been improving somewhat. Notes face has started to peel as has his abdomen. Triamcinolone helping with itch. Aquaphor helping with dry lips. No mucosal lesions. Has been tolerating PO intake better without nausea/vomiting. Notes he has been told he is continuing to have fevers.     Medications:  Continuous Infusions:  Scheduled Meds:   enoxparin  40 mg Subcutaneous Daily    famotidine  20 mg Oral BID    hydrocortisone   Topical (Top) BID    polyethylene glycol  17 g Oral Daily    [START ON 12/29/2024] predniSONE  80 mg Oral Daily    senna-docusate 8.6-50 mg  1 tablet Oral BID    triamcinolone acetonide 0.1%   Topical (Top) BID     PRN Meds:  Current Facility-Administered Medications:     acetaminophen, 650 mg, Oral, Q8H PRN    dextrose 50%, 12.5 g, Intravenous, PRN    dextrose 50%, 25 g, Intravenous, PRN    diphenhydrAMINE, 25 mg, Oral, Q6H PRN    glucagon (human recombinant), 1 mg, Intramuscular, PRN    glucose, 16 g, Oral, PRN    glucose, 24 g, Oral, PRN    melatonin, 6 mg, Oral, Nightly PRN    naloxone, 0.02 mg, Intravenous, PRN    ondansetron, 4 mg, Intravenous, Q8H PRN    sodium chloride 0.9%, 5 mL, Intravenous, PRN    Review of Systems   Constitutional:  Positive for fever. Negative for chills.   HENT:  Negative for mouth sores.    Eyes:  Negative for eye irritation and visual change.   Gastrointestinal:  Negative for abdominal pain.   Genitourinary:  Negative for dysuria and genital sores.   Skin:  Positive for itching, rash and dry skin.   Hematologic/Lymphatic: Negative for adenopathy.     Objective:     Vital Signs (Most Recent):  Temp: (!) 101 °F (38.3 °C)  (12/28/24 1153)  Pulse: 97 (12/28/24 1124)  Resp: 18 (12/28/24 0457)  BP: 123/69 (12/28/24 1124)  SpO2: 99 % (12/28/24 1124) Vital Signs (24h Range):  Temp:  [97 °F (36.1 °C)-101.7 °F (38.7 °C)] 101 °F (38.3 °C)  Pulse:  [] 97  Resp:  [18] 18  SpO2:  [98 %-100 %] 99 %  BP: (118-138)/(57-79) 123/69     Weight: 72 kg (158 lb 11.7 oz)  Body mass index is 20.94 kg/m².     Physical Exam   Constitutional: He appears well-developed and well-nourished.   Musculoskeletal: Lymphadenopathy:      Head:      Right side of head: No submental, submandibular, preauricular or posterior auricular adenopathy.      Left side of head: No submental, submandibular, preauricular or posterior auricular adenopathy.      Cervical: No cervical adenopathy.      Upper Body:      Right upper body: No supraclavicular or axillary adenopathy.      Left upper body: No supraclavicular or axillary adenopathy.     Lymphadenopathy:        Head (right side): No submental, no submandibular, no preauricular and no posterior auricular adenopathy present.        Head (left side): No submental, no submandibular, no preauricular and no posterior auricular adenopathy present.     He has no cervical adenopathy.        Right: No supraclavicular adenopathy present.        Left: No supraclavicular adenopathy present.   Neurological: He is alert and oriented to person, place, and time.   Psychiatric: He has a normal mood and affect.   Skin:   Areas Examined (abnormalities noted in diagram):   Scalp / Hair Palpated and Inspected  Head / Face Inspection Performed  Neck Inspection Performed  Chest / Axilla Inspection Performed  Abdomen Inspection Performed  Genitals / Buttocks / Groin Inspection Performed  Back Inspection Performed  RUE Inspected  LUE Inspection Performed  RLE Inspected  LLE Inspection Performed  Nails and Digits Inspection Performed  Gland Inspection Performed                                             Significant Labs: CBC:   Recent Labs   Lab  12/27/24  0246 12/28/24  1245   WBC 19.14* 12.37   HGB 12.8* 12.4*   HCT 36.8* 35.5*    164     CMP:   Recent Labs   Lab 12/27/24  0246 12/28/24  1245   * 131*   K 5.0 4.6    99   CO2 22* 22*   GLU 93 110   BUN 19 15   CREATININE 1.2 1.3   CALCIUM 7.6* 7.6*   PROT 5.8* 6.5   ALBUMIN 1.9* 2.1*   BILITOT 2.1* 3.0*   ALKPHOS 249* 302*   * 2,163*   ALT 1,334* 3,113*   ANIONGAP 6* 10     Coagulation:   Recent Labs   Lab 12/27/24  0246 12/28/24  1245   INR 1.4* 1.5*     All pertinent labs within the past 24 hours have been reviewed.    Skin biopsy:  Final Pathologic Diagnosis Skin, right arm, punch biopsy:  -DERMATITIS WITH SCATTERED NEUTROPHILS AND EOSINOPHILS, see comment    COMMENT:  Clinical images were reviewed in epic and differential diagnosis is noted.  The histological findings (see microscopic description) are supportive of a drug reaction (DRESS syndrome in the correct clinical / laboratory context).  Correlation is   recommended.       Significant Imaging: I have reviewed all pertinent imaging results/findings within the past 24 hours.      Assessment/Plan:     Oncology  * DRESS syndrome  30 yo man presented with >50% BSA of erythematous morbilliform rash with associated eosinophilia, leukocytosis, elevated LFTs, and previous LINK (now improving) with prodrome onset with subjective fever/chills and nausea/vomiting ~12/12 with rash onset ~12/20. Started bactrim 11/20 and flagyl ~11/25.   Clinical features and lab findings are most consistent with DRESS. Regiscar score: 3 (possible case). Most likely implicated medication is bactrim; however, cannot entirely rule out flagyl as a potential cause.  Patient now with superficial desquamation as rash has been improving. Still with intermittent fevers and worsening LFTs. Hepatology is following for acute liver injury. Cardiac workup with EKG, troponin, BNP Echo without suspicion for cardiac involvement. Previous concern for renal involvement  now improved with stable creatinine. Without evidence of thyroid or endocrine involvement on labs.     Labs  - 12/23 labs notable for normal ESR, elevated CRP, elevated lactic acid, elevated PT and INR, CMV quantitative not detected, troponin <0.006. EBV pending.   - 12/23 Blood Cultures NGTD.  - 12/15 Acute hepatitis panel negative for infection.  - 12/26 labs with normal TSH and free T4, elevated CPK of 262, normal troponin. CBC with leukocytosis and eosinophilia mildly improved from previous day. CMP with increasing AST and ALT, but PT and INR slightly improved from yesterday. Creatine remains stable at 1.4. EKG performed without acute changes from prior EKGs.  - 12/27 labs with slightly improved leukocytosis and eosinophilia. CMP with increasing AST and ALT (762, 1334). Creatinine improved to 1.2.  LDH and ferritin elevated. Repeat acute hepatitis panel negative.  - 12/28 with worsening AST and ALT (2163, 3113) and elevated PT and INR (16.0, 1.5). Creatinine stable at 1.3    Imaging  - 12/26 CXR without acute findings  - 12/26 Liver US with Doppler with hepatosplenomegaly  - 12/23 Retroperitoneal US with no evidence of renal abnormalities. Echogenic debris in bladder possibly consistent with cystitis.  - 12/20 CT Abdomen and Pelvis without acute findings      Recommendations:  - Continue avoidance of Bactrim and Flagyl. Bactrim has been listed as a drug allergy.  - Recommend daily CBC with diff and CMP  - Continue following blood cultures from 12/23. May consider repeat culture in setting of recurrent intermittent fevers  - Increase to prednisone 80 mg daily   - If patient is not responding to steroids could consider cyclosporine  - Continue Pepcid BID for gastric protection while on steroids.   - Continue triamcinolone 0.1% cream BID to rash on trunk and limbs. Please special request 454 g jar from pharmacy to adequately cover affected body surface area  - Can use hydrocortisone 2.5% cream BID to rash on  face.  - Recommend gentle, fragrance free moisturizer for use ad tatum to body and face while skin is peeling  - Appreciate hepatology input  - s/p punch biopsy performed at bedside 12/26 consistent with DRESS  - Recommend ordering HHV-6 PCR as this can be reactivated in DRESS. CMV and EBV ordered previously.       Biopsy Site Care:  Remove bandage and clean with gentle soap and water. Apply Vaseline and a bandage. Repeat daily. Leave gelfoam in place and it will resorb on its own in the next few weeks.               Thank you for your consult. I will follow-up with patient. Please contact us if you have any additional questions.    Riky Bang MD  Dermatology  [facility]

## 2024-12-28 NOTE — NURSING
Dr. Posey notified about 101.7 temp after tylenol was given and no change noted. Md ordered additional dose of tylenol to be given

## 2024-12-28 NOTE — SUBJECTIVE & OBJECTIVE
Subjective:     Principal Problem:DRESS syndrome    Interval History: Patient reports rash has been improving somewhat. Notes face has started to peel as has his abdomen. Triamcinolone helping with itch. Aquaphor helping with dry lips. No mucosal lesions. Has been tolerating PO intake better without nausea/vomiting. Notes he has been told he is continuing to have fevers.     Medications:  Continuous Infusions:  Scheduled Meds:   enoxparin  40 mg Subcutaneous Daily    famotidine  20 mg Oral BID    hydrocortisone   Topical (Top) BID    polyethylene glycol  17 g Oral Daily    [START ON 12/29/2024] predniSONE  80 mg Oral Daily    senna-docusate 8.6-50 mg  1 tablet Oral BID    triamcinolone acetonide 0.1%   Topical (Top) BID     PRN Meds:  Current Facility-Administered Medications:     acetaminophen, 650 mg, Oral, Q8H PRN    dextrose 50%, 12.5 g, Intravenous, PRN    dextrose 50%, 25 g, Intravenous, PRN    diphenhydrAMINE, 25 mg, Oral, Q6H PRN    glucagon (human recombinant), 1 mg, Intramuscular, PRN    glucose, 16 g, Oral, PRN    glucose, 24 g, Oral, PRN    melatonin, 6 mg, Oral, Nightly PRN    naloxone, 0.02 mg, Intravenous, PRN    ondansetron, 4 mg, Intravenous, Q8H PRN    sodium chloride 0.9%, 5 mL, Intravenous, PRN    Review of Systems   Constitutional:  Positive for fever. Negative for chills.   HENT:  Negative for mouth sores.    Eyes:  Negative for eye irritation and visual change.   Gastrointestinal:  Negative for abdominal pain.   Genitourinary:  Negative for dysuria and genital sores.   Skin:  Positive for itching, rash and dry skin.   Hematologic/Lymphatic: Negative for adenopathy.     Objective:     Vital Signs (Most Recent):  Temp: (!) 101 °F (38.3 °C) (12/28/24 1153)  Pulse: 97 (12/28/24 1124)  Resp: 18 (12/28/24 0457)  BP: 123/69 (12/28/24 1124)  SpO2: 99 % (12/28/24 1124) Vital Signs (24h Range):  Temp:  [97 °F (36.1 °C)-101.7 °F (38.7 °C)] 101 °F (38.3 °C)  Pulse:  [] 97  Resp:  [18] 18  SpO2:   [98 %-100 %] 99 %  BP: (118-138)/(57-79) 123/69     Weight: 72 kg (158 lb 11.7 oz)  Body mass index is 20.94 kg/m².     Physical Exam   Constitutional: He appears well-developed and well-nourished.   Musculoskeletal: Lymphadenopathy:      Head:      Right side of head: No submental, submandibular, preauricular or posterior auricular adenopathy.      Left side of head: No submental, submandibular, preauricular or posterior auricular adenopathy.      Cervical: No cervical adenopathy.      Upper Body:      Right upper body: No supraclavicular or axillary adenopathy.      Left upper body: No supraclavicular or axillary adenopathy.     Lymphadenopathy:        Head (right side): No submental, no submandibular, no preauricular and no posterior auricular adenopathy present.        Head (left side): No submental, no submandibular, no preauricular and no posterior auricular adenopathy present.     He has no cervical adenopathy.        Right: No supraclavicular adenopathy present.        Left: No supraclavicular adenopathy present.   Neurological: He is alert and oriented to person, place, and time.   Psychiatric: He has a normal mood and affect.   Skin:   Areas Examined (abnormalities noted in diagram):   Scalp / Hair Palpated and Inspected  Head / Face Inspection Performed  Neck Inspection Performed  Chest / Axilla Inspection Performed  Abdomen Inspection Performed  Genitals / Buttocks / Groin Inspection Performed  Back Inspection Performed  RUE Inspected  LUE Inspection Performed  RLE Inspected  LLE Inspection Performed  Nails and Digits Inspection Performed  Gland Inspection Performed                                             Significant Labs: CBC:   Recent Labs   Lab 12/27/24  0246 12/28/24  1245   WBC 19.14* 12.37   HGB 12.8* 12.4*   HCT 36.8* 35.5*    164     CMP:   Recent Labs   Lab 12/27/24  0246 12/28/24  1245   * 131*   K 5.0 4.6    99   CO2 22* 22*   GLU 93 110   BUN 19 15   CREATININE 1.2 1.3    CALCIUM 7.6* 7.6*   PROT 5.8* 6.5   ALBUMIN 1.9* 2.1*   BILITOT 2.1* 3.0*   ALKPHOS 249* 302*   * 2,163*   ALT 1,334* 3,113*   ANIONGAP 6* 10     Coagulation:   Recent Labs   Lab 12/27/24  0246 12/28/24  1245   INR 1.4* 1.5*     All pertinent labs within the past 24 hours have been reviewed.    Skin biopsy:  Final Pathologic Diagnosis Skin, right arm, punch biopsy:  -DERMATITIS WITH SCATTERED NEUTROPHILS AND EOSINOPHILS, see comment    COMMENT:  Clinical images were reviewed in epic and differential diagnosis is noted.  The histological findings (see microscopic description) are supportive of a drug reaction (DRESS syndrome in the correct clinical / laboratory context).  Correlation is   recommended.       Significant Imaging: I have reviewed all pertinent imaging results/findings within the past 24 hours.

## 2024-12-28 NOTE — SUBJECTIVE & OBJECTIVE
Subjective/Interval History:  Pt says overall rash has improved.  Reports skin on face is extremely dry.  Overall pruritus improving.  Nausea has improved and pt tolerating PO diet.  Nursing staff reports persistent fevers > 101 overnight, asymptomatic.    Labs personally reviewed:  WBC 12.37 from 19.14, INR 1.5 from 1.4,  from 249, bilirubin 3.0 from 2.1, AST 2163 from 762, ALT 3113 from 1334    Hepatology consulted:  Recommend HSV 1, hepatitis-C IgM, ID consult, IR consult for liver biopsy.    Dermatology consulted:  Recommended increasing prednisone to 80 mg daily and cont Pepcid and triamcinolone topical.  Can use 2.5% hydrocortisone cream on the face.    Discussed with IR    Objective:     Vital signs reviewed.  T-max 101.7 ° HR , otherwise stable     Physical Exam  Constitutional:       General: He is not in acute distress.     Appearance: He is not ill-appearing.   Cardiovascular:      Rate and Rhythm: Normal rate and regular rhythm.      Heart sounds: No murmur heard.  Pulmonary:      Effort: No respiratory distress.   Abdominal:      General: Bowel sounds are normal. There is no distension.      Tenderness: There is no abdominal tenderness.   Musculoskeletal:      Right lower leg: No edema.      Left lower leg: No edema.   Skin:     Comments: Diffuse maculopapular rash, see media   Neurological:      Mental Status: He is alert and oriented to person, place, and time.   Psychiatric:         Mood and Affect: Mood normal.             Significant Labs: All pertinent labs within the past 24 hours have been reviewed.    Significant Imaging: I have reviewed all pertinent imaging results/findings within the past 24 hours.

## 2024-12-28 NOTE — ASSESSMENT & PLAN NOTE
28 yo man presented with >50% BSA of erythematous morbilliform rash with associated eosinophilia, leukocytosis, elevated LFTs, and previous LINK (now improving) with prodrome onset with subjective fever/chills and nausea/vomiting ~12/12 with rash onset ~12/20. Started bactrim 11/20 and flagyl ~11/25.   Clinical features and lab findings are most consistent with DRESS. Regiscar score: 3 (possible case). Most likely implicated medication is bactrim; however, cannot entirely rule out flagyl as a potential cause.  Patient now with superficial desquamation as rash has been improving. Still with intermittent fevers and worsening LFTs. Hepatology is following for acute liver injury. Cardiac workup with EKG, troponin, BNP Echo without suspicion for cardiac involvement. Previous concern for renal involvement now improved with stable creatinine. Without evidence of thyroid or endocrine involvement on labs.     Labs  - 12/23 labs notable for normal ESR, elevated CRP, elevated lactic acid, elevated PT and INR, CMV quantitative not detected, troponin <0.006. EBV pending.   - 12/23 Blood Cultures NGTD.  - 12/15 Acute hepatitis panel negative for infection.  - 12/26 labs with normal TSH and free T4, elevated CPK of 262, normal troponin. CBC with leukocytosis and eosinophilia mildly improved from previous day. CMP with increasing AST and ALT, but PT and INR slightly improved from yesterday. Creatine remains stable at 1.4. EKG performed without acute changes from prior EKGs.  - 12/27 labs with slightly improved leukocytosis and eosinophilia. CMP with increasing AST and ALT (762, 1334). Creatinine improved to 1.2.  LDH and ferritin elevated. Repeat acute hepatitis panel negative.  - 12/28 with worsening AST and ALT (2163, 3113) and elevated PT and INR (16.0, 1.5). Creatinine stable at 1.3    Imaging  - 12/26 CXR without acute findings  - 12/26 Liver US with Doppler with hepatosplenomegaly  - 12/23 Retroperitoneal US with no evidence  of renal abnormalities. Echogenic debris in bladder possibly consistent with cystitis.  - 12/20 CT Abdomen and Pelvis without acute findings      Recommendations:  - Continue avoidance of Bactrim and Flagyl. Bactrim has been listed as a drug allergy.  - Recommend daily CBC with diff and CMP  - Continue following blood cultures from 12/23. May consider repeat culture in setting of recurrent intermittent fevers  - Increase to prednisone 80 mg daily   - If patient is not responding to steroids could consider cyclosporine  - Continue Pepcid BID for gastric protection while on steroids.   - Continue triamcinolone 0.1% cream BID to rash on trunk and limbs. Please special request 454 g jar from pharmacy to adequately cover affected body surface area  - Can use hydrocortisone 2.5% cream BID to rash on face.  - Recommend gentle, fragrance free moisturizer for use ad tatum to body and face while skin is peeling  - Appreciate hepatology input  - s/p punch biopsy performed at bedside 12/26 consistent with DRESS  - Recommend ordering HHV-6 PCR as this can be reactivated in DRESS. CMV and EBV ordered previously.       Biopsy Site Care:  Remove bandage and clean with gentle soap and water. Apply Vaseline and a bandage. Repeat daily. Leave gelfoam in place and it will resorb on its own in the next few weeks.

## 2024-12-29 LAB
ALBUMIN SERPL BCP-MCNC: 2.1 G/DL (ref 3.5–5.2)
ALP SERPL-CCNC: 283 U/L (ref 40–150)
ALT SERPL W/O P-5'-P-CCNC: 3832 U/L (ref 10–44)
ANION GAP SERPL CALC-SCNC: 8 MMOL/L (ref 8–16)
ANISOCYTOSIS BLD QL SMEAR: SLIGHT
AST SERPL-CCNC: 2698 U/L (ref 10–40)
BASO STIPL BLD QL SMEAR: ABNORMAL
BASOPHILS # BLD AUTO: ABNORMAL K/UL (ref 0–0.2)
BASOPHILS NFR BLD: 1 % (ref 0–1.9)
BILIRUB SERPL-MCNC: 3 MG/DL (ref 0.1–1)
BUN SERPL-MCNC: 23 MG/DL (ref 6–20)
CALCIUM SERPL-MCNC: 7.8 MG/DL (ref 8.7–10.5)
CHLORIDE SERPL-SCNC: 101 MMOL/L (ref 95–110)
CO2 SERPL-SCNC: 23 MMOL/L (ref 23–29)
CREAT SERPL-MCNC: 1.1 MG/DL (ref 0.5–1.4)
DIFFERENTIAL METHOD BLD: ABNORMAL
EOSINOPHIL # BLD AUTO: ABNORMAL K/UL (ref 0–0.5)
EOSINOPHIL NFR BLD: 8 % (ref 0–8)
ERYTHROCYTE [DISTWIDTH] IN BLOOD BY AUTOMATED COUNT: 15 % (ref 11.5–14.5)
EST. GFR  (NO RACE VARIABLE): >60 ML/MIN/1.73 M^2
FIBRINOGEN PPP-MCNC: 160 MG/DL (ref 182–400)
GIANT PLATELETS BLD QL SMEAR: PRESENT
GLUCOSE SERPL-MCNC: 103 MG/DL (ref 70–110)
HCT VFR BLD AUTO: 33.5 % (ref 40–54)
HGB BLD-MCNC: 11.8 G/DL (ref 14–18)
HIV 1+2 AB+HIV1 P24 AG SERPL QL IA: NORMAL
HYPOCHROMIA BLD QL SMEAR: ABNORMAL
IMM GRANULOCYTES # BLD AUTO: ABNORMAL K/UL (ref 0–0.04)
IMM GRANULOCYTES NFR BLD AUTO: ABNORMAL % (ref 0–0.5)
INR PPP: 1.5 (ref 0.8–1.2)
LYMPHOCYTES # BLD AUTO: ABNORMAL K/UL (ref 1–4.8)
LYMPHOCYTES NFR BLD: 33 % (ref 18–48)
MAGNESIUM SERPL-MCNC: 2.1 MG/DL (ref 1.6–2.6)
MCH RBC QN AUTO: 29.1 PG (ref 27–31)
MCHC RBC AUTO-ENTMCNC: 35.2 G/DL (ref 32–36)
MCV RBC AUTO: 83 FL (ref 82–98)
METAMYELOCYTES NFR BLD MANUAL: 1 %
MONOCYTES # BLD AUTO: ABNORMAL K/UL (ref 0.3–1)
MONOCYTES NFR BLD: 7 % (ref 4–15)
MYELOCYTES NFR BLD MANUAL: 2 %
NEUTROPHILS NFR BLD: 43 % (ref 38–73)
NEUTS BAND NFR BLD MANUAL: 5 %
NRBC BLD-RTO: 0 /100 WBC
OVALOCYTES BLD QL SMEAR: ABNORMAL
PHOSPHATE SERPL-MCNC: 4.9 MG/DL (ref 2.7–4.5)
PLATELET # BLD AUTO: 146 K/UL (ref 150–450)
PLATELET BLD QL SMEAR: ABNORMAL
PMV BLD AUTO: 10.7 FL (ref 9.2–12.9)
POIKILOCYTOSIS BLD QL SMEAR: SLIGHT
POTASSIUM SERPL-SCNC: 4.7 MMOL/L (ref 3.5–5.1)
PROT SERPL-MCNC: 6.3 G/DL (ref 6–8.4)
PROTHROMBIN TIME: 15.6 SEC (ref 9–12.5)
RBC # BLD AUTO: 4.05 M/UL (ref 4.6–6.2)
SMUDGE CELLS BLD QL SMEAR: PRESENT
SODIUM SERPL-SCNC: 132 MMOL/L (ref 136–145)
SPHEROCYTES BLD QL SMEAR: ABNORMAL
TARGETS BLD QL SMEAR: ABNORMAL
WBC # BLD AUTO: 10.34 K/UL (ref 3.9–12.7)

## 2024-12-29 PROCEDURE — 85610 PROTHROMBIN TIME: CPT | Performed by: HOSPITALIST

## 2024-12-29 PROCEDURE — 87799 DETECT AGENT NOS DNA QUANT: CPT | Performed by: STUDENT IN AN ORGANIZED HEALTH CARE EDUCATION/TRAINING PROGRAM

## 2024-12-29 PROCEDURE — 25000003 PHARM REV CODE 250: Performed by: HOSPITALIST

## 2024-12-29 PROCEDURE — 11000001 HC ACUTE MED/SURG PRIVATE ROOM

## 2024-12-29 PROCEDURE — 86645 CMV ANTIBODY IGM: CPT | Performed by: STUDENT IN AN ORGANIZED HEALTH CARE EDUCATION/TRAINING PROGRAM

## 2024-12-29 PROCEDURE — 25000003 PHARM REV CODE 250: Performed by: FAMILY MEDICINE

## 2024-12-29 PROCEDURE — 87389 HIV-1 AG W/HIV-1&-2 AB AG IA: CPT | Performed by: STUDENT IN AN ORGANIZED HEALTH CARE EDUCATION/TRAINING PROGRAM

## 2024-12-29 PROCEDURE — 80053 COMPREHEN METABOLIC PANEL: CPT | Performed by: HOSPITALIST

## 2024-12-29 PROCEDURE — 36415 COLL VENOUS BLD VENIPUNCTURE: CPT | Performed by: HOSPITALIST

## 2024-12-29 PROCEDURE — 63600175 PHARM REV CODE 636 W HCPCS: Performed by: FAMILY MEDICINE

## 2024-12-29 PROCEDURE — 36415 COLL VENOUS BLD VENIPUNCTURE: CPT | Performed by: STUDENT IN AN ORGANIZED HEALTH CARE EDUCATION/TRAINING PROGRAM

## 2024-12-29 PROCEDURE — 85007 BL SMEAR W/DIFF WBC COUNT: CPT | Performed by: HOSPITALIST

## 2024-12-29 PROCEDURE — 99233 SBSQ HOSP IP/OBS HIGH 50: CPT | Mod: ,,, | Performed by: STUDENT IN AN ORGANIZED HEALTH CARE EDUCATION/TRAINING PROGRAM

## 2024-12-29 PROCEDURE — 85384 FIBRINOGEN ACTIVITY: CPT | Performed by: STUDENT IN AN ORGANIZED HEALTH CARE EDUCATION/TRAINING PROGRAM

## 2024-12-29 PROCEDURE — 85027 COMPLETE CBC AUTOMATED: CPT | Performed by: HOSPITALIST

## 2024-12-29 PROCEDURE — 83735 ASSAY OF MAGNESIUM: CPT | Performed by: HOSPITALIST

## 2024-12-29 PROCEDURE — 86644 CMV ANTIBODY: CPT | Performed by: STUDENT IN AN ORGANIZED HEALTH CARE EDUCATION/TRAINING PROGRAM

## 2024-12-29 PROCEDURE — 84100 ASSAY OF PHOSPHORUS: CPT | Performed by: HOSPITALIST

## 2024-12-29 PROCEDURE — 86665 EPSTEIN-BARR CAPSID VCA: CPT | Performed by: STUDENT IN AN ORGANIZED HEALTH CARE EDUCATION/TRAINING PROGRAM

## 2024-12-29 PROCEDURE — 99223 1ST HOSP IP/OBS HIGH 75: CPT | Mod: ,,, | Performed by: STUDENT IN AN ORGANIZED HEALTH CARE EDUCATION/TRAINING PROGRAM

## 2024-12-29 RX ADMIN — SODIUM CHLORIDE 250 ML: 9 INJECTION, SOLUTION INTRAVENOUS at 04:12

## 2024-12-29 RX ADMIN — HYDROCORTISONE: 25 CREAM TOPICAL at 09:12

## 2024-12-29 RX ADMIN — ACYCLOVIR SODIUM 800 MG: 50 INJECTION, SOLUTION INTRAVENOUS at 12:12

## 2024-12-29 RX ADMIN — FAMOTIDINE 20 MG: 20 TABLET ORAL at 08:12

## 2024-12-29 RX ADMIN — SENNOSIDES AND DOCUSATE SODIUM 1 TABLET: 50; 8.6 TABLET ORAL at 09:12

## 2024-12-29 RX ADMIN — HYDROCORTISONE: 25 CREAM TOPICAL at 08:12

## 2024-12-29 RX ADMIN — FAMOTIDINE 20 MG: 20 TABLET ORAL at 09:12

## 2024-12-29 RX ADMIN — Medication 6 MG: at 08:12

## 2024-12-29 RX ADMIN — PREDNISONE 80 MG: 20 TABLET ORAL at 09:12

## 2024-12-29 RX ADMIN — POLYETHYLENE GLYCOL 3350 17 G: 17 POWDER, FOR SOLUTION ORAL at 09:12

## 2024-12-29 RX ADMIN — TRIAMCINOLONE ACETONIDE: 1 CREAM TOPICAL at 08:12

## 2024-12-29 RX ADMIN — DIPHENHYDRAMINE HYDROCHLORIDE 25 MG: 25 CAPSULE ORAL at 08:12

## 2024-12-29 RX ADMIN — SODIUM CHLORIDE, POTASSIUM CHLORIDE, SODIUM LACTATE AND CALCIUM CHLORIDE: 600; 310; 30; 20 INJECTION, SOLUTION INTRAVENOUS at 12:12

## 2024-12-29 RX ADMIN — ACYCLOVIR SODIUM 800 MG: 50 INJECTION, SOLUTION INTRAVENOUS at 09:12

## 2024-12-29 RX ADMIN — TRIAMCINOLONE ACETONIDE: 1 CREAM TOPICAL at 09:12

## 2024-12-29 RX ADMIN — ACYCLOVIR SODIUM 800 MG: 50 INJECTION, SOLUTION INTRAVENOUS at 04:12

## 2024-12-29 NOTE — ASSESSMENT & PLAN NOTE
Patient has fever, tachycardia, leukocytosis with abnormal liver function and lactic acidosis on admission.  Although he has a bone infection it is subacute and doubt it is causing these systemic symptoms and signs.  -- monitor for infection, treat DRESS  -- s/p IV fluids, encourage PO intake  -- repeat blood cultures ordered 12/28

## 2024-12-29 NOTE — ASSESSMENT & PLAN NOTE
Acute viral panel negative 12/15.  CT abd unremarkable liver.  U/S liver: Hepatosplenomegaly  -- AST/ALT/TB/ALP trending up, hepatology consulted.  Follow up hepatic serologies.  Plan for liver biopsy as above.  Cont treatment for DRESS as above.

## 2024-12-29 NOTE — PROGRESS NOTES
[facility]  Infectious Disease  Progress Note    Patient Name: Alonzo Nascimento Jr.  MRN: [id number]  Admission Date: 12/24/2024  Length of Stay: 5 days  Attending Physician: Dave Song III*  Primary Care Provider: Sohan Strange MD    Isolation Status: No active isolations  Assessment/Plan:      Orthopedic  Liver injury  Rica Nascimento is a 29 year old man who was admitted 12/24 for rash after bactrim and metronidazole use with skin biopsy consistent with DRESS. Now with worsening liver enzymes, ID consulted for evaluation of possible infectious causes/reactivation in the setting of DRESS. Hepatitis A IgM, Hep C quant and Hep B C IgM and S ag negative. CMV quant from 12/26 negative, EBV quantitative positive. HIV negative.     Recommendations   - repeated CMV quant and Igm/IgG  - will follow HSV PCR  - can continue acyclovir for now, if CMV positive would start ganciclovir   - if plans for liver biopsy, please obtain viral staining on pathology       Above discussed with primary team.     Time: 50 minutes   50% of time spent on face-to-face counseling and coordination of care. Counseling included review of test results, diagnosis, and treatment plan with patient and/or family.  I have reviewed hospital notes from  service and other specialty providers as well as outside medical records. I have also reviewed CBC, CMP/BMP,  cultures and imaging with my interpretation as documented. Patient is high risk of morbidity, on antibiotics requiring intensive monitoring for toxicity.     Anticipated Disposition: TBD    Thank you for your consult. I will follow-up with patient. Please contact us if you have any additional questions.    Zoie Keating MD  Infectious Disease  [facility]    Subjective:     Principal Problem:DRESS syndrome    HPI: 29 year old male with a history of left achilles tendon rupture s/p surgical repair in July 2023.  He developed a wound in the area.  MRI of the wound  showed possible but not definitive osteomyelitis.  Cultures of the wound were obtained in November 2024 and returned positive for Citrobacter and Prevotella.  Patient was started on Bactrim and Metronidazole to complete a 6 week course.  He then underwent surgical debridement of the wound with removal of hardware and devitalized bone on 12/4/24.  He continued on bactrim and metronidazole.  He developed skin itching and nausea/vomiting.  He presented to the hospital for evaluation.  His LFTs were noted to be elevated in the ER.  He was evaluated by dermatology and diagnosed with DRESS.  ID is consulted for antibiotic recommendations.  Interval History: ID called back due to increasing liver enzymes. He reports feeling improved today, rash/itching is better. Denies abdominal pain, chest pain, diarrhea, melena.     Review of Systems   Constitutional:  Negative for fever.   Gastrointestinal:  Negative for abdominal pain.   Skin:  Positive for rash.     Objective:     Vital Signs (Most Recent):  Temp: 98.2 °F (36.8 °C) (12/29/24 1223)  Pulse: 75 (12/29/24 1223)  Resp: 18 (12/29/24 0519)  BP: 108/64 (12/29/24 1223)  SpO2: 100 % (12/29/24 1223) Vital Signs (24h Range):  Temp:  [98 °F (36.7 °C)-99 °F (37.2 °C)] 98.2 °F (36.8 °C)  Pulse:  [73-87] 75  Resp:  [18] 18  SpO2:  [99 %-100 %] 100 %  BP: (108-128)/(63-73) 108/64     Weight: 72 kg (158 lb 11.7 oz)  Body mass index is 20.94 kg/m².    Estimated Creatinine Clearance: 99.1 mL/min (based on SCr of 1.1 mg/dL).     Physical Exam  Vitals and nursing note reviewed.   Constitutional:       Appearance: He is not ill-appearing.   HENT:      Head: Normocephalic.      Nose: Nose normal.   Eyes:      General: No scleral icterus.  Pulmonary:      Effort: Pulmonary effort is normal. No respiratory distress.   Abdominal:      General: There is no distension.      Palpations: Abdomen is soft.      Tenderness: There is no abdominal tenderness.   Musculoskeletal:      Comments: L foot  wrapped   Skin:     General: Skin is warm and dry.      Findings: Rash present.   Neurological:      General: No focal deficit present.      Mental Status: He is alert and oriented to person, place, and time.   Psychiatric:         Mood and Affect: Mood normal.         Behavior: Behavior normal.          Significant Labs:   Microbiology Results (last 7 days)       Procedure Component Value Units Date/Time    Blood culture [[id number]] Collected: 12/28/24 1246    Order Status: Completed Specimen: Blood Updated: 12/29/24 1412     Blood Culture, Routine No Growth to date      No Growth to date    Narrative:      2 different sites    Blood culture [[id number]] Collected: 12/28/24 1247    Order Status: Completed Specimen: Blood Updated: 12/29/24 1412     Blood Culture, Routine No Growth to date      No Growth to date    Narrative:      2 different sites            Significant Imaging: I have reviewed all pertinent imaging results/findings within the past 24 hours.

## 2024-12-29 NOTE — SUBJECTIVE & OBJECTIVE
Subjective:     Principal Problem:DRESS syndrome    Interval History: Patient reports rash continuing to peel on abdomen and face. Has been applying moisturizer to these areas which helps relieve the itch. Notes benadryl has been helping with itch as well. Tolerating PO intake. Notes feels well overall but is tired as he has not been sleeping well in the hospital.    LFTs continuing to increase. Plan for liver biopsy with IR. Empiric acyclovir started. ID consulted.     Medications:  Continuous Infusions:  Scheduled Meds:   acyclovir  10 mg/kg (Ideal) Intravenous Q8H    famotidine  20 mg Oral BID    hydrocortisone   Topical (Top) BID    [START ON 12/30/2024] methylPREDNISolone injection (PEDS and ADULTS)  64 mg Intravenous Daily    polyethylene glycol  17 g Oral Daily    senna-docusate 8.6-50 mg  1 tablet Oral BID    sodium chloride 0.9%  250 mL Intravenous Q8H    triamcinolone acetonide 0.1%   Topical (Top) BID     PRN Meds:  Current Facility-Administered Medications:     acetaminophen, 650 mg, Oral, Q8H PRN    dextrose 50%, 12.5 g, Intravenous, PRN    dextrose 50%, 25 g, Intravenous, PRN    diphenhydrAMINE, 25 mg, Oral, Q6H PRN    glucagon (human recombinant), 1 mg, Intramuscular, PRN    glucose, 16 g, Oral, PRN    glucose, 24 g, Oral, PRN    melatonin, 6 mg, Oral, Nightly PRN    naloxone, 0.02 mg, Intravenous, PRN    ondansetron, 4 mg, Intravenous, Q8H PRN    sodium chloride 0.9%, 5 mL, Intravenous, PRN    Review of Systems   Constitutional:  Negative for fever and chills.   HENT:  Negative for mouth sores.    Eyes:  Negative for eye irritation and visual change.   Genitourinary:  Negative for genital sores.   Skin:  Positive for itching, rash and dry skin.     Objective:     Vital Signs (Most Recent):  Temp: 98.2 °F (36.8 °C) (12/29/24 1223)  Pulse: 75 (12/29/24 1223)  Resp: 18 (12/29/24 0519)  BP: 108/64 (12/29/24 1223)  SpO2: 100 % (12/29/24 1223) Vital Signs (24h Range):  Temp:  [98 °F (36.7 °C)-99 °F (37.2  °C)] 98.2 °F (36.8 °C)  Pulse:  [73-87] 75  Resp:  [18] 18  SpO2:  [99 %-100 %] 100 %  BP: (108-128)/(63-73) 108/64     Weight: 72 kg (158 lb 11.7 oz)  Body mass index is 20.94 kg/m².     Physical Exam   Constitutional: He appears well-developed and well-nourished.   Musculoskeletal: Lymphadenopathy:      Head:      Right side of head: No submental, submandibular, preauricular or posterior auricular adenopathy.      Left side of head: No submental, submandibular, preauricular or posterior auricular adenopathy.      Cervical: No cervical adenopathy.      Upper Body:      Right upper body: No supraclavicular or axillary adenopathy.      Left upper body: No supraclavicular or axillary adenopathy.     Lymphadenopathy:        Head (right side): No submental, no submandibular, no preauricular and no posterior auricular adenopathy present.        Head (left side): No submental, no submandibular, no preauricular and no posterior auricular adenopathy present.     He has no cervical adenopathy.        Right: No supraclavicular adenopathy present.        Left: No supraclavicular adenopathy present.   Neurological: He is alert and oriented to person, place, and time.   Psychiatric: He has a normal mood and affect.   Skin:   Areas Examined (abnormalities noted in diagram):   Scalp / Hair Palpated and Inspected  Head / Face Inspection Performed  Neck Inspection Performed  Chest / Axilla Inspection Performed  Abdomen Inspection Performed  Genitals / Buttocks / Groin Inspection Performed  Back Inspection Performed  RUE Inspected  LUE Inspection Performed  RLE Inspected  LLE Inspection Performed  Nails and Digits Inspection Performed  Gland Inspection Performed                                   Significant Labs: CBC:   Recent Labs   Lab 12/28/24  1245 12/29/24  0346   WBC 12.37 10.34   HGB 12.4* 11.8*   HCT 35.5* 33.5*    146*     CMP:   Recent Labs   Lab 12/28/24  1245 12/29/24  0346   * 132*   K 4.6 4.7   CL 99 101    CO2 22* 23    103   BUN 15 23*   CREATININE 1.3 1.1   CALCIUM 7.6* 7.8*   PROT 6.5 6.3   ALBUMIN 2.1* 2.1*   BILITOT 3.0* 3.0*   ALKPHOS 302* 283*   AST 2,163* 2,698*   ALT 3,113* 3,832*   ANIONGAP 10 8     Coagulation:   Recent Labs   Lab 12/28/24  1245 12/29/24  0346   INR 1.5* 1.5*     All pertinent labs within the past 24 hours have been reviewed.    Significant Imaging: None

## 2024-12-29 NOTE — PROGRESS NOTES
[facility]  Infectious Disease  Progress Note    Patient Name: Alonzo Nascimento Jr.  MRN: [id number]  Admission Date: 12/24/2024  Length of Stay: 5 days  Attending Physician: Dave Song III*  Primary Care Provider: Sohan Strange MD    Isolation Status: No active isolations  Assessment/Plan:      Orthopedic  Liver injury  Rica Nascimento is a 29 year old man who was admitted 12/24 for rash after bactrim and metronidazole use with skin biopsy consistent with DRESS. Now with worsening liver enzymes, ID consulted for evaluation of possible infectious causes/reactivation in the setting of DRESS. Hepatitis A IgM, Hep C quant and Hep B C IgM and S ag negative. CMV quant from 12/26 negative, EBV quantitative positive. HIV negative. HSV, HHV6 pending. Low risk for less common causes of hepatitis (NTM, crypto, microsporidium, pjp, bartonella, histo, lepto, parvo, adeno).     Recommendations   - repeated CMV quant and Igm/IgG  - will follow HSV PCR  - ordered crypto, bartonella, histo/blasto, parvo, adeno  - can continue acyclovir for now, if CMV positive would change to ganciclovir   - if plans for liver biopsy, please obtain viral staining on pathology       Above discussed with primary team.     Time: 50 minutes   50% of time spent on face-to-face counseling and coordination of care. Counseling included review of test results, diagnosis, and treatment plan with patient and/or family.  I have reviewed hospital notes from  service and other specialty providers as well as outside medical records. I have also reviewed CBC, CMP/BMP,  cultures and imaging with my interpretation as documented. Patient is high risk of morbidity, on antibiotics requiring intensive monitoring for toxicity.       Anticipated Disposition: TBD    Thank you for your consult. I will follow-up with patient. Please contact us if you have any additional questions.    Zoie Keating MD  Infectious Disease  [facility]  Surg    Subjective:     Principal Problem:DRESS syndrome    HPI: 29 year old male with a history of left achilles tendon rupture s/p surgical repair in July 2023.  He developed a wound in the area.  MRI of the wound showed possible but not definitive osteomyelitis.  Cultures of the wound were obtained in November 2024 and returned positive for Citrobacter and Prevotella.  Patient was started on Bactrim and Metronidazole to complete a 6 week course.  He then underwent surgical debridement of the wound with removal of hardware and devitalized bone on 12/4/24.  He continued on bactrim and metronidazole.  He developed skin itching and nausea/vomiting.  He presented to the hospital for evaluation.  His LFTs were noted to be elevated in the ER.  He was evaluated by dermatology and diagnosed with DRESS.  ID is consulted for antibiotic recommendations.  No new subjective & objective note has been filed under this hospital service since the last note was generated.

## 2024-12-29 NOTE — CONSULTS
Inpatient Radiology Pre-procedure Note    History of Present Illness:  Alonzo Nascimento Jr. is a 29 y.o. male who presents for DRESS syndrome with elevated LFTS.    Admission H&P reviewed.  Past Medical History:   Diagnosis Date    Achilles tendon rupture 07/2023    left achilles tendon rupture s/p repair on 07/14/23 complicated by osteomyelitis of left calcaneus s/p I&D 12/4/24    Acute osteomyelitis of calcaneum, left 12/2024    osteomyelitis of left calcaneus s/p I&D 12/4/24     Past Surgical History:   Procedure Laterality Date    ANTERIOR CRUCIATE LIGAMENT REPAIR Right     2018    APPLICATION OF SPLINT Left 7/14/2023    Procedure: APPLICATION, SPLINT;  Surgeon: Lenore Pearl DPM;  Location: [facility] OR;  Service: Podiatry;  Laterality: Left;    INSERTION, ANTIBIOTIC SPACER, LOWER EXTREMITY Left 12/4/2024    Procedure: INSERTION, ANTIBIOTIC SPACER, LOWER EXTREMITY;  Surgeon: Jaswinder Garcia DPM;  Location: [facility] OR;  Service: Podiatry;  Laterality: Left;    IRRIGATION AND DEBRIDEMENT Left 12/4/2024    Procedure: IRRIGATION AND DEBRIDEMENT;  Surgeon: Jaswinder Garcia DPM;  Location: [facility] OR;  Service: Podiatry;  Laterality: Left;  mini c-arm, Stimulan jr tobramycin/Vancomycin    REPAIR OF ACHILLES TENDON Left 7/14/2023    Procedure: REPAIR, TENDON, ACHILLES;  Surgeon: Lenore Pearl DPM;  Location: [facility] OR;  Service: Podiatry;  Laterality: Left;       Review of Systems:   As documented in primary team H&P    Home Meds:   Prior to Admission medications    Medication Sig Start Date End Date Taking? Authorizing Provider   clobetasoL (TEMOVATE) 0.05 % cream Apply topically 2 (two) times daily. 12/24/24   Adilene Bolton MD   ondansetron (ZOFRAN-ODT) 4 MG TbDL Take 1 tablet (4 mg total) by mouth every 8 (eight) hours as needed. 12/20/24   Janie Rodriguez MD   predniSONE (DELTASONE) 50 MG Tab Take 1 tablet (50 mg total) by mouth once daily. 12/25/24   Adilene Bolton MD     Scheduled Meds:    acyclovir  10  mg/kg (Ideal) Intravenous Q8H    famotidine  20 mg Oral BID    hydrocortisone   Topical (Top) BID    [START ON 12/30/2024] methylPREDNISolone injection (PEDS and ADULTS)  64 mg Intravenous Daily    polyethylene glycol  17 g Oral Daily    senna-docusate 8.6-50 mg  1 tablet Oral BID    sodium chloride 0.9%  250 mL Intravenous Q8H    triamcinolone acetonide 0.1%   Topical (Top) BID     Continuous Infusions:   PRN Meds:  Current Facility-Administered Medications:     acetaminophen, 650 mg, Oral, Q8H PRN    dextrose 50%, 12.5 g, Intravenous, PRN    dextrose 50%, 25 g, Intravenous, PRN    diphenhydrAMINE, 25 mg, Oral, Q6H PRN    glucagon (human recombinant), 1 mg, Intramuscular, PRN    glucose, 16 g, Oral, PRN    glucose, 24 g, Oral, PRN    melatonin, 6 mg, Oral, Nightly PRN    naloxone, 0.02 mg, Intravenous, PRN    ondansetron, 4 mg, Intravenous, Q8H PRN    sodium chloride 0.9%, 5 mL, Intravenous, PRN  Anticoagulants/Antiplatelets: no anticoagulation    Allergies:   Review of patient's allergies indicates:   Allergen Reactions    Bactrim [sulfamethoxazole-trimethoprim] Rash     Developed DRESS     Sedation Hx: have not been any systemic reactions    Labs:  Recent Labs   Lab 12/29/24  0346   INR 1.5*       Recent Labs   Lab 12/29/24  0346   WBC 10.34   HGB 11.8*   HCT 33.5*   MCV 83   *      Recent Labs   Lab 12/29/24  0346      *   K 4.7      CO2 23   BUN 23*   CREATININE 1.1   CALCIUM 7.8*   MG 2.1   ALT 3,832*   AST 2,698*   ALBUMIN 2.1*   BILITOT 3.0*         Vitals:  Temp: 98.2 °F (36.8 °C) (12/29/24 1223)  Pulse: 75 (12/29/24 1223)  Resp: 18 (12/29/24 0519)  BP: 108/64 (12/29/24 1223)  SpO2: 100 % (12/29/24 1223)     Physical Exam:  ASA: 2  Mallampati: 2    General: no acute distress, skin is flaking all over body  Mental Status: alert and oriented to person, place and time  HEENT: normocephalic, atraumatic  Chest: unlabored breathing  Heart: regular heart rate  Abdomen:  "nondistended  Extremity: moves all extremities    Plan: US guided random liver biopsy.  Sedation Plan: samson Odom MD ([place])  Interventional Radiology          "

## 2024-12-29 NOTE — PROGRESS NOTES
[facility] - Med Surg  Dermatology  Progress Note    Patient Name: Alonzo Nascimento Jr.  MRN: [id number]  Admission Date: 12/24/2024  Hospital Length of Stay: 5 days  Attending Physician: Dave Song III*  Primary Care Provider: Sohan Strange MD     Subjective:     Principal Problem:DRESS syndrome    Interval History: Patient reports rash continuing to peel on abdomen and face. Has been applying moisturizer to these areas which helps relieve the itch. Notes benadryl has been helping with itch as well. Tolerating PO intake. Notes feels well overall but is tired as he has not been sleeping well in the hospital.    LFTs continuing to increase. Plan for liver biopsy with IR. Empiric acyclovir started. ID consulted.     Medications:  Continuous Infusions:  Scheduled Meds:   acyclovir  10 mg/kg (Ideal) Intravenous Q8H    famotidine  20 mg Oral BID    hydrocortisone   Topical (Top) BID    [START ON 12/30/2024] methylPREDNISolone injection (PEDS and ADULTS)  64 mg Intravenous Daily    polyethylene glycol  17 g Oral Daily    senna-docusate 8.6-50 mg  1 tablet Oral BID    sodium chloride 0.9%  250 mL Intravenous Q8H    triamcinolone acetonide 0.1%   Topical (Top) BID     PRN Meds:  Current Facility-Administered Medications:     acetaminophen, 650 mg, Oral, Q8H PRN    dextrose 50%, 12.5 g, Intravenous, PRN    dextrose 50%, 25 g, Intravenous, PRN    diphenhydrAMINE, 25 mg, Oral, Q6H PRN    glucagon (human recombinant), 1 mg, Intramuscular, PRN    glucose, 16 g, Oral, PRN    glucose, 24 g, Oral, PRN    melatonin, 6 mg, Oral, Nightly PRN    naloxone, 0.02 mg, Intravenous, PRN    ondansetron, 4 mg, Intravenous, Q8H PRN    sodium chloride 0.9%, 5 mL, Intravenous, PRN    Review of Systems   Constitutional:  Negative for fever and chills.   HENT:  Negative for mouth sores.    Eyes:  Negative for eye irritation and visual change.   Genitourinary:  Negative for genital sores.   Skin:  Positive for itching, rash and dry skin.      Objective:     Vital Signs (Most Recent):  Temp: 98.2 °F (36.8 °C) (12/29/24 1223)  Pulse: 75 (12/29/24 1223)  Resp: 18 (12/29/24 0519)  BP: 108/64 (12/29/24 1223)  SpO2: 100 % (12/29/24 1223) Vital Signs (24h Range):  Temp:  [98 °F (36.7 °C)-99 °F (37.2 °C)] 98.2 °F (36.8 °C)  Pulse:  [73-87] 75  Resp:  [18] 18  SpO2:  [99 %-100 %] 100 %  BP: (108-128)/(63-73) 108/64     Weight: 72 kg (158 lb 11.7 oz)  Body mass index is 20.94 kg/m².     Physical Exam   Constitutional: He appears well-developed and well-nourished.   Musculoskeletal: Lymphadenopathy:      Head:      Right side of head: No submental, submandibular, preauricular or posterior auricular adenopathy.      Left side of head: No submental, submandibular, preauricular or posterior auricular adenopathy.      Cervical: No cervical adenopathy.      Upper Body:      Right upper body: No supraclavicular or axillary adenopathy.      Left upper body: No supraclavicular or axillary adenopathy.     Lymphadenopathy:        Head (right side): No submental, no submandibular, no preauricular and no posterior auricular adenopathy present.        Head (left side): No submental, no submandibular, no preauricular and no posterior auricular adenopathy present.     He has no cervical adenopathy.        Right: No supraclavicular adenopathy present.        Left: No supraclavicular adenopathy present.   Neurological: He is alert and oriented to person, place, and time.   Psychiatric: He has a normal mood and affect.   Skin:   Areas Examined (abnormalities noted in diagram):   Scalp / Hair Palpated and Inspected  Head / Face Inspection Performed  Neck Inspection Performed  Chest / Axilla Inspection Performed  Abdomen Inspection Performed  Genitals / Buttocks / Groin Inspection Performed  Back Inspection Performed  RUE Inspected  LUE Inspection Performed  RLE Inspected  LLE Inspection Performed  Nails and Digits Inspection Performed  Gland Inspection Performed                                    Significant Labs: CBC:   Recent Labs   Lab 12/28/24  1245 12/29/24  0346   WBC 12.37 10.34   HGB 12.4* 11.8*   HCT 35.5* 33.5*    146*     CMP:   Recent Labs   Lab 12/28/24  1245 12/29/24  0346   * 132*   K 4.6 4.7   CL 99 101   CO2 22* 23    103   BUN 15 23*   CREATININE 1.3 1.1   CALCIUM 7.6* 7.8*   PROT 6.5 6.3   ALBUMIN 2.1* 2.1*   BILITOT 3.0* 3.0*   ALKPHOS 302* 283*   AST 2,163* 2,698*   ALT 3,113* 3,832*   ANIONGAP 10 8     Coagulation:   Recent Labs   Lab 12/28/24  1245 12/29/24  0346   INR 1.5* 1.5*     All pertinent labs within the past 24 hours have been reviewed.    Significant Imaging: None      Assessment/Plan:     Oncology  * DRESS syndrome  30 yo man presented with >50% BSA of erythematous morbilliform rash with associated eosinophilia, leukocytosis, elevated LFTs, and previous LINK (now improved) with prodrome onset with subjective fever/chills and nausea/vomiting ~12/12 with rash onset ~12/20. Started bactrim 11/20 and flagyl ~11/25.   Clinical features and lab findings are most consistent with DRESS. Regiscar score: 3 (possible case) on arrival.  Most likely implicated medication is bactrim; however, cannot entirely rule out flagyl as a potential cause.  Patient now with superficial desquamation as rash has been improving. Leukocytosis and eosinophilia resolved (with levels within normal limits today); however, still with intermittent fevers and worsening LFTs. Hepatology is following for acute liver injury and ID has been consulted for recommendations for additional sources of hepatitis. Planned for IR liver biopsy tomorrow. Infectious workup ordered including HSV, EBV IgM, CMV IgM, IgG, Hep E, HHV-6, Histo, bartonella. Has been started on empiric acyclovir.   Cardiac workup with EKG, troponin, BNP Echo without suspicion for cardiac involvement. Previous concern for renal involvement now improved with stable creatinine. Without evidence of thyroid or  endocrine involvement on labs.     Labs  - 12/23 labs notable for normal ESR, elevated CRP, elevated lactic acid, elevated PT and INR, CMV quantitative not detected, troponin <0.006. EBV qualitative +  - 12/23 Blood Cultures NGTD.  - 12/15 Acute hepatitis panel negative for infection.  - 12/26 labs with normal TSH and free T4, elevated CPK of 262, normal troponin. CBC with leukocytosis and eosinophilia mildly improved from previous day. CMP with increasing AST and ALT, but PT and INR slightly improved from yesterday. Creatine remains stable at 1.4. EKG performed without acute changes from prior EKGs.  - 12/27 labs with slightly improved leukocytosis and eosinophilia. CMP with increasing AST and ALT (762, 1334). Creatinine improved to 1.2.  LDH and ferritin elevated. Repeat acute hepatitis panel negative.  - 12/28 with worsening AST and ALT (2163, 3113) and elevated PT and INR (16.0, 1.5). Creatinine stable at 1.3  - 12/29 with worsening AST and ALT (2698, 3832). PT and INR (15.6, 1.5). Repeat HIV non-reactive.     Imaging  - 12/26 CXR without acute findings  - 12/26 Liver US with Doppler with hepatosplenomegaly  - 12/23 Retroperitoneal US with no evidence of renal abnormalities. Echogenic debris in bladder possibly consistent with cystitis.  - 12/20 CT Abdomen and Pelvis without acute findings      Recommendations:  - Continue avoidance of Bactrim and Flagyl. Bactrim has been listed as a drug allergy.  - Recommend daily CBC with diff and CMP  - Recommend switching to methylprednisolone 64 mg (equivalent to prednisone 80 mg) daily given patient's hepatic dysfunction which may be impairing hepatic conversion of prednisone to active prednisolone   - Consider starting cyclosporine 3 to 5 mg/kg per day in two divided doses for 7 days, followed by taper over next 7-14 days for sterid refractory DRESS  - Continue Pepcid BID for gastric protection while on steroids.   - Continue triamcinolone 0.1% cream BID to rash on trunk  and limbs. Please special request 454 g jar from pharmacy to adequately cover affected body surface area  - Continue hydrocortisone 2.5% cream BID to rash on face.  - Recommend gentle, fragrance free moisturizer for use ad tatum to body and face while skin is peeling  - Appreciate hepatology and ID input  - s/p punch biopsy performed at bedside 12/26 consistent with DRESS  - We will follow results of viral workup and liver biopsy      Biopsy Site Care:  Remove bandage and clean with gentle soap and water. Apply Vaseline and a bandage. Repeat daily. Leave gelfoam in place and it will resorb on its own in the next few weeks.               Thank you for your consult. I will follow-up with patient. Please contact us if you have any additional questions.    Riky Bang MD  Dermatology  [facility] - Med Surg

## 2024-12-29 NOTE — ASSESSMENT & PLAN NOTE
28 yo man presented with >50% BSA of erythematous morbilliform rash with associated eosinophilia, leukocytosis, elevated LFTs, and previous LINK (now improved) with prodrome onset with subjective fever/chills and nausea/vomiting ~12/12 with rash onset ~12/20. Started bactrim 11/20 and flagyl ~11/25.   Clinical features and lab findings are most consistent with DRESS. Regiscar score: 3 (possible case) on arrival.  Most likely implicated medication is bactrim; however, cannot entirely rule out flagyl as a potential cause.  Patient now with superficial desquamation as rash has been improving. Leukocytosis and eosinophilia resolved (with levels within normal limits today); however, still with intermittent fevers and worsening LFTs. Hepatology is following for acute liver injury and ID has been consulted for recommendations for additional sources of hepatitis. Planned for IR liver biopsy tomorrow. Infectious workup ordered including HSV, EBV IgM, CMV IgM, IgG, Hep E, HHV-6, Histo, bartonella. Has been started on empiric acyclovir.   Cardiac workup with EKG, troponin, BNP Echo without suspicion for cardiac involvement. Previous concern for renal involvement now improved with stable creatinine. Without evidence of thyroid or endocrine involvement on labs.     Labs  - 12/23 labs notable for normal ESR, elevated CRP, elevated lactic acid, elevated PT and INR, CMV quantitative not detected, troponin <0.006. EBV qualitative +  - 12/23 Blood Cultures NGTD.  - 12/15 Acute hepatitis panel negative for infection.  - 12/26 labs with normal TSH and free T4, elevated CPK of 262, normal troponin. CBC with leukocytosis and eosinophilia mildly improved from previous day. CMP with increasing AST and ALT, but PT and INR slightly improved from yesterday. Creatine remains stable at 1.4. EKG performed without acute changes from prior EKGs.  - 12/27 labs with slightly improved leukocytosis and eosinophilia. CMP with increasing AST and ALT  (762, 1334). Creatinine improved to 1.2.  LDH and ferritin elevated. Repeat acute hepatitis panel negative.  - 12/28 with worsening AST and ALT (2163, 3113) and elevated PT and INR (16.0, 1.5). Creatinine stable at 1.3  - 12/29 with worsening AST and ALT (2698, 3832). PT and INR (15.6, 1.5). Repeat HIV non-reactive.     Imaging  - 12/26 CXR without acute findings  - 12/26 Liver US with Doppler with hepatosplenomegaly  - 12/23 Retroperitoneal US with no evidence of renal abnormalities. Echogenic debris in bladder possibly consistent with cystitis.  - 12/20 CT Abdomen and Pelvis without acute findings      Recommendations:  - Continue avoidance of Bactrim and Flagyl. Bactrim has been listed as a drug allergy.  - Recommend daily CBC with diff and CMP  - Recommend switching to methylprednisolone 64 mg (equivalent to prednisone 80 mg) daily given patient's hepatic dysfunction which may be impairing hepatic conversion of prednisone to active prednisolone   - Consider starting cyclosporine 3 to 5 mg/kg per day in two divided doses for 7 days, followed by taper over next 7-14 days for sterid refractory DRESS  - Continue Pepcid BID for gastric protection while on steroids.   - Continue triamcinolone 0.1% cream BID to rash on trunk and limbs. Please special request 454 g jar from pharmacy to adequately cover affected body surface area  - Continue hydrocortisone 2.5% cream BID to rash on face.  - Recommend gentle, fragrance free moisturizer for use ad tatum to body and face while skin is peeling  - Appreciate hepatology and ID input  - s/p punch biopsy performed at bedside 12/26 consistent with DRESS  - We will follow results of viral workup and liver biopsy      Biopsy Site Care:  Remove bandage and clean with gentle soap and water. Apply Vaseline and a bandage. Repeat daily. Leave gelfoam in place and it will resorb on its own in the next few weeks.

## 2024-12-29 NOTE — PROGRESS NOTES
Hepatology Progress Note    Alonzo Nascimento Jr. is a 29 y.o. male admitted to hospital 12/24/2024 (Hospital Day: 6) due to DRESS syndrome.     Interval History    LFT's worse. Reviewed plan of care. Will obtain liver biopsy and recommend starting acylcovir. Pending serologies. Patient anxious this am. Father at bedside. All questions and concerns addressed.     Objective  Temp:  [98 °F (36.7 °C)-101 °F (38.3 °C)] 98.1 °F (36.7 °C) (12/29 0752)  Pulse:  [73-97] 73 (12/29 0752)  BP: (119-128)/(63-73) 122/63 (12/29 0752)  Resp:  [18] 18 (12/29 0519)  SpO2:  [99 %-100 %] 100 % (12/29 0752)    Laboratory    Lab Results   Component Value Date    WBC 10.34 12/29/2024    HGB 11.8 (L) 12/29/2024    HCT 33.5 (L) 12/29/2024    MCV 83 12/29/2024     (L) 12/29/2024       Lab Results   Component Value Date     (L) 12/29/2024    K 4.7 12/29/2024     12/29/2024    CO2 23 12/29/2024    BUN 23 (H) 12/29/2024    CREATININE 1.1 12/29/2024    CALCIUM 7.8 (L) 12/29/2024       Lab Results   Component Value Date    ALBUMIN 2.1 (L) 12/29/2024    ALT 3,832 (H) 12/29/2024    AST 2,698 (H) 12/29/2024    ALKPHOS 283 (H) 12/29/2024    BILITOT 3.0 (H) 12/29/2024       Lab Results   Component Value Date    INR 1.5 (H) 12/29/2024    INR 1.5 (H) 12/28/2024    INR 1.4 (H) 12/27/2024       MELD 3.0: 21 at 12/29/2024  3:46 AM  MELD-Na: 20 at 12/29/2024  3:46 AM  Calculated from:  Serum Creatinine: 1.1 mg/dL at 12/29/2024  3:46 AM  Serum Sodium: 132 mmol/L at 12/29/2024  3:46 AM  Total Bilirubin: 3 mg/dL at 12/29/2024  3:46 AM  Serum Albumin: 2.1 g/dL at 12/29/2024  3:46 AM  INR(ratio): 1.5 at 12/29/2024  3:46 AM  Age at listing (hypothetical): 29 years  Sex: Male at 12/29/2024  3:46 AM      Assessment    29M w/ PMHx s/f left achilles tendon rupture s/p repair on 07/14/23 complicated by osteomyelitis of left calcaneus s/p I&D 12/4/24 on PO bactrim and flagyl. He was prescribed PO Bactrim and Flagy x 6 weeks after this most recent  procedure with end date 1/1/2025. He initially presented to [facility] E on 12/14 and 12/20 for evaluation of the nausea/dehydaration and was discharged from ED after IV fluids on both occasions. About 2-3 days ago, shortly after his ED vist 12/20, the patient reports developing a full body rash; on 12/23, he presented to [facility] for evaluation of rash. Pt reported having nausea, vomiting, decreased po intake, body aches, for 1-2 weeks. The rash is described as >50% BSA covered in stereotypical morbiliform. No mucous membrane involvement. Hepatology consulted for elevated LFTs in context of DRESS. On evaluation, patient without edema or ascites, abdominal pain (incl. RUQ pain), coagulopathy (INR <1.5; prev. >1.5), si/sx GI bleeding, or encephalopathy (AOx4).    Problem List:  DRESS Syndrome  Acute Liver Injury   LINK    Recommendations:   - Agree with dermatology consult, management of DRESS per dermatology; currently on prednisone 80 mg QD and considering cyclosporine   - U/S liver w/ doppler obtained and reviewed   - Pending: IgG, ASMA, JODY, AMA, HSV PCR, EBV IgM, EBV PCR Quant, Hep E IgM and HHV6   - Please consult IR for liver biopsy   - Please consult ID for additional workup for esoteric causes of hepatitis   - Agree with empiric acyclovir; started on 12/28   - Repeat acute hep panel negative on 12/27. CMV PCR negative. EBV qual +.   - 2D Echo obtained and reviewed; unremarkable     Thank you for involving us in the care of Alonzo Nascimento Jr.. Please call with any additional concerns or questions.    Juan Carlos Rogel DO   Gastroenterology Fellow PGY- V  [facility]

## 2024-12-29 NOTE — SUBJECTIVE & OBJECTIVE
Interval History: ID called back due to increasing liver enzymes. He reports feeling improved today, rash/itching is better.     Review of Systems   Constitutional:  Negative for fever.   Gastrointestinal:  Negative for abdominal pain.   Skin:  Positive for rash.     Objective:     Vital Signs (Most Recent):  Temp: 98.2 °F (36.8 °C) (12/29/24 1223)  Pulse: 75 (12/29/24 1223)  Resp: 18 (12/29/24 0519)  BP: 108/64 (12/29/24 1223)  SpO2: 100 % (12/29/24 1223) Vital Signs (24h Range):  Temp:  [98 °F (36.7 °C)-99 °F (37.2 °C)] 98.2 °F (36.8 °C)  Pulse:  [73-87] 75  Resp:  [18] 18  SpO2:  [99 %-100 %] 100 %  BP: (108-128)/(63-73) 108/64     Weight: 72 kg (158 lb 11.7 oz)  Body mass index is 20.94 kg/m².    Estimated Creatinine Clearance: 99.1 mL/min (based on SCr of 1.1 mg/dL).     Physical Exam  Vitals and nursing note reviewed.   Constitutional:       General: He is not in acute distress.     Appearance: He is not ill-appearing or toxic-appearing.   HENT:      Head: Normocephalic.      Nose: Nose normal.   Musculoskeletal:      Comments: L foot wrapped   Skin:     General: Skin is warm and dry.      Findings: Rash present.   Neurological:      General: No focal deficit present.      Mental Status: He is alert and oriented to person, place, and time.   Psychiatric:         Mood and Affect: Mood normal.         Behavior: Behavior normal.          Significant Labs:   Microbiology Results (last 7 days)       Procedure Component Value Units Date/Time    Blood culture [[id number]] Collected: 12/28/24 1246    Order Status: Completed Specimen: Blood Updated: 12/29/24 1412     Blood Culture, Routine No Growth to date      No Growth to date    Narrative:      2 different sites    Blood culture [[id number]] Collected: 12/28/24 1247    Order Status: Completed Specimen: Blood Updated: 12/29/24 1412     Blood Culture, Routine No Growth to date      No Growth to date    Narrative:      2 different sites            Significant Imaging:  I have reviewed all pertinent imaging results/findings within the past 24 hours.

## 2024-12-29 NOTE — ASSESSMENT & PLAN NOTE
Rica Nascimento is a 29 year old man who was admitted 12/24 for rash after bactrim and metronidazole use with skin biopsy consistent with DRESS. Now with worsening liver enzymes, ID consulted for evaluation of possible infectious causes/reactivation in the setting of DRESS. Hepatitis A IgM, Hep C quant and Hep B C IgM and S ag negative. CMV quant from 12/26 negative, EBV quantitative positive. HIV negative.     Recommendations   - repeated CMV quant and Igm/IgG  - will follow HSV PCR  - can continue acyclovir for now, if CMV positive would start ganciclovir   - if plans for liver biopsy, please obtain viral staining on pathology

## 2024-12-29 NOTE — PROGRESS NOTES
"[facility] Medicine  Progress Note    Patient Name: Alonzo Nascimento Jr.  MRN: [id number]  Patient Class: IP- Inpatient   Admission Date: 12/24/2024  Length of Stay: 4 days  Attending Physician: Dave Song III*  Primary Care Provider: Sohan Strange MD        Subjective     Principal Problem:DRESS syndrome        HPI:  30 yo M with PMHx foot osteomyelitis on PO bactrim and flagyl who presented to [facility] 12/23 for evaluation of rash. Pt reported having nausea, vomiting, decreased po intake, body aches, for 1-2 weeks. Pt. Has a history of left achilles tendon rupture s/p repair on 07/14/23 complicated by osteomyelitis of left calcaneus s/p I&D 12/4/24. He was prescribed PO Bactrim and Flagy x 6 weeks after this most recent procedure with end date 1/1/2025.  He initially presented to [facility]  E on 12/14 and 12/20 for evaluation of the nausea/dehydaration and was discharged from ED after IV fluids on both occasions. About 2-3 days ago, shortly after his ED vist 12/20, the patient reports developing a full body rash. The Rash is described as >50% BSA covered in stereotypical morbiliform. No mucous membrane involvement.     In the ED, the patient was tachycardic (120s> 90s), tachypneic (20s), but otherwise hemodynamically stable.  Labs were remarkable for leukocytosis (28.3, with 12% eosinophilia), elevated INR (2.1), elevated creatinine (1.9- from a previous 1.4), hyponatremia (125), elevated LFTs (AST:  384, ALT: 635, T bili: 4.1, ALP:  341), elevated lactic acid (2.7).  VBG showed a pH of 7.34, pCO2 of 49.7 and HC03 of 22.9.  Retroperitoneal ultrasound showed no acute process.  CT abdomen and pelvis (12/20) showed no acute findings.  Referring provider is concerned about DRESS syndrome secondary to Bactrim, so patient transferred to [facility] for Dematology evaluation.     Pt. Was admitted to [facility] while awaiting bed, per [facility] medicine team, "He was started on oral prednisone 50 " "mg daily and clobetasol cream...... Patient's Lfts and LINK were improving on 12/24. He was tolerating a soft diet"    Overview/Hospital Course:  Updated hospital course pending.    Subjective/Interval History:  Pt says overall rash has improved.  Reports skin on face is extremely dry.  Overall pruritus improving.  Nausea has improved and pt tolerating PO diet.  Nursing staff reports persistent fevers > 101 overnight, asymptomatic.    Labs personally reviewed:  WBC 12.37 from 19.14, INR 1.5 from 1.4,  from 249, bilirubin 3.0 from 2.1, AST 2163 from 762, ALT 3113 from 1334    Hepatology consulted:  Recommend HSV 1, hepatitis-C IgM, ID consult, IR consult for liver biopsy.    Dermatology consulted:  Recommended increasing prednisone to 80 mg daily and cont Pepcid and triamcinolone topical.  Can use 2.5% hydrocortisone cream on the face.    Discussed with IR    Objective:     Vital signs reviewed.  T-max 101.7 ° HR , otherwise stable     Physical Exam  Constitutional:       General: He is not in acute distress.     Appearance: He is not ill-appearing.   Cardiovascular:      Rate and Rhythm: Normal rate and regular rhythm.      Heart sounds: No murmur heard.  Pulmonary:      Effort: No respiratory distress.   Abdominal:      General: Bowel sounds are normal. There is no distension.      Tenderness: There is no abdominal tenderness.   Musculoskeletal:      Right lower leg: No edema.      Left lower leg: No edema.   Skin:     Comments: Diffuse maculopapular rash, see media   Neurological:      Mental Status: He is alert and oriented to person, place, and time.   Psychiatric:         Mood and Affect: Mood normal.             Significant Labs: All pertinent labs within the past 24 hours have been reviewed.    Significant Imaging: I have reviewed all pertinent imaging results/findings within the past 24 hours.        Assessment and Plan     * DRESS syndrome  Pt. On long term bactrim therapy presenting with " maculopapular rash involving trunk and extremities, >50% BSA, elevated LFTs, LINK, leukocytosis, Eosinophilia, consistent concerning DRESS syndrome  -Dermatology consulted: s/p biopsy 12/26.  Increased prednisone to 80 mg daily 12/28 and cont topical steroids  -bilirubin, ALP, AST, ALT cont to rise:  hepatology consulted:  Liver ultrasound with hepatosplenomegaly.  Follow up hepatic serologic workup.  ID reconsulted for further recommendations.  IR consulted for biopsy.  Possibly 12/29, NPO after midnight.    Liver injury  Acute viral panel negative 12/15.  CT abd unremarkable liver.  U/S liver: Hepatosplenomegaly  -- AST/ALT/TB/ALP trending up, hepatology consulted.  Follow up hepatic serologies.  Plan for liver biopsy as above.  Cont treatment for DRESS as above.      Hypoalbuminemia  Likely d/t liver dysfunction and acute illness  Monitor volume status while on IV fluids.  Cont to monitor on labs    Severe systemic inflammatory response syndrome (SIRS)  Patient has fever, tachycardia, leukocytosis with abnormal liver function and lactic acidosis on admission.  Although he has a bone infection it is subacute and doubt it is causing these systemic symptoms and signs.  -- monitor for infection, treat DRESS  -- s/p IV fluids, encourage PO intake  -- repeat blood cultures ordered 12/28    Acute kidney injury  RESOLVED  -Unclear baseline, 1.9 on admission and improving  -Renal US unremarkable.  -Continue to monitor while admitted, pt. Will need PCP and/or nephrologist at discharge  -S/p IV fluids, encourage PO intake    Hyponatremia  Normal serum osmolality indicates pseudohyponatremia  Cont to monitor on daily labs.    Acute osteomyelitis of left calcaneus  - appreciate ID consult .  No antibiotics for now. Arrange follow up with [facility] ID clinic at [facility] after his discharge to discuss antibiotic options.   -- 12/23 podiatry note reviewed and pt confirmed plan.  Needs weekly dressing change next due  12/30.        VTE Risk Mitigation (From admission, onward)           Ordered     Place sequential compression device  Until discontinued        Holding DVT prophylaxis for potential biopsy 12/24/24 1187                    Discharge Planning   GARETH: 12/27/2024     Code Status: Full Code   Medical Readiness for Discharge Date:   Discharge Plan A: Hospital Transfer                        Dave Song III, MD  Department of Hospital Medicine   [facility]

## 2024-12-29 NOTE — ASSESSMENT & PLAN NOTE
Rica Nascimento is a 29 year old man who was admitted 12/24 for rash after bactrim and metronidazole use with skin biopsy consistent with DRESS. Now with worsening liver enzymes, ID consulted for evaluation of possible infectious causes/reactivation in the setting of DRESS. Hepatitis A IgM, Hep C quant and Hep B C IgM and S ag negative. CMV quant from 12/26 negative, EBV quantitative positive. HIV negative. HSV, HHV6 pending. Low risk for less common causes of hepatitis (NTM, crypto, microsporidium, pjp, bartonella, histo, lepto, parvo, adeno).     Recommendations   - repeated CMV quant and Igm/IgG  - will follow HSV PCR  - ordered crypto, bartonella, histo/blasto, parvo, adeno  - can continue acyclovir for now, if CMV positive would change to ganciclovir   - if plans for liver biopsy, please obtain viral staining on pathology

## 2024-12-29 NOTE — ASSESSMENT & PLAN NOTE
Pt. On long term bactrim therapy presenting with maculopapular rash involving trunk and extremities, >50% BSA, elevated LFTs, LINK, leukocytosis, Eosinophilia, consistent concerning DRESS syndrome  -Dermatology consulted: s/p biopsy 12/26.  Increased prednisone to 80 mg daily 12/28 and cont topical steroids  -bilirubin, ALP, AST, ALT cont to rise:  hepatology consulted:  Liver ultrasound with hepatosplenomegaly.  Follow up hepatic serologic workup.  ID reconsulted for further recommendations.  IR consulted for biopsy.  Possibly 12/29, NPO after midnight.

## 2024-12-30 ENCOUNTER — TELEPHONE (OUTPATIENT)
Dept: TRANSPLANT | Facility: CLINIC | Age: 29
End: 2024-12-30
Payer: MEDICAID

## 2024-12-30 LAB
ALBUMIN SERPL BCP-MCNC: 2.1 G/DL (ref 3.5–5.2)
ALP SERPL-CCNC: 288 U/L (ref 40–150)
ALT SERPL W/O P-5'-P-CCNC: 4366 U/L (ref 10–44)
ANION GAP SERPL CALC-SCNC: 7 MMOL/L (ref 8–16)
ANISOCYTOSIS BLD QL SMEAR: SLIGHT
AST SERPL-CCNC: 2199 U/L (ref 10–40)
BASOPHILS # BLD AUTO: 0.06 K/UL (ref 0–0.2)
BASOPHILS NFR BLD: 0.6 % (ref 0–1.9)
BILIRUB DIRECT SERPL-MCNC: 2.6 MG/DL (ref 0.1–0.3)
BILIRUB SERPL-MCNC: 3.6 MG/DL (ref 0.1–1)
BUN SERPL-MCNC: 19 MG/DL (ref 6–20)
CALCIUM SERPL-MCNC: 7.6 MG/DL (ref 8.7–10.5)
CERULOPLASMIN SERPL-MCNC: 33 MG/DL (ref 15–45)
CHLORIDE SERPL-SCNC: 105 MMOL/L (ref 95–110)
CO2 SERPL-SCNC: 22 MMOL/L (ref 23–29)
CREAT SERPL-MCNC: 0.9 MG/DL (ref 0.5–1.4)
DIFFERENTIAL METHOD BLD: ABNORMAL
EBV VCA IGM SER QL IA: NEGATIVE
EOSINOPHIL # BLD AUTO: 0.4 K/UL (ref 0–0.5)
EOSINOPHIL NFR BLD: 3.9 % (ref 0–8)
ERYTHROCYTE [DISTWIDTH] IN BLOOD BY AUTOMATED COUNT: 14.5 % (ref 11.5–14.5)
EST. GFR  (NO RACE VARIABLE): >60 ML/MIN/1.73 M^2
GGT SERPL-CCNC: 485 U/L (ref 8–55)
GLUCOSE SERPL-MCNC: 116 MG/DL (ref 70–110)
HBV SURFACE AB SER-ACNC: 757.05 MIU/ML
HBV SURFACE AB SER-ACNC: REACTIVE M[IU]/ML
HCT VFR BLD AUTO: 31.3 % (ref 40–54)
HGB BLD-MCNC: 10.9 G/DL (ref 14–18)
HYPOCHROMIA BLD QL SMEAR: ABNORMAL
IGG1 SER-MCNC: ABNORMAL MG/DL (ref 382–929)
IGG2 SER-MCNC: 474 MG/DL (ref 242–700)
IGG3 SER-MCNC: 200 MG/DL (ref 22–176)
IGG4 SER-MCNC: 52 MG/DL (ref 4–86)
IMM GRANULOCYTES # BLD AUTO: 0.24 K/UL (ref 0–0.04)
IMM GRANULOCYTES NFR BLD AUTO: 2.2 % (ref 0–0.5)
INR PPP: 1.4 (ref 0.8–1.2)
LYMPHOCYTES # BLD AUTO: 4.1 K/UL (ref 1–4.8)
LYMPHOCYTES NFR BLD: 38.2 % (ref 18–48)
MAGNESIUM SERPL-MCNC: 2 MG/DL (ref 1.6–2.6)
MCH RBC QN AUTO: 28.2 PG (ref 27–31)
MCHC RBC AUTO-ENTMCNC: 34.8 G/DL (ref 32–36)
MCV RBC AUTO: 81 FL (ref 82–98)
MONOCYTES # BLD AUTO: 0.8 K/UL (ref 0.3–1)
MONOCYTES NFR BLD: 7 % (ref 4–15)
NEUTROPHILS # BLD AUTO: 5.2 K/UL (ref 1.8–7.7)
NEUTROPHILS NFR BLD: 48.1 % (ref 38–73)
NRBC BLD-RTO: 0 /100 WBC
OVALOCYTES BLD QL SMEAR: ABNORMAL
PHOSPHATE SERPL-MCNC: 4.4 MG/DL (ref 2.7–4.5)
PLATELET # BLD AUTO: 148 K/UL (ref 150–450)
PLATELET BLD QL SMEAR: ABNORMAL
PMV BLD AUTO: 10.7 FL (ref 9.2–12.9)
POIKILOCYTOSIS BLD QL SMEAR: SLIGHT
POLYCHROMASIA BLD QL SMEAR: ABNORMAL
POTASSIUM SERPL-SCNC: 4.8 MMOL/L (ref 3.5–5.1)
PROT SERPL-MCNC: 6.4 G/DL (ref 6–8.4)
PROTHROMBIN TIME: 14.6 SEC (ref 9–12.5)
RBC # BLD AUTO: 3.87 M/UL (ref 4.6–6.2)
SMOOTH MUSCLE AB TITR SER IF: ABNORMAL {TITER}
SODIUM SERPL-SCNC: 134 MMOL/L (ref 136–145)
SPHEROCYTES BLD QL SMEAR: ABNORMAL
TARGETS BLD QL SMEAR: ABNORMAL
TREPONEMA PALLIDUM IGG+IGM AB [PRESENCE] IN SERUM OR PLASMA BY IMMUNOASSAY: NONREACTIVE
WBC # BLD AUTO: 10.83 K/UL (ref 3.9–12.7)

## 2024-12-30 PROCEDURE — 88307 TISSUE EXAM BY PATHOLOGIST: CPT | Mod: 26,NTX,, | Performed by: STUDENT IN AN ORGANIZED HEALTH CARE EDUCATION/TRAINING PROGRAM

## 2024-12-30 PROCEDURE — 85610 PROTHROMBIN TIME: CPT | Mod: NTX | Performed by: HOSPITALIST

## 2024-12-30 PROCEDURE — 86682 HELMINTH ANTIBODY: CPT | Mod: NTX | Performed by: FAMILY MEDICINE

## 2024-12-30 PROCEDURE — 82390 ASSAY OF CERULOPLASMIN: CPT | Mod: NTX | Performed by: FAMILY MEDICINE

## 2024-12-30 PROCEDURE — 82977 ASSAY OF GGT: CPT | Mod: NTX | Performed by: FAMILY MEDICINE

## 2024-12-30 PROCEDURE — 21400001 HC TELEMETRY ROOM: Mod: NTX

## 2024-12-30 PROCEDURE — 86403 PARTICLE AGGLUT ANTBDY SCRN: CPT | Mod: NTX | Performed by: STUDENT IN AN ORGANIZED HEALTH CARE EDUCATION/TRAINING PROGRAM

## 2024-12-30 PROCEDURE — 88365 INSITU HYBRIDIZATION (FISH): CPT | Mod: 26,,, | Performed by: STUDENT IN AN ORGANIZED HEALTH CARE EDUCATION/TRAINING PROGRAM

## 2024-12-30 PROCEDURE — 25000003 PHARM REV CODE 250: Performed by: FAMILY MEDICINE

## 2024-12-30 PROCEDURE — 82610 CYSTATIN C: CPT | Mod: NTX | Performed by: FAMILY MEDICINE

## 2024-12-30 PROCEDURE — 88341 IMHCHEM/IMCYTCHM EA ADD ANTB: CPT | Mod: 26,,, | Performed by: STUDENT IN AN ORGANIZED HEALTH CARE EDUCATION/TRAINING PROGRAM

## 2024-12-30 PROCEDURE — 84100 ASSAY OF PHOSPHORUS: CPT | Mod: NTX | Performed by: HOSPITALIST

## 2024-12-30 PROCEDURE — 88342 IMHCHEM/IMCYTCHM 1ST ANTB: CPT | Performed by: STUDENT IN AN ORGANIZED HEALTH CARE EDUCATION/TRAINING PROGRAM

## 2024-12-30 PROCEDURE — 63600175 PHARM REV CODE 636 W HCPCS: Mod: NTX | Performed by: STUDENT IN AN ORGANIZED HEALTH CARE EDUCATION/TRAINING PROGRAM

## 2024-12-30 PROCEDURE — 86593 SYPHILIS TEST NON-TREP QUANT: CPT | Mod: NTX | Performed by: FAMILY MEDICINE

## 2024-12-30 PROCEDURE — 86787 VARICELLA-ZOSTER ANTIBODY: CPT | Mod: NTX | Performed by: FAMILY MEDICINE

## 2024-12-30 PROCEDURE — 88313 SPECIAL STAINS GROUP 2: CPT | Mod: 26,NTX,, | Performed by: STUDENT IN AN ORGANIZED HEALTH CARE EDUCATION/TRAINING PROGRAM

## 2024-12-30 PROCEDURE — 88342 IMHCHEM/IMCYTCHM 1ST ANTB: CPT | Mod: 26,,, | Performed by: STUDENT IN AN ORGANIZED HEALTH CARE EDUCATION/TRAINING PROGRAM

## 2024-12-30 PROCEDURE — 86706 HEP B SURFACE ANTIBODY: CPT | Mod: 91,NTX | Performed by: FAMILY MEDICINE

## 2024-12-30 PROCEDURE — 36415 COLL VENOUS BLD VENIPUNCTURE: CPT | Mod: NTX | Performed by: STUDENT IN AN ORGANIZED HEALTH CARE EDUCATION/TRAINING PROGRAM

## 2024-12-30 PROCEDURE — 25000003 PHARM REV CODE 250: Mod: NTX | Performed by: HOSPITALIST

## 2024-12-30 PROCEDURE — 82248 BILIRUBIN DIRECT: CPT | Mod: NTX | Performed by: HOSPITALIST

## 2024-12-30 PROCEDURE — 0FB03ZX EXCISION OF LIVER, PERCUTANEOUS APPROACH, DIAGNOSTIC: ICD-10-PCS | Performed by: STUDENT IN AN ORGANIZED HEALTH CARE EDUCATION/TRAINING PROGRAM

## 2024-12-30 PROCEDURE — 83735 ASSAY OF MAGNESIUM: CPT | Mod: NTX | Performed by: HOSPITALIST

## 2024-12-30 PROCEDURE — 63600175 PHARM REV CODE 636 W HCPCS: Performed by: FAMILY MEDICINE

## 2024-12-30 PROCEDURE — 88341 IMHCHEM/IMCYTCHM EA ADD ANTB: CPT | Performed by: STUDENT IN AN ORGANIZED HEALTH CARE EDUCATION/TRAINING PROGRAM

## 2024-12-30 PROCEDURE — 88307 TISSUE EXAM BY PATHOLOGIST: CPT | Mod: NTX | Performed by: STUDENT IN AN ORGANIZED HEALTH CARE EDUCATION/TRAINING PROGRAM

## 2024-12-30 PROCEDURE — 80321 ALCOHOLS BIOMARKERS 1OR 2: CPT | Mod: NTX | Performed by: FAMILY MEDICINE

## 2024-12-30 PROCEDURE — 87449 NOS EACH ORGANISM AG IA: CPT | Mod: NTX | Performed by: STUDENT IN AN ORGANIZED HEALTH CARE EDUCATION/TRAINING PROGRAM

## 2024-12-30 PROCEDURE — 80053 COMPREHEN METABOLIC PANEL: CPT | Mod: NTX | Performed by: HOSPITALIST

## 2024-12-30 PROCEDURE — 85025 COMPLETE CBC W/AUTO DIFF WBC: CPT | Mod: NTX | Performed by: HOSPITALIST

## 2024-12-30 PROCEDURE — 88313 SPECIAL STAINS GROUP 2: CPT | Mod: NTX | Performed by: STUDENT IN AN ORGANIZED HEALTH CARE EDUCATION/TRAINING PROGRAM

## 2024-12-30 PROCEDURE — 88365 INSITU HYBRIDIZATION (FISH): CPT | Performed by: STUDENT IN AN ORGANIZED HEALTH CARE EDUCATION/TRAINING PROGRAM

## 2024-12-30 PROCEDURE — 87799 DETECT AGENT NOS DNA QUANT: CPT | Mod: NTX | Performed by: STUDENT IN AN ORGANIZED HEALTH CARE EDUCATION/TRAINING PROGRAM

## 2024-12-30 PROCEDURE — 82103 ALPHA-1-ANTITRYPSIN TOTAL: CPT | Mod: NTX | Performed by: FAMILY MEDICINE

## 2024-12-30 PROCEDURE — 11000001 HC ACUTE MED/SURG PRIVATE ROOM: Mod: NTX

## 2024-12-30 PROCEDURE — 25000003 PHARM REV CODE 250

## 2024-12-30 PROCEDURE — 86480 TB TEST CELL IMMUN MEASURE: CPT | Mod: NTX | Performed by: FAMILY MEDICINE

## 2024-12-30 PROCEDURE — 99233 SBSQ HOSP IP/OBS HIGH 50: CPT | Mod: ,,, | Performed by: INTERNAL MEDICINE

## 2024-12-30 RX ORDER — FENTANYL CITRATE 50 UG/ML
INJECTION, SOLUTION INTRAMUSCULAR; INTRAVENOUS
Status: COMPLETED | OUTPATIENT
Start: 2024-12-30 | End: 2024-12-30

## 2024-12-30 RX ORDER — MIDAZOLAM HYDROCHLORIDE 1 MG/ML
INJECTION, SOLUTION INTRAMUSCULAR; INTRAVENOUS
Status: COMPLETED | OUTPATIENT
Start: 2024-12-30 | End: 2024-12-30

## 2024-12-30 RX ORDER — MUPIROCIN 20 MG/G
OINTMENT TOPICAL 2 TIMES DAILY
Status: DISCONTINUED | OUTPATIENT
Start: 2024-12-30 | End: 2025-01-03 | Stop reason: HOSPADM

## 2024-12-30 RX ORDER — LIDOCAINE HYDROCHLORIDE 10 MG/ML
INJECTION, SOLUTION INFILTRATION; PERINEURAL
Status: COMPLETED | OUTPATIENT
Start: 2024-12-30 | End: 2024-12-30

## 2024-12-30 RX ADMIN — SODIUM CHLORIDE 250 ML: 9 INJECTION, SOLUTION INTRAVENOUS at 12:12

## 2024-12-30 RX ADMIN — FENTANYL CITRATE 50 MCG: 50 INJECTION, SOLUTION INTRAMUSCULAR; INTRAVENOUS at 02:12

## 2024-12-30 RX ADMIN — TRIAMCINOLONE ACETONIDE: 1 CREAM TOPICAL at 09:12

## 2024-12-30 RX ADMIN — METHYLPREDNISOLONE SODIUM SUCCINATE 64 MG: 40 INJECTION, POWDER, FOR SOLUTION INTRAMUSCULAR; INTRAVENOUS at 09:12

## 2024-12-30 RX ADMIN — FAMOTIDINE 20 MG: 20 TABLET ORAL at 08:12

## 2024-12-30 RX ADMIN — ACYCLOVIR SODIUM 800 MG: 50 INJECTION, SOLUTION INTRAVENOUS at 04:12

## 2024-12-30 RX ADMIN — MUPIROCIN: 20 OINTMENT TOPICAL at 08:12

## 2024-12-30 RX ADMIN — MUPIROCIN: 20 OINTMENT TOPICAL at 10:12

## 2024-12-30 RX ADMIN — DIPHENHYDRAMINE HYDROCHLORIDE 25 MG: 25 CAPSULE ORAL at 08:12

## 2024-12-30 RX ADMIN — MIDAZOLAM HYDROCHLORIDE 1 MG: 2 INJECTION, SOLUTION INTRAMUSCULAR; INTRAVENOUS at 02:12

## 2024-12-30 RX ADMIN — HYDROCORTISONE: 25 CREAM TOPICAL at 08:12

## 2024-12-30 RX ADMIN — TRIAMCINOLONE ACETONIDE: 1 CREAM TOPICAL at 08:12

## 2024-12-30 RX ADMIN — SENNOSIDES AND DOCUSATE SODIUM 1 TABLET: 50; 8.6 TABLET ORAL at 08:12

## 2024-12-30 RX ADMIN — ACYCLOVIR SODIUM 800 MG: 50 INJECTION, SOLUTION INTRAVENOUS at 01:12

## 2024-12-30 RX ADMIN — ACYCLOVIR SODIUM 800 MG: 50 INJECTION, SOLUTION INTRAVENOUS at 09:12

## 2024-12-30 RX ADMIN — SODIUM CHLORIDE 250 ML: 9 INJECTION, SOLUTION INTRAVENOUS at 05:12

## 2024-12-30 RX ADMIN — LIDOCAINE HYDROCHLORIDE 5 ML: 10 INJECTION, SOLUTION INFILTRATION; PERINEURAL at 02:12

## 2024-12-30 RX ADMIN — SODIUM CHLORIDE 250 ML: 9 INJECTION, SOLUTION INTRAVENOUS at 11:12

## 2024-12-30 RX ADMIN — Medication 6 MG: at 08:12

## 2024-12-30 RX ADMIN — SODIUM CHLORIDE 250 ML: 9 INJECTION, SOLUTION INTRAVENOUS at 10:12

## 2024-12-30 RX ADMIN — HYDROCORTISONE: 25 CREAM TOPICAL at 09:12

## 2024-12-30 NOTE — ASSESSMENT & PLAN NOTE
Rica Nascimento is a 29 year old man who was admitted 12/24 for rash after bactrim and metronidazole use with skin biopsy consistent with DRESS. Now with worsening liver enzymes, ID consulted for evaluation of possible infectious causes/reactivation in the setting of DRESS. Hepatitis A IgM, Hep C quant and Hep B C IgM and S ag negative. CMV quant from 12/26 negative, EBV quantitative positive. HIV negative. HSV, HHV6 pending. Low risk for less common causes of hepatitis (NTM, crypto, microsporidium, pjp, bartonella, histo, lepto, parvo, adeno).     Recommendations   - F/U HSV PCR, CMV quant and serology  - ordered crypto, bartonella, histo/blasto, parvo, adeno  - can continue acyclovir for now, if CMV positive would change to ganciclovir   - awaiting liver biopsy; please obtain viral staining on pathology

## 2024-12-30 NOTE — PROCEDURES
"  Pre Op Diagnosis: Liver dysfunction  Post Op Diagnosis: Same    Procedure: Liver biopsy    Procedure performed by: Lei    Written Informed Consent Obtained: Yes  Specimen Removed: YES 3 18 g cores  Estimated Blood Loss: Minimal    Findings:   Successful random liver biopsy. Gel foam slurry used for tract embolization    Patient tolerated procedure well.    Clemente Odom MD ([facility])  Interventional Radiology  [phone]      "

## 2024-12-30 NOTE — TELEPHONE ENCOUNTER
Liver Transplant Committee Discussion     Patient Name: Alonzo Nascimento   : 1995  MRN: [id number]    Requested by: Ana Martinez MD    Day to be discussed: Liver discussion days: Monday     Transplant Coordinator: Liver Coordinators: None    Patient Status: inpatient    Transplant Status: transplant status: Hepatology    Reason for Discussion: move forward with liver txp eval?, pt has acute liver injury from DRESS 2/2 abx for ostemyelitis L heel s/p achilles tendon repair    Plan:request auth for inpatient eval     Route to: Dr. Dugan

## 2024-12-30 NOTE — ASSESSMENT & PLAN NOTE
30 yo man presented with >50% BSA of erythematous morbilliform rash with associated eosinophilia, leukocytosis, elevated LFTs, and previous LINK (now improved) with prodrome onset with subjective fever/chills and nausea/vomiting ~12/12 with rash onset ~12/20. Started bactrim 11/20 and flagyl ~11/25. Clinical features and lab findings are most consistent with DRESS, supported by biopsy results. Regiscar score: 3 (possible case) on arrival.  Most likely implicated medication is bactrim; however, cannot entirely rule out flagyl as a potential cause.    Cardiac workup with EKG, troponin, BNP Echo without suspicion for cardiac involvement. Previous concern for renal involvement now improved with stable creatinine. Without evidence of thyroid or endocrine involvement on labs.     Rash continues to improve, with superficial desquamation. Leukocytosis and eosinophilia resolved (levels continue wnl today); however, course notable for progressively worsening LFTs. Hepatology is following for acute liver injury and ID has been consulted for recommendations for additional sources of hepatitis. Infectious workup initiated including viral studies. Has been started on empiric acyclovir.     Labs  - 12/23 labs notable for normal ESR, elevated CRP, elevated lactic acid, elevated PT and INR, CMV quantitative not detected, troponin <0.006. EBV qualitative +  - 12/23 Blood Cultures NGTD.  - 12/15 Acute hepatitis panel negative for infection.  - 12/26 labs with normal TSH and free T4, elevated CPK of 262, normal troponin. CBC with leukocytosis and eosinophilia mildly improved from previous day. CMP with increasing AST and ALT, but PT and INR slightly improved from yesterday. Creatine remains stable at 1.4. EKG performed without acute changes from prior EKGs.  - 12/27 labs with slightly improved leukocytosis and eosinophilia. CMP with increasing AST and ALT (762, 1334). Creatinine improved to 1.2.  LDH and ferritin elevated. Repeat acute  hepatitis panel negative.  - 12/28 with worsening AST and ALT (2163, 3113) and elevated PT and INR (16.0, 1.5). Creatinine stable at 1.3  - 12/29 with worsening AST and ALT (2698, 3832). PT and INR (15.6, 1.5). Repeat HIV non-reactive.   - 12/30 worsening AST and ALT.   - pending HSV, HHV-6, HepE, Histo, bartonella    Imaging  - 12/26 CXR without acute findings  - 12/26 Liver US with Doppler with hepatosplenomegaly  - 12/23 Retroperitoneal US with no evidence of renal abnormalities. Echogenic debris in bladder possibly consistent with cystitis.  - 12/20 CT Abdomen and Pelvis without acute findings    Recommendations:  - Continue avoidance of Bactrim and Flagyl. Bactrim has been listed as a drug allergy.  - Recommend daily CBC with diff and CMP  - acute liver injury could possibly be 2/2 drug-induced liver injury in the setting of patient's DRESS. s/p liver biopsy today. If supports drug related etiology, can consider initiation of immunosuppressant for steroid-refractory DRESS  - recommend allergy consult for additional input in DRESS management. In the event of requiring additional immunosuppression, they may have input regarding options such as IL-5 inhibitors.  - Continue Pepcid BID for gastric protection while on steroids.   - Continue triamcinolone 0.1% cream BID to rash on trunk and limbs. Please special request 454 g jar from pharmacy to adequately cover affected body surface area  - Continue hydrocortisone 2.5% cream BID to rash on face.  - Recommend gentle, fragrance free moisturizer for use ad tatum to body and face while skin is peeling  - Appreciate hepatology and ID input  - We will follow results of viral workup and liver biopsy    Biopsy Site Care:  Remove bandage and clean with gentle soap and water. Apply Vaseline and a bandage. Repeat daily. Leave gelfoam in place and it will resorb on its own in the next few weeks.

## 2024-12-30 NOTE — PLAN OF CARE
Liver biopsy procedure completed. Pt tolerated procedure well.Patient AAOx3, no distress noted, respirations even and unlabored.  Pt to be transported to [facility] for 1 hour post-procedural sedation recovery per MD. Report to be given at bedside to RN.

## 2024-12-30 NOTE — ASSESSMENT & PLAN NOTE
Patient has fever, tachycardia, leukocytosis with abnormal liver function and lactic acidosis on admission.  Although he has a bone infection it is subacute and doubt it is causing these systemic symptoms and signs.  -- monitor for infection, treat DRESS  -- s/p IV fluids, encourage PO intake  -- repeat blood cultures ordered 12/28, NGTD

## 2024-12-30 NOTE — SUBJECTIVE & OBJECTIVE
"  Subjective:     Principal Problem:DRESS syndrome    Interval History:   Patient reports rash is continuing to "run it's course", denies worsening of rash or itching.    Medications:  Continuous Infusions:  Scheduled Meds:   acyclovir  10 mg/kg (Ideal) Intravenous Q8H    famotidine  20 mg Oral BID    hydrocortisone   Topical (Top) BID    methylPREDNISolone injection (PEDS and ADULTS)  64 mg Intravenous Daily    mupirocin   Nasal BID    polyethylene glycol  17 g Oral Daily    senna-docusate 8.6-50 mg  1 tablet Oral BID    sodium chloride 0.9%  250 mL Intravenous Q8H    triamcinolone acetonide 0.1%   Topical (Top) BID     PRN Meds:  Current Facility-Administered Medications:     acetaminophen, 650 mg, Oral, Q8H PRN    dextrose 50%, 12.5 g, Intravenous, PRN    dextrose 50%, 25 g, Intravenous, PRN    diphenhydrAMINE, 25 mg, Oral, Q6H PRN    glucagon (human recombinant), 1 mg, Intramuscular, PRN    glucose, 16 g, Oral, PRN    glucose, 24 g, Oral, PRN    melatonin, 6 mg, Oral, Nightly PRN    naloxone, 0.02 mg, Intravenous, PRN    ondansetron, 4 mg, Intravenous, Q8H PRN    sodium chloride 0.9%, 5 mL, Intravenous, PRN    Review of Systems   Constitutional:  Negative for fever and chills.   HENT:  Negative for mouth sores.    Eyes:  Negative for eye irritation and visual change.   Gastrointestinal:  Negative for nausea, vomiting and abdominal pain.   Genitourinary:  Negative for genital sores.   Musculoskeletal:  Negative for arthralgias.   Skin:  Positive for rash. Negative for itching.     Objective:     Vital Signs (Most Recent):  Temp: 98.1 °F (36.7 °C) (12/30/24 1445)  Pulse: 64 (12/30/24 1545)  Resp: 20 (12/30/24 1545)  BP: 123/68 (12/30/24 1545)  SpO2: 100 % (12/30/24 1545) Vital Signs (24h Range):  Temp:  [98 °F (36.7 °C)-98.4 °F (36.9 °C)] 98.1 °F (36.7 °C)  Pulse:  [57-95] 64  Resp:  [11-20] 20  SpO2:  [96 %-100 %] 100 %  BP: (108-136)/(58-88) 123/68     Weight: 72 kg (158 lb 11.7 oz)  Body mass index is 20.94 " kg/m².     Physical Exam   Constitutional: He appears well-developed and well-nourished.   Musculoskeletal: Lymphadenopathy:      Head:      Right side of head: No submental, submandibular, preauricular or posterior auricular adenopathy.      Left side of head: No submental, submandibular, preauricular or posterior auricular adenopathy.      Cervical: No cervical adenopathy.      Upper Body:      Right upper body: No supraclavicular or axillary adenopathy.      Left upper body: No supraclavicular or axillary adenopathy.     Lymphadenopathy:        Head (right side): No submental, no submandibular, no preauricular and no posterior auricular adenopathy present.        Head (left side): No submental, no submandibular, no preauricular and no posterior auricular adenopathy present.     He has no cervical adenopathy.        Right: No supraclavicular adenopathy present.        Left: No supraclavicular adenopathy present.   Neurological: He is alert and oriented to person, place, and time.   Psychiatric: He has a normal mood and affect.   Skin:   Areas Examined (abnormalities noted in diagram):   Scalp / Hair Palpated and Inspected  Head / Face Inspection Performed  Neck Inspection Performed  Chest / Axilla Inspection Performed  Abdomen Inspection Performed  Genitals / Buttocks / Groin Inspection Performed  Back Inspection Performed  RUE Inspected  LUE Inspection Performed  RLE Inspected  LLE Inspection Performed  Nails and Digits Inspection Performed  Gland Inspection Performed                           Significant Labs: All pertinent labs within the past 24 hours have been reviewed.  - CBC eos 0.4 downtrending  - CMP LFTs continue to be elevated AST >2k, ALT >4k. Cr normalized    Significant Imaging: I have reviewed all pertinent imaging results/findings within the past 24 hours.

## 2024-12-30 NOTE — SUBJECTIVE & OBJECTIVE
Subjective/Interval History:  Pt says rash peeling on torso and face.  Pruritus improved with topical medications and antihistamines.  Denies any abdominal pain, nausea or vomiting.  Tolerating PO diet.    Labs personally reviewed:  INR 1.5, , total bilirubin 3.0, AST 2698, ALT 3832    Discussed with Interventional Radiology, planned for liver biopsy 12/30    Objective:     Vital signs reviewed.  Vital signs stable.  Last fever 101 12/28 at 11:53 a.m..     Physical Exam  Constitutional:       General: He is not in acute distress.     Appearance: He is not ill-appearing.   Cardiovascular:      Rate and Rhythm: Normal rate and regular rhythm.      Heart sounds: No murmur heard.  Pulmonary:      Effort: No respiratory distress.   Abdominal:      General: Bowel sounds are normal. There is no distension.      Tenderness: There is no abdominal tenderness.   Musculoskeletal:      Right lower leg: No edema.      Left lower leg: No edema.   Skin:     Comments: Diffuse rash, now peeling   Neurological:      Mental Status: He is alert and oriented to person, place, and time.   Psychiatric:         Mood and Affect: Mood normal.             Significant Labs: All pertinent labs within the past 24 hours have been reviewed.    Significant Imaging: I have reviewed all pertinent imaging results/findings within the past 24 hours.

## 2024-12-30 NOTE — PROGRESS NOTES
"[place] - [facility] Surg  Dermatology  Progress Note    Patient Name: Alonzo Nascimento Jr.  MRN: [id number]  Admission Date: 12/24/2024  Hospital Length of Stay: 6 days  Attending Physician: Dave Song III*  Primary Care Provider: Sohan Strange MD     Subjective:     Principal Problem:DRESS syndrome    Interval History:   Patient reports rash is continuing to "run it's course", denies worsening of rash or itching.    Medications:  Continuous Infusions:  Scheduled Meds:   acyclovir  10 mg/kg (Ideal) Intravenous Q8H    famotidine  20 mg Oral BID    hydrocortisone   Topical (Top) BID    methylPREDNISolone injection (PEDS and ADULTS)  64 mg Intravenous Daily    mupirocin   Nasal BID    polyethylene glycol  17 g Oral Daily    senna-docusate 8.6-50 mg  1 tablet Oral BID    sodium chloride 0.9%  250 mL Intravenous Q8H    triamcinolone acetonide 0.1%   Topical (Top) BID     PRN Meds:  Current Facility-Administered Medications:     acetaminophen, 650 mg, Oral, Q8H PRN    dextrose 50%, 12.5 g, Intravenous, PRN    dextrose 50%, 25 g, Intravenous, PRN    diphenhydrAMINE, 25 mg, Oral, Q6H PRN    glucagon (human recombinant), 1 mg, Intramuscular, PRN    glucose, 16 g, Oral, PRN    glucose, 24 g, Oral, PRN    melatonin, 6 mg, Oral, Nightly PRN    naloxone, 0.02 mg, Intravenous, PRN    ondansetron, 4 mg, Intravenous, Q8H PRN    sodium chloride 0.9%, 5 mL, Intravenous, PRN    Review of Systems   Constitutional:  Negative for fever and chills.   HENT:  Negative for mouth sores.    Eyes:  Negative for eye irritation and visual change.   Gastrointestinal:  Negative for nausea, vomiting and abdominal pain.   Genitourinary:  Negative for genital sores.   Musculoskeletal:  Negative for arthralgias.   Skin:  Positive for rash. Negative for itching.     Objective:     Vital Signs (Most Recent):  Temp: 98.1 °F (36.7 °C) (12/30/24 1445)  Pulse: 64 (12/30/24 1545)  Resp: 20 (12/30/24 1545)  BP: 123/68 (12/30/24 1545)  SpO2: 100 % (12/30/24 " 1545) Vital Signs (24h Range):  Temp:  [98 °F (36.7 °C)-98.4 °F (36.9 °C)] 98.1 °F (36.7 °C)  Pulse:  [57-95] 64  Resp:  [11-20] 20  SpO2:  [96 %-100 %] 100 %  BP: (108-136)/(58-88) 123/68     Weight: 72 kg (158 lb 11.7 oz)  Body mass index is 20.94 kg/m².     Physical Exam   Constitutional: He appears well-developed and well-nourished.   Musculoskeletal: Lymphadenopathy:      Head:      Right side of head: No submental, submandibular, preauricular or posterior auricular adenopathy.      Left side of head: No submental, submandibular, preauricular or posterior auricular adenopathy.      Cervical: No cervical adenopathy.      Upper Body:      Right upper body: No supraclavicular or axillary adenopathy.      Left upper body: No supraclavicular or axillary adenopathy.     Lymphadenopathy:        Head (right side): No submental, no submandibular, no preauricular and no posterior auricular adenopathy present.        Head (left side): No submental, no submandibular, no preauricular and no posterior auricular adenopathy present.     He has no cervical adenopathy.        Right: No supraclavicular adenopathy present.        Left: No supraclavicular adenopathy present.   Neurological: He is alert and oriented to person, place, and time.   Psychiatric: He has a normal mood and affect.   Skin:   Areas Examined (abnormalities noted in diagram):   Scalp / Hair Palpated and Inspected  Head / Face Inspection Performed  Neck Inspection Performed  Chest / Axilla Inspection Performed  Abdomen Inspection Performed  Genitals / Buttocks / Groin Inspection Performed  Back Inspection Performed  RUE Inspected  LUE Inspection Performed  RLE Inspected  LLE Inspection Performed  Nails and Digits Inspection Performed  Gland Inspection Performed                           Significant Labs: All pertinent labs within the past 24 hours have been reviewed.  - CBC eos 0.4 downtrending  - CMP LFTs continue to be elevated AST >2k, ALT >4k. Cr  normalized    Significant Imaging: I have reviewed all pertinent imaging results/findings within the past 24 hours.      Assessment/Plan:     Oncology  * DRESS syndrome  28 yo man presented with >50% BSA of erythematous morbilliform rash with associated eosinophilia, leukocytosis, elevated LFTs, and previous LINK (now improved) with prodrome onset with subjective fever/chills and nausea/vomiting ~12/12 with rash onset ~12/20. Started bactrim 11/20 and flagyl ~11/25. Clinical features and lab findings are most consistent with DRESS, supported by biopsy results. Regiscar score: 3 (possible case) on arrival.  Most likely implicated medication is bactrim; however, cannot entirely rule out flagyl as a potential cause.    Cardiac workup with EKG, troponin, BNP Echo without suspicion for cardiac involvement. Previous concern for renal involvement now improved with stable creatinine. Without evidence of thyroid or endocrine involvement on labs.     Rash continues to improve, with superficial desquamation. Leukocytosis and eosinophilia resolved (levels continue wnl today); however, course notable for progressively worsening LFTs. Hepatology is following for acute liver injury and ID has been consulted for recommendations for additional sources of hepatitis. Infectious workup initiated including viral studies. Has been started on empiric acyclovir.     Labs  - 12/23 labs notable for normal ESR, elevated CRP, elevated lactic acid, elevated PT and INR, CMV quantitative not detected, troponin <0.006. EBV qualitative +  - 12/23 Blood Cultures NGTD.  - 12/15 Acute hepatitis panel negative for infection.  - 12/26 labs with normal TSH and free T4, elevated CPK of 262, normal troponin. CBC with leukocytosis and eosinophilia mildly improved from previous day. CMP with increasing AST and ALT, but PT and INR slightly improved from yesterday. Creatine remains stable at 1.4. EKG performed without acute changes from prior EKGs.  - 12/27  labs with slightly improved leukocytosis and eosinophilia. CMP with increasing AST and ALT (762, 1334). Creatinine improved to 1.2.  LDH and ferritin elevated. Repeat acute hepatitis panel negative.  - 12/28 with worsening AST and ALT (2163, 3113) and elevated PT and INR (16.0, 1.5). Creatinine stable at 1.3  - 12/29 with worsening AST and ALT (2698, 3832). PT and INR (15.6, 1.5). Repeat HIV non-reactive.   - 12/30 worsening AST and ALT.   - pending HSV, HHV-6, HepE, Histo, bartonella    Imaging  - 12/26 CXR without acute findings  - 12/26 Liver US with Doppler with hepatosplenomegaly  - 12/23 Retroperitoneal US with no evidence of renal abnormalities. Echogenic debris in bladder possibly consistent with cystitis.  - 12/20 CT Abdomen and Pelvis without acute findings    Recommendations:  - Continue avoidance of Bactrim and Flagyl. Bactrim has been listed as a drug allergy.  - Recommend daily CBC with diff and CMP  - acute liver injury could possibly be 2/2 drug-induced liver injury in the setting of patient's DRESS. s/p liver biopsy today. If supports drug related etiology, can consider initiation of immunosuppressant for steroid-refractory DRESS  - recommend allergy consult for additional input in DRESS management. In the event of requiring additional immunosuppression, they may have input regarding options such as IL-5 inhibitors.  - Continue IV solumedrol 64mg daily  - Continue Pepcid BID for gastric protection while on steroids.   - Continue triamcinolone 0.1% cream BID to rash on trunk and limbs. Please special request 454 g jar from pharmacy to adequately cover affected body surface area  - Continue hydrocortisone 2.5% cream BID to rash on face.  - Recommend gentle, fragrance free moisturizer for use ad tatum to body and face while skin is peeling  - Appreciate hepatology and ID input  - We will follow results of viral workup and liver biopsy    Biopsy Site Care:  Remove bandage and clean with gentle soap and  water. Apply Vaseline and a bandage. Repeat daily. Leave gelfoam in place and it will resorb on its own in the next few weeks.       Case discussed with attending Dr. Davis. Dermatology will continue to follow. Please contact us if you have any additional questions.    Abby Brown MD PGY-3  Dermatology  [facility]

## 2024-12-30 NOTE — ASSESSMENT & PLAN NOTE
Acute viral panel negative 12/15.  CT abd unremarkable liver.  U/S liver: Hepatosplenomegaly  AST/ALT/TB/ALP trending up, hepatology consulted.  Follow up hepatic serologies.  Plan for liver biopsy as above.  Cont treatment for DRESS as above.  ID consulted:  Infectious workup ordered.  If CMV positive plan to change acyclovir to ganciclovir.  Follow up liver biopsy results and recommend viral staining on pathology.

## 2024-12-30 NOTE — PROGRESS NOTES
"[facility] Medicine  Progress Note    Patient Name: Alonzo Nascimento Jr.  MRN: [id number]  Patient Class: IP- Inpatient   Admission Date: 12/24/2024  Length of Stay: 5 days  Attending Physician: Dave Song III*  Primary Care Provider: Sohan Strange MD        Subjective     Principal Problem:DRESS syndrome        HPI:  28 yo M with PMHx foot osteomyelitis on PO bactrim and flagyl who presented to [facility] 12/23 for evaluation of rash. Pt reported having nausea, vomiting, decreased po intake, body aches, for 1-2 weeks. Pt. Has a history of left achilles tendon rupture s/p repair on 07/14/23 complicated by osteomyelitis of left calcaneus s/p I&D 12/4/24. He was prescribed PO Bactrim and Flagy x 6 weeks after this most recent procedure with end date 1/1/2025.  He initially presented to [facility]  E on 12/14 and 12/20 for evaluation of the nausea/dehydaration and was discharged from ED after IV fluids on both occasions. About 2-3 days ago, shortly after his ED vist 12/20, the patient reports developing a full body rash. The Rash is described as >50% BSA covered in stereotypical morbiliform. No mucous membrane involvement.     In the ED, the patient was tachycardic (120s> 90s), tachypneic (20s), but otherwise hemodynamically stable.  Labs were remarkable for leukocytosis (28.3, with 12% eosinophilia), elevated INR (2.1), elevated creatinine (1.9- from a previous 1.4), hyponatremia (125), elevated LFTs (AST:  384, ALT: 635, T bili: 4.1, ALP:  341), elevated lactic acid (2.7).  VBG showed a pH of 7.34, pCO2 of 49.7 and HC03 of 22.9.  Retroperitoneal ultrasound showed no acute process.  CT abdomen and pelvis (12/20) showed no acute findings.  Referring provider is concerned about DRESS syndrome secondary to Bactrim, so patient transferred to [facility] for Dematology evaluation.     Pt. Was admitted to [facility] while awaiting bed, per [facility] medicine team, "He was started on oral prednisone 50 " "mg daily and clobetasol cream...... Patient's Lfts and LINK were improving on 12/24. He was tolerating a soft diet"    Overview/Hospital Course:  Updated hospital course pending.    Subjective/Interval History:  Pt says rash peeling on torso and face.  Pruritus improved with topical medications and antihistamines.  Denies any abdominal pain, nausea or vomiting.  Tolerating PO diet.    Labs personally reviewed:  INR 1.5, , total bilirubin 3.0, AST 2698, ALT 3832    Discussed with Interventional Radiology, planned for liver biopsy 12/30    Objective:     Vital signs reviewed.  Vital signs stable.  Last fever 101 12/28 at 11:53 a.m..     Physical Exam  Constitutional:       General: He is not in acute distress.     Appearance: He is not ill-appearing.   Cardiovascular:      Rate and Rhythm: Normal rate and regular rhythm.      Heart sounds: No murmur heard.  Pulmonary:      Effort: No respiratory distress.   Abdominal:      General: Bowel sounds are normal. There is no distension.      Tenderness: There is no abdominal tenderness.   Musculoskeletal:      Right lower leg: No edema.      Left lower leg: No edema.   Skin:     Comments: Diffuse rash, now peeling   Neurological:      Mental Status: He is alert and oriented to person, place, and time.   Psychiatric:         Mood and Affect: Mood normal.             Significant Labs: All pertinent labs within the past 24 hours have been reviewed.    Significant Imaging: I have reviewed all pertinent imaging results/findings within the past 24 hours.          Assessment and Plan     * DRESS syndrome  Pt. On long term bactrim therapy presenting with maculopapular rash involving trunk and extremities, >50% BSA, elevated LFTs, LINK, leukocytosis, Eosinophilia, consistent concerning DRESS syndrome  Dermatology consulted: s/p biopsy 12/26.  Recommended Solu-Medrol and topical steroids.  Considering cyclosporin  bilirubin, ALP, AST, ALT cont to rise:  hepatology consulted:  " Liver ultrasound with hepatosplenomegaly.  Follow up hepatic serologic workup.  ID reconsulted for further recommendations.  IR consulted for biopsy.  Possibly 12/30, NPO after midnight.    Liver injury  Acute viral panel negative 12/15.  CT abd unremarkable liver.  U/S liver: Hepatosplenomegaly  AST/ALT/TB/ALP trending up, hepatology consulted.  Follow up hepatic serologies.  Plan for liver biopsy as above.  Cont treatment for DRESS as above.  ID consulted:  Infectious workup ordered.  If CMV positive plan to change acyclovir to ganciclovir.  Follow up liver biopsy results and recommend viral staining on pathology.      Hypoalbuminemia  Likely d/t liver dysfunction and acute illness  Monitor volume status while on IV fluids.  Cont to monitor on labs    Severe systemic inflammatory response syndrome (SIRS)  Patient has fever, tachycardia, leukocytosis with abnormal liver function and lactic acidosis on admission.  Although he has a bone infection it is subacute and doubt it is causing these systemic symptoms and signs.  -- monitor for infection, treat DRESS  -- s/p IV fluids, encourage PO intake  -- repeat blood cultures ordered 12/28, NGTD    Acute kidney injury  RESOLVED  -Unclear baseline, 1.9 on admission and improving  -Renal US unremarkable.  -Continue to monitor while admitted, pt. Will need PCP and/or nephrologist at discharge  -S/p IV fluids, encourage PO intake    Hyponatremia  Normal serum osmolality indicates pseudohyponatremia  Cont to monitor on daily labs.    Acute osteomyelitis of left calcaneus  - appreciate ID consult .  No antibiotics for now. Arrange follow up with [facility] ID clinic at [facility] after his discharge to discuss antibiotic options.   -- 12/23 podiatry note reviewed and pt confirmed plan.  Needs weekly dressing change next due 12/30.        VTE Risk Mitigation (From admission, onward)           Ordered     Place sequential compression device  Until discontinued          12/24/24 1849                    Discharge Planning   GARETH: 12/27/2024     Code Status: Full Code   Medical Readiness for Discharge Date:   Discharge Plan A: Hospital Transfer        Dave Song III, MD  Department of Hospital Medicine   [facility]

## 2024-12-30 NOTE — PROCEDURES
"  Pre Op Diagnosis: DRESS syndrome and Elevated LFTs  Post Op Diagnosis: Same    Procedure: Liver biopsy    Procedure performed by: Lei    Written Informed Consent Obtained: Yes  Specimen Removed: YES 3 18 g cores  Estimated Blood Loss: Minimal    Findings:   Successful US guided random liver biopsy. 3 18 g cores taken. Gel foam slurry used for tract embolization.     Patient tolerated procedure well.    Clemente Odom MD ([place])  Interventional Radiology  [phone]      "

## 2024-12-30 NOTE — PROGRESS NOTES
PHARM.D. PRE-TRANSPLANT NOTE:    This patient's medication therapy was evaluated as part of his pre-transplant evaluation.      The following general pharmacologic concerns were noted: Patient admitted at time of evaluation. Currently on methylpred 64mg daily, and acyclovir.    The following concerns for post-operative pain management were noted: None    The following pharmacologic concerns related to HCV therapy were noted: None      This patient's medication profile was reviewed for considerations for DAA Hepatitis C therapy:    [X]  No current inducers of CYP 3A4 or PGP  [X]  No amiodarone on this patient's EMR profile in the last 24 months  [X]  No past or current atrial fibrillation on this patient's EMR profile       Current Facility-Administered Medications   Medication Dose Route Frequency Provider Last Rate Last Admin    acetaminophen tablet 650 mg  650 mg Oral Q8H PRN Norberto Lo MD   650 mg at 12/28/24 2048    acyclovir 800 mg in 0.9% NaCl 250 mL IVPB  10 mg/kg (Ideal) Intravenous Q8H Dave Song III, MD   Stopped at 12/30/24 1023    dextrose 50% injection 12.5 g  12.5 g Intravenous PRN Salvador Haque MD        dextrose 50% injection 25 g  25 g Intravenous PRSalvador Braga MD        diphenhydrAMINE capsule 25 mg  25 mg Oral Q6H PRN Dk Reese MD   25 mg at 12/29/24 2039    famotidine tablet 20 mg  20 mg Oral BID Norberto Lo MD   20 mg at 12/29/24 2035    glucagon (human recombinant) injection 1 mg  1 mg Intramuscular PRSalvador Braga MD        glucose chewable tablet 16 g  16 g Oral PRSalvador Braga MD        glucose chewable tablet 24 g  24 g Oral PRSalvador Braga MD        hydrocortisone 2.5 % cream   Topical (Top) BID Dave Song III, MD   Given at 12/29/24 2037    melatonin tablet 6 mg  6 mg Oral Nightly PRSalvador Braga MD   6 mg at 12/29/24 2039    methylPREDNISolone sodium succinate injection 64 mg  64 mg Intravenous Daily Nohemi  Dave GILES III, MD   64 mg at 12/30/24 0916    mupirocin 2 % ointment   Nasal BID Pavithra Howard PA-C   Given at 12/30/24 1041    naloxone 0.4 mg/mL injection 0.02 mg  0.02 mg Intravenous PRN Salvador Haque MD        ondansetron injection 4 mg  4 mg Intravenous Q8H PRN Salvador Haque MD   4 mg at 12/26/24 0943    polyethylene glycol packet 17 g  17 g Oral Daily Salvador Haque MD   17 g at 12/29/24 0958    senna-docusate 8.6-50 mg per tablet 1 tablet  1 tablet Oral BID Salvador Haque MD   1 tablet at 12/29/24 0958    sodium chloride 0.9% bolus 250 mL 250 mL  250 mL Intravenous Q8H Dave Song III, MD   Stopped at 12/30/24 1142    sodium chloride 0.9% flush 5 mL  5 mL Intravenous PRN Salvador Haque MD        triamcinolone acetonide 0.1% cream   Topical (Top) BID Dave Song III, MD   Given at 12/29/24 2036           I am available for consultation and can be contacted, as needed by the other members of the Transplant team.

## 2024-12-30 NOTE — ASSESSMENT & PLAN NOTE
Pt. On long term bactrim therapy presenting with maculopapular rash involving trunk and extremities, >50% BSA, elevated LFTs, LINK, leukocytosis, Eosinophilia, consistent concerning DRESS syndrome  Dermatology consulted: s/p biopsy 12/26.  Recommended Solu-Medrol and topical steroids.  Considering cyclosporin  bilirubin, ALP, AST, ALT cont to rise:  hepatology consulted:  Liver ultrasound with hepatosplenomegaly.  Follow up hepatic serologic workup.  ID reconsulted for further recommendations.  IR consulted for biopsy.  Possibly 12/30, NPO after midnight.

## 2024-12-30 NOTE — PROGRESS NOTES
[facility]  Infectious Disease  Progress Note    Patient Name: Alonzo Nascimento Jr.  MRN: [id number]  Admission Date: 12/24/2024  Length of Stay: 6 days  Attending Physician: Dave Song III*  Primary Care Provider: Sohan Strange MD    Isolation Status: No active isolations  Assessment/Plan:        Acute osteomyelitis of left calcaneus  29 year old male who was started on Bactrim and metronidazole for osteomyelitis of the left calcaneus.      He has now been admitted to the hospital for DRESS.      Plan  Per Derm  Will hold off on starting any antibiotics for now pending resolution of his symptoms.  Arrange follow up with [facility] ID clinic in [place] to discuss antibiotics.    Liver injury  Rica Nascimento is a 29 year old man who was admitted 12/24 for rash after bactrim and metronidazole use with skin biopsy consistent with DRESS. Now with worsening liver enzymes, ID consulted for evaluation of possible infectious causes/reactivation in the setting of DRESS. Hepatitis A IgM, Hep C quant and Hep B C IgM and S ag negative. CMV quant from 12/26 negative, EBV quantitative positive. HIV negative. HSV, HHV6 pending. Low risk for less common causes of hepatitis (NTM, crypto, microsporidium, pjp, bartonella, histo, lepto, parvo, adeno).     CMV PCR was NR, previously.    Recommendations   - F/U HSV PCR, CMV quant and serology  - ordered crypto, bartonella, histo/blasto, parvo, adeno  - awaiting liver biopsy; please obtain viral staining on pathology     Lucho Voss MD  Infectious Disease  [facility]    Subjective:     Principal Problem:DRESS syndrome    HPI: 29 year old male with a history of left achilles tendon rupture s/p surgical repair in July 2023.  He developed a wound in the area.  MRI of the wound showed possible but not definitive osteomyelitis.  Cultures of the wound were obtained in November 2024 and returned positive for Citrobacter and Prevotella.  Patient was started on  Bactrim and Metronidazole to complete a 6 week course.  He then underwent surgical debridement of the wound with removal of hardware and devitalized bone on 12/4/24.  He continued on bactrim and metronidazole.  He developed skin itching and nausea/vomiting.  He presented to the hospital for evaluation.  His LFTs were noted to be elevated in the ER.  He was evaluated by dermatology and diagnosed with DRESS.  ID is consulted for antibiotic recommendations.  Interval History: ID called back due to increasing liver enzymes. He reports feeling improved today, rash/itching is better.     Review of Systems   Constitutional:  Negative for fever.   Gastrointestinal:  Negative for abdominal pain.   Skin:  Positive for rash.     Objective:     Vital Signs (Most Recent):  Temp: 98.2 °F (36.8 °C) (12/29/24 1223)  Pulse: 75 (12/29/24 1223)  Resp: 18 (12/29/24 0519)  BP: 108/64 (12/29/24 1223)  SpO2: 100 % (12/29/24 1223) Vital Signs (24h Range):  Temp:  [98 °F (36.7 °C)-99 °F (37.2 °C)] 98.2 °F (36.8 °C)  Pulse:  [73-87] 75  Resp:  [18] 18  SpO2:  [99 %-100 %] 100 %  BP: (108-128)/(63-73) 108/64     Weight: 72 kg (158 lb 11.7 oz)  Body mass index is 20.94 kg/m².    Estimated Creatinine Clearance: 99.1 mL/min (based on SCr of 1.1 mg/dL).     Physical Exam  Vitals and nursing note reviewed.   Constitutional:       General: He is not in acute distress.     Appearance: He is not ill-appearing or toxic-appearing.   HENT:      Head: Normocephalic.      Nose: Nose normal.   Musculoskeletal:      Comments: L foot wrapped   Skin:     General: Skin is warm and dry.      Findings: Rash present.   Neurological:      General: No focal deficit present.      Mental Status: He is alert and oriented to person, place, and time.   Psychiatric:         Mood and Affect: Mood normal.         Behavior: Behavior normal.          Significant Labs:   Microbiology Results (last 7 days)       Procedure Component Value Units Date/Time    Blood culture  [[id number]] Collected: 12/28/24 1246    Order Status: Completed Specimen: Blood Updated: 12/29/24 1412     Blood Culture, Routine No Growth to date      No Growth to date    Narrative:      2 different sites    Blood culture [[id number]] Collected: 12/28/24 1247    Order Status: Completed Specimen: Blood Updated: 12/29/24 1412     Blood Culture, Routine No Growth to date      No Growth to date    Narrative:      2 different sites            Significant Imaging: I have reviewed all pertinent imaging results/findings within the past 24 hours.

## 2024-12-30 NOTE — PLAN OF CARE
Patient arrived to [facility] number at [phone]. Connected to bedside monitor - cardiac monitoring and continuous pulse oximetry applied. Call bell within reach, side rails raised x 2, bed locked and in lowest position. VSS. Patient in no acute distress. No pain noted. Procedure site clean, dry, and intact; no bleeding or hematoma noted. Will continue to monitor.

## 2024-12-30 NOTE — PLAN OF CARE
Pt arrived to CT room 173 for liver biopsy. Pt oriented to unit and staff. Plan of care reviewed with patient, patient verbalizes understanding. Comfort measures utilized. Pt safely transferred from stretcher to procedural table. Fall risk reviewed with patient, fall risk interventions maintained. Safety strap applied, positioner pillows utilized to minimize pressure points. Blankets applied. Pt prepped and draped utilizing standard sterile technique. Patient placed on continuous monitoring, as required by sedation policy. Timeouts completed utilizing standard universal time-out, per department and facility policy. RN to remain at bedside, continuous monitoring maintained. Pt resting comfortably. Denies pain/discomfort. Will continue to monitor. See flow sheets for monitoring, medication administration, and updates.

## 2024-12-30 NOTE — SUBJECTIVE & OBJECTIVE
Subjective/Interval History:  Rash overall continues to improve, peeling on torso and face.  Pruritus responding to topical medications as well as Benadryl.  Tolerating diet.  Denies any nausea or vomiting.    Review of Systems   Constitutional:  Negative for chills and fever.   HENT:  Negative for congestion.    Respiratory:  Negative for cough and shortness of breath.    Cardiovascular:  Negative for palpitations.   Gastrointestinal:  Negative for constipation, diarrhea, nausea and vomiting.   Genitourinary: Negative.    Musculoskeletal: Negative.    Skin:  Positive for rash.   Neurological:  Negative for weakness and headaches.   Psychiatric/Behavioral:  Negative for suicidal ideas.          Vitals:    12/31/24 1626   BP: 129/70   Pulse: 82   Resp: 18   Temp: 98.1 °F (36.7 °C)       Objective:        Physical Exam  Constitutional:       General: He is not in acute distress.     Appearance: He is ill-appearing. He is not diaphoretic.   Eyes:      General: No scleral icterus.     Extraocular Movements: Extraocular movements intact.   Cardiovascular:      Rate and Rhythm: Normal rate and regular rhythm.      Heart sounds: No murmur heard.  Pulmonary:      Effort: No respiratory distress.   Abdominal:      General: Bowel sounds are normal. There is no distension.      Tenderness: There is no abdominal tenderness.   Musculoskeletal:      Cervical back: Neck supple.      Right lower leg: No edema.      Left lower leg: No edema.   Skin:     Comments: Diffuse rash, now peeling   Neurological:      Mental Status: He is alert and oriented to person, place, and time.   Psychiatric:         Mood and Affect: Mood normal.       Significant Labs: All pertinent labs within the past 24 hours have been reviewed.    Recent Labs   Lab 12/31/24  0539   WBC 11.41   RBC 3.69*   HGB 10.5*   HCT 30.3*      MCV 82   MCH 28.5   MCHC 34.7       Recent Labs   Lab 12/31/24  0539   CALCIUM 7.4*   ALBUMIN 2.0*   PROT 6.2      K 4.1    CO2 25      BUN 17   CREATININE 0.9   ALKPHOS 267*   ALT 3,549*   AST 1,262*   BILITOT 2.7*         Significant Imaging: I have reviewed all pertinent imaging results/findings within the past 24 hours.

## 2024-12-30 NOTE — ASSESSMENT & PLAN NOTE
29 year old male who was started on Bactrim and metronidazole for osteomyelitis of the left calcaneus.      He has now been admitted to the hospital for DRESS.      Plan  Per Derm  Will hold off on starting any antibiotics for now pending resolution of his symptoms.  Arrange follow up with [facility] in [place] to discuss antibiotics.

## 2024-12-31 ENCOUNTER — DOCUMENTATION ONLY (OUTPATIENT)
Dept: TRANSPLANT | Facility: CLINIC | Age: 29
End: 2024-12-31
Payer: MEDICAID

## 2024-12-31 PROBLEM — D69.6 THROMBOCYTOPENIA: Status: ACTIVE | Noted: 2024-12-31

## 2024-12-31 PROBLEM — D64.9 ANEMIA: Status: ACTIVE | Noted: 2024-12-31

## 2024-12-31 LAB
ABO + RH BLD: NORMAL
ALBUMIN SERPL BCP-MCNC: 2 G/DL (ref 3.5–5.2)
ALP SERPL-CCNC: 267 U/L (ref 40–150)
ALT SERPL W/O P-5'-P-CCNC: 3549 U/L (ref 10–44)
ANION GAP SERPL CALC-SCNC: 6 MMOL/L (ref 8–16)
ANISOCYTOSIS BLD QL SMEAR: SLIGHT
AST SERPL-CCNC: 1262 U/L (ref 10–40)
BASOPHILS # BLD AUTO: 0.1 K/UL (ref 0–0.2)
BASOPHILS NFR BLD: 0.9 % (ref 0–1.9)
BILIRUB SERPL-MCNC: 2.7 MG/DL (ref 0.1–1)
BLD GP AB SCN CELLS X3 SERPL QL: NORMAL
BUN SERPL-MCNC: 17 MG/DL (ref 6–20)
CALCIUM SERPL-MCNC: 7.4 MG/DL (ref 8.7–10.5)
CHLORIDE SERPL-SCNC: 106 MMOL/L (ref 95–110)
CMV DNA SPEC QL NAA+PROBE: NORMAL
CMV IGG SERPL QL IA: REACTIVE
CO2 SERPL-SCNC: 25 MMOL/L (ref 23–29)
CREAT SERPL-MCNC: 0.9 MG/DL (ref 0.5–1.4)
CRYPTOC AG SER QL LA: NEGATIVE
CYTOMEGALOVIRUS PCR, QUANT: NOT DETECTED IU/ML
DIFFERENTIAL METHOD BLD: ABNORMAL
EOSINOPHIL # BLD AUTO: 2.1 K/UL (ref 0–0.5)
EOSINOPHIL NFR BLD: 18.1 % (ref 0–8)
EPSTEIN-BARR VIRUS DNA: ABNORMAL
EPSTEIN-BARR VIRUS PCR, QUANT: ABNORMAL IU/ML
ERYTHROCYTE [DISTWIDTH] IN BLOOD BY AUTOMATED COUNT: 15.2 % (ref 11.5–14.5)
EST. GFR  (NO RACE VARIABLE): >60 ML/MIN/1.73 M^2
GLUCOSE SERPL-MCNC: 102 MG/DL (ref 70–110)
HCT VFR BLD AUTO: 30.3 % (ref 40–54)
HGB BLD-MCNC: 10.5 G/DL (ref 14–18)
IMM GRANULOCYTES # BLD AUTO: 0.22 K/UL (ref 0–0.04)
IMM GRANULOCYTES NFR BLD AUTO: 1.9 % (ref 0–0.5)
INR PPP: 1.2 (ref 0.8–1.2)
LYMPHOCYTES # BLD AUTO: 5 K/UL (ref 1–4.8)
LYMPHOCYTES NFR BLD: 44.2 % (ref 18–48)
MAGNESIUM SERPL-MCNC: 2.1 MG/DL (ref 1.6–2.6)
MCH RBC QN AUTO: 28.5 PG (ref 27–31)
MCHC RBC AUTO-ENTMCNC: 34.7 G/DL (ref 32–36)
MCV RBC AUTO: 82 FL (ref 82–98)
MONOCYTES # BLD AUTO: 0.8 K/UL (ref 0.3–1)
MONOCYTES NFR BLD: 6.7 % (ref 4–15)
NEUTROPHILS # BLD AUTO: 3.2 K/UL (ref 1.8–7.7)
NEUTROPHILS NFR BLD: 28.2 % (ref 38–73)
NRBC BLD-RTO: 0 /100 WBC
PHOSPHATE SERPL-MCNC: 4.5 MG/DL (ref 2.7–4.5)
PLATELET # BLD AUTO: 160 K/UL (ref 150–450)
PLATELET BLD QL SMEAR: ABNORMAL
PMV BLD AUTO: 10.3 FL (ref 9.2–12.9)
POTASSIUM SERPL-SCNC: 4.1 MMOL/L (ref 3.5–5.1)
PROT SERPL-MCNC: 6.2 G/DL (ref 6–8.4)
PROTHROMBIN TIME: 13.4 SEC (ref 9–12.5)
RBC # BLD AUTO: 3.69 M/UL (ref 4.6–6.2)
SODIUM SERPL-SCNC: 137 MMOL/L (ref 136–145)
VARICELLA INTERPRETATION: POSITIVE
VARICELLA ZOSTER IGG: 27.5 S/CO
WBC # BLD AUTO: 11.41 K/UL (ref 3.9–12.7)

## 2024-12-31 PROCEDURE — 86747 PARVOVIRUS ANTIBODY: CPT | Mod: 91,NTX | Performed by: FAMILY MEDICINE

## 2024-12-31 PROCEDURE — 97535 SELF CARE MNGMENT TRAINING: CPT | Mod: NTX

## 2024-12-31 PROCEDURE — 87801 DETECT AGNT MULT DNA AMPLI: CPT | Mod: NTX | Performed by: FAMILY MEDICINE

## 2024-12-31 PROCEDURE — 97165 OT EVAL LOW COMPLEX 30 MIN: CPT | Mod: NTX

## 2024-12-31 PROCEDURE — 36415 COLL VENOUS BLD VENIPUNCTURE: CPT | Mod: NTX | Performed by: FAMILY MEDICINE

## 2024-12-31 PROCEDURE — 25000003 PHARM REV CODE 250: Mod: NTX | Performed by: HOSPITALIST

## 2024-12-31 PROCEDURE — 99232 SBSQ HOSP IP/OBS MODERATE 35: CPT | Mod: NTX,,, | Performed by: INTERNAL MEDICINE

## 2024-12-31 PROCEDURE — 97161 PT EVAL LOW COMPLEX 20 MIN: CPT | Mod: NTX

## 2024-12-31 PROCEDURE — 87449 NOS EACH ORGANISM AG IA: CPT | Mod: NTX | Performed by: FAMILY MEDICINE

## 2024-12-31 PROCEDURE — 25500020 PHARM REV CODE 255: Mod: NTX | Performed by: STUDENT IN AN ORGANIZED HEALTH CARE EDUCATION/TRAINING PROGRAM

## 2024-12-31 PROCEDURE — 25000003 PHARM REV CODE 250: Mod: NTX | Performed by: STUDENT IN AN ORGANIZED HEALTH CARE EDUCATION/TRAINING PROGRAM

## 2024-12-31 PROCEDURE — 25000003 PHARM REV CODE 250: Mod: NTX | Performed by: FAMILY MEDICINE

## 2024-12-31 PROCEDURE — 85025 COMPLETE CBC W/AUTO DIFF WBC: CPT | Mod: NTX | Performed by: HOSPITALIST

## 2024-12-31 PROCEDURE — 84100 ASSAY OF PHOSPHORUS: CPT | Mod: NTX | Performed by: HOSPITALIST

## 2024-12-31 PROCEDURE — 83735 ASSAY OF MAGNESIUM: CPT | Mod: NTX | Performed by: HOSPITALIST

## 2024-12-31 PROCEDURE — 85610 PROTHROMBIN TIME: CPT | Mod: NTX | Performed by: HOSPITALIST

## 2024-12-31 PROCEDURE — 97116 GAIT TRAINING THERAPY: CPT | Mod: NTX

## 2024-12-31 PROCEDURE — 63600175 PHARM REV CODE 636 W HCPCS: Mod: NTX | Performed by: STUDENT IN AN ORGANIZED HEALTH CARE EDUCATION/TRAINING PROGRAM

## 2024-12-31 PROCEDURE — 80053 COMPREHEN METABOLIC PANEL: CPT | Mod: NTX | Performed by: HOSPITALIST

## 2024-12-31 PROCEDURE — 86611 BARTONELLA ANTIBODY: CPT | Mod: NTX | Performed by: FAMILY MEDICINE

## 2024-12-31 PROCEDURE — 86900 BLOOD TYPING SEROLOGIC ABO: CPT | Mod: NTX | Performed by: FAMILY MEDICINE

## 2024-12-31 PROCEDURE — 63600175 PHARM REV CODE 636 W HCPCS: Mod: NTX | Performed by: FAMILY MEDICINE

## 2024-12-31 PROCEDURE — 87799 DETECT AGENT NOS DNA QUANT: CPT | Mod: NTX | Performed by: FAMILY MEDICINE

## 2024-12-31 PROCEDURE — 21400001 HC TELEMETRY ROOM: Mod: NTX

## 2024-12-31 PROCEDURE — 11000001 HC ACUTE MED/SURG PRIVATE ROOM: Mod: NTX

## 2024-12-31 RX ADMIN — MUPIROCIN: 20 OINTMENT TOPICAL at 09:12

## 2024-12-31 RX ADMIN — SODIUM CHLORIDE 250 ML: 9 INJECTION, SOLUTION INTRAVENOUS at 09:12

## 2024-12-31 RX ADMIN — SODIUM CHLORIDE 250 ML: 9 INJECTION, SOLUTION INTRAVENOUS at 03:12

## 2024-12-31 RX ADMIN — DIPHENHYDRAMINE HYDROCHLORIDE 25 MG: 25 CAPSULE ORAL at 08:12

## 2024-12-31 RX ADMIN — FAMOTIDINE 20 MG: 20 TABLET ORAL at 09:12

## 2024-12-31 RX ADMIN — ACYCLOVIR SODIUM 720 MG: 50 INJECTION, SOLUTION INTRAVENOUS at 03:12

## 2024-12-31 RX ADMIN — SENNOSIDES AND DOCUSATE SODIUM 1 TABLET: 50; 8.6 TABLET ORAL at 08:12

## 2024-12-31 RX ADMIN — IOHEXOL 100 ML: 350 INJECTION, SOLUTION INTRAVENOUS at 09:12

## 2024-12-31 RX ADMIN — TRIAMCINOLONE ACETONIDE: 1 CREAM TOPICAL at 09:12

## 2024-12-31 RX ADMIN — FAMOTIDINE 20 MG: 20 TABLET ORAL at 08:12

## 2024-12-31 RX ADMIN — HYDROCORTISONE: 25 CREAM TOPICAL at 08:12

## 2024-12-31 RX ADMIN — SENNOSIDES AND DOCUSATE SODIUM 1 TABLET: 50; 8.6 TABLET ORAL at 09:12

## 2024-12-31 RX ADMIN — POLYETHYLENE GLYCOL 3350 17 G: 17 POWDER, FOR SOLUTION ORAL at 09:12

## 2024-12-31 RX ADMIN — ACYCLOVIR SODIUM 800 MG: 50 INJECTION, SOLUTION INTRAVENOUS at 09:12

## 2024-12-31 RX ADMIN — ACYCLOVIR SODIUM 800 MG: 50 INJECTION, SOLUTION INTRAVENOUS at 12:12

## 2024-12-31 RX ADMIN — Medication 6 MG: at 08:12

## 2024-12-31 RX ADMIN — METHYLPREDNISOLONE SODIUM SUCCINATE 64 MG: 40 INJECTION, POWDER, FOR SOLUTION INTRAMUSCULAR; INTRAVENOUS at 09:12

## 2024-12-31 NOTE — PLAN OF CARE
Problem: Occupational Therapy  Goal: Occupational Therapy Goal  Description: Patient functioning at baseline and does not require skilled occupational therapy services; therefore, no goals established at this time    Outcome: Met

## 2024-12-31 NOTE — PROGRESS NOTES
Transplant Recipient Adult Psychosocial Assessment    SW completed evaluation at bedside with pt and father. Pt's mother participated by phone.    Alonzo Nascimento  [address]  [place] [address]  Telephone Information:   Mobile [phone]   Home  There is no home phone number on file.  Work  There is no work phone number on file.  E-mail  [email or web address]    Sex: male  YOB: 1995  Age: 29 y.o.    Encounter Date: 12/23/2024  U[place] Citizen: yes  Primary Language: English   Needed: no    Emergency Contact:  Name: Kne Alexandra  Relationship: mother  Address: same as pt  Phone Numbers:  [phone] (mobile)    Family/Social Support:   Number of dependents/: pt denies  Marital history: pt is single, never   Other family dynamics: Pt lives with his mother and pt's father lives in [place]. Pt's father is currently sleeping in pt's hospital room and plans to stay in town as long as needed. Pt has 2 younger sisters- 1 in [place] and 1 in [place]. Pt's paternal grandmother lives in [place], is retired and would be involved in caregiving.     Household Composition:  Name: Ken Alexandra  Relationship: mother  Does person drive? yes    Do you and your caregivers have access to reliable transportation? yes  PRIMARY CAREGIVER: Pt's mother (ken Alexandra [phone]) and father (Alonzo Nascimento Sr. [phone]) will be primary caregivers.     Able to take time off work without financial concerns: yes. Pt's mother works part-time for [employer] on travel assignments and can choose to not take a new assignment. Pt's father works from home and has FMLA.     Additional Significant Others who will Assist with Transplant:  Pt's mother and father will be caregivers. Pt's paternal grandmother lives in [place], is retired and would be involved in caregiving.     Living Will: no .  Healthcare Power of : no  Advance Directives on file: <<no  information> per medical record.  Verbally reviewed LW/HCPA information.   provided patient with copy of LW/HCPA documents and provided education on completion of forms    Living Donors: No. Education and resource information given to patient.    Highest Education Level: Attended College/Technical School  Reading Ability: college  Reports difficulty with: N/A  Learns Best By:  multisensory info     Status: no  VA Benefits: no     Working for Income: yes  If yes, working activity level: Working Full Time. 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 Pt reports his employer is supportive and is holding his job; however, pt is not being paid while not working.   Spouse/Significant Other Employment: n/a    Disabled: no    Monthly Income:  Pt didn't disclose, but reports no financial concerns.     Insurance:   Payer/Plan Subscr  Sex Relation Sub. Ins. ID Effective Group Num   1. MEDICAID - * MAGGY TILLMAN * 1995 Male Self [id number]* 23                                    [address] [address]     Primary Insurance (for UNOS reporting): Public Insurance - Medicaid  Secondary Insurance (for UNOS reporting): None    Dialysis Adherence: Patient reports no dialysis history.    Infusion Service: patient utilizing? no  Home Health: patient utilizing? not currently but had HH in past after a surgery  DME: yes, crutches (not really using)  Pulmonary/Cardiac Rehab: pt denies   ADLS:  Pt reports no difficulties with driving, walking, bathing, cooking, housekeeping, eating, shopping, and taking medication.    Adherence:    Pt reports is highly compliant with all medical appointments and instructions.  Adherence education and counseling provided.     Per History Section:  Past Medical History:   Diagnosis Date    Achilles tendon rupture 2023    left achilles tendon rupture s/p repair on 23 complicated by osteomyelitis of left calcaneus s/p I&D 24     Acute osteomyelitis of calcaneum, left 12/2024    osteomyelitis of left calcaneus s/p I&D 12/4/24     Social History     Tobacco Use    Smoking status: Never    Smokeless tobacco: Never   Substance Use Topics    Alcohol use: Yes     Comment: rarely     Social History     Substance and Sexual Activity   Drug Use Yes    Types: Marijuana     Social History     Substance and Sexual Activity   Sexual Activity Yes    Partners: Female       Per Today's Psychosocial:  Tobacco: none, patient denies any use.  Alcohol: none, patient denies any use.  Illicit Drugs/Non-prescribed Medications: Pt reports he smokes marijuana on occasion.      Arrests/DWI/Treatment/Rehab: patient denies    Psychiatric History:    Mental Health: Pt denies mental health concerns and reports he is dealing with situation as well as can be expected.  Psychiatrist/Counselor: pt denies  Medications:  pt denies  Suicide/Homicide Issues: Pt denies history or current SI/HI.     Knowledge: Patient states having clear understanding and realistic expectations regarding the potential risks and potential benefits of organ transplantation and organ donation and agrees to discuss with health care team members and support system members, as well as to utilize available resources and express questions and/or concerns in order to further facilitate the pt informed decision-making.  Resources and information provided and reviewed.     Patient is aware of [facility]'s affiliation and/or partnership with agencies in home health care, LTAC, SNF, [facility], and other hospitals and clinics.    Understanding: Patient reports having a clear understanding of the many lifetime commitments involved with being a transplant recipient, including costs, compliance, medications, lab work, procedures, appointments, concrete and financial planning, preparedness, timely and appropriate communication of concerns, abstinence (ETOH, tobacco, illicit non-prescribed drugs), adherence to all health  care team recommendations, support system and caregiver involvement, appropriate and timely resource utilization and follow-through, mental health counseling as needed/recommended, and patient and caregiver responsibilities.  Social Service Handbook, resources and detailed educational information provided and reviewed.  Educational information provided.    Patient also reports current and expected compliance with health care regime, and patient states having a clear understanding of the importance of compliance.  Patient reports a clear understanding that risks and benefits may be involved with organ transplantation and with organ donation.  Patient also reports clear understanding that psychosocial risk factors may affect patient, and include but are not limited to feelings of depression, generalized anxiety, anxiety regarding dependence on others, post traumatic stress disorder, feelings of guilt and other emotional and/or mental concerns, and/or exacerbation of existing mental health concerns.  Detailed resources provided and discussed.  Patient agrees to access appropriate resources in a timely manner as needed and/or as recommended, and to communicate concerns appropriately.  Patient also reports a clear understanding of treatment options available.      Patient and caregiver received education in a group setting.   reviewed education, provided additional information, and answered questions.    Feelings or Concerns: Pt and family are hopeful pt will not need a transplant.     Coping: Identify Patient & Caregiver Strategies to [place]:   1. In the past, coping with major surgery and/or related stress - working out, playing basketball, maxx, family   2. Currently & Pre-transplant - maxx and family support   3. At the time of surgery - maxx and family support   4. During post-Transplant & Recovery Period - drawing, rest, family support, maxx    Goals: Pt has goal of not needing a transplant and  returning to work.  Patient referred to Vocational Rehabilitation.    Interview Behavior: Patient presents as alert and oriented x 4, pleasant, good eye contact, well groomed, recall good, concentration/judgement good, average intelligence, calm, communicative, cooperative, and asking and answering questions appropriately.  Pt's highly supportive father was present and pt's mother participated by phone.          Transplant Social Work - Candidacy  Assessment/Plan:     Psychosocial Suitability:  Based on psychosocial risk factors, patient presents as low risk candidate for transplant due to established, supportive caregiver plan, no reported tobacco/etoh/illicit substance use, no financial concerns, lives locally, and reports coping well.    Final determination of transplant candidacy will be made once evaluation is complete and reviewed by Liver Transplant Selection Committee    Recommendations/Additional Comments: SW recommends pt establish local lodging plan for immediate posttransplant recovery period. Sw recommends pt conduct fundraising to assist pt with pay for cost of medications, food, gas, and other transplant related needs. SW recommends pt remain aware of potential mental health concerns and contact team if any concerns arise. SW recommends pt remain abstinent from tobacco, ETOH, and substance use. SW supports pt's continued adherence. SW remains available to answer any questions or concerns that arise as pt moves through transplant process.      Destinee Bueno, COREY

## 2024-12-31 NOTE — PROGRESS NOTES
PRE EDUCATION TEACHING NOTE    Alonzo Nascimento was seen in the hospital today.  Handbook on pre-liver transplant information (see outline below) was given to the patient and time was allowed for questions.  Patient's father was at  his bedside.   Informed consent signed and written information given on selection criteria. Signed consent confirmed in media.       LIVER TRANSPLANT WORK-UP EDUCATION  I. NATIONAL REGISTRY LISTING  A. Information for listing  B. Regions  C. Per UNOS, can be listed at more than one center  II. SURGERY  A. Length  B. Complications: bleeding, infection  C. Central lines, drains, Zapata catheter, incision, endotracheal tube, NG tube  D. Transfusions, cell saver  III. SHORT TERM RECOVERY  A. ICU, PICU, Hospital stay  IV. LONG TERM RECOVERY  A. Labs at home  B. Clinic visits  C. Complications: infection, rejection, readmissions  D. Normal immunity and immunosuppression  E. Incidence of re-admit in 1st year  V. REJECTION  A. Incidence  B. Treatment: Solumedrol, Prograf, Thymoglobulin (actions and side effects)  VI. IMMUNOSUPPRESSIVES  A. Prednisone  B. Imuran/Cellcept  C. Cyclosporin/Prograf  D. Rapamune  E. Need for lifetime compliance  F. Actions and side effects  G. Costs  VII. RECURRENCE OF VIRAL HEPATITIS  VIII. Patient notified that HIV Testing is required for transplant evaluation and   repeated at scheduled intervals before and after transplant. Explained that   education will be provided, results will be discussed, and the appropriate   consults will be scheduled if needed.

## 2024-12-31 NOTE — ASSESSMENT & PLAN NOTE
28 yo man presented with >50% BSA of erythematous morbilliform rash with associated eosinophilia, leukocytosis, elevated LFTs, and previous LINK (now improved) with prodrome onset with subjective fever/chills and nausea/vomiting ~12/12 with rash onset ~12/20. Started bactrim 11/20 and flagyl ~11/25. Clinical features and lab findings are most consistent with DRESS, supported by biopsy results. Regiscar score 3 (possible case) on arrival.  Most likely implicated medication is bactrim; however, cannot entirely rule out flagyl as a potential cause.    Cardiac workup with EKG, troponin, BNP Echo without suspicion for cardiac involvement. Previous concern for renal involvement now improved with stable creatinine. Without evidence of thyroid or endocrine involvement on labs.     Rash continues to improve, with superficial desquamation. Course notable for transaminitis - although LFTs have started downtrending today.  Infectious workup initiated including viral studies. Has been started on empiric acyclovir.     Labs  - 12/23 labs notable for normal ESR, elevated CRP, elevated lactic acid, elevated PT and INR, CMV quantitative not detected, troponin <0.006. EBV qualitative +  - 12/23 Blood Cultures NGTD.  - 12/15 Acute hepatitis panel negative for infection.  - 12/26 labs with normal TSH and free T4, elevated CPK of 262, normal troponin. CBC with leukocytosis and eosinophilia mildly improved from previous day. CMP with increasing AST and ALT, but PT and INR slightly improved from yesterday. Creatine remains stable at 1.4. EKG performed without acute changes from prior EKGs.  - 12/27 labs with slightly improved leukocytosis and eosinophilia. CMP with increasing AST and ALT (762, 1334). Creatinine improved to 1.2.  LDH and ferritin elevated. Repeat acute hepatitis panel negative.  - 12/28 with worsening AST and ALT (2163, 3113) and elevated PT and INR (16.0, 1.5). Creatinine stable at 1.3  - 12/29 with worsening AST and ALT  (9533, 3832). PT and INR (15.6, 1.5). Repeat HIV non-reactive.   - 12/30 worsening AST and ALT.   - pending HSV, HHV-6, HepE, Histo, bartonella    Imaging  - 12/26 CXR without acute findings  - 12/26 Liver US with Doppler with hepatosplenomegaly  - 12/23 Retroperitoneal US with no evidence of renal abnormalities. Echogenic debris in bladder possibly consistent with cystitis.  - 12/20 CT Abdomen and Pelvis without acute findings    Recommendations:  - Continue avoidance of Bactrim and Flagyl. Bactrim has been listed as a drug allergy.  - Recommend daily CBC with diff and CMP  - s/p liver biopsy, results support drug induced injury vs infectious etiology. Cutaneous involvement improving on current steroid regimen. Concern for primary organ involvement being the liver at this time.    - continue IV solumedrol 64mg daily  - defer to hepatology regarding need for additional immunosuppression for drug induced liver injury  - Continue Pepcid BID for gastric protection while on steroids.   - Continue triamcinolone 0.1% cream BID to rash on trunk and limbs. Please special request 454 g jar from pharmacy to adequately cover affected body surface area  - Continue hydrocortisone 2.5% cream BID to rash on face.  - Recommend gentle, fragrance free moisturizer for use ad tatum to body and face while skin is peeling  - Appreciate hepatology, ID, and allergy input    Biopsy Site Care:  Remove bandage and clean with gentle soap and water. Apply Vaseline and a bandage. Repeat daily. Leave gelfoam in place and it will resorb on its own in the next few weeks.

## 2024-12-31 NOTE — SUBJECTIVE & OBJECTIVE
Subjective:     Principal Problem:DRESS syndrome    Interval History:   Continues to be afebrile overnight. Rash continuing to improve.    Medications:  Continuous Infusions:  Scheduled Meds:   acyclovir  10 mg/kg Intravenous Q8H    famotidine  20 mg Oral BID    hydrocortisone   Topical (Top) BID    methylPREDNISolone injection (PEDS and ADULTS)  64 mg Intravenous Daily    mupirocin   Nasal BID    polyethylene glycol  17 g Oral Daily    senna-docusate 8.6-50 mg  1 tablet Oral BID    sodium chloride 0.9%  250 mL Intravenous Q8H    triamcinolone acetonide 0.1%   Topical (Top) BID     PRN Meds:  Current Facility-Administered Medications:     acetaminophen, 650 mg, Oral, Q8H PRN    dextrose 50%, 12.5 g, Intravenous, PRN    dextrose 50%, 25 g, Intravenous, PRN    diphenhydrAMINE, 25 mg, Oral, Q6H PRN    glucagon (human recombinant), 1 mg, Intramuscular, PRN    glucose, 16 g, Oral, PRN    glucose, 24 g, Oral, PRN    iohexol, 15 mL, Oral, PRN    melatonin, 6 mg, Oral, Nightly PRN    naloxone, 0.02 mg, Intravenous, PRN    ondansetron, 4 mg, Intravenous, Q8H PRN    sodium chloride 0.9%, 5 mL, Intravenous, PRN    Review of Systems   Constitutional:  Negative for fever and chills.   HENT:  Negative for mouth sores.    Eyes:  Negative for eye irritation and visual change.   Gastrointestinal:  Negative for nausea, vomiting and abdominal pain.   Genitourinary:  Negative for genital sores.   Musculoskeletal:  Negative for arthralgias.   Skin:  Positive for itching and rash.     Objective:     Vital Signs (Most Recent):  Temp: 98 °F (36.7 °C) (12/31/24 1139)  Pulse: 67 (12/31/24 1149)  Resp: 18 (12/31/24 1139)  BP: 125/83 (12/31/24 1139)  SpO2: 100 % (12/31/24 1139) Vital Signs (24h Range):  Temp:  [98 °F (36.7 °C)-98.1 °F (36.7 °C)] 98 °F (36.7 °C)  Pulse:  [57-86] 67  Resp:  [11-20] 18  SpO2:  [78 %-100 %] 100 %  BP: (110-136)/(60-88) 125/83     Weight: 72 kg (158 lb 11.7 oz)  Body mass index is 20.94 kg/m².     Physical Exam    Constitutional: He appears well-developed and well-nourished.   Musculoskeletal: Lymphadenopathy:      Head:      Right side of head: No submental, submandibular, preauricular or posterior auricular adenopathy.      Left side of head: No submental, submandibular, preauricular or posterior auricular adenopathy.      Cervical: No cervical adenopathy.      Upper Body:      Right upper body: No supraclavicular or axillary adenopathy.      Left upper body: No supraclavicular or axillary adenopathy.     Lymphadenopathy:        Head (right side): No submental, no submandibular, no preauricular and no posterior auricular adenopathy present.        Head (left side): No submental, no submandibular, no preauricular and no posterior auricular adenopathy present.     He has no cervical adenopathy.        Right: No supraclavicular adenopathy present.        Left: No supraclavicular adenopathy present.   Neurological: He is alert and oriented to person, place, and time.   Psychiatric: He has a normal mood and affect.   Skin:   Areas Examined (abnormalities noted in diagram):   Scalp / Hair Palpated and Inspected  Head / Face Inspection Performed  Neck Inspection Performed  Chest / Axilla Inspection Performed  Abdomen Inspection Performed  Genitals / Buttocks / Groin Inspection Performed  Back Inspection Performed  RUE Inspected  LUE Inspection Performed  RLE Inspected  LLE Inspection Performed  Nails and Digits Inspection Performed  Gland Inspection Performed                 Significant Labs: All pertinent labs within the past 24 hours have been reviewed.  - LFTs downtrending. Cr wnl  - Eos 2.1    Liver, biopsy:   - Moderate to severe mixed portal, interface and lobular inflammation with prominent eosinophils   - Moderate cholestasis   - No significant fibrosis on trichrome stain   - Iron is negative   - See comment     Comment: Overall, the findings are most concerning for a drug-induced liver injury, especially in this  clinical context. However, an infectious etiology remains within the differential.     Significant Imaging: I have reviewed all pertinent imaging results/findings within the past 24 hours.

## 2024-12-31 NOTE — PT/OT/SLP EVAL
"Physical Therapy Evaluation and Discharge Note    Patient Name:  Alonzo Nascimento Jr.   MRN:  [id number]    Recommendations:     Discharge Recommendations: No Therapy Indicated  Discharge Equipment Recommendations: none   Barriers to discharge: None    Assessment:     Alonzo Nascimento Jr. is a 29 y.o. male admitted with a medical diagnosis of DRESS syndrome. .  At this time, patient is functioning at their prior level of function and does not require further acute PT services.     Recent Surgery: * No surgery found *      Plan:     During this hospitalization, patient does not require further acute PT services.  Please re-consult if situation changes.      Subjective     "I just went to my follow up and they took the stitches out of my foot"    Pain/Comfort:  Pain Rating 1: 0/10    Patients cultural, spiritual, Taoism conflicts given the current situation: no    Living Environment:  Per pt: pt and roommate reside in [facility] with no [facility]. Pt has 1 flight with R HR to get to second floor where bedroom and both walk-in shower and tub/shower are located.   Prior to admission, patients level of function was I and mod I crutches.  Equipment used at home: crutches, axillary.  DME owned (not currently used): none.  Upon discharge, patient will have assistance from unknown.    Objective:     Communicated with RN prior to session.  Patient found sitting edge of bed with peripheral IV upon PT entry to room.    General Precautions: Standard, fall    Orthopedic Precautions: (per pt, pt OP podiatrist recommended L LE WBAT through forefoot only with darco shoe on)   Braces:  (darco shoe)  Respiratory Status: Room air    Exams:  RLE ROM: WFL  RLE Strength: WFL  LLE ROM: WFL  LLE Strength: WFL    Functional Mobility:  Transfers:     Sit to Stand:  independence with no AD  Toilet Transfer: independence with  no AD  using  Step Transfer  Gait: pt amb 100' no AD I and presented with compliance with L LE WBAT to forefoot, reciprocal " gait, arm swing present. Fair leigha  Balance:   Good sitting balance  Good standing balance    Politely declined stair training and reported no concerns with ability to ascend/descend stairs    AM-PAC 6 CLICK MOBILITY  Total Score:24       Treatment and Education:  Discussed importance of complying with WB precautions and darco shoe donned; pt verbalized understanding  Crutches were adjusted to appropriate size  Donned darco shoe I  Discussed amb in hallway when no lines are intact and to notify RN/staff prior to leaving room. If lines were intact, then instructed to amb with staff; pt verbalized understanding    Pt is at functional baseline with no acute needs. PT to sign off. Please reconsult if status changes.    Educated pt on PT role/POC  Educated pt on importance of OOB activity and daily ambulation   Pt educated on proper body mechanics, safety techniques, and energy conservation with PT facilitation and cueing throughout session   Pt verbalized understanding          AM-PAC 6 CLICK MOBILITY  Total Score:24     Patient left sitting edge of bed with all lines intact, call button in reach, RN notified, and OT present.    GOALS:   Multidisciplinary Problems       Physical Therapy Goals          Problem: Physical Therapy    Goal Priority Disciplines Outcome Interventions   Physical Therapy Goal     PT, PT/OT     Description: Pt is at functional baseline with no acute needs. PT to sign off. Please reconsult if status changes.                       History:     Past Medical History:   Diagnosis Date    Achilles tendon rupture 07/2023    left achilles tendon rupture s/p repair on 07/14/23 complicated by osteomyelitis of left calcaneus s/p I&D 12/4/24    Acute osteomyelitis of calcaneum, left 12/2024    osteomyelitis of left calcaneus s/p I&D 12/4/24       Past Surgical History:   Procedure Laterality Date    ANTERIOR CRUCIATE LIGAMENT REPAIR Right     2018    APPLICATION OF SPLINT Left 7/14/2023    Procedure:  APPLICATION, SPLINT;  Surgeon: Lenore Pearl DPM;  Location: [facility] OR;  Service: Podiatry;  Laterality: Left;    INSERTION, ANTIBIOTIC SPACER, LOWER EXTREMITY Left 12/4/2024    Procedure: INSERTION, ANTIBIOTIC SPACER, LOWER EXTREMITY;  Surgeon: Jaswinder Garcia DPM;  Location: [facility] OR;  Service: Podiatry;  Laterality: Left;    IRRIGATION AND DEBRIDEMENT Left 12/4/2024    Procedure: IRRIGATION AND DEBRIDEMENT;  Surgeon: Jaswinder Garcia DPM;  Location: [facility] OR;  Service: Podiatry;  Laterality: Left;  mini c-arm, Stimulan jr tobramycin/Vancomycin    REPAIR OF ACHILLES TENDON Left 7/14/2023    Procedure: REPAIR, TENDON, ACHILLES;  Surgeon: Lenore Pearl DPM;  Location: [facility] OR;  Service: Podiatry;  Laterality: Left;       Time Tracking:     PT Received On: 12/31/24  PT Start Time: 1111     PT Stop Time: 1139  PT Total Time (min): 28 min     Billable Minutes: Evaluation 10 and Gait Training 10    Co-treatment performed due to patient's multiple deficits requiring two skilled therapists to appropriately and safely assess patient's strength and endurance while facilitating functional tasks in addition to accommodating for patient's activity tolerance.         12/31/2024

## 2024-12-31 NOTE — CONSULTS
"  [facility] - Med Surg  Adult Nutrition  Consult Note    SUMMARY     Recommendations  --Continue Regular diet as tolerated and clinically indicated   --Encourage good intakes   --Nursing: please continue to document % meal eaten on flowsheet   --RD to monitor weight, PO intake     Goals: Meet % EEN/EPN  Nutrition Goal Status: new  Communication of RD Recs:  (POC)    Assessment and Plan    No nutrition diagnosis at this time    Reason for Assessment    Reason For Assessment: consult (Assessment of nutritional status and recommendations for liver transplant work-up)  Diagnosis:  (DRESS syndrome)  General Information Comments: 28 yo M with PMHx foot osteomyelitis on PO bactrim and flagyl who [facility]. RD consult for assessment of nutritional status and recommendations for liver transplant work-up. Spoke with pt at bedside. Pt reports good appetite - 3 meals/day with 100% PO intake. Denies n/v/c/d. Weight stable - no concerns for malnutrition at this time.     Nutrition Discharge Planningm Na education provided on . Discussed foods to limit or avoid and benefits of diet adherence. Post-transplant expectations/guidelines also introduced. Appropriate education material with RD contact information provided. No other needs identified.    Nutrition Related Social Determinants of Health: SDOH: Adequate food in home environment      Nutrition/Diet History    Patient Reported Diet/Restrictions/Preferences: heart healthy  Spiritual, Cultural Beliefs, Church Practices, Values that Affect Care: no  Food Allergies: NKFA  Factors Affecting Nutritional Intake: None identified at this time    Anthropometrics    Temp: 98.1 °F (36.7 °C)  Height: 6' 1" (185.4 cm)  Height (inches): 73 in  Weight: 72 kg (158 lb 11.7 oz)  Weight (lb): 158.73 lb  Ideal Body Weight (IBW), Male: 184 lb  % Ideal Body Weight, Male (lb): 86.27 %  BMI (Calculated): 20.9  BMI Grade: 18.5-24.9 - normal   "     Lab/Procedures/Meds    Pertinent Labs Reviewed: reviewed - hemoglobin 10.5, hematocrit 30.3, Ca 7.4, AST 1262, ALT 3549    Pertinent Medications Reviewed: reviewed - methylprednisolone, polyethylene glycol, senna-docusate     Estimated/Assessed Needs    Weight Used For Calorie Calculations: 72 kg (158 lb 11.7 oz)  Energy Calorie Requirements (kcal): 4806-0107 kcal/day (25-30 kcal/kg)  Energy Need Method: Kcal/kg  Protein Requirements: 58-72 g/day (0.8-1.0 g/kg)  Weight Used For Protein Calculations: 72 kg (158 lb 11.7 oz)  Fluid Requirements (mL): per MD  Estimated Fluid Requirement Method: RDA Method  RDA Method (mL): 1800  CHO Requirement: 225-270 g/day      Nutrition Prescription Ordered    Current Diet Order: Regular    Evaluation of Received Nutrient/Fluid Intake    Comments: 12/29  % Intake of Estimated Energy Needs: 75 - 100 %  % Meal Intake: 75 - 100 %    Nutrition Risk    Level of Risk/Frequency of Follow-up: high       Monitor and Evaluation    Food and Nutrient Intake: energy intake, food and beverage intake  Food and Nutrient Adminstration: diet order  Knowledge/Beliefs/Attitudes: food and nutrition knowledge/skill  Physical Activity and Function: nutrition-related ADLs and IADLs  Anthropometric Measurements: weight, weight change, body mass index  Biochemical Data, Medical Tests and Procedures: electrolyte and renal panel, gastrointestinal profile, glucose/endocrine profile, inflammatory profile, lipid profile  Nutrition-Focused Physical Findings: overall appearance       Nutrition Follow-Up    RD Follow-up?: Yes    Julia Mckeon, Registration Eligible, Provisional LDN

## 2024-12-31 NOTE — CARE UPDATE
I have reviewed the chart of Alonzo Nascimento Jr. who is hospitalized for the following:    Active Hospital Problems    Diagnosis    *DRESS syndrome    Thrombocytopenia    Fever    Hypoalbuminemia    Severe systemic inflammatory response syndrome (SIRS)    Acute kidney injury    Liver injury    Hyponatremia    Acute osteomyelitis of left calcaneus        Pavithra Howard PA-C  [facility]

## 2024-12-31 NOTE — CONSULTS
Consult Note  Liver Transplant Surgery    Consult Requested By: Hepatology  Reason for Consult: Liver transplant evaluation    SUBJECTIVE:     History of Present Illness: Patient is a 29 y.o. male with liver disease due to  DRESS .  Symptoms and complications of liver disease include fatigue, hyponatremia, jaundice, sleep disturbance, and thrombocytopenia.     Prior Surgery: no abdominal surgeries  Medical Considerations: systemic DRESS syndrome, recent osteomyelitis of LLE, 12/27 echo with EF 60-65% and PASP 32mmHg, 12/30 liver biopsy pending  Frailty:none  Ascites:none  SBP history:denies  PV: patent per 12/26 US  Arterial: noncalcified with possible accessory RHA present on coronal view of 12/20 CT; new CT pending  Biliary/Pancreas: no history of pancreatitis or cholecystitis/cholecystectomy     Scheduled Meds:   acyclovir  10 mg/kg (Ideal) Intravenous Q8H    famotidine  20 mg Oral BID    hydrocortisone   Topical (Top) BID    methylPREDNISolone injection (PEDS and ADULTS)  64 mg Intravenous Daily    mupirocin   Nasal BID    polyethylene glycol  17 g Oral Daily    senna-docusate 8.6-50 mg  1 tablet Oral BID    sodium chloride 0.9%  250 mL Intravenous Q8H    triamcinolone acetonide 0.1%   Topical (Top) BID     Continuous Infusions:  PRN Meds:  Current Facility-Administered Medications:     acetaminophen, 650 mg, Oral, Q8H PRN    dextrose 50%, 12.5 g, Intravenous, PRN    dextrose 50%, 25 g, Intravenous, PRN    diphenhydrAMINE, 25 mg, Oral, Q6H PRN    glucagon (human recombinant), 1 mg, Intramuscular, PRN    glucose, 16 g, Oral, PRN    glucose, 24 g, Oral, PRN    iohexol, 15 mL, Oral, PRN    melatonin, 6 mg, Oral, Nightly PRN    naloxone, 0.02 mg, Intravenous, PRN    ondansetron, 4 mg, Intravenous, Q8H PRN    sodium chloride 0.9%, 5 mL, Intravenous, PRN    Review of patient's allergies indicates:   Allergen Reactions    Bactrim [sulfamethoxazole-trimethoprim] Rash     Developed DRESS       Past Medical History:    Diagnosis Date    Achilles tendon rupture 07/2023    left achilles tendon rupture s/p repair on 07/14/23 complicated by osteomyelitis of left calcaneus s/p I&D 12/4/24    Acute osteomyelitis of calcaneum, left 12/2024    osteomyelitis of left calcaneus s/p I&D 12/4/24     Past Surgical History:   Procedure Laterality Date    ANTERIOR CRUCIATE LIGAMENT REPAIR Right     2018    APPLICATION OF SPLINT Left 7/14/2023    Procedure: APPLICATION, SPLINT;  Surgeon: Lenore Pearl DPM;  Location: [facility] OR;  Service: Podiatry;  Laterality: Left;    INSERTION, ANTIBIOTIC SPACER, LOWER EXTREMITY Left 12/4/2024    Procedure: INSERTION, ANTIBIOTIC SPACER, LOWER EXTREMITY;  Surgeon: Jaswinder Garcia DPM;  Location: [facility] OR;  Service: Podiatry;  Laterality: Left;    IRRIGATION AND DEBRIDEMENT Left 12/4/2024    Procedure: IRRIGATION AND DEBRIDEMENT;  Surgeon: Jaswinder Garcia DPM;  Location: [facility] OR;  Service: Podiatry;  Laterality: Left;  mini c-arm, Stimulan jr tobramycin/Vancomycin    REPAIR OF ACHILLES TENDON Left 7/14/2023    Procedure: REPAIR, TENDON, ACHILLES;  Surgeon: Lenore Pearl DPM;  Location: [facility] OR;  Service: Podiatry;  Laterality: Left;     No family history on file.  Social History     Tobacco Use    Smoking status: Never    Smokeless tobacco: Never   Substance Use Topics    Alcohol use: Yes     Comment: rarely    Drug use: Yes     Types: Marijuana        Review of Systems:  Constitutional: no fever or chills, positive for fatigue  Eyes: no visual changes  ENT: no nasal congestion or sore throat  Respiratory: no cough or shortness of breath  Cardiovascular: no chest pain or palpitations  Gastrointestinal: no nausea or vomiting, no abdominal pain or change in bowel habits  Genitourinary: no hematuria or dysuria  Integument/Breast: positive for pruritus and rash  Musculoskeletal: no arthralgias or myalgias  Neurological: no seizures or tremors  Behavioral/Psych: no auditory or visual  hallucinations  Endocrine: no heat or cold intolerance    OBJECTIVE:     Vital Signs (Most Recent)  Temp: 98.1 °F (36.7 °C) (12/31/24 0739)  Pulse: 84 (12/31/24 0739)  Resp: 18 (12/31/24 0739)  BP: 111/64 (12/31/24 0739)  SpO2: 99 % (12/31/24 0739)    Vital Signs Range (Last 24H):  Temp:  [98 °F (36.7 °C)-98.1 °F (36.7 °C)]   Pulse:  [57-84]   Resp:  [11-20]   BP: (110-136)/(60-88)   SpO2:  [78 %-100 %]     Physical Exam:  General: well developed, well nourished, appears stated age, icteric, no distress  HENT: Head:normocephalic, atraumatic. Ears:bilateral TM's and external ear canals normal. Nose: no discharge. Throat: no throat erythema.  Eyes: conjunctivae/corneas clear. PERRL.   Neck: supple, symmetrical, trachea midline, no JVD and thyroid not enlarged, symmetric, no tenderness/mass/nodules  Lungs:  normal respiratory effort  Cardiovascular: Heart: regular rate and rhythm, S1, S2 normal, no murmur, click, rub or gallop. Chest Wall: no tenderness. Extremities: no cyanosis or edema, or clubbing. Pulses: 2+ and symmetric.  Abdomen/Rectal: Abdomen: soft, non-tender non-distented; bowel sounds normal; no masses,  no organomegaly. Rectal: not examined  Skin: diffuse rash and erythema with desquamation present to anterior trunk, BUE, forehead, and cheeks  Musculoskeletal:LLE with dressing in place  Lymph Nodes: No cervical or supraclavicular adenopathy  Neurologic: Mental status: Alert, oriented, thought content appropriate  Psych/Behavioral:  Alert and oriented, appropriate affect.    Laboratory:  CBC:   Recent Labs   Lab 12/31/24 0539   WBC 11.41   RBC 3.69*   HGB 10.5*   HCT 30.3*        CMP:   Recent Labs   Lab 12/31/24 0539      CALCIUM 7.4*   ALBUMIN 2.0*   PROT 6.2      K 4.1   CO2 25      BUN 17   CREATININE 0.9   ALKPHOS 267*   ALT 3,549*   AST 1,262*   BILITOT 2.7*     Coagulation:   Recent Labs   Lab 12/31/24  0539   INR 1.2         ASSESSMENT/PLAN:     Patient Active Problem  List   Diagnosis    Impaired range of motion of left ankle    Impaired functional mobility, balance, gait, and endurance    Decreased range of motion    Calf muscle weakness    Weakness of both hips    Non healing left heel wound    Acute osteomyelitis of left calcaneus    DRESS syndrome    Hyponatremia    LINK (acute kidney injury)    Acute kidney injury    Liver injury    Severe systemic inflammatory response syndrome (SIRS)    Hypoalbuminemia    Fever       Transplant Candidacy: Patient is a 29 y.o. year old male with  DRESS syndrome  being evaluated for possible OLT.  In my opinion, he is a suitable liver transplant candidate.  He will be presented to selection committee after all tests and evaluations are complete.    UNOS Patient Status  Functional Status: 40% - Disabled: requires special care and assistance  Physical Capacity: No Limitations    Counseling: I discussed with the patient the benefits of liver transplantation.  We discussed the evaluation and listing procedures.  We discussed the MELD system and the associated waiting times.  We discussed national and center specific survival results.  We discussed the option of being multiply listed in different OPOs.   We discussed the risks, benefits and potential complications related to surgery including the risks related to anesthesia, bleeding, infection, primary non-function of the allograft, the risk of reoperation as well as the risk of death.  We discussed the typical post-operative course, length of hospitalization, the long-term use of immunosuppressive therapy as well as the need for long-term routine follow-up.    Coronavirus disease (COVID-19) caused by severe acute respiratory virus coronavirus 2 (SARS-C0V 2) is associated with increased mortality in solid organ transplant recipients (SOT) compared to non-transplant patients. Vaccine responses to vaccination are depressed against SARS-CoV2 compared to normal individuals but improve with third  vaccination doses. Vaccination prior to SOT provides both the best opportunity for transplant candidates to develop protective immunity and to reduce the risk of serious COVID19 infections post transplantation. Organ transplant candidates at [facility] will be required to receive SARS-CoV-2 vaccination prior to being listed with a an active status, whenever possible. Exceptions will be made for disability related reasons or for sincerely held Shinto beliefs.     PHS: I discussed the use of organs from donors with PHS risk criteria, including the testing protocols utilized, as well as data from the literature regarding the likelihood of transmission of hepatitis or HIV.  The patient that he is willing to consider such grafts.  DCD: I discussed the use of organs recovered by donation after cardiac death (DCD), including slightly decreased graft survival and greater risk of arterial and biliary complications. The potential advantage to the recipient is possibly receiving a transplant sooner by accepting such an organ. The patient that he is willing to consider such grafts.  HBcAb: I discussed the use of organs from donors with HBcAb in conjunction with long term use of HBV antiviral drugs, such as lamivudine. The small but measurable risk of hepatitis B seroconversion was discussed as well as the potentially life long need to continue antiviral drugs. The patient that he is willing to consider such grafts.  HCV Non-viremic recipient: I discussed the use of HCV-positive organs in naive recipients, including the risk of viral transmission to the patients or others, potential insurance barriers for antiviral medication coverage, risk for fibrosing cholestatic hepatitis, death or graft loss. The potential advantage to the recipient is the possibility of receiving a transplant sooner with decreased mortality risk by accepting such an organ. The patient that he is willing to consider  such grafts.  LDLT: I discussed the nature of living donor liver transplant, including donor risks and more frequent recipient complications. The patient that he is willing to consider such grafts.  Covid: I discussed that this donor has tested positive for the covid virus, but with very low levels of virus and no evidence of covid disease. Although the risk of transmission is unknown, we believe this donor is not infectious and use of the abdominal organs is safe.  To date, these organs have been used with no evidence of transmission. The patient that he is willing to consider such grafts.

## 2024-12-31 NOTE — CONSULTS
ALLERGY & IMMUNOLOGY SERVICE CONSULT  NOTE     HISTORY OF PRESENT ILLNESS     Patient ID: Alonzo Nascimento Jr. is a 29 y.o. male    Consulted for: Concern for steroid resistant DRESS    Consulted by: Internal Medicine    HPI:   Alonzo Nascimento Jr. is a 29 y.o. male with a past medical history significant for left Achilles tendon rupture (status post repair on 07/14/23) and osteomyelitis of the left calcaneus (diagnosed 11/20/24, status post I&D on 12/4/24 by Dr. Garcia). He initially presented with nausea, vomiting, and a diffuse maculopapular rash that began approximately two weeks ago.    The patient was started on oral Bactrim and Flagyl on 11/20 for a six-week course to treat osteomyelitis, with an anticipated completion date of 01/01/2025. On 12/20, he noticed a worsening rash. On 12/23, the patient followed up in the ED for a post-op evaluation and reported persistent nausea, vomiting, and progression of the rash. A clinical diagnosis of DRESS was made, supported by biopsy results and a Regiscar score of 3 (possible case).     The most likely implicated drug is Bactrim, but Flagyl cannot be entirely excluded. The patient's course has been complicated by leukocytosis, eosinophilia, acute kidney injury (LINK), and progressive liver function test (LFT) abnormalities. Leukocytosis, LINK, and eosinophilia have since resolved,, but LFTs remain elevated with mild improvement. Hepatology is following for acute liver injury, and infectious workup, including viral studies, has been initiated by the infectious disease (ID) team.    A/I was consulted due to concern for steroid resistant DRESS.     REVIEW OF SYSTEMS     ROS negative except stated above.     PAST MEDICAL HISTORY     Past Medical History:   Diagnosis Date    Achilles tendon rupture 07/2023    left achilles tendon rupture s/p repair on 07/14/23 complicated by osteomyelitis of left calcaneus s/p I&D 12/4/24    Acute osteomyelitis of calcaneum, left 12/2024     osteomyelitis of left calcaneus s/p I&D 12/4/24         PHYSICAL EXAM     /64   Pulse 84   Temp 98.1 °F (36.7 °C) (Oral)   Resp 18   Wt 72 kg (158 lb 11.7 oz)   SpO2 99%   BMI 20.94 kg/m²   Physical Exam  Vitals reviewed.   Constitutional:       General: He is not in acute distress.  HENT:      Head: Normocephalic and atraumatic.      Mouth/Throat:      Mouth: Mucous membranes are moist.   Eyes:      Extraocular Movements: Extraocular movements intact.      Conjunctiva/sclera: Conjunctivae normal.      Pupils: Pupils are equal, round, and reactive to light.   Cardiovascular:      Rate and Rhythm: Normal rate.      Pulses: Normal pulses.   Pulmonary:      Effort: Pulmonary effort is normal.      Breath sounds: Normal breath sounds.   Abdominal:      General: Abdomen is flat.      Palpations: Abdomen is soft.   Musculoskeletal:         General: No swelling or tenderness. Normal range of motion.      Cervical back: Normal range of motion.   Skin:     General: Skin is warm and dry.      Capillary Refill: Capillary refill takes less than 2 seconds.      Comments: Superficial desquamating rash across entire face, back, chest, and bilateral arms   Neurological:      General: No focal deficit present.      Mental Status: He is alert and oriented to person, place, and time.   Psychiatric:         Mood and Affect: Mood normal.           LABS     Component      Latest Ref Rn 12/24/2024 12/30/2024 12/31/2024   WBC      3.90 - 12.70 K/uL 30.59 (H)  10.83  11.41    RBC      4.60 - 6.20 M/uL 5.26  3.87 (L)  3.69 (L)    Hemoglobin      14.0 - 18.0 g/dL 14.6  10.9 (L)  10.5 (L)    Hematocrit      40.0 - 54.0 % 42.1  31.3 (L)  30.3 (L)    MCV      82 - 98 fL 80 (L)  81 (L)  82    MCH      27.0 - 31.0 pg 27.8  28.2  28.5    MCHC      32.0 - 36.0 g/dL 34.7  34.8  34.7    RDW      11.5 - 14.5 % 14.2  14.5  15.2 (H)    Platelet Count      150 - 450 K/uL 257  148 (L)  160    MPV      9.2 - 12.9 fL 11.2  10.7  10.3    Immature  Granulocytes      0.0 - 0.5 % 2.0 (H)  2.2 (H)     Gran # (ANC)      1.8 - 7.7 K/uL 15.5 (H)  5.2     Immature Grans (Abs)      0.00 - 0.04 K/uL 0.62 (H)  0.24 (H)     Lymph #      1.0 - 4.8 K/uL 6.9 (H)  4.1     Mono #      0.3 - 1.0 K/uL 3.2 (H)  0.8     Eos #      0.0 - 0.5 K/uL 4.3 (H)  0.4     Baso #      0.00 - 0.20 K/uL 0.05  0.06     nRBC      0 /100 WBC 0  0     Gran %      38.0 - 73.0 % 50.7  48.1     Lymph %      18.0 - 48.0 % 22.5  38.2     Mono %      4.0 - 15.0 % 10.5  7.0     Eos %      0.0 - 8.0 % 14.1 (H)  3.9     Basophil %      0.0 - 1.9 % 0.2  0.6     Platelet Estimate Appears normal  Decreased !     Aniso  Slight     Poikilocytosis  Slight     Poly  Occasional     Hypo  Occasional     Ovalocytes  Occasional     Target Cells  Occasional     Spherocytes  Occasional     Differential Method Automated  Automated     Sodium      136 - 145 mmol/L 128 (L)  134 (L)  137    Potassium      3.5 - 5.1 mmol/L 5.1  4.8  4.1    Chloride      95 - 110 mmol/L 96  105  106    CO2      23 - 29 mmol/L 20 (L)  22 (L)  25    Glucose      70 - 110 mg/dL 110  116 (H)  102    BUN      6 - 20 mg/dL 23 (H)  19  17    Creatinine      0.5 - 1.4 mg/dL 1.5 (H)  0.9  0.9    Calcium      8.7 - 10.5 mg/dL 8.0 (L)  7.6 (L)  7.4 (L)    PROTEIN TOTAL      6.0 - 8.4 g/dL 6.0  6.4  6.2    Albumin      3.5 - 5.2 g/dL 2.0 (L)  2.1 (L)  2.0 (L)    BILIRUBIN TOTAL      0.1 - 1.0 mg/dL 2.5 (H)  3.6 (H)  2.7 (H)    ALP      40 - 150 U/L 307 (H)  288 (H)  267 (H)    AST      10 - 40 U/L 318 (H)  2,199 (H)  1,262 (H)    ALT      10 - 44 U/L 611 (H)  4,366 (H)  3,549 (H)    eGFR      >60 mL/min/1.73 m^2 >60  >60.0  >60.0    Anion Gap      8 - 16 mmol/L 12  7 (L)  6 (L)    PT      9.0 - 12.5 sec  14.6 (H)     INR      0.8 - 1.2   1.4 (H)           IMAGING & OTHER DIAGNOSTICS     EXAMINATION:  US LIVER WITH DOPPLER 12/26/2024     CLINICAL HISTORY:  elevated LFT's;     TECHNIQUE:  Complete abdominal ultrasound with doppler.  Color and spectral  Doppler were performed.     COMPARISON:  CT abdomen pelvis 12/20/2024     FINDINGS:  The visualized portion of the pancreas is unremarkable.     The liver is enlarged measuring 19.1 cm the liver demonstrates homogeneous echotexture.  No focal hepatic lesions are seen.HRI: 1.1.     The gallbladder is unremarkable with no evidence of calculi.  The common duct is not dilated, measuring 4 mm.  The gallbladder wall is not thickened. No dilated intrahepatic radicles are seen.     The spleen is enlarged in size measuring 13.6 x 3.5 cm with a homogeneous echotexture.     There is no evidence of ascites.     The main portal vein, right portal vein, left portal vein, middle hepatic vein, right hepatic vein, left hepatic vein, SMV, and IVC are patent with proper directional flow.  The main hepatic artery is patent. The umbilical vein is not patent.     Impression:     Satisfactory Doppler evaluation of the liver.     Hepatosplenomegaly.       ASSESSMENT & RECOMMENDATIONS     Alonzo Nascimento Jr. is a 29 y.o. male with hx of recent osteomyelitis who now presents with DRESS. The patient presents with clinical features consistent with DRESS, likely triggered by Bactrim, although Flagyl remains a potential contributor. While leukocytosis, eosinophilia, and LINK have improved, persistent liver enzyme abnormalities raise concern for ongoing hepatic involvement. The gold standard for managing DRESS involves discontinuing the suspected offending drug, providing supportive care, and initiating corticosteroids. However, given the possibility of steroid-resistant DRESS, alternative immunosuppressive therapies may be required if the liver biopsy and workup confirm ongoing pathology. Liver enzyme improvement may take weeks to months, with some cases normalizing within 2-3 weeks after corticosteroid initiation, while others may require longer. Efficacy of a single dose or repeated doses of reslizumab, mepolizumab, and benralizumab in treating  DRESS patients is inconclusive. Cyclosporine (5 mg/kg/day for seven days) is recommended as a second-line agent for steroid-resistant cases.    - Await liver biopsy results to guide further management.  - Continue corticosteroid therapy and supportive measures.  - Monitor LFTs closely and trend daily.  - Initiate cyclosporine (5 mg/kg/day for seven days) if biopsy and pending lab workup confirms steroid-resistant DRESS.  - Educate patient on the prolonged course of liver enzyme normalization and need for close follow-up.      I have discussed the patient's case with my attending physician.  Thank you for allowing us to help care for your patient.  Please contact us with any concerns.    Discussed with: Dr. Rena Mcdonald MD  Allergy and Immunology  Pager [phone]

## 2024-12-31 NOTE — PROGRESS NOTES
[facility]  Dermatology  Progress Note    Patient Name: Alonzo Nascimento Jr.  MRN: [id number]  Admission Date: 12/24/2024  Hospital Length of Stay: 7 days  Attending Physician: Jossie Barakat MD  Primary Care Provider: Sohan Strange MD     Subjective:     Principal Problem:DRESS syndrome    Interval History:   Continues to be afebrile overnight. Rash continuing to improve.    Medications:  Continuous Infusions:  Scheduled Meds:   acyclovir  10 mg/kg Intravenous Q8H    famotidine  20 mg Oral BID    hydrocortisone   Topical (Top) BID    methylPREDNISolone injection (PEDS and ADULTS)  64 mg Intravenous Daily    mupirocin   Nasal BID    polyethylene glycol  17 g Oral Daily    senna-docusate 8.6-50 mg  1 tablet Oral BID    sodium chloride 0.9%  250 mL Intravenous Q8H    triamcinolone acetonide 0.1%   Topical (Top) BID     PRN Meds:  Current Facility-Administered Medications:     acetaminophen, 650 mg, Oral, Q8H PRN    dextrose 50%, 12.5 g, Intravenous, PRN    dextrose 50%, 25 g, Intravenous, PRN    diphenhydrAMINE, 25 mg, Oral, Q6H PRN    glucagon (human recombinant), 1 mg, Intramuscular, PRN    glucose, 16 g, Oral, PRN    glucose, 24 g, Oral, PRN    iohexol, 15 mL, Oral, PRN    melatonin, 6 mg, Oral, Nightly PRN    naloxone, 0.02 mg, Intravenous, PRN    ondansetron, 4 mg, Intravenous, Q8H PRN    sodium chloride 0.9%, 5 mL, Intravenous, PRN    Review of Systems   Constitutional:  Negative for fever and chills.   HENT:  Negative for mouth sores.    Eyes:  Negative for eye irritation and visual change.   Gastrointestinal:  Negative for nausea, vomiting and abdominal pain.   Genitourinary:  Negative for genital sores.   Musculoskeletal:  Negative for arthralgias.   Skin:  Positive for itching and rash.     Objective:     Vital Signs (Most Recent):  Temp: 98 °F (36.7 °C) (12/31/24 1139)  Pulse: 67 (12/31/24 1149)  Resp: 18 (12/31/24 1139)  BP: 125/83 (12/31/24 1139)  SpO2: 100 % (12/31/24 1139) Vital Signs (24h  Range):  Temp:  [98 °F (36.7 °C)-98.1 °F (36.7 °C)] 98 °F (36.7 °C)  Pulse:  [57-86] 67  Resp:  [11-20] 18  SpO2:  [78 %-100 %] 100 %  BP: (110-136)/(60-88) 125/83     Weight: 72 kg (158 lb 11.7 oz)  Body mass index is 20.94 kg/m².     Physical Exam   Constitutional: He appears well-developed and well-nourished.   Musculoskeletal: Lymphadenopathy:      Head:      Right side of head: No submental, submandibular, preauricular or posterior auricular adenopathy.      Left side of head: No submental, submandibular, preauricular or posterior auricular adenopathy.      Cervical: No cervical adenopathy.      Upper Body:      Right upper body: No supraclavicular or axillary adenopathy.      Left upper body: No supraclavicular or axillary adenopathy.     Lymphadenopathy:        Head (right side): No submental, no submandibular, no preauricular and no posterior auricular adenopathy present.        Head (left side): No submental, no submandibular, no preauricular and no posterior auricular adenopathy present.     He has no cervical adenopathy.        Right: No supraclavicular adenopathy present.        Left: No supraclavicular adenopathy present.   Neurological: He is alert and oriented to person, place, and time.   Psychiatric: He has a normal mood and affect.   Skin:   Areas Examined (abnormalities noted in diagram):   Scalp / Hair Palpated and Inspected  Head / Face Inspection Performed  Neck Inspection Performed  Chest / Axilla Inspection Performed  Abdomen Inspection Performed  Genitals / Buttocks / Groin Inspection Performed  Back Inspection Performed  RUE Inspected  LUE Inspection Performed  RLE Inspected  LLE Inspection Performed  Nails and Digits Inspection Performed  Gland Inspection Performed                 Significant Labs: All pertinent labs within the past 24 hours have been reviewed.  - LFTs downtrending. Cr wnl  - Eos 2.1    Liver, biopsy:   - Moderate to severe mixed portal, interface and lobular inflammation  with prominent eosinophils   - Moderate cholestasis   - No significant fibrosis on trichrome stain   - Iron is negative   - See comment     Comment: Overall, the findings are most concerning for a drug-induced liver injury, especially in this clinical context. However, an infectious etiology remains within the differential.     Significant Imaging: I have reviewed all pertinent imaging results/findings within the past 24 hours.      Assessment/Plan:     Oncology  * DRESS syndrome  28 yo man presented with >50% BSA of erythematous morbilliform rash with associated eosinophilia, leukocytosis, elevated LFTs, and previous LINK (now improved) with prodrome onset with subjective fever/chills and nausea/vomiting ~12/12 with rash onset ~12/20. Started bactrim 11/20 and flagyl ~11/25. Clinical features and lab findings are most consistent with DRESS, supported by biopsy results. Regiscar score 3 (possible case) on arrival.  Most likely implicated medication is bactrim; however, cannot entirely rule out flagyl as a potential cause.    Cardiac workup with EKG, troponin, BNP Echo without suspicion for cardiac involvement. Previous concern for renal involvement now improved with stable creatinine. Without evidence of thyroid or endocrine involvement on labs.     Rash continues to improve, with superficial desquamation. Course notable for transaminitis - although LFTs have started downtrending today.  Infectious workup initiated including viral studies. Has been started on empiric acyclovir.     Labs  - 12/23 labs notable for normal ESR, elevated CRP, elevated lactic acid, elevated PT and INR, CMV quantitative not detected, troponin <0.006. EBV qualitative +  - 12/23 Blood Cultures NGTD.  - 12/15 Acute hepatitis panel negative for infection.  - 12/26 labs with normal TSH and free T4, elevated CPK of 262, normal troponin. CBC with leukocytosis and eosinophilia mildly improved from previous day. CMP with increasing AST and ALT, but  PT and INR slightly improved from yesterday. Creatine remains stable at 1.4. EKG performed without acute changes from prior EKGs.  - 12/27 labs with slightly improved leukocytosis and eosinophilia. CMP with increasing AST and ALT (762, 1334). Creatinine improved to 1.2.  LDH and ferritin elevated. Repeat acute hepatitis panel negative.  - 12/28 with worsening AST and ALT (2163, 3113) and elevated PT and INR (16.0, 1.5). Creatinine stable at 1.3  - 12/29 with worsening AST and ALT (2698, 3832). PT and INR (15.6, 1.5). Repeat HIV non-reactive.   - 12/30 worsening AST and ALT.   - pending HSV, HHV-6, HepE, Histo, bartonella    Imaging  - 12/26 CXR without acute findings  - 12/26 Liver US with Doppler with hepatosplenomegaly  - 12/23 Retroperitoneal US with no evidence of renal abnormalities. Echogenic debris in bladder possibly consistent with cystitis.  - 12/20 CT Abdomen and Pelvis without acute findings    Recommendations:  - Continue avoidance of Bactrim and Flagyl. Bactrim has been listed as a drug allergy.  - Recommend daily CBC with diff and CMP  - s/p liver biopsy, results support drug induced injury vs infectious etiology. Cutaneous involvement improving on current steroid regimen. Concern for primary organ involvement being the liver at this time.    - continue IV solumedrol 64mg daily  - defer to hepatology regarding need for additional immunosuppression for drug induced liver injury  - Continue Pepcid BID for gastric protection while on steroids.   - Continue triamcinolone 0.1% cream BID to rash on trunk and limbs. Please special request 454 g jar from pharmacy to adequately cover affected body surface area  - Continue hydrocortisone 2.5% cream BID to rash on face.  - Recommend gentle, fragrance free moisturizer for use ad tatum to body and face while skin is peeling  - Appreciate hepatology, ID, and allergy input    Biopsy Site Care:  Remove bandage and clean with gentle soap and water. Apply Vaseline and a  bandage. Repeat daily. Leave gelfoam in place and it will resorb on its own in the next few weeks.       Case discussed with attending Dr. Davis. Dermatology will continue to follow. Please contact us if you have any additional questions.    Abby Brown MD PGY-3  Dermatology  [facility]

## 2024-12-31 NOTE — PROGRESS NOTES
"[facility] Progress Note      Attending: Jossie Barakat MD   Admit Date: 12/24/2024  Today's Date: 12/31/2024    SUBJECTIVE:     Interval History:   Liver enzymes continue to improve. No complaints this morning.       Scheduled Medications:    acyclovir  10 mg/kg Intravenous Q8H    famotidine  20 mg Oral BID    hydrocortisone   Topical (Top) BID    methylPREDNISolone injection (PEDS and ADULTS)  64 mg Intravenous Daily    mupirocin   Nasal BID    polyethylene glycol  17 g Oral Daily    senna-docusate 8.6-50 mg  1 tablet Oral BID    sodium chloride 0.9%  250 mL Intravenous Q8H    triamcinolone acetonide 0.1%   Topical (Top) BID       PRN Medications:     Current Facility-Administered Medications:     acetaminophen, 650 mg, Oral, Q8H PRN    dextrose 50%, 12.5 g, Intravenous, PRN    dextrose 50%, 25 g, Intravenous, PRN    diphenhydrAMINE, 25 mg, Oral, Q6H PRN    glucagon (human recombinant), 1 mg, Intramuscular, PRN    glucose, 16 g, Oral, PRN    glucose, 24 g, Oral, PRN    iohexol, 15 mL, Oral, PRN    melatonin, 6 mg, Oral, Nightly PRN    naloxone, 0.02 mg, Intravenous, PRN    ondansetron, 4 mg, Intravenous, Q8H PRN    sodium chloride 0.9%, 5 mL, Intravenous, PRN      OBJECTIVE:     Vital Signs Trends/Hx Reviewed  Vitals:    12/31/24 0455 12/31/24 0739 12/31/24 1053 12/31/24 1055   BP: 115/63 111/64     BP Location:       Patient Position:       Pulse: 84 84     Resp: 18 18     Temp: 98.1 °F (36.7 °C) 98.1 °F (36.7 °C)     TempSrc: Oral Oral     SpO2: 98% 99%     Weight:   72 kg (158 lb 11.7 oz)    Height:   6' 1" (1.854 m) 6' 1" (1.854 m)     Physical Exam  Vitals reviewed.   Constitutional:       General: He is not in acute distress.     Appearance: He is not ill-appearing or diaphoretic.   Abdominal:      General: There is no distension.   Skin:     Findings: Rash present.   Neurological:      Mental Status: He is alert and oriented to person, place, and time. Mental status is at baseline.      "   Laboratory:  Lab results in last 24 hours reviewed.     MELD 3.0: 15 at 12/31/2024  5:39 AM  MELD-Na: 12 at 12/31/2024  5:39 AM  Calculated from:  Serum Creatinine: 0.9 mg/dL (Using min of 1 mg/dL) at 12/31/2024  5:39 AM  Serum Sodium: 137 mmol/L at 12/31/2024  5:39 AM  Total Bilirubin: 2.7 mg/dL at 12/31/2024  5:39 AM  Serum Albumin: 2 g/dL at 12/31/2024  5:39 AM  INR(ratio): 1.2 at 12/31/2024  5:39 AM  Age at listing (hypothetical): 29 years  Sex: Male at 12/31/2024  5:39 AM      ASSESSMENT & RECOMMENDATIONS   29M w/ PMHx s/f left achilles tendon rupture s/p repair on 07/14/23 complicated by osteomyelitis of left calcaneus s/p I&D 12/4/24 on PO bactrim and flagyl. He was prescribed PO Bactrim and Flagy x 6 weeks after this most recent procedure with end date 1/1/2025. He initially presented to [facility] on 12/14 and 12/20 for evaluation of the nausea/dehydaration and was discharged from ED after IV fluids on both occasions. About 2-3 days ago, shortly after his ED vist 12/20, the patient reports developing a full body rash; on 12/23, he presented to [facility] for evaluation of rash. Pt reported having nausea, vomiting, decreased po intake, body aches, for 1-2 weeks. The rash is described as >50% BSA covered in stereotypical morbiliform. No mucous membrane involvement. Hepatology consulted for elevated LFTs in context of DRESS. On evaluation, patient without edema or ascites, abdominal pain (incl. RUQ pain), coagulopathy (INR <1.5; prev. >1.5), si/sx GI bleeding, or encephalopathy (AOx4). Dermatology and allergy consulted, started on steroids. Serologies ordered; negative JODY and AMA, ASMA + 1:80, IgG 1223. Pending PETH and A1AT. He underwent liver bx on 12/30 which showed moderate to severe mixed portal, interface and lobular inflammation with prominent eosinophils, moderate cholestasis but no significant fibrosis;  findings are most concerning for DILI. Transplant workup initiated as LFTs were worsening but  are now trending down.      Problem List:  DRESS Syndrome  Acute Liver Injury   LINK-resolved     Recommendations:   - He was approved for inpatient transplant evaluation.    ->Triple phase CT obtained and negative.    ->LTS evaluated; suitable for liver transplant.   ->Given improvement in liver enzymes and biopsy findings, it is unlikely that he will require a liver transplant moving forward. Will hold on further inpatient transplant workup at this time.   - Awaiting further recommendations from Transplant ID regarding treatment for osteomyelitis of the L calcaneus.   - On methylprednisolone for treatment of DRESS; managed by Dermatology and Allergy.   - Please obtain daily CMP and INR.     Thank you for allowing us to participate in the care of this patient. Please call with questions.      Adrianne Roth MD  Gastroenterology Fellow, PGY-V  [facility]

## 2024-12-31 NOTE — PLAN OF CARE
Liver transplant evaluation cancelled  No further antibiotics indicated at this time  Please ensure patient has outpatient follow up w/ [facility]  DRESS management as per dermatology  Stop acyclovir if path staining is negative for HSV    Serina Cocco DO  Transplant Infectious Disease

## 2024-12-31 NOTE — PT/OT/SLP EVAL
"Occupational Therapy   Co- Evaluation and Discharge Note    Name: Alonzo Nascimento Jr.  MRN: [id number]  Admitting Diagnosis: DRESS syndrome  Recent Surgery: * No surgery found *      Recommendations:     Discharge Recommendations: No Therapy Indicated  Discharge Equipment Recommendations: none  Barriers to discharge:  None    Assessment:     Alonzo Nascimento Jr. is a 29 y.o. male with a medical diagnosis of DRESS syndrome. At this time, patient is functioning at their prior level of function and does not require further acute OT services. Per pt report, pt's podiatrist stated pt had PWB precautions on L foot with forefoot WB and Darco shoe on during mobility.    Co-treat performed due to acuity and complexity of pt's medical status with the expectation of  2 skilled disciplines needed to optimize pts occupational performance and to improve activity tolerance.     Plan:     During this hospitalization, patient does not require further acute OT services.  Please re-consult if situation changes.    Plan of Care Reviewed with: patient    Subjective     Chief Complaint: none  Patient/Family Comments/goals: "I've been moving fine."    Occupational Profile:  Living Environment: Pt lives in 2  [address] [facility] with flight of stairs to get to bed/bath on 2nd floor with R HR. WIS and t/s combo  Previous level of function: (I) ADLS and mobility. 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  Patient found ambulatory in room/ya with peripheral IV upon OT entry to room.    General Precautions: Standard, fall  Orthopedic Precautions:  (per pt, podiatrist informed PWB on toes, no weight on " heel)  Braces:  (Darco shoe)  Respiratory Status: Room air     Occupational Performance:    Bed Mobility:    N/A 2/2 in bathroom upon OT arrival    Functional Mobility/Transfers:  Patient completed Sit <> Stand Transfer with independence  with  no assistive device   Functional Mobility: Pt engaged in functional mobility throughout hospital room and hallway modeling home/community distance with no AD and  (I) to maximize functional endurance and standing balance required for home/community mobility and occupational engagement. Pt reports performing room mobility with (I) throughout admission    Activities of Daily Living:  Lower Body Dressing: independence donned L darco shoe  Toileting: independence performed sitting on toilet . Pt reports performing toileting (I) throughput admission    Cognitive/Visual Perceptual:  Cognitive/Psychosocial Skills:     -       Oriented to: Person, Place, Time, and Situation   -       Follows Commands/attention:Follows multistep  commands  -       Communication: clear/fluent  -       Memory: No Deficits noted  -       Safety awareness/insight to disability: intact   -       Mood/Affect/Coping skills/emotional control: Appropriate to situation    Physical Exam:  Balance:    -       (I)   Sensation:    -       Intact  Dominant hand:    -       Right  Upper Extremity Range of Motion:     -       Right Upper Extremity: WNL  -       Left Upper Extremity: WNL  Upper Extremity Strength:    -       Right Upper Extremity: WNL  -       Left Upper Extremity: WNL   Strength:    -       Right Upper Extremity: WNL  -       Left Upper Extremity: WNL  Fine Motor Coordination:    -       Intact    AMPAC 6 Click ADL:  AMPAC Total Score: 24    Treatment & Education:  -Education on energy conservation and task modification to maximize safety and (I) during ADLs and mobility  -Education on importance of OOB activity to improve overall activity tolerance and promote recovery  -Pt educated to call for  assistance and to transfer with hospital staff only  -Education on WB precautions. Pt able to recall precautions without verbal cues  -Provided education regarding role of OT, POC, & discharge recommendations with pt verbalizing understanding.  Pt had no further questions & when asked whether there were any concerns pt reported none.      Patient left sitting edge of bed with all lines intact, call button in reach, and nurse notified    GOALS:   Multidisciplinary Problems       Occupational Therapy Goals       Not on file              Multidisciplinary Problems (Resolved)          Problem: Occupational Therapy    Goal Priority Disciplines Outcome Interventions   Occupational Therapy Goal   (Resolved)     OT, PT/OT Met    Description: Patient functioning at baseline and does not require skilled occupational therapy services; therefore, no goals established at this time                         History:     Past Medical History:   Diagnosis Date    Achilles tendon rupture 07/2023    left achilles tendon rupture s/p repair on 07/14/23 complicated by osteomyelitis of left calcaneus s/p I&D 12/4/24    Acute osteomyelitis of calcaneum, left 12/2024    osteomyelitis of left calcaneus s/p I&D 12/4/24         Past Surgical History:   Procedure Laterality Date    ANTERIOR CRUCIATE LIGAMENT REPAIR Right     2018    APPLICATION OF SPLINT Left 7/14/2023    Procedure: APPLICATION, SPLINT;  Surgeon: Lenore Pearl DPM;  Location: [facility] OR;  Service: Podiatry;  Laterality: Left;    INSERTION, ANTIBIOTIC SPACER, LOWER EXTREMITY Left 12/4/2024    Procedure: INSERTION, ANTIBIOTIC SPACER, LOWER EXTREMITY;  Surgeon: Jaswinder Garcia DPM;  Location: [facility] OR;  Service: Podiatry;  Laterality: Left;    IRRIGATION AND DEBRIDEMENT Left 12/4/2024    Procedure: IRRIGATION AND DEBRIDEMENT;  Surgeon: Jaswinder Garcia DPM;  Location: [facility] OR;  Service: Podiatry;  Laterality: Left;  mini c-arm, Stimulan jr tobramycin/Vancomycin    REPAIR OF  ACHILLES TENDON Left 7/14/2023    Procedure: REPAIR, TENDON, ACHILLES;  Surgeon: Lenore Pearl DPM;  Location: [facility];  Service: Podiatry;  Laterality: Left;       Time Tracking:     OT Date of Treatment: 12/31/24  OT Start Time: 1111  OT Stop Time: 1139  OT Total Time (min): 28 min    Billable Minutes:Evaluation 10 min  Self Care/Home Management 18 min    12/31/2024

## 2024-12-31 NOTE — PLAN OF CARE
[facility] - Med Surg  Discharge Reassessment    Primary Care Provider: Sohan Strange MD    Expected Discharge Date: 1/2/2025        Sw met with pt at bedside who reported that he is ambulatory and independent with all ADL's. Pt stated that he will need transportation home at discharge.     Discharge Plan A and Plan B have been determined by review of patient's clinical status, future medical and therapeutic needs, and coverage/benefits for post-acute care in coordination with multidisciplinary team members.        Reassessment (most recent)       Discharge Reassessment - 12/31/24 1065          Discharge Reassessment    Assessment Type Discharge Planning Reassessment (P)      Did the patient's condition or plan change since previous assessment? Yes (P)      Discharge Plan discussed with: Patient (P)      Communicated GARETH with patient/caregiver Date not available/Unable to determine (P)      Discharge Plan A Home with family (P)      Discharge Plan B Home (P)      DME Needed Upon Discharge  none (P)      Transition of Care Barriers None (P)      Why the patient remains in the hospital Requires continued medical care (P)         Post-Acute Status    Post-Acute Authorization Other (P)      Coverage MEDICA[employer] - [employer] (P)      Other Status No Post-Acute Service Needs (P)      Discharge Delays None known at this time (P)                    [facility]  Case Management  [phone]

## 2025-01-01 LAB
ALBUMIN SERPL BCP-MCNC: 2.3 G/DL (ref 3.5–5.2)
ALP SERPL-CCNC: 246 U/L (ref 40–150)
ALT SERPL W/O P-5'-P-CCNC: 2844 U/L (ref 10–44)
ANION GAP SERPL CALC-SCNC: 7 MMOL/L (ref 8–16)
ANISOCYTOSIS BLD QL SMEAR: SLIGHT
AST SERPL-CCNC: 650 U/L (ref 10–40)
BASOPHILS # BLD AUTO: 0.13 K/UL (ref 0–0.2)
BASOPHILS NFR BLD: 1.2 % (ref 0–1.9)
BILIRUB SERPL-MCNC: 2.1 MG/DL (ref 0.1–1)
BUN SERPL-MCNC: 20 MG/DL (ref 6–20)
CALCIUM SERPL-MCNC: 7.7 MG/DL (ref 8.7–10.5)
CHLORIDE SERPL-SCNC: 106 MMOL/L (ref 95–110)
CO2 SERPL-SCNC: 22 MMOL/L (ref 23–29)
CREAT SERPL-MCNC: 1 MG/DL (ref 0.5–1.4)
DIFFERENTIAL METHOD BLD: ABNORMAL
EOSINOPHIL # BLD AUTO: 2.8 K/UL (ref 0–0.5)
EOSINOPHIL NFR BLD: 25.8 % (ref 0–8)
ERYTHROCYTE [DISTWIDTH] IN BLOOD BY AUTOMATED COUNT: 15.9 % (ref 11.5–14.5)
EST. GFR  (NO RACE VARIABLE): >60 ML/MIN/1.73 M^2
GLUCOSE SERPL-MCNC: 88 MG/DL (ref 70–110)
HCT VFR BLD AUTO: 32.5 % (ref 40–54)
HGB BLD-MCNC: 10.8 G/DL (ref 14–18)
HISTOPLASMA/BLASTOMYCES AG VALUE: NOT DETECTED NG/ML
HISTOPLASMA/BLASTOMYCES RESULT: NOT DETECTED
HSV-1 DNA BY PCR: NEGATIVE
HSV-2 DNA BY PCR: NEGATIVE
HYPOCHROMIA BLD QL SMEAR: ABNORMAL
IMM GRANULOCYTES # BLD AUTO: 0.27 K/UL (ref 0–0.04)
IMM GRANULOCYTES NFR BLD AUTO: 2.5 % (ref 0–0.5)
INR PPP: 1.1 (ref 0.8–1.2)
LYMPHOCYTES # BLD AUTO: 4.2 K/UL (ref 1–4.8)
LYMPHOCYTES NFR BLD: 39.5 % (ref 18–48)
MAGNESIUM SERPL-MCNC: 1.9 MG/DL (ref 1.6–2.6)
MCH RBC QN AUTO: 27.8 PG (ref 27–31)
MCHC RBC AUTO-ENTMCNC: 33.2 G/DL (ref 32–36)
MCV RBC AUTO: 84 FL (ref 82–98)
MONOCYTES # BLD AUTO: 0.7 K/UL (ref 0.3–1)
MONOCYTES NFR BLD: 6.8 % (ref 4–15)
NEUTROPHILS # BLD AUTO: 2.6 K/UL (ref 1.8–7.7)
NEUTROPHILS NFR BLD: 24.2 % (ref 38–73)
NRBC BLD-RTO: 0 /100 WBC
OVALOCYTES BLD QL SMEAR: ABNORMAL
PHOSPHATE SERPL-MCNC: 4.8 MG/DL (ref 2.7–4.5)
PLATELET # BLD AUTO: 157 K/UL (ref 150–450)
PLATELET BLD QL SMEAR: ABNORMAL
PMV BLD AUTO: 9.8 FL (ref 9.2–12.9)
POIKILOCYTOSIS BLD QL SMEAR: SLIGHT
POTASSIUM SERPL-SCNC: 4.5 MMOL/L (ref 3.5–5.1)
PROT SERPL-MCNC: 6.4 G/DL (ref 6–8.4)
PROTHROMBIN TIME: 12.4 SEC (ref 9–12.5)
RBC # BLD AUTO: 3.89 M/UL (ref 4.6–6.2)
SODIUM SERPL-SCNC: 135 MMOL/L (ref 136–145)
SPHEROCYTES BLD QL SMEAR: ABNORMAL
TARGETS BLD QL SMEAR: ABNORMAL
WBC # BLD AUTO: 10.67 K/UL (ref 3.9–12.7)
WBC TOXIC VACUOLES BLD QL SMEAR: PRESENT

## 2025-01-01 PROCEDURE — 80053 COMPREHEN METABOLIC PANEL: CPT | Mod: NTX | Performed by: HOSPITALIST

## 2025-01-01 PROCEDURE — 25000003 PHARM REV CODE 250: Mod: NTX | Performed by: HOSPITALIST

## 2025-01-01 PROCEDURE — 63600175 PHARM REV CODE 636 W HCPCS: Mod: NTX | Performed by: FAMILY MEDICINE

## 2025-01-01 PROCEDURE — 85610 PROTHROMBIN TIME: CPT | Mod: NTX | Performed by: HOSPITALIST

## 2025-01-01 PROCEDURE — 11000001 HC ACUTE MED/SURG PRIVATE ROOM: Mod: NTX

## 2025-01-01 PROCEDURE — 85025 COMPLETE CBC W/AUTO DIFF WBC: CPT | Mod: NTX | Performed by: HOSPITALIST

## 2025-01-01 PROCEDURE — 63600175 PHARM REV CODE 636 W HCPCS: Mod: NTX | Performed by: STUDENT IN AN ORGANIZED HEALTH CARE EDUCATION/TRAINING PROGRAM

## 2025-01-01 PROCEDURE — 25000003 PHARM REV CODE 250: Mod: NTX | Performed by: STUDENT IN AN ORGANIZED HEALTH CARE EDUCATION/TRAINING PROGRAM

## 2025-01-01 PROCEDURE — 83735 ASSAY OF MAGNESIUM: CPT | Mod: NTX | Performed by: HOSPITALIST

## 2025-01-01 PROCEDURE — 25000003 PHARM REV CODE 250: Mod: NTX | Performed by: FAMILY MEDICINE

## 2025-01-01 PROCEDURE — 36415 COLL VENOUS BLD VENIPUNCTURE: CPT | Mod: NTX | Performed by: HOSPITALIST

## 2025-01-01 PROCEDURE — 84100 ASSAY OF PHOSPHORUS: CPT | Mod: NTX | Performed by: HOSPITALIST

## 2025-01-01 RX ADMIN — ACYCLOVIR SODIUM 720 MG: 50 INJECTION, SOLUTION INTRAVENOUS at 12:01

## 2025-01-01 RX ADMIN — Medication 6 MG: at 08:01

## 2025-01-01 RX ADMIN — SODIUM CHLORIDE 250 ML: 9 INJECTION, SOLUTION INTRAVENOUS at 12:01

## 2025-01-01 RX ADMIN — POLYETHYLENE GLYCOL 3350 17 G: 17 POWDER, FOR SOLUTION ORAL at 08:01

## 2025-01-01 RX ADMIN — SODIUM CHLORIDE 250 ML: 9 INJECTION, SOLUTION INTRAVENOUS at 08:01

## 2025-01-01 RX ADMIN — HYDROCORTISONE: 25 CREAM TOPICAL at 09:01

## 2025-01-01 RX ADMIN — MUPIROCIN: 20 OINTMENT TOPICAL at 09:01

## 2025-01-01 RX ADMIN — SENNOSIDES AND DOCUSATE SODIUM 1 TABLET: 50; 8.6 TABLET ORAL at 08:01

## 2025-01-01 RX ADMIN — FAMOTIDINE 20 MG: 20 TABLET ORAL at 08:01

## 2025-01-01 RX ADMIN — ACYCLOVIR SODIUM 720 MG: 50 INJECTION, SOLUTION INTRAVENOUS at 08:01

## 2025-01-01 RX ADMIN — METHYLPREDNISOLONE SODIUM SUCCINATE 64 MG: 40 INJECTION, POWDER, FOR SOLUTION INTRAMUSCULAR; INTRAVENOUS at 08:01

## 2025-01-01 RX ADMIN — DIPHENHYDRAMINE HYDROCHLORIDE 25 MG: 25 CAPSULE ORAL at 08:01

## 2025-01-01 RX ADMIN — SODIUM CHLORIDE 250 ML: 9 INJECTION, SOLUTION INTRAVENOUS at 03:01

## 2025-01-01 RX ADMIN — ACYCLOVIR SODIUM 720 MG: 50 INJECTION, SOLUTION INTRAVENOUS at 03:01

## 2025-01-01 RX ADMIN — TRIAMCINOLONE ACETONIDE: 1 CREAM TOPICAL at 09:01

## 2025-01-01 NOTE — ASSESSMENT & PLAN NOTE
- appreciate ID consult .  No antibiotics for now.     -- 12/23 podiatry note reviewed and pt confirmed plan.  Needs weekly dressing change next due 12/30.    - Will clarify with ID regarding need for further antibiotics.

## 2025-01-01 NOTE — PROGRESS NOTES
"[facility] Medicine  Progress Note    Patient Name: Alonzo Nascimento Jr.  MRN: [id number]  Patient Class: IP- Inpatient   Admission Date: 12/24/2024  Length of Stay: 7 days  Attending Physician: Jossie Barakat MD  Primary Care Provider: Sohan Strange MD        Subjective     Principal Problem:DRESS syndrome        HPI:  Per admitting physician (12/24):  28 yo M with PMHx foot osteomyelitis on PO bactrim and flagyl who presented to [facility] 12/23 for evaluation of rash. Pt reported having nausea, vomiting, decreased po intake, body aches, for 1-2 weeks. Pt. Has a history of left achilles tendon rupture s/p repair on 07/14/23 complicated by osteomyelitis of left calcaneus s/p I&D 12/4/24. He was prescribed PO Bactrim and Flagy x 6 weeks after this most recent procedure with end date 1/1/2025.  He initially presented to [facility]  E on 12/14 and 12/20 for evaluation of the nausea/dehydaration and was discharged from ED after IV fluids on both occasions. About 2-3 days ago, shortly after his ED vist 12/20, the patient reports developing a full body rash. The Rash is described as >50% BSA covered in stereotypical morbiliform. No mucous membrane involvement.     In the ED, the patient was tachycardic (120s> 90s), tachypneic (20s), but otherwise hemodynamically stable.  Labs were remarkable for leukocytosis (28.3, with 12% eosinophilia), elevated INR (2.1), elevated creatinine (1.9- from a previous 1.4), hyponatremia (125), elevated LFTs (AST:  384, ALT: 635, T bili: 4.1, ALP:  341), elevated lactic acid (2.7).  VBG showed a pH of 7.34, pCO2 of 49.7 and HC03 of 22.9.  Retroperitoneal ultrasound showed no acute process.  CT abdomen and pelvis (12/20) showed no acute findings.  Referring provider is concerned about DRESS syndrome secondary to Bactrim, so patient transferred to [facility] for Dematology evaluation.     Pt. Was admitted to [place] while awaiting bed, per [place] medicine team, "He was " "started on oral prednisone 50 mg daily and clobetasol cream...... Patient's Lfts and LINK were improving on 12/24. He was tolerating a soft diet"    Overview/Hospital Course:  Admitted to hospital medicine for DRESS syndrome likely d/t Bactrim, however, can not entirely rule out Flagyl as source.  Dermatology consulted, s/p biopsy, started on IV and topical steroids.  Rash improved, leukocytosis and eosinophilia resolved, LINK resolved, GI symptoms resolved but pt with worsening liver function.  Hepatology consulted for acute liver injury, serologic workup ordered.  ID consulted for extensive infectious workup.  Worsening liver injury throughout admission, started on empiric acyclovir.  Interventional Radiology consulted, liver biopsy pending, tentatively 12/30.  Liver transplant authorization received.    12/31- appreciate dermatology recommendations and suggestion for immunology/allergy recommendations.  Overall liver function improving.  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 Liver enzymes, Tbi land INR improved.  Would defer liver transplant evaluation at this time and he will likely not need further evaluation.  We will clarify with ID regarding treatment for osteomyelitis.     Subjective/Interval History:  Rash overall continues to improve, peeling on torso and face.  Pruritus responding to topical medications as well as Benadryl.  Tolerating diet.  Denies any nausea or vomiting.    Review of Systems   Constitutional:  Negative for chills and fever.   HENT:  Negative for congestion.    Respiratory:  Negative for cough and shortness of breath.    Cardiovascular:  Negative for palpitations.   Gastrointestinal:  Negative for constipation, diarrhea, nausea and vomiting.   Genitourinary: Negative.    Musculoskeletal: Negative.    Skin:  Positive for rash.   Neurological:  Negative for weakness and headaches.   Psychiatric/Behavioral:  Negative for suicidal ideas.          Vitals:    12/31/24 1626   BP: " 129/70   Pulse: 82   Resp: 18   Temp: 98.1 °F (36.7 °C)       Objective:        Physical Exam  Constitutional:       General: He is not in acute distress.     Appearance: He is ill-appearing. He is not diaphoretic.   Eyes:      General: No scleral icterus.     Extraocular Movements: Extraocular movements intact.   Cardiovascular:      Rate and Rhythm: Normal rate and regular rhythm.      Heart sounds: No murmur heard.  Pulmonary:      Effort: No respiratory distress.   Abdominal:      General: Bowel sounds are normal. There is no distension.      Tenderness: There is no abdominal tenderness.   Musculoskeletal:      Cervical back: Neck supple.      Right lower leg: No edema.      Left lower leg: No edema.   Skin:     Comments: Diffuse rash, now peeling   Neurological:      Mental Status: He is alert and oriented to person, place, and time.   Psychiatric:         Mood and Affect: Mood normal.       Significant Labs: All pertinent labs within the past 24 hours have been reviewed.    Recent Labs   Lab 12/31/24  0539   WBC 11.41   RBC 3.69*   HGB 10.5*   HCT 30.3*      MCV 82   MCH 28.5   MCHC 34.7       Recent Labs   Lab 12/31/24  0539   CALCIUM 7.4*   ALBUMIN 2.0*   PROT 6.2      K 4.1   CO2 25      BUN 17   CREATININE 0.9   ALKPHOS 267*   ALT 3,549*   AST 1,262*   BILITOT 2.7*         Significant Imaging: I have reviewed all pertinent imaging results/findings within the past 24 hours.    Assessment and Plan     * DRESS syndrome  Pt. On long term bactrim therapy presenting with maculopapular rash involving trunk and extremities, >50% BSA, elevated LFTs, LINK, leukocytosis, Eosinophilia, consistent concerning DRESS syndrome  Dermatology consulted: s/p biopsy 12/26.  Recommended Solu-Medrol and topical steroids.  Considering cyclosporin  bilirubin, ALP, AST, ALT cont to rise:  hepatology consulted:  Liver ultrasound with hepatosplenomegaly.  Follow up hepatic serologic workup. IR consulted for biopsy ,  completed 12/30. Liver transplant evaluation requested.   Liver biopsy suggestive of DILI and no fibrosis. Will not likely need liver transplant     Appreciate Dermatology and allergy recommendations     Anemia  Anemia is likely due to drug toxicity from Antibiotics and DRESS syndrome . Most recent hemoglobin and hematocrit are listed below.  Recent Labs     12/29/24  0346 12/30/24  0811 12/31/24  0539   HGB 11.8* 10.9* 10.5*   HCT 33.5* 31.3* 30.3*     Plan  - Monitor serial CBC: Daily  - Transfuse PRBC if patient becomes hemodynamically unstable, symptomatic or H/H drops below 7/21.  - Patient has not received any PRBC transfusions to date  - Patient's anemia is currently stable    Thrombocytopenia  The likely etiology of thrombocytopenia is  liver injury secondary to dress syndrome. . The patients 3 most recent labs are listed below.  Recent Labs     12/29/24  0346 12/30/24  0811 12/31/24  0539   * 148* 160     Plan  - Will transfuse if platelet count is <50k (if undergoing surgical procedure or have active bleeding).      Fever  Last reported fever on 12/28.  No further febrile episodes.  Likely secondary to DRESS syndrome.    He has been treated with Bactrim and Flagyl for osteomyelitis of the left calcaneus.  Appreciate infectious disease recommendations.  No further antibiotics at this time.  Recommendation to follow up with [facility] ID clinic to discuss further antibiotics.       Hypoalbuminemia  Likely d/t liver dysfunction and acute illness  Monitor volume status while on IV fluids.  Cont to monitor on labs    Severe systemic inflammatory response syndrome (SIRS)  Patient has fever, tachycardia, leukocytosis with abnormal liver function and lactic acidosis on admission.  Although he has a bone infection it is subacute and doubt it is causing these systemic symptoms and signs.    -- monitor for infection, treat DRESS  -- s/p IV fluids, encourage PO intake  -- repeat blood cultures ordered 12/28,  NGTD    Liver injury  MELD 3.0: 15 at 12/31/2024  5:39 AM  MELD-Na: 12 at 12/31/2024  5:39 AM  Calculated from:  Serum Creatinine: 0.9 mg/dL (Using min of 1 mg/dL) at 12/31/2024  5:39 AM  Serum Sodium: 137 mmol/L at 12/31/2024  5:39 AM  Total Bilirubin: 2.7 mg/dL at 12/31/2024  5:39 AM  Serum Albumin: 2 g/dL at 12/31/2024  5:39 AM  INR(ratio): 1.2 at 12/31/2024  5:39 AM  Age at listing (hypothetical): 29 years  Sex: Male at 12/31/2024  5:39 AM    Acute viral panel negative 12/15.  CT abd unremarkable liver.  U/S liver: Hepatosplenomegaly  AST/ALT/TB/ALP trending down.    Appreciate hepatology recommendations.  Liver biopsy obtained on 12/30/2024.  Results consistent with drug-induced liver injury.  Liver transplant evaluation was started however given biopsy results and ongoing improvement we will likely not need liver transplant.  Appreciate ID recommendations and recommendation to start acyclovir if liver biopsy negative for CMV.    Acute kidney injury  RESOLVED    -Unclear baseline, 1.9 on admission and improving  -Renal US unremarkable.  -Continue to monitor while admitted, pt. Will need PCP and/or nephrologist at discharge  -S/p IV fluids, encourage PO intake    Hyponatremia  Normal serum osmolality indicates pseudohyponatremia  Cont to monitor on daily labs.    Acute osteomyelitis of left calcaneus  - appreciate ID consult .  No antibiotics for now.     -- 12/23 podiatry note reviewed and pt confirmed plan.  Needs weekly dressing change next due 12/30.    - Will clarify with ID regarding need for further antibiotics.        VTE Risk Mitigation (From admission, onward)           Ordered     Place sequential compression device  Until discontinued         12/24/24 1849                    Discharge Planning   GARETH: 1/2/2025     Code Status: Full Code   Medical Readiness for Discharge Date:   Discharge Plan A: Home with family   Discharge Delays: None known at this time                    Jossie Barakat MD  Department  of [facility] Medicine   [facility]

## 2025-01-01 NOTE — SUBJECTIVE & OBJECTIVE
"Interval History:      Rash overall continues to improve, peeling on torso and face.  Pruritus responding to topical medications as well as Benadryl.  Tolerating diet.  Denies any nausea or vomiting.    Review of Systems   Constitutional:  Negative for chills and fever.   HENT:  Negative for congestion.    Respiratory:  Negative for cough and shortness of breath.    Cardiovascular:  Negative for palpitations.   Gastrointestinal:  Negative for constipation, diarrhea, nausea and vomiting.   Genitourinary: Negative.    Musculoskeletal: Negative.    Skin:  Positive for rash.   Neurological:  Negative for weakness and headaches.   Psychiatric/Behavioral:  Negative for suicidal ideas.          Vitals:    01/01/25 0731   BP: 117/79   Pulse: 67   Resp: 18   Temp: 98.4 °F (36.9 °C)       Objective:        Physical Exam  Constitutional:       General: He is not in acute distress.     Appearance: He is ill-appearing. He is not diaphoretic.   Eyes:      General: No scleral icterus.     Extraocular Movements: Extraocular movements intact.   Cardiovascular:      Rate and Rhythm: Normal rate and regular rhythm.      Heart sounds: No murmur heard.  Pulmonary:      Effort: No respiratory distress.   Abdominal:      General: Bowel sounds are normal. There is no distension.      Tenderness: There is no abdominal tenderness.   Musculoskeletal:      Cervical back: Neck supple.      Right lower leg: No edema.      Left lower leg: No edema.   Skin:     Comments: Diffuse rash, now peeling   Neurological:      Mental Status: He is alert and oriented to person, place, and time.   Psychiatric:         Mood and Affect: Mood normal.       Significant Labs: All pertinent labs within the past 24 hours have been reviewed.      No results for input(s): "WBC", "RBC", "HGB", "HCT", "PLT", "MCV", "MCH", "MCHC" in the last 24 hours.    No results for input(s): "GLUCOSE", "CALCIUM", "ALBUMIN", "PROT", "NA", "K", "CO2", "CL", "BUN", "CREATININE", " ""ALKPHOS", "ALT", "AST", "BILITOT" in the last 24 hours.        Significant Imaging: I have reviewed all pertinent imaging results/findings within the past 24 hours.    "

## 2025-01-01 NOTE — ASSESSMENT & PLAN NOTE
I have reviewed the notes, assessments, and/or procedures performed by Dr Fay, I concur with her/his documentation of Alessia Shin.  Date of Service: 11/26/2024    [facility]   RESOLVED    -Unclear baseline, 1.9 on admission and improving  -Renal US unremarkable.  -Continue to monitor while admitted, pt. Will need PCP and/or nephrologist at discharge  -S/p IV fluids, encourage PO intake

## 2025-01-01 NOTE — ASSESSMENT & PLAN NOTE
Anemia is likely due to drug toxicity from Antibiotics and DRESS syndrome . Most recent hemoglobin and hematocrit are listed below.  Recent Labs     12/29/24  0346 12/30/24  0811 12/31/24  0539   HGB 11.8* 10.9* 10.5*   HCT 33.5* 31.3* 30.3*     Plan  - Monitor serial CBC: Daily  - Transfuse PRBC if patient becomes hemodynamically unstable, symptomatic or H/H drops below 7/21.  - Patient has not received any PRBC transfusions to date  - Patient's anemia is currently stable

## 2025-01-01 NOTE — ASSESSMENT & PLAN NOTE
Pt. On long term bactrim therapy presenting with maculopapular rash involving trunk and extremities, >50% BSA, elevated LFTs, LINK, leukocytosis, Eosinophilia, consistent concerning DRESS syndrome  Dermatology consulted: s/p biopsy 12/26.  Recommended Solu-Medrol and topical steroids.  Considering cyclosporin  bilirubin, ALP, AST, ALT cont to rise:  hepatology consulted:  Liver ultrasound with hepatosplenomegaly.  Follow up hepatic serologic workup. IR consulted for biopsy , completed 12/30. Liver transplant evaluation requested.   Liver biopsy suggestive of DILI and no fibrosis. Will not likely need liver transplant     Appreciate Dermatology and allergy recommendations

## 2025-01-01 NOTE — ASSESSMENT & PLAN NOTE
The likely etiology of thrombocytopenia is  liver injury secondary to dress syndrome. . The patients 3 most recent labs are listed below.  Recent Labs     12/29/24  0346 12/30/24  0811 12/31/24  0539   * 148* 160     Plan  - Will transfuse if platelet count is <50k (if undergoing surgical procedure or have active bleeding).

## 2025-01-01 NOTE — ASSESSMENT & PLAN NOTE
MELD 3.0: 15 at 12/31/2024  5:39 AM  MELD-Na: 12 at 12/31/2024  5:39 AM  Calculated from:  Serum Creatinine: 0.9 mg/dL (Using min of 1 mg/dL) at 12/31/2024  5:39 AM  Serum Sodium: 137 mmol/L at 12/31/2024  5:39 AM  Total Bilirubin: 2.7 mg/dL at 12/31/2024  5:39 AM  Serum Albumin: 2 g/dL at 12/31/2024  5:39 AM  INR(ratio): 1.2 at 12/31/2024  5:39 AM  Age at listing (hypothetical): 29 years  Sex: Male at 12/31/2024  5:39 AM    Acute viral panel negative 12/15.  CT abd unremarkable liver.  U/S liver: Hepatosplenomegaly  AST/ALT/TB/ALP trending down.    Appreciate hepatology recommendations.  Liver biopsy obtained on 12/30/2024.  Results consistent with drug-induced liver injury.  Liver transplant evaluation was started however given biopsy results and ongoing improvement we will likely not need liver transplant.  Appreciate ID recommendations and recommendation to start acyclovir if liver biopsy negative for CMV.

## 2025-01-01 NOTE — ASSESSMENT & PLAN NOTE
Last reported fever on 12/28.  No further febrile episodes.  Likely secondary to DRESS syndrome.    He has been treated with Bactrim and Flagyl for osteomyelitis of the left calcaneus.  Appreciate infectious disease recommendations.  No further antibiotics at this time.  Recommendation to follow up with [facility] ID clinic to discuss further antibiotics.

## 2025-01-01 NOTE — ASSESSMENT & PLAN NOTE
Anemia is likely due to drug toxicity from Antibiotics and DRESS syndrome . Most recent hemoglobin and hematocrit are listed below.  Recent Labs     12/30/24  0811 12/31/24  0539   HGB 10.9* 10.5*   HCT 31.3* 30.3*       Plan  - Monitor serial CBC: Daily  - Transfuse PRBC if patient becomes hemodynamically unstable, symptomatic or H/H drops below 7/21.  - Patient has not received any PRBC transfusions to date  - Patient's anemia is currently stable

## 2025-01-01 NOTE — PROGRESS NOTES
"[facility] Medicine  Progress Note    Patient Name: Alonzo Nascimento Jr.  MRN: [id number]  Patient Class: IP- Inpatient   Admission Date: 12/24/2024  Length of Stay: 8 days  Attending Physician: Jossie Barakat MD  Primary Care Provider: Sohan Strange MD        Subjective     Principal Problem:DRESS syndrome        HPI:  Per admitting physician (12/24):  28 yo M with PMHx foot osteomyelitis on PO bactrim and flagyl who presented to [facility] 12/23 for evaluation of rash. Pt reported having nausea, vomiting, decreased po intake, body aches, for 1-2 weeks. Pt. Has a history of left achilles tendon rupture s/p repair on 07/14/23 complicated by osteomyelitis of left calcaneus s/p I&D 12/4/24. He was prescribed PO Bactrim and Flagy x 6 weeks after this most recent procedure with end date 1/1/2025.  He initially presented to [facility]  E on 12/14 and 12/20 for evaluation of the nausea/dehydaration and was discharged from ED after IV fluids on both occasions. About 2-3 days ago, shortly after his ED vist 12/20, the patient reports developing a full body rash. The Rash is described as >50% BSA covered in stereotypical morbiliform. No mucous membrane involvement.     In the ED, the patient was tachycardic (120s> 90s), tachypneic (20s), but otherwise hemodynamically stable.  Labs were remarkable for leukocytosis (28.3, with 12% eosinophilia), elevated INR (2.1), elevated creatinine (1.9- from a previous 1.4), hyponatremia (125), elevated LFTs (AST:  384, ALT: 635, T bili: 4.1, ALP:  341), elevated lactic acid (2.7).  VBG showed a pH of 7.34, pCO2 of 49.7 and HC03 of 22.9.  Retroperitoneal ultrasound showed no acute process.  CT abdomen and pelvis (12/20) showed no acute findings.  Referring provider is concerned about DRESS syndrome secondary to Bactrim, so patient transferred to [facility] for Dematology evaluation.     Pt. Was admitted to [facility] while awaiting bed, per [facility] medicine team, "He was " "started on oral prednisone 50 mg daily and clobetasol cream...... Patient's Lfts and LINK were improving on 12/24. He was tolerating a soft diet"    Overview/Hospital Course:  Admitted to hospital medicine for DRESS syndrome likely d/t Bactrim, however, can not entirely rule out Flagyl as source.  Dermatology consulted, s/p biopsy, started on IV and topical steroids.  Rash improved, leukocytosis and eosinophilia resolved, LINK resolved, GI symptoms resolved but pt with worsening liver function.  Hepatology consulted for acute liver injury, serologic workup ordered.  ID consulted for extensive infectious workup.  Worsening liver injury throughout admission, started on empiric acyclovir.  Interventional Radiology consulted, liver biopsy pending, tentatively 12/30.  Liver transplant authorization received.    12/31- appreciate dermatology recommendations and suggestion for immunology/allergy recommendations.  Overall liver function improving.  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 Liver enzymes, Tbi land INR improved.  Would defer liver transplant evaluation at this time and he will likely not need further evaluation.  We will clarify with ID regarding treatment for osteomyelitis.     1/1- doing we will.  No reported distress or fevers.  Liver transplant evaluation paused had given biopsy results and downtrending LFTs.    Interval History:       Rash overall continues to improve, peeling on torso and face.  Pruritus responding to topical medications as well as Benadryl.  Tolerating diet.  Denies any nausea or vomiting.     Review of Systems   Constitutional:  Negative for chills and fever.   HENT:  Negative for congestion.    Respiratory:  Negative for cough and shortness of breath.    Cardiovascular:  Negative for palpitations.   Gastrointestinal:  Negative for constipation, diarrhea, nausea and vomiting.   Genitourinary: Negative.    Musculoskeletal: Negative.    Skin:  Positive for rash.   Neurological:  " Negative for weakness and headaches.   Psychiatric/Behavioral:  Negative for suicidal ideas.                 Vitals:     01/01/25 0731   BP: 117/79   Pulse: 67   Resp: 18   Temp: 98.4 °F (36.9 °C)         Objective:      Physical Exam  Constitutional:       General: He is not in acute distress.     Appearance: He is ill-appearing. He is not diaphoretic.   Eyes:      General: No scleral icterus.     Extraocular Movements: Extraocular movements intact.   Cardiovascular:      Rate and Rhythm: Normal rate and regular rhythm.      Heart sounds: No murmur heard.  Pulmonary:      Effort: No respiratory distress.   Abdominal:      General: Bowel sounds are normal. There is no distension.      Tenderness: There is no abdominal tenderness.   Musculoskeletal:      Cervical back: Neck supple.      Right lower leg: No edema.      Left lower leg: No edema.   Skin:     Comments: Diffuse rash, now peeling   Neurological:      Mental Status: He is alert and oriented to person, place, and time.   Psychiatric:         Mood and Affect: Mood normal.         Significant Labs: All pertinent labs within the past 24 hours have been reviewed.         Assessment and Plan     * DRESS syndrome  Pt. On long term bactrim therapy presenting with maculopapular rash involving trunk and extremities, >50% BSA, elevated LFTs, LINK, leukocytosis, Eosinophilia, consistent concerning DRESS syndrome  Dermatology consulted: s/p biopsy 12/26.  Recommended Solu-Medrol and topical steroids.  Considering cyclosporin  bilirubin, ALP, AST, ALT cont to rise:  hepatology consulted:  Liver ultrasound with hepatosplenomegaly.  Follow up hepatic serologic workup. IR consulted for biopsy , completed 12/30. Liver transplant evaluation requested.   Liver biopsy suggestive of DILI and no fibrosis. Will not likely need liver transplant     Appreciate Dermatology and allergy recommendations     Anemia  Anemia is likely due to drug toxicity from Antibiotics and DRESS syndrome  . Most recent hemoglobin and hematocrit are listed below.  Recent Labs     12/30/24  0811 12/31/24  0539   HGB 10.9* 10.5*   HCT 31.3* 30.3*       Plan  - Monitor serial CBC: Daily  - Transfuse PRBC if patient becomes hemodynamically unstable, symptomatic or H/H drops below 7/21.  - Patient has not received any PRBC transfusions to date  - Patient's anemia is currently stable    Thrombocytopenia  The likely etiology of thrombocytopenia is  liver injury secondary to dress syndrome. . The patients 3 most recent labs are listed below.  Recent Labs     12/29/24  0346 12/30/24  0811 12/31/24  0539   * 148* 160     Plan  - Will transfuse if platelet count is <50k (if undergoing surgical procedure or have active bleeding).      Fever  Last reported fever on 12/28.  No further febrile episodes.  Likely secondary to DRESS syndrome.    He has been treated with Bactrim and Flagyl for osteomyelitis of the left calcaneus.  Appreciate infectious disease recommendations.  No further antibiotics at this time.  Recommendation to follow up with [facility] ID clinic to discuss further antibiotics.       Hypoalbuminemia  Likely d/t liver dysfunction and acute illness  Monitor volume status while on IV fluids.  Cont to monitor on labs    Severe systemic inflammatory response syndrome (SIRS)  Patient has fever, tachycardia, leukocytosis with abnormal liver function and lactic acidosis on admission.  Although he has a bone infection it is subacute and doubt it is causing these systemic symptoms and signs.    -- monitor for infection, treat DRESS  -- s/p IV fluids, encourage PO intake  -- repeat blood cultures ordered 12/28, NGTD    Liver injury  MELD 3.0: 15 at 12/31/2024  5:39 AM  MELD-Na: 12 at 12/31/2024  5:39 AM  Calculated from:  Serum Creatinine: 0.9 mg/dL (Using min of 1 mg/dL) at 12/31/2024  5:39 AM  Serum Sodium: 137 mmol/L at 12/31/2024  5:39 AM  Total Bilirubin: 2.7 mg/dL at 12/31/2024  5:39 AM  Serum Albumin: 2 g/dL at  12/31/2024  5:39 AM  INR(ratio): 1.2 at 12/31/2024  5:39 AM  Age at listing (hypothetical): 29 years  Sex: Male at 12/31/2024  5:39 AM    Acute viral panel negative 12/15.  CT abd unremarkable liver.  U/S liver: Hepatosplenomegaly  AST/ALT/TB/ALP trending down.    Appreciate hepatology recommendations.  Liver biopsy obtained on 12/30/2024.  Results consistent with drug-induced liver injury.  Liver transplant evaluation was started however given biopsy results and ongoing improvement we will likely not need liver transplant.  Appreciate ID recommendations and recommendation to start acyclovir if liver biopsy negative for CMV.    Acute kidney injury  RESOLVED    -Unclear baseline, 1.9 on admission and improving  -Renal US unremarkable.  -Continue to monitor while admitted, pt. Will need PCP and/or nephrologist at discharge  -S/p IV fluids, encourage PO intake    Hyponatremia  Normal serum osmolality indicates pseudohyponatremia  Cont to monitor on daily labs.    Acute osteomyelitis of left calcaneus  - appreciate ID consult .  No antibiotics for now.     -- 12/23 podiatry note reviewed and pt confirmed plan.  Needs weekly dressing change next due 12/30.    - Will clarify with ID regarding need for further antibiotics.        VTE Risk Mitigation (From admission, onward)           Ordered     Place sequential compression device  Until discontinued         12/24/24 1849                    Discharge Planning   GARETH: 1/2/2025     Code Status: Full Code   Medical Readiness for Discharge Date:   Discharge Plan A: Home with family   Discharge Delays: None known at this time                    Jossie Barakat MD  Department of Hospital Medicine   [facility]

## 2025-01-02 LAB
ALBUMIN SERPL BCP-MCNC: 2.4 G/DL (ref 3.5–5.2)
ALP SERPL-CCNC: 223 U/L (ref 40–150)
ALT SERPL W/O P-5'-P-CCNC: 2145 U/L (ref 10–44)
ANION GAP SERPL CALC-SCNC: 6 MMOL/L (ref 8–16)
AST SERPL-CCNC: 323 U/L (ref 10–40)
B HENSELAE IGG TITR SER IF: NORMAL TITER
B HENSELAE IGM TITR SER IF: NORMAL TITER
B QUINTANA IGG TITR SER IF: NORMAL TITER
B QUINTANA IGM TITR SER IF: NORMAL TITER
B19V DNA # SPEC NAA+PROBE: NOT DETECTED COPIES/ML
BACTERIA BLD CULT: NORMAL
BACTERIA BLD CULT: NORMAL
BARTONELLA DNA BLD QL NAA+PROBE: NEGATIVE
BASOPHILS # BLD AUTO: 0.07 K/UL (ref 0–0.2)
BASOPHILS NFR BLD: 0.8 % (ref 0–1.9)
BILIRUB SERPL-MCNC: 1.7 MG/DL (ref 0.1–1)
BUN SERPL-MCNC: 21 MG/DL (ref 6–20)
CALCIUM SERPL-MCNC: 8.2 MG/DL (ref 8.7–10.5)
CHLORIDE SERPL-SCNC: 107 MMOL/L (ref 95–110)
CMV IGM SERPL IA-ACNC: <8 AU/ML
CO2 SERPL-SCNC: 25 MMOL/L (ref 23–29)
CREAT SERPL-MCNC: 1.2 MG/DL (ref 0.5–1.4)
CYSTATIN C SERPL-MCNC: 1.96 MG/L (ref 0.63–1.03)
DIFFERENTIAL METHOD BLD: ABNORMAL
EOSINOPHIL # BLD AUTO: 1.4 K/UL (ref 0–0.5)
EOSINOPHIL NFR BLD: 15.6 % (ref 0–8)
ERYTHROCYTE [DISTWIDTH] IN BLOOD BY AUTOMATED COUNT: 16.5 % (ref 11.5–14.5)
EST. GFR  (NO RACE VARIABLE): >60 ML/MIN/1.73 M^2
FINAL PATHOLOGIC DIAGNOSIS: ABNORMAL
GAMMA INTERFERON BACKGROUND BLD IA-ACNC: 4.01 IU/ML
GFR/BSA.PRED SERPLBLD CYS-BASED-ARV: 36 ML/MIN/BSA
GLUCOSE SERPL-MCNC: 108 MG/DL (ref 70–110)
GROSS: ABNORMAL
HCT VFR BLD AUTO: 32.5 % (ref 40–54)
HGB BLD-MCNC: 10.8 G/DL (ref 14–18)
HISTOPLASMA/BLASTOMYCES AG VALUE: NOT DETECTED NG/ML
HISTOPLASMA/BLASTOMYCES RESULT: NOT DETECTED
IMM GRANULOCYTES # BLD AUTO: 0.13 K/UL (ref 0–0.04)
IMM GRANULOCYTES NFR BLD AUTO: 1.5 % (ref 0–0.5)
INR PPP: 1.1 (ref 0.8–1.2)
LYMPHOCYTES # BLD AUTO: 3.4 K/UL (ref 1–4.8)
LYMPHOCYTES NFR BLD: 38.8 % (ref 18–48)
Lab: ABNORMAL
M TB IFN-G CD4+ BCKGRND COR BLD-ACNC: -0.28 IU/ML
M TB IFN-G CD4+ BCKGRND COR BLD-ACNC: -0.44 IU/ML
MAGNESIUM SERPL-MCNC: 2 MG/DL (ref 1.6–2.6)
MCH RBC QN AUTO: 27.8 PG (ref 27–31)
MCHC RBC AUTO-ENTMCNC: 33.2 G/DL (ref 32–36)
MCV RBC AUTO: 84 FL (ref 82–98)
MICROSCOPIC EXAM: ABNORMAL
MITOGEN IGNF BCKGRD COR BLD-ACNC: 5.99 IU/ML
MONOCYTES # BLD AUTO: 0.8 K/UL (ref 0.3–1)
MONOCYTES NFR BLD: 9.5 % (ref 4–15)
NEUTROPHILS # BLD AUTO: 3 K/UL (ref 1.8–7.7)
NEUTROPHILS NFR BLD: 33.8 % (ref 38–73)
NRBC BLD-RTO: 0 /100 WBC
PARVOVIRUS B19 ABS IGG & IGM: ABNORMAL
PARVOVIRUS B19 DNA, QUANT: NOT DETECTED LOG CPS/ML
PARVOVIRUS B19 IGG ANTIBODY: POSITIVE
PARVOVIRUS B19 IGM ANTIBODY: NEGATIVE
PHOSPHATE SERPL-MCNC: 5 MG/DL (ref 2.7–4.5)
PLATELET # BLD AUTO: 172 K/UL (ref 150–450)
PMV BLD AUTO: 9.6 FL (ref 9.2–12.9)
POTASSIUM SERPL-SCNC: 4.6 MMOL/L (ref 3.5–5.1)
PROT SERPL-MCNC: 6.4 G/DL (ref 6–8.4)
PROTHROMBIN TIME: 12.2 SEC (ref 9–12.5)
RBC # BLD AUTO: 3.89 M/UL (ref 4.6–6.2)
SODIUM SERPL-SCNC: 138 MMOL/L (ref 136–145)
SPECIMEN SOURCE: NORMAL
SPECIMEN SOURCE: NORMAL
STRONGYLOIDES ANTIBODY IGG: NEGATIVE
SUPPLEMENTAL DIAGNOSIS: ABNORMAL
TB GOLD PLUS: NEGATIVE
WBC # BLD AUTO: 8.78 K/UL (ref 3.9–12.7)

## 2025-01-02 PROCEDURE — 85025 COMPLETE CBC W/AUTO DIFF WBC: CPT | Mod: NTX | Performed by: HOSPITALIST

## 2025-01-02 PROCEDURE — 11000001 HC ACUTE MED/SURG PRIVATE ROOM

## 2025-01-02 PROCEDURE — 25000003 PHARM REV CODE 250: Performed by: HOSPITALIST

## 2025-01-02 PROCEDURE — 85610 PROTHROMBIN TIME: CPT | Mod: NTX | Performed by: HOSPITALIST

## 2025-01-02 PROCEDURE — 25000003 PHARM REV CODE 250: Mod: NTX | Performed by: STUDENT IN AN ORGANIZED HEALTH CARE EDUCATION/TRAINING PROGRAM

## 2025-01-02 PROCEDURE — 25000003 PHARM REV CODE 250: Mod: NTX | Performed by: HOSPITALIST

## 2025-01-02 PROCEDURE — 63600175 PHARM REV CODE 636 W HCPCS: Mod: NTX | Performed by: STUDENT IN AN ORGANIZED HEALTH CARE EDUCATION/TRAINING PROGRAM

## 2025-01-02 PROCEDURE — 99232 SBSQ HOSP IP/OBS MODERATE 35: CPT | Mod: NTX,,, | Performed by: DERMATOLOGY

## 2025-01-02 PROCEDURE — 25000003 PHARM REV CODE 250: Mod: NTX | Performed by: FAMILY MEDICINE

## 2025-01-02 PROCEDURE — 36415 COLL VENOUS BLD VENIPUNCTURE: CPT | Mod: NTX | Performed by: HOSPITALIST

## 2025-01-02 PROCEDURE — 80053 COMPREHEN METABOLIC PANEL: CPT | Mod: NTX | Performed by: HOSPITALIST

## 2025-01-02 PROCEDURE — 84100 ASSAY OF PHOSPHORUS: CPT | Mod: NTX | Performed by: HOSPITALIST

## 2025-01-02 PROCEDURE — 63600175 PHARM REV CODE 636 W HCPCS: Mod: NTX | Performed by: FAMILY MEDICINE

## 2025-01-02 PROCEDURE — 83735 ASSAY OF MAGNESIUM: CPT | Mod: NTX | Performed by: HOSPITALIST

## 2025-01-02 RX ORDER — METHYLPREDNISOLONE 4 MG/1
60 TABLET ORAL DAILY
Status: DISCONTINUED | OUTPATIENT
Start: 2025-01-03 | End: 2025-01-03

## 2025-01-02 RX ADMIN — Medication 6 MG: at 08:01

## 2025-01-02 RX ADMIN — FAMOTIDINE 20 MG: 20 TABLET ORAL at 08:01

## 2025-01-02 RX ADMIN — MUPIROCIN: 20 OINTMENT TOPICAL at 10:01

## 2025-01-02 RX ADMIN — HYDROCORTISONE: 25 CREAM TOPICAL at 08:01

## 2025-01-02 RX ADMIN — ACYCLOVIR SODIUM 720 MG: 50 INJECTION, SOLUTION INTRAVENOUS at 12:01

## 2025-01-02 RX ADMIN — SENNOSIDES AND DOCUSATE SODIUM 1 TABLET: 50; 8.6 TABLET ORAL at 08:01

## 2025-01-02 RX ADMIN — HYDROCORTISONE: 25 CREAM TOPICAL at 10:01

## 2025-01-02 RX ADMIN — MUPIROCIN: 20 OINTMENT TOPICAL at 08:01

## 2025-01-02 RX ADMIN — METHYLPREDNISOLONE SODIUM SUCCINATE 64 MG: 40 INJECTION, POWDER, FOR SOLUTION INTRAMUSCULAR; INTRAVENOUS at 08:01

## 2025-01-02 RX ADMIN — DIPHENHYDRAMINE HYDROCHLORIDE 25 MG: 25 CAPSULE ORAL at 08:01

## 2025-01-02 RX ADMIN — TRIAMCINOLONE ACETONIDE: 1 CREAM TOPICAL at 10:01

## 2025-01-02 RX ADMIN — SODIUM CHLORIDE 250 ML: 9 INJECTION, SOLUTION INTRAVENOUS at 12:01

## 2025-01-02 RX ADMIN — ACYCLOVIR SODIUM 720 MG: 50 INJECTION, SOLUTION INTRAVENOUS at 08:01

## 2025-01-02 RX ADMIN — SODIUM CHLORIDE 250 ML: 9 INJECTION, SOLUTION INTRAVENOUS at 08:01

## 2025-01-02 RX ADMIN — TRIAMCINOLONE ACETONIDE: 1 CREAM TOPICAL at 08:01

## 2025-01-02 NOTE — ASSESSMENT & PLAN NOTE
RESOLVED     Last reported fever on 12/28.  No further febrile episodes.  Likely secondary to DRESS syndrome.    He has been treated with Bactrim and Flagyl for osteomyelitis of the left calcaneus.  Appreciate infectious disease recommendations.  No further antibiotics at this time.  Recommendation to follow up with [facility] ID clinic to discuss further antibiotics.

## 2025-01-02 NOTE — ASSESSMENT & PLAN NOTE
MELD 3.0: 13 at 1/2/2025  4:00 AM  MELD-Na: 11 at 1/2/2025  4:00 AM  Calculated from:  Serum Creatinine: 1.2 mg/dL at 1/2/2025  4:00 AM  Serum Sodium: 138 mmol/L (Using max of 137 mmol/L) at 1/2/2025  4:00 AM  Total Bilirubin: 1.7 mg/dL at 1/2/2025  4:00 AM  Serum Albumin: 2.4 g/dL at 1/2/2025  4:00 AM  INR(ratio): 1.1 at 1/2/2025  4:00 AM  Age at listing (hypothetical): 29 years  Sex: Male at 1/2/2025  4:00 AM    Acute viral panel negative 12/15.  CT abd unremarkable liver.  U/S liver: Hepatosplenomegaly  AST/ALT/TB/ALP trending down.    Appreciate hepatology recommendations.  Liver biopsy obtained on 12/30/2024.  Results consistent with drug-induced liver injury.  Liver transplant evaluation was started however given biopsy results and ongoing improvement we will likely not need liver transplant.  Appreciate ID recommendations and recommendation to start acyclovir if liver biopsy negative for CMV.

## 2025-01-02 NOTE — PROGRESS NOTES
Dermatology Inpatient progress Note:  1/2/2025  2:16 PM    Principal Problem:DRESS syndrome     Interval History:   Continues to be afebrile overnight. Rash continuing to improve. Denies any current itching. Continues to use the triamcinolone. He feels like the swelling of face/arms have resolved.      Scheduled Meds:   famotidine  20 mg Oral BID    hydrocortisone   Topical (Top) BID    methylPREDNISolone injection (PEDS and ADULTS)  64 mg Intravenous Daily    mupirocin   Nasal BID    polyethylene glycol  17 g Oral Daily    senna-docusate 8.6-50 mg  1 tablet Oral BID    triamcinolone acetonide 0.1%   Topical (Top) BID     Continuous Infusions:  PRN Meds:.  Current Facility-Administered Medications:     acetaminophen, 650 mg, Oral, Q8H PRN    dextrose 50%, 12.5 g, Intravenous, PRN    dextrose 50%, 25 g, Intravenous, PRN    diphenhydrAMINE, 25 mg, Oral, Q6H PRN    glucagon (human recombinant), 1 mg, Intramuscular, PRN    glucose, 16 g, Oral, PRN    glucose, 24 g, Oral, PRN    iohexol, 15 mL, Oral, PRN    melatonin, 6 mg, Oral, Nightly PRN    naloxone, 0.02 mg, Intravenous, PRN    ondansetron, 4 mg, Intravenous, Q8H PRN    sodium chloride 0.9%, 5 mL, Intravenous, PRN    Review of patient's allergies indicates:   Allergen Reactions    Bactrim [sulfamethoxazole-trimethoprim] Rash     Developed DRESS       Past Medical History:   Diagnosis Date    Achilles tendon rupture 07/2023    left achilles tendon rupture s/p repair on 07/14/23 complicated by osteomyelitis of left calcaneus s/p I&D 12/4/24    Acute osteomyelitis of calcaneum, left 12/2024    osteomyelitis of left calcaneus s/p I&D 12/4/24       Past Surgical History:   Procedure Laterality Date    ANTERIOR CRUCIATE LIGAMENT REPAIR Right     2018    APPLICATION OF SPLINT Left 7/14/2023    Procedure: APPLICATION, SPLINT;  Surgeon: Lenore Pearl DPM;  Location: [facility];  Service: Podiatry;  Laterality: Left;    INSERTION, ANTIBIOTIC SPACER, LOWER EXTREMITY Left  12/4/2024    Procedure: INSERTION, ANTIBIOTIC SPACER, LOWER EXTREMITY;  Surgeon: Jaswinder Garcia DPM;  Location: [facility] OR;  Service: Podiatry;  Laterality: Left;    IRRIGATION AND DEBRIDEMENT Left 12/4/2024    Procedure: IRRIGATION AND DEBRIDEMENT;  Surgeon: Jaswinder Garcia DPM;  Location: [facility] OR;  Service: Podiatry;  Laterality: Left;  mini c-arm, Stimulan jr tobramycin/Vancomycin    REPAIR OF ACHILLES TENDON Left 7/14/2023    Procedure: REPAIR, TENDON, ACHILLES;  Surgeon: Lenore Pearl DPM;  Location: [facility] OR;  Service: Podiatry;  Laterality: Left;       Social History     Socioeconomic History    Marital status: Single   Tobacco Use    Smoking status: Never    Smokeless tobacco: Never   Substance and Sexual Activity    Alcohol use: Yes     Comment: rarely    Drug use: Yes     Types: Marijuana    Sexual activity: Yes     Partners: Female     Social Drivers of Health     Financial Resource Strain: Low Risk  (12/25/2024)    Overall Financial Resource Strain (CARDIA)     Difficulty of Paying Living Expenses: Not very hard   Food Insecurity: No Food Insecurity (12/25/2024)    Hunger Vital Sign     Worried About Running Out of Food in the Last Year: Never true     Ran Out of Food in the Last Year: Never true   Transportation Needs: No Transportation Needs (12/25/2024)    TRANSPORTATION NEEDS     Transportation : No   Recent Concern: Transportation Needs - Unmet Transportation Needs (11/19/2024)    TRANSPORTATION NEEDS     Transportation : Yes, it has kept me from non-medical meetings, appointments, work or from getting things that I need.   Physical Activity: Patient Unable To Answer (12/25/2024)    Exercise Vital Sign     Days of Exercise per Week: Patient unable to answer     Minutes of Exercise per Session: Patient unable to answer   Stress: Stress Concern Present (12/25/2024)    [employer] [place] [employer] - Occupational Stress Questionnaire     Feeling of Stress : To some extent   Housing  Stability: Low Risk  (12/25/2024)    Housing Stability Vital Sign     Unable to Pay for Housing in the Last Year: No     Homeless in the Last Year: No       No family history on file.    Physical Exam:  Vitals:    01/02/25 1124   BP: 126/65   Pulse: 69   Resp: 18   Temp: 98.3 °F (36.8 °C)     General: NAD   Skin:  -Interval improvement to diffuse erythema. Continued superficial desquamation to trunk, upper arms, face. No erythema visualized today                   Significant Labs: All pertinent labs within the past 24 hours have been reviewed.  - LFTs downtrending. Cr wnl. Hgb 10.8 - Eos  1.4     Liver, biopsy:   - Moderate to severe mixed portal, interface and lobular inflammation with prominent eosinophils   - Moderate cholestasis   - No significant fibrosis on trichrome stain   - Iron is negative   - See comment     Comment: Overall, the findings are most concerning for a drug-induced liver injury, especially in this clinical context. However, an infectious etiology remains within the differential. Mixed inflammation consisting of lymphocytes, eosinophils, and plasma cells is present in the portal tracts, interface areas and lobules. The lobular inflammation is moderate to severe, with spotty hepatocyte necrosis.  Immunohistochemical stains for herpes simplex virus 1 (HSV-1) and cytomegalovirus (CMV), performed with adequate and reactive controls, are negative.   Jeanne-Barr virus in situ hybridization (EBV JUAN PABLO), performed with adequate positive and negative controls, is negative.   The diagnosis remains unchanged.     Assessment and Plan:   DRESS syndrome  28 yo man presented with >50% BSA of erythematous morbilliform rash with associated eosinophilia, leukocytosis, elevated LFTs, and previous LINK (now improved) with prodrome onset with subjective fever/chills and nausea/vomiting ~12/12 with rash onset ~12/20. Started bactrim 11/20 and flagyl ~11/25. Clinical features and lab findings are most consistent with  DRESS, supported by biopsy results. Regiscar score: 3 (possible case) on arrival.  Most likely implicated medication is bactrim; however, cannot entirely rule out flagyl as a potential cause.     Cardiac workup with EKG, troponin, BNP Echo without suspicion for cardiac involvement. Previous concern for renal involvement now improved with stable creatinine. Without evidence of thyroid or endocrine involvement on labs.      Rash continues to improve, with superficial desquamation. Leukocytosis and eosinophilia resolved (levels continue wnl today). Notable course for significant liver involvement (ALT 4366 at one point and was evaluated for liver transplant) but his LFTS are downtrending (AST//2145 today). Hepatology is following for acute liver injury and ID has been consulted for recommendations for additional sources of hepatitis but hepatitis panel wnl.      Labs  - 12/23 labs notable for normal ESR, elevated CRP, elevated lactic acid, elevated PT and INR, CMV quantitative not detected, troponin <0.006. EBV qualitative +  - 12/23 Blood Cultures NGTD.  - 12/15 Acute hepatitis panel negative for infection.  - 12/26 labs with normal TSH and free T4, elevated CPK of 262, normal troponin. CBC with leukocytosis and eosinophilia mildly improved from previous day. CMP with increasing AST and ALT, but PT and INR slightly improved from yesterday. Creatine remains stable at 1.4. EKG performed without acute changes from prior EKGs.  - 12/27 labs with slightly improved leukocytosis and eosinophilia. CMP with increasing AST and ALT (762, 1334). Creatinine improved to 1.2.  LDH and ferritin elevated. Repeat acute hepatitis panel negative.  - 12/28 with worsening AST and ALT (2163, 3113) and elevated PT and INR (16.0, 1.5). Creatinine stable at 1.3  - 12/29 with worsening AST and ALT (2698, 3832). PT and INR (15.6, 1.5). Repeat HIV non-reactive.   - 12/30 worsening AST and ALT.   - 1/2: improving AST/ALT to 323/2145  -  Bartonella negative, HSV-1 and 2, negative, RPR negative,  - pending  HHV-6, HepE, Histo     Imaging  - 12/30 Liver biopsy: c/w drug-induced liver injury. Viral stains negative  - 12/26 CXR without acute findings  - 12/26 Liver US with Doppler with hepatosplenomegaly  - 12/23 Retroperitoneal US with no evidence of renal abnormalities. Echogenic debris in bladder possibly consistent with cystitis.  - 12/20 CT Abdomen and Pelvis without acute findings     Recommendations:  - Continue avoidance of Bactrim and Flagyl. Bactrim has been listed as a drug allergy.  - Recommend daily CBC with diff and CMP  - liver biopsy suggest the acute liver injury is secondary to DRESS.  - recommend allergy consult for additional input in DRESS management. In the event of requiring additional immunosuppression, they may have input regarding options such as IL-5 inhibitors.  - Continue IV solumedrol daily and appreciate hepatology recommendations of when to start tapering. Recommend a long taper of steroids to prevent relapse of DRESS.   - Continue Pepcid BID for gastric protection while on steroids.   - Continue triamcinolone 0.1% cream BID to rash on trunk and limbs. Please special request 454 g jar from pharmacy to adequately cover affected body surface area  - Continue hydrocortisone 2.5% cream BID to rash on face.  - Recommend gentle, fragrance free moisturizer for use ad tatum to body and face while skin is peeling  - Appreciate hepatology and ID input  - We will follow results of viral workup      Biopsy Site Care:  Remove bandage and clean with gentle soap and water. Apply Vaseline and a bandage. Repeat daily.      Case discussed with dermatology attending, Dr. Dumont. Dermatology will continue to follow. Please contact us if you have any additional questions.     Celia Smith MD   Dermatology, PGYIII

## 2025-01-02 NOTE — ASSESSMENT & PLAN NOTE
Anemia is likely due to drug toxicity from Antibiotics and DRESS syndrome . Most recent hemoglobin and hematocrit are listed below.  Recent Labs     12/31/24  0539 01/01/25  0818 01/02/25  0400   HGB 10.5* 10.8* 10.8*   HCT 30.3* 32.5* 32.5*       Plan  - Monitor serial CBC: Daily  - Transfuse PRBC if patient becomes hemodynamically unstable, symptomatic or H/H drops below 7/21.  - Patient has not received any PRBC transfusions to date  - Patient's anemia is currently stable

## 2025-01-02 NOTE — PROGRESS NOTES
"[facility] Medicine  Progress Note    Patient Name: Alonzo Nascimento Jr.  MRN: [id number]  Patient Class: IP- Inpatient   Admission Date: 12/24/2024  Length of Stay: 9 days  Attending Physician: Jossie Barakat MD  Primary Care Provider: Sohan Strange MD        Subjective     Principal Problem:DRESS syndrome        HPI:  Per admitting physician (12/24):  30 yo M with PMHx foot osteomyelitis on PO bactrim and flagyl who presented to [facility] 12/23 for evaluation of rash. Pt reported having nausea, vomiting, decreased po intake, body aches, for 1-2 weeks. Pt. Has a history of left achilles tendon rupture s/p repair on 07/14/23 complicated by osteomyelitis of left calcaneus s/p I&D 12/4/24. He was prescribed PO Bactrim and Flagy x 6 weeks after this most recent procedure with end date 1/1/2025.  He initially presented to [facility]  E on 12/14 and 12/20 for evaluation of the nausea/dehydaration and was discharged from ED after IV fluids on both occasions. About 2-3 days ago, shortly after his ED vist 12/20, the patient reports developing a full body rash. The Rash is described as >50% BSA covered in stereotypical morbiliform. No mucous membrane involvement.     In the ED, the patient was tachycardic (120s> 90s), tachypneic (20s), but otherwise hemodynamically stable.  Labs were remarkable for leukocytosis (28.3, with 12% eosinophilia), elevated INR (2.1), elevated creatinine (1.9- from a previous 1.4), hyponatremia (125), elevated LFTs (AST:  384, ALT: 635, T bili: 4.1, ALP:  341), elevated lactic acid (2.7).  VBG showed a pH of 7.34, pCO2 of 49.7 and HC03 of 22.9.  Retroperitoneal ultrasound showed no acute process.  CT abdomen and pelvis (12/20) showed no acute findings.  Referring provider is concerned about DRESS syndrome secondary to Bactrim, so patient transferred to [facility] for Dematology evaluation.     Pt. Was admitted to [facility] while awaiting bed, per [facility] medicine team, "He was " "started on oral prednisone 50 mg daily and clobetasol cream...... Patient's Lfts and LINK were improving on 12/24. He was tolerating a soft diet"    Overview/Hospital Course:  Admitted to hospital medicine for DRESS syndrome likely d/t Bactrim, however, can not entirely rule out Flagyl as source.  Dermatology consulted, s/p biopsy, started on IV and topical steroids.  Rash improved, leukocytosis and eosinophilia resolved, LINK resolved, GI symptoms resolved but pt with worsening liver function.  Hepatology consulted for acute liver injury, serologic workup ordered.  ID consulted for extensive infectious workup.  Worsening liver injury throughout admission, started on empiric acyclovir.  Interventional Radiology consulted, liver biopsy pending, tentatively 12/30.  Liver transplant authorization received.    12/31- appreciate dermatology recommendations and suggestion for immunology/allergy recommendations.  Overall liver function improving.  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 Liver enzymes, Tbi land INR improved.  Would defer liver transplant evaluation at this time and he will likely not need further evaluation.  We will clarify with ID regarding treatment for osteomyelitis.     1/1- doing we will.  No reported distress or fevers.  Liver transplant evaluation paused had given biopsy results and downtrending LFTs.    1/2- reports feeling better.  Ambulating better.  LFTs are trending down.  Discussion with hepatology on dermatology.  We will transitioned to oral methylprednisolone 60 mg starting tomorrow.  We will encourage for close follow up with the Rheumatology and hepatology.    Interval History:      Rash overall continues to improve.  Pruritus responding to topical medications as well as Benadryl.  Tolerating diet.  Denies any nausea or vomiting.    Review of Systems   Constitutional:  Negative for chills and fever.   HENT:  Negative for congestion.    Respiratory:  Negative for cough and shortness " of breath.    Cardiovascular:  Negative for palpitations.   Gastrointestinal:  Negative for constipation, diarrhea, nausea and vomiting.   Genitourinary: Negative.    Musculoskeletal: Negative.    Skin:  Positive for rash.   Neurological:  Negative for weakness and headaches.   Psychiatric/Behavioral:  Negative for suicidal ideas.          Vitals:    01/02/25 1605   BP: 118/69   Pulse: 97   Resp: 18   Temp: 97.6 °F (36.4 °C)       Objective:        Physical Exam  Constitutional:       General: He is not in acute distress.     Appearance: He is ill-appearing. He is not diaphoretic.   Eyes:      General: No scleral icterus.     Extraocular Movements: Extraocular movements intact.   Cardiovascular:      Rate and Rhythm: Normal rate and regular rhythm.      Heart sounds: No murmur heard.  Pulmonary:      Effort: No respiratory distress.   Abdominal:      General: Bowel sounds are normal. There is no distension.      Tenderness: There is no abdominal tenderness.   Musculoskeletal:      Cervical back: Neck supple.      Right lower leg: No edema.      Left lower leg: No edema.   Skin:     Comments: Diffuse rash, now peeling   Neurological:      Mental Status: He is alert and oriented to person, place, and time.   Psychiatric:         Mood and Affect: Mood normal.       Significant Labs: All pertinent labs within the past 24 hours have been reviewed.      Recent Labs   Lab 01/02/25  0400   WBC 8.78   RBC 3.89*   HGB 10.8*   HCT 32.5*      MCV 84   MCH 27.8   MCHC 33.2       Recent Labs   Lab 01/02/25  0400   CALCIUM 8.2*   ALBUMIN 2.4*   PROT 6.4      K 4.6   CO2 25      BUN 21*   CREATININE 1.2   ALKPHOS 223*   ALT 2,145*   *   BILITOT 1.7*           Significant Imaging: I have reviewed all pertinent imaging results/findings within the past 24 hours.        Assessment and Plan     * DRESS syndrome  Pt. On long term bactrim therapy presenting with maculopapular rash involving trunk and extremities,  >50% BSA, elevated LFTs, LINK, leukocytosis, Eosinophilia, consistent concerning DRESS syndrome  Dermatology consulted: s/p biopsy 12/26.  Recommended Solu-Medrol and topical steroids.  Considering cyclosporin  bilirubin, ALP, AST, ALT cont to rise:  hepatology consulted:  Liver ultrasound with hepatosplenomegaly.  Follow up hepatic serologic workup. IR consulted for biopsy , completed 12/30. Liver transplant evaluation requested.   Liver biopsy suggestive of DILI and no fibrosis. Will not likely need liver transplant     Appreciate Dermatology and allergy recommendations.  Collaborative discussion with dermatology and hepatology and we will transition to methylprednisolone 60 mg starting 01/03/2025 and suggested for a long taper pending further assessment as outpatient.    Anemia  Anemia is likely due to drug toxicity from Antibiotics and DRESS syndrome . Most recent hemoglobin and hematocrit are listed below.  Recent Labs     12/31/24  0539 01/01/25  0818 01/02/25  0400   HGB 10.5* 10.8* 10.8*   HCT 30.3* 32.5* 32.5*       Plan  - Monitor serial CBC: Daily  - Transfuse PRBC if patient becomes hemodynamically unstable, symptomatic or H/H drops below 7/21.  - Patient has not received any PRBC transfusions to date  - Patient's anemia is currently stable    Thrombocytopenia  RESOLVED     The likely etiology of thrombocytopenia is  liver injury secondary to dress syndrome. . The patients 3 most recent labs are listed below.  Recent Labs     12/31/24  0539 01/01/25  0818 01/02/25  0400    157 172       Plan  - Will transfuse if platelet count is <50k (if undergoing surgical procedure or have active bleeding).      Fever  RESOLVED     Last reported fever on 12/28.  No further febrile episodes.  Likely secondary to DRESS syndrome.    He has been treated with Bactrim and Flagyl for osteomyelitis of the left calcaneus.  Appreciate infectious disease recommendations.  No further antibiotics at this time.   Recommendation to follow up with [facility] ID clinic to discuss further antibiotics.       Hypoalbuminemia  Likely d/t liver dysfunction and acute illness  Monitor volume status while on IV fluids.  Cont to monitor on labs    Severe systemic inflammatory response syndrome (SIRS)  RESOLVED     Patient has fever, tachycardia, leukocytosis with abnormal liver function and lactic acidosis on admission.  Although he has a bone infection it is subacute and doubt it is causing these systemic symptoms and signs.    -- monitor for infection, treat DRESS  -- s/p IV fluids, encourage PO intake  -- repeat blood cultures ordered 12/28, NGTD    Liver injury  MELD 3.0: 13 at 1/2/2025  4:00 AM  MELD-Na: 11 at 1/2/2025  4:00 AM  Calculated from:  Serum Creatinine: 1.2 mg/dL at 1/2/2025  4:00 AM  Serum Sodium: 138 mmol/L (Using max of 137 mmol/L) at 1/2/2025  4:00 AM  Total Bilirubin: 1.7 mg/dL at 1/2/2025  4:00 AM  Serum Albumin: 2.4 g/dL at 1/2/2025  4:00 AM  INR(ratio): 1.1 at 1/2/2025  4:00 AM  Age at listing (hypothetical): 29 years  Sex: Male at 1/2/2025  4:00 AM    Acute viral panel negative 12/15.  CT abd unremarkable liver.  U/S liver: Hepatosplenomegaly  AST/ALT/TB/ALP trending down.    Appreciate hepatology recommendations.  Liver biopsy obtained on 12/30/2024.  Results consistent with drug-induced liver injury.  Liver transplant evaluation was started however given biopsy results and ongoing improvement we will likely not need liver transplant.  Appreciate ID recommendations and recommendation to start acyclovir if liver biopsy negative for CMV.    Acute kidney injury  RESOLVED    -Unclear baseline, 1.9 on admission and improving  -Renal US unremarkable.  -Continue to monitor while admitted, pt. Will need PCP and/or nephrologist at discharge  -S/p IV fluids, encourage PO intake    Hyponatremia  Normal serum osmolality indicates pseudohyponatremia  Cont to monitor on daily labs.    Acute osteomyelitis of left calcaneus  -  appreciate ID consult .  No antibiotics for now.     -- 12/23 podiatry note reviewed and pt confirmed plan.  Needs weekly dressing change next due 12/30.    - discuss with Infectious Disease who also recommended to hold further at this time and follow up outpatient and discuss needs for further antibiotic        VTE Risk Mitigation (From admission, onward)           Ordered     Place sequential compression device  Until discontinued         12/24/24 7255                    Discharge Planning   GARETH: 1/3/2025     Code Status: Full Code   Medical Readiness for Discharge Date:   Discharge Plan A: Home with family   Discharge Delays: None known at this time                    Jossie Barakat MD  Department of Hospital Medicine   [facility] Surg

## 2025-01-02 NOTE — ASSESSMENT & PLAN NOTE
RESOLVED     The likely etiology of thrombocytopenia is  liver injury secondary to dress syndrome. . The patients 3 most recent labs are listed below.  Recent Labs     12/31/24  0539 01/01/25  0818 01/02/25  0400    157 172       Plan  - Will transfuse if platelet count is <50k (if undergoing surgical procedure or have active bleeding).

## 2025-01-02 NOTE — ASSESSMENT & PLAN NOTE
RESOLVED     Patient has fever, tachycardia, leukocytosis with abnormal liver function and lactic acidosis on admission.  Although he has a bone infection it is subacute and doubt it is causing these systemic symptoms and signs.    -- monitor for infection, treat DRESS  -- s/p IV fluids, encourage PO intake  -- repeat blood cultures ordered 12/28, NGTD

## 2025-01-02 NOTE — ASSESSMENT & PLAN NOTE
Pt. On long term bactrim therapy presenting with maculopapular rash involving trunk and extremities, >50% BSA, elevated LFTs, LINK, leukocytosis, Eosinophilia, consistent concerning DRESS syndrome  Dermatology consulted: s/p biopsy 12/26.  Recommended Solu-Medrol and topical steroids.  Considering cyclosporin  bilirubin, ALP, AST, ALT cont to rise:  hepatology consulted:  Liver ultrasound with hepatosplenomegaly.  Follow up hepatic serologic workup. IR consulted for biopsy , completed 12/30. Liver transplant evaluation requested.   Liver biopsy suggestive of DILI and no fibrosis. Will not likely need liver transplant     Appreciate Dermatology and allergy recommendations.  Collaborative discussion with dermatology and hepatology and we will transition to methylprednisolone 60 mg starting 01/03/2025 and suggested for a long taper pending further assessment as outpatient.

## 2025-01-02 NOTE — SUBJECTIVE & OBJECTIVE
Interval History:      Rash overall continues to improve.  Pruritus responding to topical medications as well as Benadryl.  Tolerating diet.  Denies any nausea or vomiting.    Review of Systems   Constitutional:  Negative for chills and fever.   HENT:  Negative for congestion.    Respiratory:  Negative for cough and shortness of breath.    Cardiovascular:  Negative for palpitations.   Gastrointestinal:  Negative for constipation, diarrhea, nausea and vomiting.   Genitourinary: Negative.    Musculoskeletal: Negative.    Skin:  Positive for rash.   Neurological:  Negative for weakness and headaches.   Psychiatric/Behavioral:  Negative for suicidal ideas.          Vitals:    01/02/25 1605   BP: 118/69   Pulse: 97   Resp: 18   Temp: 97.6 °F (36.4 °C)       Objective:        Physical Exam  Constitutional:       General: He is not in acute distress.     Appearance: He is ill-appearing. He is not diaphoretic.   Eyes:      General: No scleral icterus.     Extraocular Movements: Extraocular movements intact.   Cardiovascular:      Rate and Rhythm: Normal rate and regular rhythm.      Heart sounds: No murmur heard.  Pulmonary:      Effort: No respiratory distress.   Abdominal:      General: Bowel sounds are normal. There is no distension.      Tenderness: There is no abdominal tenderness.   Musculoskeletal:      Cervical back: Neck supple.      Right lower leg: No edema.      Left lower leg: No edema.   Skin:     Comments: Diffuse rash, now peeling   Neurological:      Mental Status: He is alert and oriented to person, place, and time.   Psychiatric:         Mood and Affect: Mood normal.       Significant Labs: All pertinent labs within the past 24 hours have been reviewed.      Recent Labs   Lab 01/02/25  0400   WBC 8.78   RBC 3.89*   HGB 10.8*   HCT 32.5*      MCV 84   MCH 27.8   MCHC 33.2       Recent Labs   Lab 01/02/25  0400   CALCIUM 8.2*   ALBUMIN 2.4*   PROT 6.4      K 4.6   CO2 25      BUN 21*    CREATININE 1.2   ALKPHOS 223*   ALT 2,145*   *   BILITOT 1.7*           Significant Imaging: I have reviewed all pertinent imaging results/findings within the past 24 hours.

## 2025-01-02 NOTE — ASSESSMENT & PLAN NOTE
- appreciate ID consult .  No antibiotics for now.     -- 12/23 podiatry note reviewed and pt confirmed plan.  Needs weekly dressing change next due 12/30.    - discuss with Infectious Disease who also recommended to hold further at this time and follow up outpatient and discuss needs for further antibiotic

## 2025-01-03 VITALS
HEIGHT: 73 IN | HEART RATE: 89 BPM | TEMPERATURE: 98 F | RESPIRATION RATE: 18 BRPM | OXYGEN SATURATION: 100 % | BODY MASS INDEX: 21.04 KG/M2 | WEIGHT: 158.75 LBS | SYSTOLIC BLOOD PRESSURE: 118 MMHG | DIASTOLIC BLOOD PRESSURE: 71 MMHG

## 2025-01-03 DIAGNOSIS — S36.119A HEPATIC TRAUMA, INITIAL ENCOUNTER: Primary | ICD-10-CM

## 2025-01-03 LAB
A1AT PHENOTYP SERPL-IMP: ABNORMAL BANDS
A1AT SERPL NEPH-MCNC: 231 MG/DL (ref 100–190)
CLINICAL BIOCHEMIST REVIEW: NORMAL
HADV DNA # SPEC NAA+PROBE: <1000 CPY/ML
HADV DNA SPEC NAA+PROBE-LOG#: <3 LOG CPY/ML
HADV DNA SPEC QL NAA+PROBE: NOT DETECTED
HEV IGM SER QL: NOT DETECTED
MAYO MISCELLANEOUS RESULT (REF): NORMAL
PLPETH BLD-MCNC: <10 NG/ML
POPETH BLD-MCNC: <10 NG/ML
SPECIMEN SOURCE: NORMAL

## 2025-01-03 PROCEDURE — 63600175 PHARM REV CODE 636 W HCPCS: Performed by: STUDENT IN AN ORGANIZED HEALTH CARE EDUCATION/TRAINING PROGRAM

## 2025-01-03 PROCEDURE — 25000003 PHARM REV CODE 250: Performed by: HOSPITALIST

## 2025-01-03 RX ORDER — FAMOTIDINE 20 MG/1
20 TABLET, FILM COATED ORAL DAILY
Qty: 30 TABLET | Refills: 0 | Status: SHIPPED | OUTPATIENT
Start: 2025-01-03 | End: 2025-01-08 | Stop reason: SDUPTHER

## 2025-01-03 RX ORDER — PREDNISOLONE SODIUM PHOSPHATE 15 MG/5ML
60 SOLUTION ORAL DAILY
Qty: 200 ML | Refills: 0 | Status: SHIPPED | OUTPATIENT
Start: 2025-01-03 | End: 2025-01-08

## 2025-01-03 RX ORDER — HYDROCORTISONE 25 MG/G
CREAM TOPICAL 2 TIMES DAILY
Qty: 30 G | Refills: 1 | Status: SHIPPED | OUTPATIENT
Start: 2025-01-03

## 2025-01-03 RX ORDER — TRIAMCINOLONE ACETONIDE 1 MG/G
CREAM TOPICAL 2 TIMES DAILY
Qty: 453 G | Refills: 1 | Status: SHIPPED | OUTPATIENT
Start: 2025-01-03

## 2025-01-03 RX ORDER — PREDNISOLONE SODIUM PHOSPHATE 15 MG/5ML
60 SOLUTION ORAL DAILY
Status: DISCONTINUED | OUTPATIENT
Start: 2025-01-03 | End: 2025-01-03 | Stop reason: HOSPADM

## 2025-01-03 RX ADMIN — MUPIROCIN: 20 OINTMENT TOPICAL at 10:01

## 2025-01-03 RX ADMIN — SENNOSIDES AND DOCUSATE SODIUM 1 TABLET: 50; 8.6 TABLET ORAL at 10:01

## 2025-01-03 RX ADMIN — POLYETHYLENE GLYCOL 3350 17 G: 17 POWDER, FOR SOLUTION ORAL at 10:01

## 2025-01-03 RX ADMIN — FAMOTIDINE 20 MG: 20 TABLET ORAL at 10:01

## 2025-01-03 RX ADMIN — PREDNISOLONE SODIUM PHOSPHATE 60 MG: 15 SOLUTION ORAL at 10:01

## 2025-01-03 NOTE — PLAN OF CARE
01/03/25 1229   Post-Acute Status   Post-Acute Authorization Home Health   Home Health Status Referrals Sent   Coverage MEDICAID - [employer] -   Discharge Delays None known at this time   Discharge Plan   Discharge Plan A Home with family   Discharge Plan B Home Health      Ariana met with pt at bedside and discussed the recommendations from medical team of home health. Ariana provided a list of  agencies to pt. Pt stated that he wanted agencies in [place] ([facility] Ariana sent referrals and home health orders to agencies.    1:16 PM  Ariana contacted [facility], but agencies do not accept pt insurance. Stat  reported there is no coverage in [place] and office is located in [place].     Discharge Plan A and Plan B have been determined by review of patient's clinical status, future medical and therapeutic needs, and coverage/benefits for post-acute care in coordination with multidisciplinary team members.    ARIANA Borja  Case Management  [phone]

## 2025-01-03 NOTE — PLAN OF CARE
[facility]  Discharge Final Note    Primary Care Provider: Sohan Strange MD    Expected Discharge Date: 1/3/2025          Patient will discharge home with family and no needs. Pt was referred to  agencies but no acceptance. Attending doctor stated that pt could dc home without. Pt follow up appointments scheduled by CHW Team 3.    Discharge Plan A and Plan B have been determined by review of patient's clinical status, future medical and therapeutic needs, and coverage/benefits for post-acute care in coordination with multidisciplinary team members.      Future Appointments   Date Time Provider Department Center   1/6/2025  9:45 AM Jaswinder Garcia, DPM [facility]   1/6/2025  2:20 PM Sohan Strange MD [facility]   1/8/2025  2:30 PM ACUTE DERMATOLOGY CLINIC [facility] DERM [address]   1/10/2025 12:40 PM LAB, [facility]   1/17/2025 12:40 PM LAB, [facility]   1/23/2025  1:00 PM Jaswinder Garcia, DPDAYRON [facility] PODIAT [facility]   1/24/2025 12:40 PM LAB, [facility]   1/30/2025  9:00 AM Jaswinder Garica, DPDAYRON [facility]   1/31/2025 12:40 PM LAB, [facility]   2/7/2025 12:40 PM LAB, [facility]   2/14/2025 12:40 PM LAB, [facility]   2/21/2025 12:40 PM LAB, [facility]   2/28/2025 12:40 PM LAB, [facility]   3/7/2025 12:40 PM LAB, [facility]   3/13/2025  9:00 AM Wallace Lantigua MD [facility] HE[facility]       Final Discharge Note (most recent)       Final Note - 01/03/25 1539          Final Note    Assessment Type Final Discharge Note (P)      Anticipated Discharge Disposition Home or Self Care (P)      What phone number can be called within the next 1-3 days to see how you are doing after discharge? [phone] (P)      Hospital Resources/Appts/Education Provided  Provided patient/caregiver with written discharge plan information;Provided education on problems/symptoms using teachback;Appointments scheduled and added to AVS (P)         Post-Acute Status    Post-Acute Authorization Home Health (P)    No accepting provider for     Home Health Status Discharge Plan Changed (P)      Coverage MEDICAID - [employer] (P)      Other Status No Post-Acute Service Needs (P)      Discharge Delays None known at this time (P)                      Important Message from Medicare             Contact Info       Sohan Strange MD   Specialty: Family Medicine   Relationship: PCP - General    [address], [place] [address]  [place] [address]   Phone: [phone]       Next Steps: Follow up in 1 week(s)          WEST Borja  Case Management  [phone]

## 2025-01-03 NOTE — NURSING
Wound care done to patients left foot.Cleansed with vashe. Applied Hydrofera ready blue secured with cast padding and Ace wrap forming modified football dressing. Will continue to monitor patient

## 2025-01-03 NOTE — PLAN OF CARE
[facility]      HOME HEALTH ORDERS  FACE TO FACE ENCOUNTER    Patient Name: Alonzo Nascimento Jr.  YOB: 1995    PCP: Sohan Strange MD   PCP Address: [address] / [place] [address]  PCP Phone Number: [phone]  PCP Fax: [phone]    Encounter Date: 12/23/24    Admit to Home Health    Diagnoses:  Active Hospital Problems    Diagnosis  POA    *DRESS syndrome [D72.12, T50.905A]  Yes    Thrombocytopenia [D69.6]  No    Anemia [D64.9]  Yes    Fever [R50.9]  No    Hypoalbuminemia [E88.09]  Yes    Severe systemic inflammatory response syndrome (SIRS) [R65.10]  Yes    Acute kidney injury [N17.9]  Yes    Liver injury [S36.119A]  Yes    Hyponatremia [E87.1]  Yes    Acute osteomyelitis of left calcaneus [M86.172]  Yes      Resolved Hospital Problems   No resolved problems to display.       Follow Up Appointments:  Future Appointments   Date Time Provider Department Center   1/6/2025  9:45 AM Jaswinder Garcia DPM [facility]   1/8/2025  2:30 PM ACUTE DERMATOLOGY CLINIC [facility]   1/23/2025  1:00 PM Jaswinder Garcia DPM [facility]   1/30/2025  9:00 AM Jaswinder Garcia [facility]       Allergies:  Review of patient's allergies indicates:   Allergen Reactions    Bactrim [sulfamethoxazole-trimethoprim] Rash     Developed DRESS       Medications: Review discharge medications with patient and family and provide education.      I have seen and examined this patient within the last 30 days. My clinical findings that support the need for the home health skilled services and home bound status are the following:no   Weakness/numbness causing balance and gait disturbance due to Weakness/Debility and Anemia making it taxing to leave home.  Requiring assistive device to leave home due to unsteady gait caused by  Infection, Weakness/Debility, and Anemia.  Patient with medication mismanagement issues requiring home bound status  as evidenced by  Poor understanding of medication regimen/dosage.  Medical restrictions requiring assistance of another human to leave home due to  Unstable ambulation, Wound care needs, and Weight bearing restricted.     Diet:   regular diet      Referrals/ Consults  Physical Therapy to evaluate and treat. Evaluate for home safety and equipment needs; Establish/upgrade home exercise program. Perform / instruct on therapeutic exercises, gait training, transfer training, and Range of Motion.  Occupational Therapy to evaluate and treat. Evaluate home environment for safety and equipment needs. Perform/Instruct on transfers, ADL training, ROM, and therapeutic exercises.  Aide to provide assistance with personal care, ADLs, and vital signs.    Activities:   activity as tolerated    Nursing:   Agency to admit patient within 24 hours of hospital discharge unless specified on physician order or at patient request    SN to complete comprehensive assessment including routine vital signs. Instruct on disease process and s/s of complications to report to MD. Review/verify medication list sent home with the patient at time of discharge  and instruct patient/caregiver as needed. Frequency may be adjusted depending on start of care date.     Skilled nurse to perform up to 3 visits PRN for symptoms related to diagnosis    Notify MD if SBP > 160 or < 90; DBP > 90 or < 50; HR > 120 or < 50; Temp > 101; O2 < 88%; Other:       Ok to schedule additional visits based on staff availability and patient request on consecutive days within the home health episode.      Home Health Aide:  Nursing Three times weekly, Physical Therapy Three times weekly, Occupational Therapy Three times weekly, and Home Health Aide Three times weekly    Wound Care Orders  yes:   Applied Hydrofera ready blue secured with cast padding and Ace wrap forming modified football dressing.    I certify that this patient is confined to his home and needs intermittent  skilled nursing care, physical therapy, and occupational therapy.

## 2025-01-03 NOTE — PROGRESS NOTES
AVS virtually reviewed with Mr Nascimento and his father in its entirety with emphasis on diet, medications, follow-up appointments and reasons to return to the ED or contact the [facility] On [facility] Nurse Care Line. Patient also encouraged to utilize their patient portal. Ease and convenience of use reiterated. Education complete and patient voiced understanding. All questions answered. Discharge teaching complete.

## 2025-01-03 NOTE — PLAN OF CARE
Problem: Adult Inpatient Plan of Care  Goal: Plan of Care Review  Outcome: Met  Goal: Patient-Specific Goal (Individualized)  Outcome: Met  Goal: Absence of Hospital-Acquired Illness or Injury  Outcome: Met  Goal: Optimal Comfort and Wellbeing  Outcome: Met  Goal: Readiness for Transition of Care  Outcome: Met     Problem: Acute Kidney Injury/Impairment  Goal: Fluid and Electrolyte Balance  Outcome: Met  Goal: Improved Oral Intake  Outcome: Met  Goal: Effective Renal Function  Outcome: Met     Problem: Wound  Goal: Optimal Coping  Outcome: Met  Goal: Optimal Functional Ability  Outcome: Met  Goal: Absence of Infection Signs and Symptoms  Outcome: Met  Goal: Improved Oral Intake  Outcome: Met  Goal: Optimal Pain Control and Function  Outcome: Met  Goal: Skin Health and Integrity  Outcome: Met  Goal: Optimal Wound Healing  Outcome: Met     Problem: Fall Injury Risk  Goal: Absence of Fall and Fall-Related Injury  Outcome: Met     Problem: Sepsis/Septic Shock  Goal: Optimal Coping  Outcome: Met  Goal: Absence of Bleeding  Outcome: Met  Goal: Blood Glucose Level Within Targeted Range  Outcome: Met  Goal: Absence of Infection Signs and Symptoms  Outcome: Met  Goal: Optimal Nutrition Intake  Outcome: Met     Problem: Nausea and Vomiting  Goal: Nausea and Vomiting Relief  Outcome: Met     Problem: Electrolyte Imbalance  Goal: Electrolyte Balance  Outcome: Met     Problem: Skin or Soft Tissue Infection  Goal: Absence of Infection Signs and Symptoms  Outcome: Met

## 2025-01-03 NOTE — DISCHARGE INSTRUCTIONS
Drug rash with eosinophilia and systemic symptoms, or DRESS syndrome, is a severe hypersensitivity reaction to certain medications. Symptoms of the condition include skin eruption, fever, hematologic abnormalities, and multi-organ involvement.  Health experts consider this rare adverse drug allergy to be potentially life threatening. It occurs due to the immune system overreacting to certain medications. Symptoms may manifest weeks after a person begins the problematic medicine. The treatment often involves ceasing suspect medications and using immunomodulatory drugs to reduce the symptoms.      **IMPORTANT DISCHARGE INSTRUCTIONS**    Biopsy Site Care:  Remove bandage and clean with gentle soap and water. Apply Vaseline and a bandage. Repeat daily.       Our goal at [facility] is to always give you outstanding care and exceptional service. You may receive a survey by mail, text or e-mail in the next 7-10 days from Parvin Frederick and our leadership team asking about the care you received with us. The survey should only take 5-10 minutes to complete and is very important to us.     Your feedback provides us with a way to recognize our staff who work tirelessly to provide the best care! Also, your responses help us learn how to improve when your experience was below our aspiration of excellence. We WILL use your feedback to continue making improvements to help us provide the highest quality care. We keep your personal information and feedback confidential. We appreciate your time completing this survey and can't wait to hear from you!!!     We want you to leave us today feeling like you can DEFINITELY recommend us to others! We look forward to your continued care with us! Thanks so much for choosing [facility] for your healthcare needs!

## 2025-01-04 NOTE — ASSESSMENT & PLAN NOTE
ID evaluated . Antibiotics held.     -- 12/23 podiatry note reviewed and pt confirmed plan.  Needs weekly dressing change next due 12/30.    - discuss with Infectious Disease who also recommended to hold further at this time and follow up outpatient and discuss needs for further antibiotic

## 2025-01-04 NOTE — SUBJECTIVE & OBJECTIVE
"Interval History:      Rash overall continues to improve.  Pruritus responding to topical medications as well as Benadryl.  Tolerating diet.  Denies any nausea or vomiting.    Review of Systems   Constitutional:  Negative for chills and fever.   HENT:  Negative for congestion.    Respiratory:  Negative for cough and shortness of breath.    Cardiovascular:  Negative for palpitations.   Gastrointestinal:  Negative for constipation, diarrhea, nausea and vomiting.   Genitourinary: Negative.    Musculoskeletal: Negative.    Skin:  Positive for rash.   Neurological:  Negative for weakness and headaches.   Psychiatric/Behavioral:  Negative for suicidal ideas.          Vitals:    01/03/25 1122   BP: 118/71   Pulse: 89   Resp: 18   Temp: 98.4 °F (36.9 °C)       Objective:        Physical Exam  Constitutional:       General: He is not in acute distress.     Appearance: He is ill-appearing. He is not diaphoretic.   Eyes:      General: No scleral icterus.     Extraocular Movements: Extraocular movements intact.   Cardiovascular:      Rate and Rhythm: Normal rate and regular rhythm.      Heart sounds: No murmur heard.  Pulmonary:      Effort: No respiratory distress.   Abdominal:      General: Bowel sounds are normal. There is no distension.      Tenderness: There is no abdominal tenderness.   Musculoskeletal:      Cervical back: Neck supple.      Right lower leg: No edema.      Left lower leg: No edema.   Skin:     Comments: Diffuse rash, now peeling   Neurological:      Mental Status: He is alert and oriented to person, place, and time.   Psychiatric:         Mood and Affect: Mood normal.       Significant Labs: All pertinent labs within the past 24 hours have been reviewed.      No results for input(s): "WBC", "RBC", "HGB", "HCT", "PLT", "MCV", "MCH", "MCHC" in the last 24 hours.      No results for input(s): "GLUCOSE", "CALCIUM", "ALBUMIN", "PROT", "NA", "K", "CO2", "CL", "BUN", "CREATININE", "ALKPHOS", "ALT", "AST", " ""BILITOT" in the last 24 hours.          Significant Imaging: I have reviewed all pertinent imaging results/findings within the past 24 hours.      Review of Systems   Constitutional:  Negative for chills and fever.   HENT:  Negative for congestion.    Respiratory:  Negative for cough and shortness of breath.    Cardiovascular:  Negative for palpitations.   Gastrointestinal:  Negative for constipation, diarrhea, nausea and vomiting.   Genitourinary: Negative.    Musculoskeletal: Negative.    Skin:  Positive for rash.   Neurological:  Negative for weakness and headaches.   Psychiatric/Behavioral:  Negative for suicidal ideas.      "

## 2025-01-04 NOTE — DISCHARGE SUMMARY
[facility] Medicine  Discharge Summary      Patient Name: Alonzo Nascimento Jr.  MRN: [id number]  MAAME: [id number]  Patient Class: IP- Inpatient  Admission Date: 12/24/2024  Hospital Length of Stay: 10 days  Discharge Date and Time: 1/3/2025  3:27 PM  Attending Physician: No att. providers found   Discharging Provider: Jossie Barakat MD  Primary Care Provider: Sohan Strange MD  [facility] Medicine Team: [facility] Jossie Barakat MD  Primary Care Team: [facility]    HPI:   Per admitting physician (12/24):  30 yo M with PMHx foot osteomyelitis on PO bactrim and flagyl who presented to [facility] 12/23 for evaluation of rash. Pt reported having nausea, vomiting, decreased po intake, body aches, for 1-2 weeks. Pt. Has a history of left achilles tendon rupture s/p repair on 07/14/23 complicated by osteomyelitis of left calcaneus s/p I&D 12/4/24. He was prescribed PO Bactrim and Flagy x 6 weeks after this most recent procedure with end date 1/1/2025.  He initially presented to [facility]  E on 12/14 and 12/20 for evaluation of the nausea/dehydaration and was discharged from ED after IV fluids on both occasions. About 2-3 days ago, shortly after his ED vist 12/20, the patient reports developing a full body rash. The Rash is described as >50% BSA covered in stereotypical morbiliform. No mucous membrane involvement.     In the ED, the patient was tachycardic (120s> 90s), tachypneic (20s), but otherwise hemodynamically stable.  Labs were remarkable for leukocytosis (28.3, with 12% eosinophilia), elevated INR (2.1), elevated creatinine (1.9- from a previous 1.4), hyponatremia (125), elevated LFTs (AST:  384, ALT: 635, T bili: 4.1, ALP:  341), elevated lactic acid (2.7).  VBG showed a pH of 7.34, pCO2 of 49.7 and HC03 of 22.9.  Retroperitoneal ultrasound showed no acute process.  CT abdomen and pelvis (12/20) showed no acute findings.  Referring provider is concerned about DRESS syndrome secondary to Bactrim,  "so patient transferred to [facility] for Dematology evaluation.     Pt. Was admitted to [facility] while awaiting bed, per [facility] medicine team, "He was started on oral prednisone 50 mg daily and clobetasol cream...... Patient's Lfts and LINK were improving on 12/24. He was tolerating a soft diet"    * No surgery found *      Hospital Course:   Admitted to hospital medicine for DRESS syndrome likely d/t Bactrim, however, can not entirely rule out Flagyl as source.  Dermatology consulted, s/p biopsy, started on IV and topical steroids.  Rash improved, leukocytosis and eosinophilia resolved, LINK resolved, GI symptoms resolved but pt with worsening liver function.  Hepatology consulted for acute liver injury, serologic workup ordered.  ID consulted for extensive infectious workup.  Worsening liver injury throughout admission, started on empiric acyclovir.  Interventional Radiology consulted, liver biopsy pending, tentatively 12/30.  Liver transplant authorization received.    12/31- appreciate dermatology recommendations and suggestion for immunology/allergy recommendations.  Overall liver function improving.  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 Liver enzymes, Tbi land INR improved.  Would defer liver transplant evaluation at this time and he will likely not need further evaluation.  We will clarify with ID regarding treatment for osteomyelitis.     1/1- doing we will.  No reported distress or fevers.  Liver transplant evaluation paused had given biopsy results and downtrending LFTs.    1/2- reports feeling better.  Ambulating better.  LFTs are trending down.  Discussion with hepatology on dermatology.  We will transitioned to oral methylprednisolone 60 mg starting tomorrow.  We will encourage for close follow up with the Rheumatology and hepatology.    1/3- LFTs trending down, discussed plan for discharge with hepatology and dermatology. Will discharge on prednisone and recommend close follow up. "         Review of Systems   Constitutional:  Negative for chills and fever.   HENT:  Negative for congestion.    Respiratory:  Negative for cough and shortness of breath.    Cardiovascular:  Negative for palpitations.   Gastrointestinal:  Negative for constipation, diarrhea, nausea and vomiting.   Genitourinary: Negative.    Musculoskeletal: Negative.    Skin:  Positive for rash.   Neurological:  Negative for weakness and headaches.   Psychiatric/Behavioral:  Negative for suicidal ideas.          Vitals:    01/03/25 1122   BP: 118/71   Pulse: 89   Resp: 18   Temp: 98.4 °F (36.9 °C)       Objective:        Physical Exam  Constitutional:       General: He is not in acute distress.     Appearance: He is ill-appearing. He is not diaphoretic.   Eyes:      General: No scleral icterus.     Extraocular Movements: Extraocular movements intact.   Cardiovascular:      Rate and Rhythm: Normal rate and regular rhythm.      Heart sounds: No murmur heard.  Pulmonary:      Effort: No respiratory distress.   Abdominal:      General: Bowel sounds are normal. There is no distension.      Tenderness: There is no abdominal tenderness.   Musculoskeletal:      Cervical back: Neck supple.      Right lower leg: No edema.      Left lower leg: No edema.   Skin:     Comments: Diffuse rash, now peeling   Neurological:      Mental Status: He is alert and oriented to person, place, and time.   Psychiatric:         Mood and Affect: Mood normal.        Goals of Care Treatment Preferences:  Code Status: Full Code      SDOH Screening:  The patient was screened for utility difficulties, food insecurity, transport difficulties, housing insecurity, and interpersonal safety and there were no concerns identified this admission.     Consults:   Consults (From admission, onward)          Status Ordering Provider     Inpatient consult to Allergy  Once        Provider:  (Not yet assigned)    Completed ISIDRO OLSON III     Inpatient consult to  Registered Dietitian/Nutritionist  Once        Provider:  (Not yet assigned)    Completed ISIDRO OLSON III     Inpatient consult to Interventional Radiology  Once        Provider:  (Not yet assigned)    Completed ISIDRO OLSON III     Inpatient consult to Hepatology  Once        Provider:  (Not yet assigned)    Completed ISIDRO OLSON III     Inpatient consult to Infectious Diseases  Once        Provider:  (Not yet assigned)    Completed GABE HOLT     Inpatient consult to Dermatology  Once        Provider:  (Not yet assigned)    Completed GABE HOLT            * DRESS syndrome  Pt. On long term bactrim therapy presenting with maculopapular rash involving trunk and extremities, >50% BSA, elevated LFTs, LINK, leukocytosis, Eosinophilia, consistent concerning DRESS syndrome  Dermatology consulted: s/p biopsy 12/26.  Recommended Solu-Medrol and topical steroids.  Considering cyclosporin  bilirubin, ALP, AST, ALT cont to rise:  hepatology consulted:  Liver ultrasound with hepatosplenomegaly.  Follow up hepatic serologic workup. IR consulted for biopsy , completed 12/30. Liver transplant evaluation requested.   Liver biopsy suggestive of DILI and no fibrosis. Will not likely need liver transplant     Appreciate Dermatology and allergy recommendations.  Collaborative discussion with dermatology and hepatology and we will transition to methylprednisolone 60 mg starting 01/03/2025 and suggested for a long taper pending further assessment as outpatient.    Anemia  Anemia is likely due to drug toxicity from Antibiotics and DRESS syndrome . Most recent hemoglobin and hematocrit are listed below.  Recent Labs     12/31/24  0539 01/01/25  0818 01/02/25  0400   HGB 10.5* 10.8* 10.8*   HCT 30.3* 32.5* 32.5*       Plan  - Monitor serial CBC: Daily  - Transfuse PRBC if patient becomes hemodynamically unstable, symptomatic or H/H drops below 7/21.  - Patient has not received any PRBC transfusions to  date  - Patient's anemia is currently stable    Thrombocytopenia  RESOLVED     The likely etiology of thrombocytopenia is  liver injury secondary to dress syndrome. . The patients 3 most recent labs are listed below.  Recent Labs     12/31/24  0539 01/01/25  0818 01/02/25  0400    157 172       Plan  - Will transfuse if platelet count is <50k (if undergoing surgical procedure or have active bleeding).      Fever  RESOLVED     Last reported fever on 12/28.  No further febrile episodes.  Likely secondary to DRESS syndrome.    He has been treated with Bactrim and Flagyl for osteomyelitis of the left calcaneus.  Appreciate infectious disease recommendations.  No further antibiotics at this time.  Recommendation to follow up with [facility] ID clinic to discuss further antibiotics.       Hypoalbuminemia  Likely d/t liver dysfunction and acute illness  Monitor volume status while on IV fluids.  Cont to monitor on labs    Severe systemic inflammatory response syndrome (SIRS)  RESOLVED     Patient has fever, tachycardia, leukocytosis with abnormal liver function and lactic acidosis on admission.  Although he has a bone infection it is subacute and doubt it is causing these systemic symptoms and signs.    -- monitor for infection, treat DRESS  -- s/p IV fluids, encourage PO intake  -- repeat blood cultures ordered 12/28, NGTD    Liver injury  MELD 3.0: 13 at 1/2/2025  4:00 AM  MELD-Na: 11 at 1/2/2025  4:00 AM  Calculated from:  Serum Creatinine: 1.2 mg/dL at 1/2/2025  4:00 AM  Serum Sodium: 138 mmol/L (Using max of 137 mmol/L) at 1/2/2025  4:00 AM  Total Bilirubin: 1.7 mg/dL at 1/2/2025  4:00 AM  Serum Albumin: 2.4 g/dL at 1/2/2025  4:00 AM  INR(ratio): 1.1 at 1/2/2025  4:00 AM  Age at listing (hypothetical): 29 years  Sex: Male at 1/2/2025  4:00 AM    Acute viral panel negative 12/15.  CT abd unremarkable liver.  U/S liver: Hepatosplenomegaly  AST/ALT/TB/ALP trending down.    Appreciate hepatology recommendations.  Liver  biopsy obtained on 12/30/2024.  Results consistent with drug-induced liver injury.  Liver transplant evaluation was started however given biopsy results and ongoing improvement we will likely not need liver transplant.  Appreciate ID recommendations and recommendation to start acyclovir if liver biopsy negative for CMV.    Acute kidney injury  RESOLVED    -Unclear baseline, 1.9 on admission and improving  -Renal US unremarkable.  -Continue to monitor while admitted, pt. Will need PCP and/or nephrologist at discharge  -S/p IV fluids, encourage PO intake    Hyponatremia  Normal serum osmolality indicates pseudohyponatremia  Cont to monitor on daily labs.    Acute osteomyelitis of left calcaneus  ID evaluated . Antibiotics held.     -- 12/23 podiatry note reviewed and pt confirmed plan.  Needs weekly dressing change next due 12/30.    - discuss with Infectious Disease who also recommended to hold further at this time and follow up outpatient and discuss needs for further antibiotic        Final Active Diagnoses:    Diagnosis Date Noted POA    PRINCIPAL PROBLEM:  DRESS syndrome [D72.12, T50.905A] 12/23/2024 Yes    Thrombocytopenia [D69.6] 12/31/2024 No    Anemia [D64.9] 12/31/2024 Yes    Fever [R50.9] 12/28/2024 No    Hypoalbuminemia [E88.09] 12/26/2024 Yes    Severe systemic inflammatory response syndrome (SIRS) [R65.10] 12/25/2024 Yes    Acute kidney injury [N17.9] 12/24/2024 Yes    Liver injury [S36.119A] 12/24/2024 Yes    Hyponatremia [E87.1] 12/23/2024 Yes    Acute osteomyelitis of left calcaneus [M86.172] 12/04/2024 Yes      Problems Resolved During this Admission:       Discharged Condition: stable    Disposition: Home-Health Care [facility]    Follow Up:   Follow-up Information       Sohan Strange MD Follow up in 1 week(s).    Specialty: Family Medicine  Contact information:  [address], [place] [address]  [place] [address] [phone]                           Patient Instructions:      Ambulatory  referral/consult to Dermatology   Standing Status: Future   Referral Priority: Routine Referral Type: Consultation   Referral Reason: Specialty Services Required   Requested Specialty: Dermatology   Number of Visits Requested: 1     Ambulatory referral/consult to Wound Clinic   Standing Status: Future   Referral Priority: Routine Referral Type: Consultation   Referral Reason: Specialty Services Required   Requested Specialty: Wound Care   Number of Visits Requested: 1     Diet Adult Regular     Notify your health care provider if you experience any of the following:  temperature >100.4     Notify your health care provider if you experience any of the following:  persistent nausea and vomiting or diarrhea     Notify your health care provider if you experience any of the following:  redness, tenderness, or signs of infection (pain, swelling, redness, odor or green/yellow discharge around incision site)     Notify your health care provider if you experience any of the following:  worsening rash     Activity as tolerated       Significant Diagnostic Studies: Labs: CMP   Recent Labs   Lab 01/02/25  0400      K 4.6      CO2 25      BUN 21*   CREATININE 1.2   CALCIUM 8.2*   PROT 6.4   ALBUMIN 2.4*   BILITOT 1.7*   ALKPHOS 223*   *   ALT 2,145*   ANIONGAP 6*    and CBC   Recent Labs   Lab 01/02/25  0400   WBC 8.78   HGB 10.8*   HCT 32.5*          Pending Diagnostic Studies:       None           Medications:  Reconciled Home Medications:      Medication List        START taking these medications      famotidine 20 MG tablet  Commonly known as: PEPCID  Take 1 tablet (20 mg total) by mouth Daily. Take while on steroids.  Notes to patient: REDUCES STOMACH ACID     hydrocortisone 2.5 % cream  Apply topically 2 (two) times daily. Apply to rash on face.     prednisoLONE 15 mg/5 mL (3 mg/mL) solution  Commonly known as: ORAPRED  Take 20 mLs (60 mg total) by mouth once daily  for 10 days. Continue  until follow up with dermatology.     triamcinolone acetonide 0.1% 0.1 % cream  Commonly known as: KENALOG  Apply topically 2 (two) times daily. Apply to trunk and limbs.            CONTINUE taking these medications      ondansetron 4 MG Tbdl  Commonly known as: ZOFRAN-ODT  Take 1 tablet (4 mg total) by mouth every 8 (eight) hours as needed.            STOP taking these medications      clobetasoL 0.05 % cream  Commonly known as: TEMOVATE     predniSONE 50 MG Tab  Commonly known as: DELTASONE              Indwelling Lines/Drains at time of discharge:   Lines/Drains/Airways       None                   Time spent on the discharge of patient: 60 minutes         Jossie Barakat MD  Department of Hospital Medicine  [facility]

## 2025-01-06 ENCOUNTER — HOSPITAL ENCOUNTER (OUTPATIENT)
Dept: RADIOLOGY | Facility: HOSPITAL | Age: 30
Discharge: HOME | End: 2025-01-06
Attending: PODIATRIST
Payer: MEDICAID

## 2025-01-06 ENCOUNTER — PATIENT MESSAGE (OUTPATIENT)
Dept: PODIATRY | Facility: CLINIC | Age: 30
End: 2025-01-06

## 2025-01-06 ENCOUNTER — OFFICE VISIT (OUTPATIENT)
Dept: PODIATRY | Facility: CLINIC | Age: 30
End: 2025-01-06
Payer: MEDICAID

## 2025-01-06 ENCOUNTER — OFFICE VISIT (OUTPATIENT)
Dept: PRIMARY CARE CLINIC | Facility: CLINIC | Age: 30
End: 2025-01-06
Payer: MEDICAID

## 2025-01-06 VITALS
WEIGHT: 158.75 LBS | BODY MASS INDEX: 21.04 KG/M2 | DIASTOLIC BLOOD PRESSURE: 69 MMHG | SYSTOLIC BLOOD PRESSURE: 134 MMHG | HEART RATE: 97 BPM | HEIGHT: 73 IN

## 2025-01-06 VITALS
BODY MASS INDEX: 19.57 KG/M2 | HEIGHT: 73 IN | HEART RATE: 83 BPM | SYSTOLIC BLOOD PRESSURE: 115 MMHG | OXYGEN SATURATION: 100 % | WEIGHT: 147.69 LBS | DIASTOLIC BLOOD PRESSURE: 81 MMHG

## 2025-01-06 DIAGNOSIS — D72.12 DRESS SYNDROME: Primary | ICD-10-CM

## 2025-01-06 DIAGNOSIS — Z09 FOLLOW-UP EXAMINATION FOLLOWING SURGERY: Primary | ICD-10-CM

## 2025-01-06 DIAGNOSIS — R29.898 MUSCULAR DECONDITIONING: ICD-10-CM

## 2025-01-06 DIAGNOSIS — R79.89 ELEVATED LFTS: ICD-10-CM

## 2025-01-06 DIAGNOSIS — Z87.39 HISTORY OF OSTEOMYELITIS: ICD-10-CM

## 2025-01-06 DIAGNOSIS — T50.905A DRESS SYNDROME: Primary | ICD-10-CM

## 2025-01-06 DIAGNOSIS — D64.9 ANEMIA, UNSPECIFIED TYPE: ICD-10-CM

## 2025-01-06 DIAGNOSIS — Z09 FOLLOW-UP EXAMINATION FOLLOWING SURGERY: ICD-10-CM

## 2025-01-06 DIAGNOSIS — M62.81 CALF MUSCLE WEAKNESS: ICD-10-CM

## 2025-01-06 PROCEDURE — 3074F SYST BP LT 130 MM HG: CPT | Mod: CPTII,,,

## 2025-01-06 PROCEDURE — 99999 PR PBB SHADOW E&M-EST. PATIENT-LVL III: CPT | Mod: PBBFAC,,, | Performed by: PODIATRIST

## 2025-01-06 PROCEDURE — 1159F MED LIST DOCD IN RCRD: CPT | Mod: CPTII,,, | Performed by: PODIATRIST

## 2025-01-06 PROCEDURE — 3075F SYST BP GE 130 - 139MM HG: CPT | Mod: CPTII,,, | Performed by: PODIATRIST

## 2025-01-06 PROCEDURE — 3078F DIAST BP <80 MM HG: CPT | Mod: CPTII,,, | Performed by: PODIATRIST

## 2025-01-06 PROCEDURE — 85025 COMPLETE CBC W/AUTO DIFF WBC: CPT

## 2025-01-06 PROCEDURE — 3079F DIAST BP 80-89 MM HG: CPT | Mod: CPTII,,,

## 2025-01-06 PROCEDURE — 99214 OFFICE O/P EST MOD 30 MIN: CPT | Mod: PBBFAC,25,27,PN

## 2025-01-06 PROCEDURE — 99214 OFFICE O/P EST MOD 30 MIN: CPT | Mod: S$PBB,,,

## 2025-01-06 PROCEDURE — 80053 COMPREHEN METABOLIC PANEL: CPT

## 2025-01-06 PROCEDURE — 1160F RVW MEDS BY RX/DR IN RCRD: CPT | Mod: CPTII,,,

## 2025-01-06 PROCEDURE — 99999 PR PBB SHADOW E&M-EST. PATIENT-LVL IV: CPT | Mod: PBBFAC,,,

## 2025-01-06 PROCEDURE — 1159F MED LIST DOCD IN RCRD: CPT | Mod: CPTII,,,

## 2025-01-06 PROCEDURE — 73650 X-RAY EXAM OF HEEL: CPT | Mod: TC,FY,LT

## 2025-01-06 PROCEDURE — 99213 OFFICE O/P EST LOW 20 MIN: CPT | Mod: PBBFAC,PN | Performed by: PODIATRIST

## 2025-01-06 PROCEDURE — 1160F RVW MEDS BY RX/DR IN RCRD: CPT | Mod: CPTII,,, | Performed by: PODIATRIST

## 2025-01-06 PROCEDURE — 3008F BODY MASS INDEX DOCD: CPT | Mod: CPTII,,,

## 2025-01-06 PROCEDURE — 1111F DSCHRG MED/CURRENT MED MERGE: CPT | Mod: CPTII,,,

## 2025-01-06 PROCEDURE — 99024 POSTOP FOLLOW-UP VISIT: CPT | Mod: ,,, | Performed by: PODIATRIST

## 2025-01-06 PROCEDURE — 73650 X-RAY EXAM OF HEEL: CPT | Mod: 26,LT,, | Performed by: INTERNAL MEDICINE

## 2025-01-06 PROCEDURE — G2211 COMPLEX E/M VISIT ADD ON: HCPCS | Mod: S$PBB,,,

## 2025-01-06 NOTE — PROGRESS NOTES
"Subjective:     Patient ID: Alonzo Nascimento Jr. is a 29 y.o. male.    Chief Complaint: Post-op Evaluation    Patient is seen post hospital discharge for osteomyelitis of the left calcaneus.  He has a prior history of Achilles tendon repair per  on 07/14/2023.  States that he has always had some type of discomfort to this area.  Bone biopsy was completed while in the hospital which grew Citrobacter koseri and prevotella bevia.  Patient was placed on 6 week course of oral Bactrim DS and metronidazole per PCP.  Patient was ambulating with slippers and relates that he has a job working on the [employer] that requires an hour standing at a time.    12/10/2024:  Post I&D of infected osteomyelitic bone with foreign body removal left calcaneus on 12/04/2024.  No pain reported.  Partial weight-bearing to left lower extremity with Darco shoe and crutches.  Taking oral Bactrim DS and Flagyl as prescribed.    12/23/2024:  Patient complains of persistent nausea and vomiting with dehydration.  He has been taking Pedialyte and multiple fluids to try stay dehydrated.  His lips or cracking.  He has a diffuse papular rash has broken out throughout his body.  He was seen at the ED on 12/20/2024 and given a single dose of Benadryl.  A referral was also placed to a kidney specialist.  Patient was told to stop his oral Bactrim nearly a week ago by his primary.  Patient has a follow up with his primary care physician on 12/27/2024 but will attempt to go today.    01/06/2025:  Seen post hospital discharge on 01/03/2025.  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  Previous liver biopsy was completed while he was at [facility].  No need for liver transplant.  Patient feels like he is recovering and reports no pain to left heel.  He is able to stand and walk without any pain.    Vitals:    01/06/25 1002   BP: 134/69   Pulse: 97   Weight: 72 kg (158 lb 11.7 oz)   Height: 6' 1" (1.854 m)   PainSc: 0-No pain "      Past Medical History:   Diagnosis Date    Achilles tendon rupture 07/2023    left achilles tendon rupture s/p repair on 07/14/23 complicated by osteomyelitis of left calcaneus s/p I&D 12/4/24    Acute osteomyelitis of calcaneum, left 12/2024    osteomyelitis of left calcaneus s/p I&D 12/4/24       Past Surgical History:   Procedure Laterality Date    ANTERIOR CRUCIATE LIGAMENT REPAIR Right     2018    APPLICATION OF SPLINT Left 7/14/2023    Procedure: APPLICATION, SPLINT;  Surgeon: Lenore Pearl DPM;  Location: [facility] OR;  Service: Podiatry;  Laterality: Left;    INSERTION, ANTIBIOTIC SPACER, LOWER EXTREMITY Left 12/4/2024    Procedure: INSERTION, ANTIBIOTIC SPACER, LOWER EXTREMITY;  Surgeon: Jaswinder Garcia DPM;  Location: [facility] OR;  Service: Podiatry;  Laterality: Left;    IRRIGATION AND DEBRIDEMENT Left 12/4/2024    Procedure: IRRIGATION AND DEBRIDEMENT;  Surgeon: Jaswinder Garcia DPM;  Location: [facility] OR;  Service: Podiatry;  Laterality: Left;  mini c-arm, Stimulan jr tobramycin/Vancomycin    REPAIR OF ACHILLES TENDON Left 7/14/2023    Procedure: REPAIR, TENDON, ACHILLES;  Surgeon: Lenore Pearl DPM;  Location: [facility] OR;  Service: Podiatry;  Laterality: Left;       No family history on file.    Social History     Socioeconomic History    Marital status: Single   Tobacco Use    Smoking status: Never    Smokeless tobacco: Never   Substance and Sexual Activity    Alcohol use: Yes     Comment: rarely    Drug use: Yes     Types: Marijuana    Sexual activity: Yes     Partners: Female     Social Drivers of Health     Financial Resource Strain: Low Risk  (1/2/2025)    Overall Financial Resource Strain (CARDIA)     Difficulty of Paying Living Expenses: Not hard at all   Food Insecurity: No Food Insecurity (1/2/2025)    Hunger Vital Sign     Worried About Running Out of Food in the Last Year: Never true     Ran Out of Food in the Last Year: Never true   Transportation Needs: No Transportation Needs (1/2/2025)     TRANSPORTATION NEEDS     Transportation : No   Recent Concern: Transportation Needs - Unmet Transportation Needs (11/19/2024)    TRANSPORTATION NEEDS     Transportation : Yes, it has kept me from non-medical meetings, appointments, work or from getting things that I need.   Physical Activity: Patient Unable To Answer (12/25/2024)    Exercise Vital Sign     Days of Exercise per Week: Patient unable to answer     Minutes of Exercise per Session: Patient unable to answer   Stress: No Stress Concern Present (1/2/2025)    [employer] - Occupational Stress Questionnaire     Feeling of Stress : Not at all   Recent Concern: Stress - Stress Concern Present (12/25/2024)    [employer] - Occupational Stress Questionnaire     Feeling of Stress : To some extent   Housing Stability: Low Risk  (1/2/2025)    Housing Stability Vital Sign     Unable to Pay for Housing in the Last Year: No     Homeless in the Last Year: No       Current Outpatient Medications   Medication Sig Dispense Refill    famotidine (PEPCID) 20 MG tablet Take 1 tablet (20 mg total) by mouth Daily. Take while on steroids. 30 tablet 0    hydrocortisone 2.5 % cream Apply topically 2 (two) times daily. Apply to rash on face. 30 g 1    ondansetron (ZOFRAN-ODT) 4 MG TbDL Take 1 tablet (4 mg total) by mouth every 8 (eight) hours as needed. 14 tablet 1    prednisoLONE (ORAPRED) 15 mg/5 mL (3 mg/mL) solution Take 20 mLs (60 mg total) by mouth once daily  for 10 days. Continue until follow up with dermatology. 200 mL 0    triamcinolone acetonide 0.1% (KENALOG) 0.1 % cream Apply topically 2 (two) times daily. Apply to trunk and limbs. 453 g 1     No current facility-administered medications for this visit.       Review of patient's allergies indicates:   Allergen Reactions    Bactrim [sulfamethoxazole-trimethoprim] Rash     Developed DRESS         Review of Systems   Constitutional: Negative for chills, fever and  malaise/fatigue.   HENT:  Negative for congestion and hearing loss.    Cardiovascular:  Negative for chest pain, claudication and leg swelling.   Respiratory:  Negative for cough and shortness of breath.    Skin:  Positive for color change and poor wound healing.   Musculoskeletal:  Negative for back pain, joint pain, muscle cramps and muscle weakness.   Gastrointestinal:  Negative for nausea and vomiting.   Neurological:  Negative for numbness, paresthesias and weakness.   Psychiatric/Behavioral:  Negative for altered mental status.         Objective:     Physical Exam  Constitutional:       General: He is not in acute distress.     Appearance: He is not ill-appearing.   Cardiovascular:      Pulses:           Dorsalis pedis pulses are 2+ on the right side and 2+ on the left side.        Posterior tibial pulses are 2+ on the right side and 2+ on the left side.   Musculoskeletal:      Comments: No pain on palpation to the left heel or with squeezing the left heel.  No localized edema, warmth or discoloration.   Skin:     General: Skin is warm.      Capillary Refill: Capillary refill takes less than 2 seconds.      Findings: No erythema.      Nails: There is no clubbing.      Comments: Previous surgical incision site is healed.   Neurological:      Mental Status: He is alert and oriented to person, place, and time.      Sensory: Sensation is intact.      Motor: Motor function is intact.           Assessment:      Encounter Diagnoses   Name Primary?    Follow-up examination following surgery Yes    History of osteomyelitis      Plan:     Alonzo was seen today for post-op evaluation.    Diagnoses and all orders for this visit:    Follow-up examination following surgery  -     X-Ray Calcaneus 2 View Left; Future  -     X-Ray Calcaneus 2 View Left; Future    History of osteomyelitis  -     X-Ray Calcaneus 2 View Left; Future      I counseled the patient on his conditions, their implications and medical  management.    From my standpoint the patient may increase his activity as tolerated gradually.  I did discuss with him that he is still recovering from dress syndrome and that he should seek further consultation from his primary care physician however he is cleared to returned to work part-time and gradually increase activity.    Follow-up left calcaneus x-ray to be completed today and in 3 months.  No further need for antibiotic therapy.      RTC p.r.n. as discussed     Assisted by Ming Gonzalez DPM PGY 2    A portion of this note was generated by voice recognition software and may contain spelling and grammar errors.      .

## 2025-01-06 NOTE — PROGRESS NOTES
"  [facility] FAMILY PRACTICE CLINIC NOTE  Follow-up Visit      SUBJECTIVE:     Patient: Alonzo Nascimento Jr. is a 29 y.o. male.    Chief Compliant:   Chief Complaint   Patient presents with    Hospital Follow Up       History of Present Illness:  Hospital f/u - hospitalized for DRESS 2/2 to bactrim. Currently on PO steroids and recovering. Continues to have dry flaky skin but improving at this time. Has dermatology follow-up scheduled. AST/ALT remain elevated but decreasing.      Denies any acute concerns or complaints at this time.      Review of Systems   Constitutional:  Negative for fever.   Respiratory:  Negative for shortness of breath.    Cardiovascular:  Negative for chest pain.   Gastrointestinal:  Negative for abdominal pain.   Neurological:  Negative for headaches.     A 10+ review of systems was performed with pertinent positives and negatives noted above in the history of present illness. Other systems were negative unless otherwise specified.    OBJECTIVE:     Vital Signs (Most Recent)  Vitals:    01/06/25 1442   BP: 115/81   BP Location: Left arm   Patient Position: Sitting   Pulse: 83   SpO2: 100%   Weight: 67 kg (147 lb 11.3 oz)   Height: 6' 1" (1.854 m)     BMI: Body mass index is 19.49 kg/m².     Physical Exam:  Physical Exam  Vitals and nursing note reviewed.   Constitutional:       General: He is not in acute distress.     Appearance: Normal appearance. He is normal weight. He is not ill-appearing, toxic-appearing or diaphoretic.   HENT:      Head: Normocephalic and atraumatic.      Right Ear: External ear normal.      Left Ear: External ear normal.      Nose: Nose normal.      Mouth/Throat:      Pharynx: Oropharynx is clear.   Eyes:      Extraocular Movements: Extraocular movements intact.   Cardiovascular:      Rate and Rhythm: Normal rate and regular rhythm.      Pulses: Normal pulses.      Heart sounds: Normal heart sounds.   Pulmonary:      Effort: Pulmonary effort is normal.      Breath sounds: " Normal breath sounds.   Abdominal:      General: Abdomen is flat. There is no distension.      Palpations: Abdomen is soft.      Tenderness: There is no abdominal tenderness. There is no guarding or rebound.   Musculoskeletal:         General: Normal range of motion.      Cervical back: Normal range of motion.   Skin:     General: Skin is warm and dry.      Findings: Rash (diffuse, dry flaky skin) present.   Neurological:      General: No focal deficit present.      Mental Status: He is alert and oriented to person, place, and time. Mental status is at baseline.   Psychiatric:         Mood and Affect: Mood normal.         Behavior: Behavior normal.         Thought Content: Thought content normal.          ASSESSMENT:   Alonzo Nascimento Jr. is a 29 y.o. male who presents to clinic to for    1. DRESS syndrome    2. Elevated LFTs    3. Anemia, unspecified type    4. Calf muscle weakness    5. Muscular deconditioning         PLAN:     DRESS syndrome  - Acute condition.  - Controlled/Improving.  - Reviewed previous labs, tests, and/or imaging obtained since last visit.  - Discussed various diagnostic and treatment options with patient at this visit.  - Continue current medications.  - Orders, labs, and imaging ordered as below. Follow-up results with patient.   -     CBC W/ AUTO DIFFERENTIAL; Future; Expected date: 01/06/2025  -     Comprehensive Metabolic Panel; Future; Expected date: 01/06/2025    Elevated LFTs  - Plan as above.    Anemia, unspecified type  - Chronic condition.  - Uncontrolled.  - Reviewed previous labs, tests, and/or imaging obtained since last visit.  - Discussed various diagnostic and treatment options with patient at this visit.  - Orders, labs, and imaging ordered as below. Follow-up results with patient.   -     Vitamin B12; Future; Expected date: 01/06/2025  -     Iron and TIBC; Future; Expected date: 01/06/2025  -     Folate; Future; Expected date: 01/06/2025  -     CBC W/ AUTO DIFFERENTIAL;  Future; Expected date: 01/06/2025  -     Comprehensive Metabolic Panel; Future; Expected date: 01/06/2025    Calf muscle weakness  - Acute condition.  - Uncontrolled.  - Reviewed previous labs, tests, and/or imaging obtained since last visit.  - Discussed various diagnostic and treatment options with patient at this visit.  - Referral placed as below.   -     Ambulatory referral/consult to Physical/Occupational Therapy; Future; Expected date: 01/13/2025    Muscular deconditioning  - Plan as above.  -     Ambulatory referral/consult to Physical/Occupational Therapy; Future; Expected date: 01/13/2025        Provided patient with anticipatory guidance and return precautions. Treatment plan discussed with patient, all questions answered, and patient acknowledged understanding and verbal agreement.      Follow-up in: 2 weeks virtually and 4 weeks in-person; or sooner PRN if acute concerns arise.      ________________________  Sohan Strange MD  [facility] PGY-3

## 2025-01-07 LAB
ALBUMIN SERPL BCP-MCNC: 3.3 G/DL (ref 3.5–5.2)
ALP SERPL-CCNC: 168 U/L (ref 40–150)
ALT SERPL W/O P-5'-P-CCNC: 592 U/L (ref 10–44)
ANION GAP SERPL CALC-SCNC: 12 MMOL/L (ref 8–16)
AST SERPL-CCNC: 49 U/L (ref 10–40)
BASOPHILS # BLD AUTO: 0.05 K/UL (ref 0–0.2)
BASOPHILS NFR BLD: 0.6 % (ref 0–1.9)
BILIRUB SERPL-MCNC: 1.3 MG/DL (ref 0.1–1)
BUN SERPL-MCNC: 19 MG/DL (ref 6–20)
CALCIUM SERPL-MCNC: 9.1 MG/DL (ref 8.7–10.5)
CHLORIDE SERPL-SCNC: 106 MMOL/L (ref 95–110)
CO2 SERPL-SCNC: 21 MMOL/L (ref 23–29)
CREAT SERPL-MCNC: 1 MG/DL (ref 0.5–1.4)
DIFFERENTIAL METHOD BLD: ABNORMAL
EOSINOPHIL # BLD AUTO: 0.1 K/UL (ref 0–0.5)
EOSINOPHIL NFR BLD: 1.4 % (ref 0–8)
ERYTHROCYTE [DISTWIDTH] IN BLOOD BY AUTOMATED COUNT: 20.2 % (ref 11.5–14.5)
EST. GFR  (NO RACE VARIABLE): >60 ML/MIN/1.73 M^2
GLUCOSE SERPL-MCNC: 125 MG/DL (ref 70–110)
HCT VFR BLD AUTO: 37.5 % (ref 40–54)
HGB BLD-MCNC: 11.7 G/DL (ref 14–18)
IMM GRANULOCYTES # BLD AUTO: 0.1 K/UL (ref 0–0.04)
IMM GRANULOCYTES NFR BLD AUTO: 1.1 % (ref 0–0.5)
LYMPHOCYTES # BLD AUTO: 1.2 K/UL (ref 1–4.8)
LYMPHOCYTES NFR BLD: 13.5 % (ref 18–48)
MCH RBC QN AUTO: 28.3 PG (ref 27–31)
MCHC RBC AUTO-ENTMCNC: 31.2 G/DL (ref 32–36)
MCV RBC AUTO: 91 FL (ref 82–98)
MONOCYTES # BLD AUTO: 0.3 K/UL (ref 0.3–1)
MONOCYTES NFR BLD: 3.4 % (ref 4–15)
NEUTROPHILS # BLD AUTO: 7 K/UL (ref 1.8–7.7)
NEUTROPHILS NFR BLD: 80 % (ref 38–73)
NRBC BLD-RTO: 0 /100 WBC
PLATELET # BLD AUTO: 372 K/UL (ref 150–450)
PMV BLD AUTO: 9.5 FL (ref 9.2–12.9)
POTASSIUM SERPL-SCNC: 4.2 MMOL/L (ref 3.5–5.1)
PROT SERPL-MCNC: 7.9 G/DL (ref 6–8.4)
RBC # BLD AUTO: 4.14 M/UL (ref 4.6–6.2)
SODIUM SERPL-SCNC: 139 MMOL/L (ref 136–145)
WBC # BLD AUTO: 8.72 K/UL (ref 3.9–12.7)

## 2025-01-08 ENCOUNTER — OFFICE VISIT (OUTPATIENT)
Dept: DERMATOLOGY | Facility: CLINIC | Age: 30
End: 2025-01-08
Payer: MEDICAID

## 2025-01-08 DIAGNOSIS — Z79.899 ENCOUNTER FOR LONG-TERM (CURRENT) DRUG USE: ICD-10-CM

## 2025-01-08 DIAGNOSIS — D72.12 DRESS SYNDROME: Primary | ICD-10-CM

## 2025-01-08 DIAGNOSIS — Z87.39 HISTORY OF OSTEOMYELITIS: ICD-10-CM

## 2025-01-08 DIAGNOSIS — T50.905A DRESS SYNDROME: Primary | ICD-10-CM

## 2025-01-08 DIAGNOSIS — R74.01 TRANSAMINITIS: ICD-10-CM

## 2025-01-08 PROCEDURE — 1111F DSCHRG MED/CURRENT MED MERGE: CPT | Mod: CPTII,,, | Performed by: DERMATOLOGY

## 2025-01-08 PROCEDURE — G2211 COMPLEX E/M VISIT ADD ON: HCPCS | Mod: S$PBB,,, | Performed by: DERMATOLOGY

## 2025-01-08 PROCEDURE — 99214 OFFICE O/P EST MOD 30 MIN: CPT | Mod: S$PBB,,, | Performed by: DERMATOLOGY

## 2025-01-08 RX ORDER — FAMOTIDINE 20 MG/1
20 TABLET, FILM COATED ORAL DAILY
Qty: 30 TABLET | Refills: 0 | Status: SHIPPED | OUTPATIENT
Start: 2025-01-08 | End: 2026-01-08

## 2025-01-08 RX ORDER — PREDNISOLONE SODIUM PHOSPHATE 15 MG/5ML
SOLUTION ORAL
Qty: 420 ML | Refills: 0 | Status: SHIPPED | OUTPATIENT
Start: 2025-01-08 | End: 2025-02-12

## 2025-01-08 NOTE — PROGRESS NOTES
Subjective:      Patient ID:  Alonzo Nascimento Jr. is a 29 y.o. male who presents for hospital follow-up.      HPI  29 yoM presenting follow-up after being hospitalized for DRESS likely from bactrim but could not rule out flagyl. His course was complicated by LINK and acute transaminates with LFTS in the thousands. The patient was discharged on prednisolone 60mg QD  and reports that the rash continues to improve.  Reports that he is tolerating well without side effects other than increased appetite.     Also on pepcid BID and  triamcinolone 0.1% cream      Of note the patient had hx of osteomyelitis (Citrobacter koseri and prevotella bevia) and was on PO bactrim DS and metronidazole. 12/10/2024 the patient had infected bone infection and foreign body removed.  He followed up with podiatry this week who plans for no further abx at this time. The patient has not seen ID since discharge.       Review of Systems    Objective:   Physical Exam   Constitutional: He appears well-developed. No distress.   Neurological: He is alert. He is not disoriented.   Psychiatric: He has a normal mood and affect.   Skin:   Areas Examined (abnormalities noted in diagram):   Head / Face Inspection Performed  Neck Inspection Performed  Chest / Axilla Inspection Performed  Back Inspection Performed  RUE Inspected  LUE Inspection Performed  RLE Inspected  LLE Inspection Performed            Diagram Legend     Erythematous scaling macule/papule c/w actinic keratosis       Vascular papule c/w angioma      Pigmented verrucoid papule/plaque c/w seborrheic keratosis      Yellow umbilicated papule c/w sebaceous hyperplasia      Irregularly shaped tan macule c/w lentigo     1-2 mm smooth white papules consistent with Milia      Movable subcutaneous cyst with punctum c/w epidermal inclusion cyst      Subcutaneous movable cyst c/w pilar cyst      Firm pink to brown papule c/w dermatofibroma      Pedunculated fleshy papule(s) c/w skin tag(s)       Evenly pigmented macule c/w junctional nevus     Mildly variegated pigmented, slightly irregular-bordered macule c/w mildly atypical nevus      Flesh colored to evenly pigmented papule c/w intradermal nevus       Pink pearly papule/plaque c/w basal cell carcinoma      Erythematous hyperkeratotic cursted plaque c/w SCC      Surgical scar with no sign of skin cancer recurrence      Open and closed comedones      Inflammatory papules and pustules      Verrucoid papule consistent consistent with wart     Erythematous eczematous patches and plaques     Dystrophic onycholytic nail with subungual debris c/w onychomycosis     Umbilicated papule    Erythematous-base heme-crusted tan verrucoid plaque consistent with inflamed seborrheic keratosis     Erythematous Silvery Scaling Plaque c/w Psoriasis     See annotation    Labs:   -12/26 labs with normal TSH and free T4   -12/27 acute hepatitis panel and hep E negative  -12/23 EBV positive   -12/30/2024 AST 2199 ALT 4366  -Bartonella negative, HSV-1 and 2, negative, RPR negative  -Histo negative    1/6/2025 CBC with Hgb 11.7, Eos 0.1, CMP with Cr 1.0 AST 49,   Assessment / Plan:      DRESS  29 yoM presenting follow-up after being hospitalized for >50% BSA or erythematous morbilliform rash +eosinophilia, leukocytosis, elevated LFTs, LINK, subjective fevers (REGISCAR of 3 at hospital admission).  Diagnosed with DRESS likely from bactrim but could not rule out flagyl. The patient is currently on PO steroids with improvement in skin and his LFTs continue to downtrend.     -A longer taper of steroids is recommended for the treatment of DRESS to prevent a rebound.    As the patient has been improving clinically and with his LFTs, we will start tapering down the steroid and plan for about a 6 week course.   -Decrease prednisolone from 60mg to 50mg for 1 week. Then decrease to 40mg for 1 week, 30mg for 1 week, 30mg for 1 week, 20mg for 1 wk, 10mg for 1 wk   -Recommend the patient  use triamcinolone 0.1% cream as needed for itching areas. Discussed side effects of chronic steroid use topically. Advised him to liberally apply Vaseline/Aquaphor to his skin daily.   - Continue avoidance of Bactrim and Flagyl. Bactrim has been listed as a drug allergy and counseled patient he should not take this medication again.   -Plan to repeat TSH in 1 month.     -Warning signs to look out for: recurrence/worsening of the rash, enlarging lymph nodes, fevers, blood in urine, chest pain, SOB, headaches, worsening liver function. Encouraged him to reach out and go do the ED if any of these occur.     Long term drug use   -Continue pepcid 20mg BID while on longer taper of steroid that he was discharged with. Refills prescribed     Transaminitis    -his LFTs continue to downtrend.   He is scheduled for LFTs weekly ordered by another provider.    History of osteomyelitis   -s/p several weeks of bactrim/flagyl  and had and surgical debridement with podiatry. Pt was evaluated by ID in inpatient setting  -Reached out to ID to help facilitate follow-up as he is not currently scheduled.     RTC 3 weeks

## 2025-01-09 NOTE — PROGRESS NOTES
I assume primary medical responsibility for this patient. I have reviewed the history, physical, and assessement & treatment plan with the resident and agree that the care is reasonable and necessary. This service has been performed by a resident with the presence of a teaching physician under the primary care exception. If necessary, an addendum of additional findings or evaluation beyond the resident documentation will be noted below.    Patient with acute DRESS, on prednisone taper with improving LFTs. Labs ordered. Continue to follow-up closely with Dermatology.      Silvia Mooney MD    [facility]

## 2025-01-10 ENCOUNTER — PATIENT MESSAGE (OUTPATIENT)
Dept: PODIATRY | Facility: CLINIC | Age: 30
End: 2025-01-10
Payer: MEDICAID

## 2025-01-10 ENCOUNTER — TELEPHONE (OUTPATIENT)
Dept: PODIATRY | Facility: CLINIC | Age: 30
End: 2025-01-10
Payer: MEDICAID

## 2025-01-10 ENCOUNTER — LAB VISIT (OUTPATIENT)
Dept: LAB | Facility: HOSPITAL | Age: 30
End: 2025-01-10
Attending: INTERNAL MEDICINE
Payer: MEDICAID

## 2025-01-10 DIAGNOSIS — S36.119A HEPATIC TRAUMA, INITIAL ENCOUNTER: ICD-10-CM

## 2025-01-10 LAB
ALBUMIN SERPL BCP-MCNC: 3.5 G/DL (ref 3.5–5.2)
ALP SERPL-CCNC: 109 U/L (ref 38–126)
ALT SERPL W/O P-5'-P-CCNC: 244 U/L (ref 10–44)
ANION GAP SERPL CALC-SCNC: 3 MMOL/L (ref 8–16)
AST SERPL-CCNC: 36 U/L (ref 15–46)
BASOPHILS # BLD AUTO: 0.14 K/UL (ref 0–0.2)
BASOPHILS NFR BLD: 1.2 % (ref 0–1.9)
BILIRUB SERPL-MCNC: 1.2 MG/DL (ref 0.1–1)
CALCIUM SERPL-MCNC: 9 MG/DL (ref 8.7–10.5)
CHLORIDE SERPL-SCNC: 104 MMOL/L (ref 95–110)
CO2 SERPL-SCNC: 31 MMOL/L (ref 23–29)
CREAT SERPL-MCNC: 1.44 MG/DL (ref 0.5–1.4)
DIFFERENTIAL METHOD BLD: ABNORMAL
EOSINOPHIL # BLD AUTO: 0.6 K/UL (ref 0–0.5)
EOSINOPHIL NFR BLD: 4.9 % (ref 0–8)
ERYTHROCYTE [DISTWIDTH] IN BLOOD BY AUTOMATED COUNT: 20.6 % (ref 11.5–14.5)
EST. GFR  (NO RACE VARIABLE): >60 ML/MIN/1.73 M^2
GLUCOSE SERPL-MCNC: 93 MG/DL (ref 70–110)
HCT VFR BLD AUTO: 34.5 % (ref 40–54)
HGB BLD-MCNC: 11.3 G/DL (ref 14–18)
IMM GRANULOCYTES # BLD AUTO: 0.05 K/UL (ref 0–0.04)
IMM GRANULOCYTES NFR BLD AUTO: 0.4 % (ref 0–0.5)
INR PPP: 1.1 (ref 0.8–1.2)
LYMPHOCYTES # BLD AUTO: 2.7 K/UL (ref 1–4.8)
LYMPHOCYTES NFR BLD: 23.2 % (ref 18–48)
MCH RBC QN AUTO: 28.9 PG (ref 27–31)
MCHC RBC AUTO-ENTMCNC: 32.8 G/DL (ref 32–36)
MCV RBC AUTO: 88 FL (ref 82–98)
MONOCYTES # BLD AUTO: 1.3 K/UL (ref 0.3–1)
MONOCYTES NFR BLD: 11 % (ref 4–15)
NEUTROPHILS # BLD AUTO: 6.8 K/UL (ref 1.8–7.7)
NEUTROPHILS NFR BLD: 59.3 % (ref 38–73)
NRBC BLD-RTO: 0 /100 WBC
PLATELET # BLD AUTO: 316 K/UL (ref 150–450)
PMV BLD AUTO: 8.9 FL (ref 9.2–12.9)
POTASSIUM SERPL-SCNC: 4.3 MMOL/L (ref 3.5–5.1)
PROT SERPL-MCNC: 7.4 G/DL (ref 6–8.4)
PROTHROMBIN TIME: 11.8 SEC (ref 9–12.5)
RBC # BLD AUTO: 3.91 M/UL (ref 4.6–6.2)
SODIUM SERPL-SCNC: 138 MMOL/L (ref 136–145)
UUN UR-MCNC: 29 MG/DL (ref 2–20)
WBC # BLD AUTO: 11.54 K/UL (ref 3.9–12.7)

## 2025-01-10 PROCEDURE — 36415 COLL VENOUS BLD VENIPUNCTURE: CPT | Mod: PN | Performed by: INTERNAL MEDICINE

## 2025-01-10 PROCEDURE — 85610 PROTHROMBIN TIME: CPT | Mod: PN | Performed by: INTERNAL MEDICINE

## 2025-01-10 PROCEDURE — 80053 COMPREHEN METABOLIC PANEL: CPT | Mod: PN | Performed by: INTERNAL MEDICINE

## 2025-01-10 PROCEDURE — 85025 COMPLETE CBC W/AUTO DIFF WBC: CPT | Mod: PN | Performed by: INTERNAL MEDICINE

## 2025-01-10 NOTE — TELEPHONE ENCOUNTER
----- Message from Donny sent at 1/10/2025  1:28 PM CST -----  .Type:  Needs Medical Advice    Who Called: pt    Would the patient rather a call back or a response via [facility]? Call back  Best Call Back Number: [phone]  Additional Information:     Pt stated he would like a call back regarding his paper to return to work he need it to state he is fully cleared to return

## 2025-01-10 NOTE — TELEPHONE ENCOUNTER
Spoke with Mr Gabrielley to inform him that Dr Garcia drafted a work release letter for him to return to full duty and that I have uploaded/sent it to through the patient portal as an attachment. Verbalized understanding with no other needs voiced at this time. Call back encouraged if needed.

## 2025-01-13 ENCOUNTER — TELEPHONE (OUTPATIENT)
Dept: INFECTIOUS DISEASES | Facility: CLINIC | Age: 30
End: 2025-01-13
Payer: MEDICAID

## 2025-01-13 NOTE — TELEPHONE ENCOUNTER
----- Message from Med Assistant Quintana sent at 1/10/2025  7:46 AM CST -----    ----- Message -----  From: Lori Katz DO  Sent: 1/9/2025   4:49 PM CST  To: Jarod Sanders MD; Margy Johnson MD; #    This patient developed a life threatening reaction to an antibiotic prescribed by [facility]. Please fit him in for follow up within 2 weeks.  ----- Message -----  From: Renee Michelle MA  Sent: 1/8/2025   4:27 PM CST  To: Lori Katz DO    See below  ----- Message -----  From: Mariano Irizarry MA  Sent: 1/8/2025   4:15 PM CST  To: Renee Michelle MA    He is booked until March  ----- Message -----  From: Renee Michelle MA  Sent: 1/8/2025   4:09 PM CST  To: Marilyn Myers    Hello,     Can you help me schedule a 2 week hospital follow up with Dr. Sanders?    Renee

## 2025-01-14 ENCOUNTER — CLINICAL SUPPORT (OUTPATIENT)
Dept: REHABILITATION | Facility: HOSPITAL | Age: 30
End: 2025-01-14
Payer: MEDICAID

## 2025-01-14 DIAGNOSIS — M62.81 CALF MUSCLE WEAKNESS: ICD-10-CM

## 2025-01-14 DIAGNOSIS — R29.898 MUSCULAR DECONDITIONING: ICD-10-CM

## 2025-01-14 DIAGNOSIS — R29.898 HIP WEAKNESS: Primary | ICD-10-CM

## 2025-01-14 PROCEDURE — 97110 THERAPEUTIC EXERCISES: CPT | Mod: PO

## 2025-01-14 PROCEDURE — 97161 PT EVAL LOW COMPLEX 20 MIN: CPT | Mod: PO

## 2025-01-14 NOTE — PROGRESS NOTES
[facility] Physical Therapy (PT) Initial Evaluation- ANKLE       Name: Alonzo Nascimento Jr.  Clinic Number: [id number]  YOB: 1995    Therapy Diagnosis:   Encounter Diagnoses   Name Primary?    Calf muscle weakness     Muscular deconditioning     Hip weakness Yes     Physician: Sohan Strange MD    Physician Orders: Evaluate and Treat  Medical Diagnosis: Calf muscle weakness [M62.81], Muscular deconditioning [R29.898]   Surgical Procedure and Date: Achilles tendon repair per  on 07/14/2023. Irrigation and Discharge on 12/4/2024  Evaluation Date: 1/14/2025  Insurance Authorization Period Expiration: 1/6/2025 - 1/6/2026   Plan of Care Certification Period: 4/14/2025  Progress Note Due: 2/14/2025   Date of Return to MD: CORKY  Visit # / Visits authorized: 1 / 1    Precautions:  Standard    Time In: 2:05  Time Out: 3:00  Total Appointment Time (timed & untimed codes): 55 minutes    Subjective     Date of onset: December 4th for osteomyelitis.     History of current condition: Alonzo Nascimento Jr. presents with post achillies surgery in July 2023. He was doing physical therpay until December. Jan 24- May 2024 he bagn coming here to continue the therapy. He reports the foot was very sore and stiff and found out there was an infection brewing. He went gregoria to the podiatrist and found osteomyelitis. He was in a Darco boot and eventually got his stitches out a couple of weeks ago. He reports being cleared for full weight bearing last week and he returned to work this week. After his recent surgery He devoloped dress syndrome and wa sin the hospital for 2 weeks.     Past medical history:   Past Medical History:   Diagnosis Date    Achilles tendon rupture 07/2023    left achilles tendon rupture s/p repair on 07/14/23 complicated by osteomyelitis of left calcaneus s/p I&D 12/4/24    Acute osteomyelitis of calcaneum, left 12/2024    osteomyelitis of left calcaneus s/p I&D 12/4/24       Past  Surgical history:  has a past surgical history that includes Anterior cruciate ligament repair (Right); Repair of Achilles tendon (Left, 7/14/2023); Application of splint (Left, 7/14/2023); irrigation and debridement (Left, 12/4/2024); and insertion, antibiotic spacer, lower extremity (Left, 12/4/2024).    Medications: Alonzo has a current medication list which includes the following prescription(s): famotidine, hydrocortisone, ondansetron, prednisolone sodium phosphate, and triamcinolone acetonide 0.1%.    Allergies:   Review of patient's allergies indicates:   Allergen Reactions    Bactrim [sulfamethoxazole-trimethoprim] Rash     Developed DRESS       Prior Therapy: Yes.   Social: Lives with his mom. He has stairs at home and he has been using that to strengthen his legs.   Occupation: [employer]. Not on his feet too much, maybe 45 minutes for the job.   Prior Level of Function: independent with some ankle weakness and pain.   Current Level of Function: independent with ankle weakness.   Patient's goals: improve his calf strength    Imaging:  X-Ray: Overall there is a stable region of lucency within the posterior calcaneus in this patient with history of I and D with infected bone and foreign body removal.      Environmental/Abuse/Neglect/Sensory concerns: None  The patient's spiritual, cultural, social and education needs were considered with no evidence of barriers noted.     Suicide Prevention Screening:  Do you ever have thoughts of injuring or harming yourself? []Yes   [x]No If yes, explain/actions taken:   Do you feel threatened by anyone or feel unsafe at home?  []Yes   [x]No If yes, explain/actions taken:   Any physical signs of abuse present?               []Yes   [x]No If yes, explain/actions taken:     Pain: Current 0/10, worst 6/10, best 0/10   Location: left ankle  Description: stiffness  Aggravating Factors: weight bearing.   Easing Factors: rest.       CMS Impairment/Limitation/Restriction for  FOTO ankle Survey    Therapist reviewed FOTO scores for Alonzo Nascimento Jr. on 1/14/2025.   FOTO documents entered into [employer] - see Media section.    Limitation Score: 76%  Predicted Score:81%  Category: Mobility         Objective     Posture: fair    Gait: Patient has increased adduction during gait mixed with internal rotation of left LE trough gait cycle.     Balance: Single Leg Stance (SLS): Left: 15 seconds Right: 15 seconds  Single Leg Heel Raises: Left 1 reps, Right 10 reps     Active Range of Motion (AROM)/Passive Range of Motion (PROM):    LEFT RIGHT   Ankle dorsiflexion (DF) knee straight 14 24   Ankle plantarflexion (PF) 50 60   Ankle inversion (IV) 20 20   Ankle eversion (EV) 10 10     Manual Muscle Test (MMT):   LEFT RIGHT   Hip flexion 5/5 5/5   Knee extension 4+/5 5/5   Ankle DF 4/5 5/5   Ankle PF 3/5 5/5   Ankle IV 4-/5 5/5   Ankle EV 4-/5 5/5     GIRTH: (cm)   LEFT RIGHT   Calf Assess first after Assess first after     FLEXIBILITY   LEFT RIGHT   Hamstring 80 ° 80 °   Abril's - -   Ely  + +       Palpation: Denies tenderness to palpation    Joint play:   4/6 in TC joint  2/6 in subtalar joint      Treatment   Treatment Time In: 2:50  Treatment Time Out: 3:00  Total Treatment time (time-based codes) separate from Evaluation: 10 minutes    Alonzo received therapeutic exercises to develop strength for 10 minutes including:  Patient was educated on the exercises for their HEP and the importance of the HEP compliance. Patient HEP can be found in patient instructions.       Home Exercises and Patient Education   Education provided:   Patient provided education on the importance of compliance with HEP, diagnosis and prognosis, and intended duration/frequency of physical therapy plan of care. Shared-decision making between evaluating therapist and patient utilized to determine therapeutic intervention selection, plan of care parameters, and continued monitoring of signs/symptoms.    Written Home Exercise  Program (HEP) Provided: yes.  Exercises were reviewed and Alonzo was able to demonstrate them prior to the end of the session.  Alonzo demonstrated good  understanding of the education provided.     See Electronic Medical Record under Patient Instructions for exercises provided 1/14/2025.    Assessment     Alonzo is a 29 y.o. male referred to outpatient Physical Therapy with a medical diagnosis of  Calf muscle weakness [M62.81], Muscular deconditioning [R29.898] . Patient presents with signs and symptoms consistent with their medical diagnosis. Patient presents with impaired subtalar mobility, ankle range of motion, gait, and strength. Patient also presents with increased pain that occurs with increased weightbearing and he has not been able to run, jump, or workout freely. At this time, the patients limitations are preventing them from performing hobbies and ADLs around their house without increased difficulty, pain, and discomfort.       Patient prognosis is Good.   Patient will benefit from skilled outpatient Physical Therapy to address the deficits stated above and in the chart below, provide patient/family education, and to maximize patient's level of independence.     Plan of care discussed with patient: Yes  Patient's spiritual, cultural and educational needs considered and patient is agreeable to the plan of care and goals as stated below:     Anticipated Barriers for therapy: none    Medical Necessity is demonstrated by the following  History  Co-morbidities and personal factors that may impact the plan of care [] LOW: no personal factors / co-morbidities  [x] MODERATE: 1-2 personal factors / co-morbidities  [] HIGH: 3+ personal factors / co-morbidities    Moderate / High Support Documentation: see PMHx     Examination  Body Structures and Functions, activity limitations and participation restrictions that may impact the plan of care [x] LOW: addressing 1-2 elements  [] MODERATE: 3+ elements  [] HIGH: 4+  elements (please support below)    Moderate / High Support Documentation: see objective     Clinical Presentation [x] LOW: stable  [] MODERATE: Evolving  [] HIGH: Unstable     Decision Making/ Complexity Score: low       GOALS  Short Term Goals (4 weeks):  1. Patient will have improved AROM of the left ankle to 20-0-55 degrees to improve terminal stance/push-off phase of gait.  2. Patient will have decreased complaints of pain to 0/10 to demonstrate improved symptom and pain control for return to prior level of ambulation and participation.  3. Patient will be independent and compliant with issued HEP for continued functional mobility and strength to perform all activities of daily living.  4. Patient will maintain left SLS for at least 30 seconds with pertubation's to demonstrate improved postural control to reduce risk of reinjury.    Long Term Goals (8weeks):   Patient will have improved AROM of the left ankle to 20-0-60 to improve terminal stance/push-off phase of gait.  2. Patient will have improved strength of the left ankle to 4+/5 for improved tissue tolerance for return to prior level of function.   3. Patient will be able to resume prior functional level with no deficits to increase participation in recreational and leisure activities for improved quality of life.  4. Patient will have overall improvement in condition to have decreased FOTO Limitation Score to 81% demonstrate clinically significant change in perceived function.     Plan   Plan of Care Certification: 1/14/2025 to 4/14/2025.    Outpatient Physical Therapy 2 times weekly for 6 weeks to include the following interventions: Aquatic Therapy, Cervical/Lumbar Traction, Electrical Stimulation as needed, Gait Training, Manual Therapy, Moist Heat/ Ice, Neuromuscular Re-ed, Orthotic Management and Training, Patient Education, Self Care, Therapeutic Activities, Therapeutic Exercise, and Ultrasound.       Lavelle Watson, PT  1/14/2025    Therapeutic  Exercise  Treadmill on incline  Active ankle pumps in high plank for active stretch  Double leg heel raises with band around medial mal on left side (purple power band)  Shuttle HR  Leg press squats  Hip 3 way on BOSU  SL hip abduction circles  Bridges with HR  Ankle 4 way  Inversion/eversion slides    Neuromuscular reeducation   Toe scrunches  Arch raises/holds  Toe spreading  Toe yoga  SLS on foam at rebounder  Tandem balance on foam at re bounder  Weight shifts on trmapoline  MOBO balance    Therapeutic Activities  Stair taps with jumping - progress to  Step ups with knee thrust  Step downs  Split squats  Lateral walks with band around toes   Inversion walkouts    Manual Therapy  ST joint mobilizations  Mid foot joint mobilizations  STM of gastrocnemius/peronealas

## 2025-01-14 NOTE — PLAN OF CARE
[facility] Physical Therapy (PT) Initial Evaluation- ANKLE       Name: Alonzo Nascimento Jr.  Clinic Number: [id number]  YOB: 1995    Therapy Diagnosis:   Encounter Diagnoses   Name Primary?    Calf muscle weakness     Muscular deconditioning      Physician: Sohan Strange MD    Physician Orders: Evaluate and Treat  Medical Diagnosis: Calf muscle weakness [M62.81], Muscular deconditioning [R29.898]   Surgical Procedure and Date:   Evaluation Date: 1/14/2025  Insurance Authorization Period Expiration: 1/6/2025 - 1/6/2026   Plan of Care Certification Period: 4/14/2025  Progress Note Due: 2/14/2025   Date of Return to MD: CORKY  Visit # / Visits authorized: 1 / 1    Precautions:  Standard    Time In: 2:05  Time Out: 3:00  Total Appointment Time (timed & untimed codes): 55 minutes    Subjective     Date of onset: December 4th for osteomyelitis.     History of current condition: Alonzo Nascimento Jr. presents with post achillies surgery in July 2023. He was doing physical therpay until December. Jan 24- May 2024 he bagn coming here to continue the therapy. He reports the foot was very sore and stiff and found out there was an infection brewing. He went greg[place] to the podiatrist and found osteomyelitis. He was in a Darco boot and eventually got his stitches out a couple of weeks ago. He reports being cleared for full weight bearing last week and he returned to work this week. After his recent surgery He devoloped dress syndrome and wa sin the hospital for 2 weeks.     Past medical history:   Past Medical History:   Diagnosis Date    Achilles tendon rupture 07/2023    left achilles tendon rupture s/p repair on 07/14/23 complicated by osteomyelitis of left calcaneus s/p I&D 12/4/24    Acute osteomyelitis of calcaneum, left 12/2024    osteomyelitis of left calcaneus s/p I&D 12/4/24       Past Surgical history:  has a past surgical history that includes Anterior cruciate ligament repair (Right); Repair  of Achilles tendon (Left, 7/14/2023); Application of splint (Left, 7/14/2023); irrigation and debridement (Left, 12/4/2024); and insertion, antibiotic spacer, lower extremity (Left, 12/4/2024).    Medications: Alonzo has a current medication list which includes the following prescription(s): famotidine, hydrocortisone, ondansetron, prednisolone sodium phosphate, and triamcinolone acetonide 0.1%.    Allergies:   Review of patient's allergies indicates:   Allergen Reactions    Bactrim [sulfamethoxazole-trimethoprim] Rash     Developed DRESS       Prior Therapy: Yes.   Social: Lives with his mom. He has stairs at home and he has been using that to strengthen his legs.   Occupation: [employer]. Not on his feet too much, maybe 45 minutes for the job.   Prior Level of Function: independent with some ankle weakness and pain.   Current Level of Function: independent with ankle weakness.   Patient's goals: improve his calf strength    Imaging:  X-Ray: Overall there is a stable region of lucency within the posterior calcaneus in this patient with history of I and D with infected bone and foreign body removal.      Environmental/Abuse/Neglect/Sensory concerns: None  The patient's spiritual, cultural, social and education needs were considered with no evidence of barriers noted.     Suicide Prevention Screening:  Do you ever have thoughts of injuring or harming yourself? []Yes   [x]No If yes, explain/actions taken:   Do you feel threatened by anyone or feel unsafe at home?  []Yes   [x]No If yes, explain/actions taken:   Any physical signs of abuse present?               []Yes   [x]No If yes, explain/actions taken:     Pain: Current 0/10, worst 6/10, best 0/10   Location: left ankle  Description: stiffness  Aggravating Factors: weight bearing.   Easing Factors: rest.       CMS Impairment/Limitation/Restriction for FOTO ankle Survey    Therapist reviewed FOTO scores for Alonzo Nascimento JrMaia on 1/14/2025.   FOTO documents  entered into [employer] - see Media section.    Limitation Score: 76%  Predicted Score:81%  Category: Mobility         Objective     Posture: fair    Gait: Patient has increased adduction during gait mixed with internal rotation of left LE trough gait cycle.     Balance: Single Leg Stance (SLS): Left: 15 seconds Right: 15 seconds  Single Leg Heel Raises: Left 1 reps, Right 10 reps     Active Range of Motion (AROM)/Passive Range of Motion (PROM):    LEFT RIGHT   Ankle dorsiflexion (DF) knee straight 14 24   Ankle plantarflexion (PF) 50 60   Ankle inversion (IV) 20 20   Ankle eversion (EV) 10 10     Manual Muscle Test (MMT):   LEFT RIGHT   Hip flexion 5/5 5/5   Knee extension 4+/5 5/5   Ankle DF 4/5 5/5   Ankle PF 3/5 5/5   Ankle IV 4-/5 5/5   Ankle EV 4-/5 5/5     GIRTH: (cm)   LEFT RIGHT   Calf Assess first after Assess first after     FLEXIBILITY   LEFT RIGHT   Hamstring 80 ° 80 °   Abril's - -   Ely  + +       Palpation: Denies tenderness to palpation    Joint play:   4/6 in TC joint  2/6 in subtalar joint      Treatment   Treatment Time In: 2:50  Treatment Time Out: 3:00  Total Treatment time (time-based codes) separate from Evaluation: 10 minutes    Alonzo received therapeutic exercises to develop strength for 10 minutes including:  Patient was educated on the exercises for their HEP and the importance of the HEP compliance. Patient HEP can be found in patient instructions.       Home Exercises and Patient Education   Education provided:   Patient provided education on the importance of compliance with HEP, diagnosis and prognosis, and intended duration/frequency of physical therapy plan of care. Shared-decision making between evaluating therapist and patient utilized to determine therapeutic intervention selection, plan of care parameters, and continued monitoring of signs/symptoms.    Written Home Exercise Program (HEP) Provided: yes.  Exercises were reviewed and Alonzo was able to demonstrate them prior to the end  of the session.  Alonzo demonstrated good  understanding of the education provided.     See Electronic Medical Record under Patient Instructions for exercises provided 1/14/2025.    Assessment     Alonzo is a 29 y.o. male referred to outpatient Physical Therapy with a medical diagnosis of  Calf muscle weakness [M62.81], Muscular deconditioning [R29.898] . Patient presents with signs and symptoms consistent with their medical diagnosis. Patient presents with impaired subtalar mobility, ankle range of motion, gait, and strength. Patient also presents with increased pain that occurs with increased weightbearing and he has not been able to run, jump, or workout freely. At this time, the patients limitations are preventing them from performing hobbies and ADLs around their house without increased difficulty, pain, and discomfort.       Patient prognosis is Good.   Patient will benefit from skilled outpatient Physical Therapy to address the deficits stated above and in the chart below, provide patient/family education, and to maximize patient's level of independence.     Plan of care discussed with patient: Yes  Patient's spiritual, cultural and educational needs considered and patient is agreeable to the plan of care and goals as stated below:     Anticipated Barriers for therapy: none    Medical Necessity is demonstrated by the following  History  Co-morbidities and personal factors that may impact the plan of care [] LOW: no personal factors / co-morbidities  [x] MODERATE: 1-2 personal factors / co-morbidities  [] HIGH: 3+ personal factors / co-morbidities    Moderate / High Support Documentation: see PMHx     Examination  Body Structures and Functions, activity limitations and participation restrictions that may impact the plan of care [x] LOW: addressing 1-2 elements  [] MODERATE: 3+ elements  [] HIGH: 4+ elements (please support below)    Moderate / High Support Documentation: see objective     Clinical  Presentation [x] LOW: stable  [] MODERATE: Evolving  [] HIGH: Unstable     Decision Making/ Complexity Score: low       GOALS  Short Term Goals (4 weeks):  1. Patient will have improved AROM of the left ankle to 20-0-55 degrees to improve terminal stance/push-off phase of gait.  2. Patient will have decreased complaints of pain to 0/10 to demonstrate improved symptom and pain control for return to prior level of ambulation and participation.  3. Patient will be independent and compliant with issued HEP for continued functional mobility and strength to perform all activities of daily living.  4. Patient will maintain left SLS for at least 30 seconds with pertubation's to demonstrate improved postural control to reduce risk of reinjury.    Long Term Goals (8weeks):   Patient will have improved AROM of the left ankle to 20-0-60 to improve terminal stance/push-off phase of gait.  2. Patient will have improved strength of the left ankle to 4+/5 for improved tissue tolerance for return to prior level of function.   3. Patient will be able to resume prior functional level with no deficits to increase participation in recreational and leisure activities for improved quality of life.  4. Patient will have overall improvement in condition to have decreased FOTO Limitation Score to 81% demonstrate clinically significant change in perceived function.     Plan   Plan of Care Certification: 1/14/2025 to 4/14/2025.    Outpatient Physical Therapy 2 times weekly for 6 weeks to include the following interventions: Aquatic Therapy, Cervical/Lumbar Traction, Electrical Stimulation as needed, Gait Training, Manual Therapy, Moist Heat/ Ice, Neuromuscular Re-ed, Orthotic Management and Training, Patient Education, Self Care, Therapeutic Activities, Therapeutic Exercise, and Ultrasound.       Lavelle Watson, PT  1/14/2025    Therapeutic Exercise  Treadmill on incline  Active ankle pumps in high plank for active stretch  Double leg heel  raises with band around medial mal on left side (purple power band)  Shuttle HR  Leg press squats  Hip 3 way on BOSU  SL hip abduction circles  Bridges with HR  Ankle 4 way  Inversion/eversion slides    Neuromuscular reeducation   Toe scrunches  Arch raises/holds  Toe spreading  Toe yoga  SLS on foam at rebounder  Tandem balance on foam at re bounder  Weight shifts on trmapoline  MOBO balance    Therapeutic Activities  Stair taps with jumping - progress to  Step ups with knee thrust  Step downs  Split squats  Lateral walks with band around toes   Inversion walkouts    Manual Therapy  ST joint mobilizations  Mid foot joint mobilizations  STM of gastrocnemius/peronealas

## 2025-01-14 NOTE — PLAN OF CARE
[facility] Physical Therapy (PT) Initial Evaluation- ANKLE       Name: Alonzo Nascimento Jr.  Clinic Number: [id number]  YOB: 1995    Therapy Diagnosis:   Encounter Diagnoses   Name Primary?    Calf muscle weakness     Muscular deconditioning     Hip weakness Yes     Physician: Sohan Strange MD    Physician Orders: Evaluate and Treat  Medical Diagnosis: Calf muscle weakness [M62.81], Muscular deconditioning [R29.898]   Surgical Procedure and Date: Achilles tendon repair per  on 07/14/2023. Irrigation and Discharge on 12/4/2024  Evaluation Date: 1/14/2025  Insurance Authorization Period Expiration: 1/6/2025 - 1/6/2026   Plan of Care Certification Period: 4/14/2025  Progress Note Due: 2/14/2025   Date of Return to MD: CORKY  Visit # / Visits authorized: 1 / 1    Precautions:  Standard    Time In: 2:05  Time Out: 3:00  Total Appointment Time (timed & untimed codes): 55 minutes    Subjective     Date of onset: December 4th for osteomyelitis.     History of current condition: Alonzo Nascimento Jr. presents with post achillies surgery in July 2023. He was doing physical therpay until December. Jan 24- May 2024 he bagn coming here to continue the therapy. He reports the foot was very sore and stiff and found out there was an infection brewing. He went [place]reg[place] to the podiatrist and found osteomyelitis. He was in a Darco boot and eventually got his stitches out a couple of weeks ago. He reports being cleared for full weight bearing last week and he returned to work this week. After his recent surgery He devoloped dress syndrome and wa sin the hospital for 2 weeks.     Past medical history:   Past Medical History:   Diagnosis Date    Achilles tendon rupture 07/2023    left achilles tendon rupture s/p repair on 07/14/23 complicated by osteomyelitis of left calcaneus s/p I&D 12/4/24    Acute osteomyelitis of calcaneum, left 12/2024    osteomyelitis of left calcaneus s/p I&D 12/4/24       Past  Surgical history:  has a past surgical history that includes Anterior cruciate ligament repair (Right); Repair of Achilles tendon (Left, 7/14/2023); Application of splint (Left, 7/14/2023); irrigation and debridement (Left, 12/4/2024); and insertion, antibiotic spacer, lower extremity (Left, 12/4/2024).    Medications: Alonzo has a current medication list which includes the following prescription(s): famotidine, hydrocortisone, ondansetron, prednisolone sodium phosphate, and triamcinolone acetonide 0.1%.    Allergies:   Review of patient's allergies indicates:   Allergen Reactions    Bactrim [sulfamethoxazole-trimethoprim] Rash     Developed DRESS       Prior Therapy: Yes.   Social: Lives with his mom. He has stairs at home and he has been using that to strengthen his legs.   Occupation: [employer]. Not on his feet too much, maybe 45 minutes for the job.   Prior Level of Function: independent with some ankle weakness and pain.   Current Level of Function: independent with ankle weakness.   Patient's goals: improve his calf strength    Imaging:  X-Ray: Overall there is a stable region of lucency within the posterior calcaneus in this patient with history of I and D with infected bone and foreign body removal.      Environmental/Abuse/Neglect/Sensory concerns: None  The patient's spiritual, cultural, social and education needs were considered with no evidence of barriers noted.     Suicide Prevention Screening:  Do you ever have thoughts of injuring or harming yourself? []Yes   [x]No If yes, explain/actions taken:   Do you feel threatened by anyone or feel unsafe at home?  []Yes   [x]No If yes, explain/actions taken:   Any physical signs of abuse present?               []Yes   [x]No If yes, explain/actions taken:     Pain: Current 0/10, worst 6/10, best 0/10   Location: left ankle  Description: stiffness  Aggravating Factors: weight bearing.   Easing Factors: rest.       CMS Impairment/Limitation/Restriction for  FOTO ankle Survey    Therapist reviewed FOTO scores for Alonzo Nascimento Jr. on 1/14/2025.   FOTO documents entered into [employer] - see Media section.    Limitation Score: 76%  Predicted Score:81%  Category: Mobility         Objective     Posture: fair    Gait: Patient has increased adduction during gait mixed with internal rotation of left LE trough gait cycle.     Balance: Single Leg Stance (SLS): Left: 15 seconds Right: 15 seconds  Single Leg Heel Raises: Left 1 reps, Right 10 reps     Active Range of Motion (AROM)/Passive Range of Motion (PROM):    LEFT RIGHT   Ankle dorsiflexion (DF) knee straight 14 24   Ankle plantarflexion (PF) 50 60   Ankle inversion (IV) 20 20   Ankle eversion (EV) 10 10     Manual Muscle Test (MMT):   LEFT RIGHT   Hip flexion 5/5 5/5   Knee extension 4+/5 5/5   Ankle DF 4/5 5/5   Ankle PF 3/5 5/5   Ankle IV 4-/5 5/5   Ankle EV 4-/5 5/5     GIRTH: (cm)   LEFT RIGHT   Calf Assess first after Assess first after     FLEXIBILITY   LEFT RIGHT   Hamstring 80 ° 80 °   Abril's - -   Ely  + +       Palpation: Denies tenderness to palpation    Joint play:   4/6 in TC joint  2/6 in subtalar joint      Treatment   Treatment Time In: 2:50  Treatment Time Out: 3:00  Total Treatment time (time-based codes) separate from Evaluation: 10 minutes    Alonzo received therapeutic exercises to develop strength for 10 minutes including:  Patient was educated on the exercises for their HEP and the importance of the HEP compliance. Patient HEP can be found in patient instructions.       Home Exercises and Patient Education   Education provided:   Patient provided education on the importance of compliance with HEP, diagnosis and prognosis, and intended duration/frequency of physical therapy plan of care. Shared-decision making between evaluating therapist and patient utilized to determine therapeutic intervention selection, plan of care parameters, and continued monitoring of signs/symptoms.    Written Home Exercise  Program (HEP) Provided: yes.  Exercises were reviewed and Alonzo was able to demonstrate them prior to the end of the session.  Alonzo demonstrated good  understanding of the education provided.     See Electronic Medical Record under Patient Instructions for exercises provided 1/14/2025.    Assessment     Alonzo is a 29 y.o. male referred to outpatient Physical Therapy with a medical diagnosis of  Calf muscle weakness [M62.81], Muscular deconditioning [R29.898] . Patient presents with signs and symptoms consistent with their medical diagnosis. Patient presents with impaired subtalar mobility, ankle range of motion, gait, and strength. Patient also presents with increased pain that occurs with increased weightbearing and he has not been able to run, jump, or workout freely. At this time, the patients limitations are preventing them from performing hobbies and ADLs around their house without increased difficulty, pain, and discomfort.       Patient prognosis is Good.   Patient will benefit from skilled outpatient Physical Therapy to address the deficits stated above and in the chart below, provide patient/family education, and to maximize patient's level of independence.     Plan of care discussed with patient: Yes  Patient's spiritual, cultural and educational needs considered and patient is agreeable to the plan of care and goals as stated below:     Anticipated Barriers for therapy: none    Medical Necessity is demonstrated by the following  History  Co-morbidities and personal factors that may impact the plan of care [] LOW: no personal factors / co-morbidities  [x] MODERATE: 1-2 personal factors / co-morbidities  [] HIGH: 3+ personal factors / co-morbidities    Moderate / High Support Documentation: see PMHx     Examination  Body Structures and Functions, activity limitations and participation restrictions that may impact the plan of care [x] LOW: addressing 1-2 elements  [] MODERATE: 3+ elements  [] HIGH: 4+  elements (please support below)    Moderate / High Support Documentation: see objective     Clinical Presentation [x] LOW: stable  [] MODERATE: Evolving  [] HIGH: Unstable     Decision Making/ Complexity Score: low       GOALS  Short Term Goals (4 weeks):  1. Patient will have improved AROM of the left ankle to 20-0-55 degrees to improve terminal stance/push-off phase of gait.  2. Patient will have decreased complaints of pain to 0/10 to demonstrate improved symptom and pain control for return to prior level of ambulation and participation.  3. Patient will be independent and compliant with issued HEP for continued functional mobility and strength to perform all activities of daily living.  4. Patient will maintain left SLS for at least 30 seconds with pertubation's to demonstrate improved postural control to reduce risk of reinjury.    Long Term Goals (8weeks):   Patient will have improved AROM of the left ankle to 20-0-60 to improve terminal stance/push-off phase of gait.  2. Patient will have improved strength of the left ankle to 4+/5 for improved tissue tolerance for return to prior level of function.   3. Patient will be able to resume prior functional level with no deficits to increase participation in recreational and leisure activities for improved quality of life.  4. Patient will have overall improvement in condition to have decreased FOTO Limitation Score to 81% demonstrate clinically significant change in perceived function.     Plan   Plan of Care Certification: 1/14/2025 to 4/14/2025.    Outpatient Physical Therapy 2 times weekly for 6 weeks to include the following interventions: Aquatic Therapy, Cervical/Lumbar Traction, Electrical Stimulation as needed, Gait Training, Manual Therapy, Moist Heat/ Ice, Neuromuscular Re-ed, Orthotic Management and Training, Patient Education, Self Care, Therapeutic Activities, Therapeutic Exercise, and Ultrasound.       Lavelle Watson, PT  1/14/2025    Therapeutic  Exercise  Treadmill on incline  Active ankle pumps in high plank for active stretch  Double leg heel raises with band around medial mal on left side (purple power band)  Shuttle HR  Leg press squats  Hip 3 way on BOSU  SL hip abduction circles  Bridges with HR  Ankle 4 way  Inversion/eversion slides    Neuromuscular reeducation   Toe scrunches  Arch raises/holds  Toe spreading  Toe yoga  SLS on foam at rebounder  Tandem balance on foam at re bounder  Weight shifts on trmapoline  MOBO balance    Therapeutic Activities  Stair taps with jumping - progress to  Step ups with knee thrust  Step downs  Split squats  Lateral walks with band around toes   Inversion walkouts    Manual Therapy  ST joint mobilizations  Mid foot joint mobilizations  STM of gastrocnemius/peronealas

## 2025-01-17 ENCOUNTER — LAB VISIT (OUTPATIENT)
Dept: LAB | Facility: HOSPITAL | Age: 30
End: 2025-01-17
Attending: INTERNAL MEDICINE
Payer: MEDICAID

## 2025-01-17 DIAGNOSIS — S36.119A HEPATIC TRAUMA, INITIAL ENCOUNTER: ICD-10-CM

## 2025-01-17 LAB
ALBUMIN SERPL BCP-MCNC: 3.7 G/DL (ref 3.5–5.2)
ALP SERPL-CCNC: 86 U/L (ref 38–126)
ALT SERPL W/O P-5'-P-CCNC: 79 U/L (ref 10–44)
ANION GAP SERPL CALC-SCNC: 4 MMOL/L (ref 8–16)
AST SERPL-CCNC: 29 U/L (ref 15–46)
BASOPHILS # BLD AUTO: 0.07 K/UL (ref 0–0.2)
BASOPHILS NFR BLD: 0.6 % (ref 0–1.9)
BILIRUB SERPL-MCNC: 1.1 MG/DL (ref 0.1–1)
CALCIUM SERPL-MCNC: 9.1 MG/DL (ref 8.7–10.5)
CHLORIDE SERPL-SCNC: 102 MMOL/L (ref 95–110)
CO2 SERPL-SCNC: 31 MMOL/L (ref 23–29)
CREAT SERPL-MCNC: 1.61 MG/DL (ref 0.5–1.4)
DIFFERENTIAL METHOD BLD: ABNORMAL
EOSINOPHIL # BLD AUTO: 0.3 K/UL (ref 0–0.5)
EOSINOPHIL NFR BLD: 2.6 % (ref 0–8)
ERYTHROCYTE [DISTWIDTH] IN BLOOD BY AUTOMATED COUNT: 19.7 % (ref 11.5–14.5)
EST. GFR  (NO RACE VARIABLE): 58.3 ML/MIN/1.73 M^2
GLUCOSE SERPL-MCNC: 93 MG/DL (ref 70–110)
HCT VFR BLD AUTO: 36.2 % (ref 40–54)
HGB BLD-MCNC: 11.8 G/DL (ref 14–18)
IMM GRANULOCYTES # BLD AUTO: 0.05 K/UL (ref 0–0.04)
IMM GRANULOCYTES NFR BLD AUTO: 0.5 % (ref 0–0.5)
INR PPP: 1 (ref 0.8–1.2)
LYMPHOCYTES # BLD AUTO: 3.6 K/UL (ref 1–4.8)
LYMPHOCYTES NFR BLD: 32.8 % (ref 18–48)
MCH RBC QN AUTO: 29.6 PG (ref 27–31)
MCHC RBC AUTO-ENTMCNC: 32.6 G/DL (ref 32–36)
MCV RBC AUTO: 91 FL (ref 82–98)
MONOCYTES # BLD AUTO: 0.9 K/UL (ref 0.3–1)
MONOCYTES NFR BLD: 8.3 % (ref 4–15)
NEUTROPHILS # BLD AUTO: 6.1 K/UL (ref 1.8–7.7)
NEUTROPHILS NFR BLD: 55.2 % (ref 38–73)
NRBC BLD-RTO: 0 /100 WBC
PLATELET # BLD AUTO: 258 K/UL (ref 150–450)
PMV BLD AUTO: 8.9 FL (ref 9.2–12.9)
POTASSIUM SERPL-SCNC: 4.3 MMOL/L (ref 3.5–5.1)
PROT SERPL-MCNC: 7.3 G/DL (ref 6–8.4)
PROTHROMBIN TIME: 11.2 SEC (ref 9–12.5)
RBC # BLD AUTO: 3.99 M/UL (ref 4.6–6.2)
SODIUM SERPL-SCNC: 137 MMOL/L (ref 136–145)
UUN UR-MCNC: 25 MG/DL (ref 2–20)
WBC # BLD AUTO: 10.96 K/UL (ref 3.9–12.7)

## 2025-01-17 PROCEDURE — 80053 COMPREHEN METABOLIC PANEL: CPT | Mod: PN | Performed by: INTERNAL MEDICINE

## 2025-01-17 PROCEDURE — 36415 COLL VENOUS BLD VENIPUNCTURE: CPT | Mod: PN | Performed by: INTERNAL MEDICINE

## 2025-01-17 PROCEDURE — 85025 COMPLETE CBC W/AUTO DIFF WBC: CPT | Mod: PN | Performed by: INTERNAL MEDICINE

## 2025-01-17 PROCEDURE — 85610 PROTHROMBIN TIME: CPT | Mod: PN | Performed by: INTERNAL MEDICINE

## 2025-01-19 ENCOUNTER — PATIENT MESSAGE (OUTPATIENT)
Dept: PRIMARY CARE CLINIC | Facility: CLINIC | Age: 30
End: 2025-01-19
Payer: MEDICAID

## 2025-01-20 ENCOUNTER — PATIENT MESSAGE (OUTPATIENT)
Dept: PRIMARY CARE CLINIC | Facility: CLINIC | Age: 30
End: 2025-01-20
Payer: MEDICAID

## 2025-01-24 ENCOUNTER — LAB VISIT (OUTPATIENT)
Dept: LAB | Facility: HOSPITAL | Age: 30
End: 2025-01-24
Attending: INTERNAL MEDICINE
Payer: MEDICAID

## 2025-01-24 DIAGNOSIS — S36.119A HEPATIC TRAUMA, INITIAL ENCOUNTER: ICD-10-CM

## 2025-01-24 LAB
ALBUMIN SERPL BCP-MCNC: 3.7 G/DL (ref 3.5–5.2)
ALP SERPL-CCNC: 65 U/L (ref 38–126)
ALT SERPL W/O P-5'-P-CCNC: 49 U/L (ref 10–44)
ANION GAP SERPL CALC-SCNC: 4 MMOL/L (ref 8–16)
AST SERPL-CCNC: 39 U/L (ref 15–46)
BASOPHILS # BLD AUTO: 0.07 K/UL (ref 0–0.2)
BASOPHILS NFR BLD: 0.7 % (ref 0–1.9)
BILIRUB SERPL-MCNC: 0.6 MG/DL (ref 0.1–1)
CALCIUM SERPL-MCNC: 9.1 MG/DL (ref 8.7–10.5)
CHLORIDE SERPL-SCNC: 104 MMOL/L (ref 95–110)
CO2 SERPL-SCNC: 30 MMOL/L (ref 23–29)
CREAT SERPL-MCNC: 1.25 MG/DL (ref 0.5–1.4)
DIFFERENTIAL METHOD BLD: ABNORMAL
EOSINOPHIL # BLD AUTO: 0.3 K/UL (ref 0–0.5)
EOSINOPHIL NFR BLD: 3 % (ref 0–8)
ERYTHROCYTE [DISTWIDTH] IN BLOOD BY AUTOMATED COUNT: 19.2 % (ref 11.5–14.5)
EST. GFR  (NO RACE VARIABLE): >60 ML/MIN/1.73 M^2
GLUCOSE SERPL-MCNC: 93 MG/DL (ref 70–110)
HCT VFR BLD AUTO: 37.9 % (ref 40–54)
HGB BLD-MCNC: 12 G/DL (ref 14–18)
IMM GRANULOCYTES # BLD AUTO: 0.04 K/UL (ref 0–0.04)
IMM GRANULOCYTES NFR BLD AUTO: 0.4 % (ref 0–0.5)
INR PPP: 1 (ref 0.8–1.2)
LYMPHOCYTES # BLD AUTO: 3.2 K/UL (ref 1–4.8)
LYMPHOCYTES NFR BLD: 32.5 % (ref 18–48)
MCH RBC QN AUTO: 29.1 PG (ref 27–31)
MCHC RBC AUTO-ENTMCNC: 31.7 G/DL (ref 32–36)
MCV RBC AUTO: 92 FL (ref 82–98)
MONOCYTES # BLD AUTO: 0.9 K/UL (ref 0.3–1)
MONOCYTES NFR BLD: 9.3 % (ref 4–15)
NEUTROPHILS # BLD AUTO: 5.3 K/UL (ref 1.8–7.7)
NEUTROPHILS NFR BLD: 54.1 % (ref 38–73)
NRBC BLD-RTO: 0 /100 WBC
PLATELET # BLD AUTO: 184 K/UL (ref 150–450)
PMV BLD AUTO: 8.9 FL (ref 9.2–12.9)
POTASSIUM SERPL-SCNC: 4.2 MMOL/L (ref 3.5–5.1)
PROT SERPL-MCNC: 7.1 G/DL (ref 6–8.4)
PROTHROMBIN TIME: 10.8 SEC (ref 9–12.5)
RBC # BLD AUTO: 4.13 M/UL (ref 4.6–6.2)
SODIUM SERPL-SCNC: 138 MMOL/L (ref 136–145)
UUN UR-MCNC: 20 MG/DL (ref 2–20)
WBC # BLD AUTO: 9.72 K/UL (ref 3.9–12.7)

## 2025-01-24 PROCEDURE — 85610 PROTHROMBIN TIME: CPT | Mod: PN | Performed by: INTERNAL MEDICINE

## 2025-01-24 PROCEDURE — 36415 COLL VENOUS BLD VENIPUNCTURE: CPT | Mod: PN | Performed by: INTERNAL MEDICINE

## 2025-01-24 PROCEDURE — 80053 COMPREHEN METABOLIC PANEL: CPT | Mod: PN | Performed by: INTERNAL MEDICINE

## 2025-01-24 PROCEDURE — 85025 COMPLETE CBC W/AUTO DIFF WBC: CPT | Mod: PN | Performed by: INTERNAL MEDICINE

## 2025-01-27 ENCOUNTER — OFFICE VISIT (OUTPATIENT)
Dept: INFECTIOUS DISEASES | Facility: CLINIC | Age: 30
End: 2025-01-27
Payer: MEDICAID

## 2025-01-27 VITALS — HEIGHT: 73 IN | BODY MASS INDEX: 19.57 KG/M2 | WEIGHT: 147.69 LBS

## 2025-01-27 DIAGNOSIS — M86.172 ACUTE OSTEOMYELITIS OF LEFT CALCANEUS: Primary | ICD-10-CM

## 2025-01-27 PROCEDURE — 99213 OFFICE O/P EST LOW 20 MIN: CPT | Mod: S$PBB,,, | Performed by: INTERNAL MEDICINE

## 2025-01-27 PROCEDURE — 99212 OFFICE O/P EST SF 10 MIN: CPT | Mod: PBBFAC,PN | Performed by: INTERNAL MEDICINE

## 2025-01-27 PROCEDURE — 1111F DSCHRG MED/CURRENT MED MERGE: CPT | Mod: CPTII,,, | Performed by: INTERNAL MEDICINE

## 2025-01-27 PROCEDURE — 1159F MED LIST DOCD IN RCRD: CPT | Mod: CPTII,,, | Performed by: INTERNAL MEDICINE

## 2025-01-27 PROCEDURE — 3008F BODY MASS INDEX DOCD: CPT | Mod: CPTII,,, | Performed by: INTERNAL MEDICINE

## 2025-01-27 PROCEDURE — 99999 PR PBB SHADOW E&M-EST. PATIENT-LVL II: CPT | Mod: PBBFAC,,, | Performed by: INTERNAL MEDICINE

## 2025-01-27 NOTE — PROGRESS NOTES
Infectious Disease Clinic  Clinic note    Patient Name: Alonzo Nascimento Jr.  YOB: 1995    PRESENTING HISTORY       History of Present Illness:  Mr. Alonzo Nascimento Jr. is a 30yo man w/a history of Achilles tendon rupture (s/p L Achilles tendon repair on 7/14/2023, c/b chronic left heel/lateral wound with recurrent SSTI last in 10/2024 treated with kelfex for 5 days) who was admitted on 11/19/2024 with 10 days of worsening swelling, pain, erythema, and seropurulent drainage from a blistering left lateral ankle/heel ulceration with underlying calcaneal osteomyelitis (calcaneal tuberosity marrow edema and hyper-enhancement per 11/11/2024 MRI). Cxs grew citrobacter and prevotella. He was started on bactrim and flagyl. He had reaction to the bactrim and developed DRESS. He stopped all antibiotics at that point and has been off them since. His foot have been healing well and he currently has no symptoms concerning for infection. Podiatry plans to repeat his xray in April and currently do not think he needs further antibiotic therapy    Review of Systems:  Constitutional: no fever or chills  Eyes: no visual changes  ENT: no nasal congestion or sore throat  Respiratory: no cough or shortness of breath  Cardiovascular: no chest pain  Gastrointestinal: no nausea or vomiting, no abdominal pain, no constipation, no diarrhea  Genitourinary: no hematuria or dysuria  Musculoskeletal: no arthralgias or myalgias  Skin: no rash  Neurological: no headaches, numbness, or paresthesias    PAST HISTORY:     Past Medical History:   Diagnosis Date    Achilles tendon rupture 07/2023    left achilles tendon rupture s/p repair on 07/14/23 complicated by osteomyelitis of left calcaneus s/p I&D 12/4/24    Acute osteomyelitis of calcaneum, left 12/2024    osteomyelitis of left calcaneus s/p I&D 12/4/24       Past Surgical History:   Procedure Laterality Date    ANTERIOR CRUCIATE LIGAMENT REPAIR Right     2018    APPLICATION OF  SPLINT Left 7/14/2023    Procedure: APPLICATION, SPLINT;  Surgeon: Lenore Pearl DPM;  Location: [facility] OR;  Service: Podiatry;  Laterality: Left;    INSERTION, ANTIBIOTIC SPACER, LOWER EXTREMITY Left 12/4/2024    Procedure: INSERTION, ANTIBIOTIC SPACER, LOWER EXTREMITY;  Surgeon: Jaswinder Garcia DPM;  Location: [facility] OR;  Service: Podiatry;  Laterality: Left;    IRRIGATION AND DEBRIDEMENT Left 12/4/2024    Procedure: IRRIGATION AND DEBRIDEMENT;  Surgeon: Jaswinder Garcia DPM;  Location: [facility] OR;  Service: Podiatry;  Laterality: Left;  mini c-arm, Stimulan jr tobramycin/Vancomycin    REPAIR OF ACHILLES TENDON Left 7/14/2023    Procedure: REPAIR, TENDON, ACHILLES;  Surgeon: Lenore Pearl DPM;  Location: [facility] OR;  Service: Podiatry;  Laterality: Left;       No family history on file.    Social History     Socioeconomic History    Marital status: Single   Tobacco Use    Smoking status: Never    Smokeless tobacco: Never   Substance and Sexual Activity    Alcohol use: Yes     Comment: rarely    Drug use: Yes     Types: Marijuana    Sexual activity: Yes     Partners: Female     Social Drivers of Health     Financial Resource Strain: Low Risk  (1/2/2025)    Overall Financial Resource Strain (CARDIA)     Difficulty of Paying Living Expenses: Not hard at all   Food Insecurity: No Food Insecurity (1/2/2025)    Hunger Vital Sign     Worried About Running Out of Food in the Last Year: Never true     Ran Out of Food in the Last Year: Never true   Transportation Needs: No Transportation Needs (1/2/2025)    TRANSPORTATION NEEDS     Transportation : No   Recent Concern: Transportation Needs - Unmet Transportation Needs (11/19/2024)    TRANSPORTATION NEEDS     Transportation : Yes, it has kept me from non-medical meetings, appointments, work or from getting things that I need.   Physical Activity: Patient Unable To Answer (12/25/2024)    Exercise Vital Sign     Days of Exercise per Week: Patient unable to answer     Minutes  "of Exercise per Session: Patient unable to answer   Stress: No Stress Concern Present (1/2/2025)    [employer] - Occupational Stress Questionnaire     Feeling of Stress : Not at all   Recent Concern: Stress - Stress Concern Present (12/25/2024)    [employer] - Occupational Stress Questionnaire     Feeling of Stress : To some extent   Housing Stability: Low Risk  (1/2/2025)    Housing Stability Vital Sign     Unable to Pay for Housing in the Last Year: No     Homeless in the Last Year: No       MEDICATIONS & ALLERGIES:     Current Outpatient Medications on File Prior to Visit   Medication Sig    famotidine (PEPCID) 20 MG tablet Take 1 tablet (20 mg total) by mouth Daily. Take while on steroids.    hydrocortisone 2.5 % cream Apply topically 2 (two) times daily. Apply to rash on face.    prednisoLONE sodium phosphate (ORAPRED) 15 mg/5 mL (5 mL) solution Take 16.7 mLs (50.1 mg total) by mouth once daily for 7 days, THEN 13.3 mLs (39.9 mg total) once daily for 7 days, THEN 10 mLs (30 mg total) once daily for 7 days, THEN 6.7 mLs (20.1 mg total) once daily for 7 days, THEN 3.3 mLs (9.9 mg total) once daily for 7 days.    triamcinolone acetonide 0.1% (KENALOG) 0.1 % cream Apply topically 2 (two) times daily. Apply to trunk and limbs.    ondansetron (ZOFRAN-ODT) 4 MG TbDL Take 1 tablet (4 mg total) by mouth every 8 (eight) hours as needed. (Patient not taking: Reported on 1/27/2025)     No current facility-administered medications on file prior to visit.       Review of patient's allergies indicates:   Allergen Reactions    Bactrim [sulfamethoxazole-trimethoprim] Rash     Developed DRESS       OBJECTIVE:   Vital Signs:  Vitals:    01/27/25 1056   Weight: 67 kg (147 lb 11.3 oz)   Height: 6' 1" (1.854 m)       No results found for this or any previous visit (from the past 24 hours).      Physical Exam:   General:  Well developed, well nourished, no acute " distress  HEENT:  Normocephalic, atraumatic, EOMI, clear sclera, throat clear without erythema or exudates  CVS:  RRR, S1 and S2 normal, no murmurs, rubs, gallops  Resp:  Lungs clear to auscultation, no wheezes, rales, rhonchi  GI:  Abdomen soft, non-tender, non-distended, normoactive bowel sounds, no masses  MSK:  healed scar on left heel  Skin:  No rashes, ulcers, erythema  Neuro:  CNII-XII grossly intact  Psych:  Alert and oriented to person, place, and time    ASSESSMENT:     1. Acute osteomyelitis of left calcaneus  28yo man w/a history of Achilles tendon rupture (s/p L Achilles tendon repair on 7/14/2023, c/b chronic left heel/lateral wound with recurrent SSTI last in 10/2024 treated with kelfex for 5 days) who was admitted on 11/19/2024 with 10 days of worsening swelling, pain, erythema, and seropurulent drainage from a blistering left lateral ankle/heel ulceration with underlying calcaneal osteomyelitis. Received partial therapy with bactrim and flagyl but had to stop due to DRESS from bactrim    PLAN:     -given lack of symptoms or exam concerning for infection will monitor off antibiotics  -follow up with podiatry   RTC PRN

## 2025-01-28 ENCOUNTER — TELEPHONE (OUTPATIENT)
Dept: DERMATOLOGY | Facility: CLINIC | Age: 30
End: 2025-01-28
Payer: MEDICAID

## 2025-01-28 ENCOUNTER — CLINICAL SUPPORT (OUTPATIENT)
Dept: REHABILITATION | Facility: HOSPITAL | Age: 30
End: 2025-01-28
Payer: MEDICAID

## 2025-01-28 DIAGNOSIS — M62.81 CALF MUSCLE WEAKNESS: Primary | ICD-10-CM

## 2025-01-28 DIAGNOSIS — R29.898 HIP WEAKNESS: ICD-10-CM

## 2025-01-28 PROCEDURE — 97110 THERAPEUTIC EXERCISES: CPT | Mod: PO

## 2025-01-28 NOTE — PROGRESS NOTES
Outpatient Rehab    Physical Therapy Visit    Patient Name: Alonzo Nascimento Jr.  MRN: [id number]  YOB: 1995  Today's Date: 1/28/2025    Therapy Diagnosis:   Encounter Diagnoses   Name Primary?    Calf muscle weakness Yes    Hip weakness      Physician: Sohan Strange MD    Physician Orders: Eval and Treat  Medical Diagnosis: Calf muscle weakness [M62.81], Muscular deconditioning [R29.898]     Visit # / Visits Authorized:  1 / 12   Date of Evaluation:  1/14/2025  Insurance Authorization Period: 1/22/2025 - 4/14/2025   Plan of Care Certification:  4/14/2025      Time In: 1500   Time Out: 1554  Total Time: 54   Total Billable Time: 54 minutes         Subjective   Patient is doing well today. He has not had much pain..  Pain reported as 0.      Past Medical History/Physical Systems Review:   Alonzo Nascimento Jr.  has a past medical history of Achilles tendon rupture and Acute osteomyelitis of calcaneum, left.    Alonzo Nascimento Jr.  has a past surgical history that includes Anterior cruciate ligament repair (Right); Repair of Achilles tendon (Left, 7/14/2023); Application of splint (Left, 7/14/2023); irrigation and debridement (Left, 12/4/2024); and insertion, antibiotic spacer, lower extremity (Left, 12/4/2024).    Alonzo has a current medication list which includes the following prescription(s): famotidine, hydrocortisone, ondansetron, prednisolone sodium phosphate, and triamcinolone acetonide 0.1%.    Review of patient's allergies indicates:   Allergen Reactions    Bactrim [sulfamethoxazole-trimethoprim] Rash     Developed DRESS        Objective            Treatment:  Therapeutic Exercise  Therapeutic Exercise Activity 1: Heel raises with power band around medial mal: 3 x 20 reps double leg  Therapeutic Exercise Activity 2: shuttle squats (3 x 15) and single heel raises: (3 x 8)  Therapeutic Exercise Activity 3: Ankle pumps with high plank: 1 minute    Manual Therapy  Manual Therapy Activity 1:  Subtalar mobilizations Grade III  Manual Therapy Activity 2: Subtalar manipulation    Balance/Neuromuscular Re-Education  Balance/Neuromuscular Re-Education Activity 1: BOSU balance with ball toss (single leg)  Balance/Neuromuscular Re-Education Activity 2: Toe scrunches: 2 minutes in standing  Balance/Neuromuscular Re-Education Activity 3: Toe walkin long laps  Balance/Neuromuscular Re-Education Activity 4: Arch domes in standing: 3 x 10    Gait Training  Gait Training Activity 1: Treadmill: 8 minutes 3.0 mph at 1.5 incline    Patient's spiritual, cultural, and educational needs considered and patient agreeable to plan of care and goals.     Assessment & Plan   Assessment: Patient reports for follow up treatment with decrease in complaint of symptoms. Patient tolerated today's session with no complaints. Today's treatment includedstrengthening, balance, and musclar recruiting interventions. Patient remains limited in subtalar range of motion and calf strength. Patient is progressing well towards their goals. Patient will continue to benefit from skilled outpatient physical therapy to address the deficits listed in the problem list box on initial evaluation, provide patient/family education and to maximize patient's level of independence in the home and community environment.  Evaluation/Treatment Tolerance: Patient tolerated treatment well        Plan: Progress calf strengthening    Goals:   Active       Physical Therapy       Physical Therapy Goal (Progressing)       Start:  25    Expected End:  25         Short Term Goals (4 weeks):  1. Patient will have improved AROM of the left ankle to 20-0-55 degrees to improve terminal stance/push-off phase of gait.  2. Patient will have decreased complaints of pain to 0/10 to demonstrate improved symptom and pain control for return to prior level of ambulation and participation.  3. Patient will be independent and compliant with issued HEP for continued  functional mobility and strength to perform all activities of daily living.  4. Patient will maintain left SLS for at least 30 seconds with pertubation's to demonstrate improved postural control to reduce risk of reinjury.     Long Term Goals (8weeks):   1. Patient will have improved AROM of the left ankle to 20-0-60 to improve terminal stance/push-off phase of gait.  2. Patient will have improved strength of the left ankle to 4+/5 for improved tissue tolerance for return to prior level of function.   3. Patient will be able to resume prior functional level with no deficits to increase participation in recreational and leisure activities for improved quality of life.  4. Patient will have overall improvement in condition to have decreased FOTO Limitation Score to 81% demonstrate clinically significant change in perceived function.                Plan going forward  Therapeutic Exercise  Treadmill on incline  Active ankle pumps in high plank for active stretch  Double leg heel raises with band around medial mal on left side (purple power band)  Shuttle HR  Leg press squats  Hip 3 way on BOSU  SL hip abduction circles  Bridges with HR  Ankle 4 way  Inversion/eversion slides     Neuromuscular reeducation   Toe scrunches  Arch raises/holds  Toe spreading  Toe yoga  SLS on foam at rebounder  Tandem balance on foam at re bounder  Weight shifts on trmapoline  MOBO balance     Therapeutic Activities  Stair taps with jumping - progress to  Step ups with knee thrust  Step downs  Split squats  Lateral walks with band around toes   Inversion walkouts     Manual Therapy  ST joint mobilizations  Mid foot joint mobilizations  STM of gastrocnemius/peronealas

## 2025-01-30 ENCOUNTER — CLINICAL SUPPORT (OUTPATIENT)
Dept: REHABILITATION | Facility: HOSPITAL | Age: 30
End: 2025-01-30
Payer: MEDICAID

## 2025-01-30 ENCOUNTER — OFFICE VISIT (OUTPATIENT)
Dept: PRIMARY CARE CLINIC | Facility: CLINIC | Age: 30
End: 2025-01-30
Payer: MEDICAID

## 2025-01-30 VITALS
WEIGHT: 171.63 LBS | TEMPERATURE: 98 F | DIASTOLIC BLOOD PRESSURE: 71 MMHG | SYSTOLIC BLOOD PRESSURE: 108 MMHG | RESPIRATION RATE: 17 BRPM | BODY MASS INDEX: 22.75 KG/M2 | HEART RATE: 69 BPM | HEIGHT: 73 IN | OXYGEN SATURATION: 97 %

## 2025-01-30 DIAGNOSIS — T50.905A DRESS SYNDROME: Primary | ICD-10-CM

## 2025-01-30 DIAGNOSIS — R29.898 HIP WEAKNESS: ICD-10-CM

## 2025-01-30 DIAGNOSIS — D72.12 DRESS SYNDROME: Primary | ICD-10-CM

## 2025-01-30 DIAGNOSIS — M62.81 CALF MUSCLE WEAKNESS: Primary | ICD-10-CM

## 2025-01-30 DIAGNOSIS — R79.89 ELEVATED LFTS: ICD-10-CM

## 2025-01-30 PROCEDURE — 97110 THERAPEUTIC EXERCISES: CPT | Mod: PO

## 2025-01-30 PROCEDURE — 99999 PR PBB SHADOW E&M-EST. PATIENT-LVL III: CPT | Mod: PBBFAC,,,

## 2025-01-30 PROCEDURE — 99213 OFFICE O/P EST LOW 20 MIN: CPT | Mod: PBBFAC,PN

## 2025-01-30 NOTE — PROGRESS NOTES
"  [facility] CLINIC NOTE  Follow-up Visit      SUBJECTIVE:     Patient: Alonzo Nascimento Jr. is a 30 y.o. male.    Chief Compliant:   Chief Complaint   Patient presents with    Follow-up     Lab f/u       History of Present Illness:  #Dress Syndrome  #Elevated Liver Enzymes  - Seen 1/6 following hospitalization 12/23-1/6 for DRESS due to bactrim for OM of L ankle, seeing ID (per ID, no need for antibiotics, healing well), seeing Physical therapy, patient states going well.  - Completing steroid course, has about 2 weeks left of steroid taper.  - Dermatologist appt yesterday, unable to attend but rescheduled 2/1. Reports skin improvement (dry and flaky prior)  - Lab work completed due to elevated liver enzymes, hepatology appt 3/11.    Denies any acute concerns or complaints at this time.      Review of Systems   Constitutional:  Negative for fever.   Respiratory:  Negative for shortness of breath.    Cardiovascular:  Negative for chest pain.   Gastrointestinal:  Negative for abdominal pain.   Neurological:  Negative for headaches.     A 10+ review of systems was performed with pertinent positives and negatives noted above in the history of present illness. Other systems were negative unless otherwise specified.    OBJECTIVE:     Vital Signs (Most Recent)  Vitals:    01/30/25 1331   BP: 108/71   BP Location: Left arm   Patient Position: Sitting   Pulse: 69   Resp: 17   Temp: 98.2 °F (36.8 °C)   TempSrc: Oral   SpO2: 97%   Weight: 77.9 kg (171 lb 10.1 oz)   Height: 6' 1" (1.854 m)       BMI: Body mass index is 22.64 kg/m².     Physical Exam:  Physical Exam  Vitals and nursing note reviewed.   Constitutional:       General: He is not in acute distress.     Appearance: Normal appearance. He is normal weight. He is not ill-appearing, toxic-appearing or diaphoretic.   HENT:      Head: Normocephalic and atraumatic.      Right Ear: External ear normal.      Left Ear: External ear normal.      Nose: Nose normal.      " Mouth/Throat:      Pharynx: Oropharynx is clear.   Eyes:      Extraocular Movements: Extraocular movements intact.   Cardiovascular:      Rate and Rhythm: Normal rate and regular rhythm.      Pulses: Normal pulses.      Heart sounds: Normal heart sounds.   Pulmonary:      Effort: Pulmonary effort is normal.      Breath sounds: Normal breath sounds.   Abdominal:      General: Abdomen is flat. There is no distension.      Palpations: Abdomen is soft.      Tenderness: There is no abdominal tenderness. There is no guarding or rebound.   Musculoskeletal:         General: Normal range of motion.      Cervical back: Normal range of motion.   Skin:     General: Skin is warm and dry.   Neurological:      General: No focal deficit present.      Mental Status: He is alert and oriented to person, place, and time. Mental status is at baseline.   Psychiatric:         Mood and Affect: Mood normal.         Behavior: Behavior normal.         Thought Content: Thought content normal.          ASSESSMENT:   Alonzo Nascimento Jr. is a 30 y.o. male who presents to clinic to for    1. DRESS syndrome    2. Elevated LFTs           PLAN:     DRESS syndrome  - Completing steroid course, patient reports doing well. Has routine lab work weekly.   - Follow with dermatologist, hepatologist.  - Continue steroid taper.   - Lab work reviewed with patient.    Elevated LFTs  - lab work reviewed with patient, improving as demonstrated by down trending LFTs.  - Patient to have weekly CMPs completed  - Follow with hepatology      Provided patient with anticipatory guidance and return precautions. Treatment plan discussed with patient, all questions answered, and patient acknowledged understanding and verbal agreement.      Follow-up in: 3 months for FU of DRESS syndrome

## 2025-01-30 NOTE — PROGRESS NOTES
"  Outpatient Rehab    Physical Therapy Visit    Patient Name: Alonzo Nascimento Jr.  MRN: [id number]  YOB: 1995  Today's Date: 2025    Therapy Diagnosis:   Encounter Diagnoses   Name Primary?    Calf muscle weakness Yes    Hip weakness      Physician: Sohan Strange MD    Physician Orders: Eval and Treat  Medical Diagnosis: Calf muscle weakness [M62.81], Muscular deconditioning [R29.898]     Visit # / Visits Authorized:     Date of Evaluation:  2025  Insurance Authorization Period: 2025 - 2025   Plan of Care Certification:  2025     Time In: 1700   Time Out: 1758  Total Time: 58   Total Billable Time: 58 minutes         Subjective   No new complaints. Not much soreness after last session..  Pain reported as 0.      Objective            Treatment:  Therapeutic Exercise  Therapeutic Exercise Activity 1: Heel raises with power band around medial mal: 3 x 20 reps double leg  Therapeutic Exercise Activity 2: Double leg shuttle squats (3 x 15)  and single leg each (2 x 8) 4 cords  Therapeutic Exercise Activity 3: Ankle pumps with high plank: 1 minute  Therapeutic Exercise Activity 4: Single leg heel raises on shuttle: 5 x 6 reps 3 cords    Balance/Neuromuscular Re-Education  Balance/Neuromuscular Re-Education Activity 1: BOSU balance with ball toss (single leg)  Balance/Neuromuscular Re-Education Activity 2: Toe scrunches: 3 minutes in standing  Balance/Neuromuscular Re-Education Activity 3: Toe walkin long laps  Balance/Neuromuscular Re-Education Activity 4: Arch domes in standing: 3 x 10, 5" hold    Gait Training  Gait Training Activity 1: Treadmill: 8 minutes 3.0 mph at 1.5 incline    Patient's spiritual, cultural, and educational needs considered and patient agreeable to plan of care and goals.     Assessment & Plan   Assessment: Patient reports for follow up treatment with no change in complaint of symptoms. Patient tolerated today's session with no complaints. Today's " treatment included strengthening, balance, and musclar recruiting interventions. Progressions included increased resistance for shuttle exercises. Patient remains limited in subtalar range of motion and calf strength. Patient is progressing well towards their goals. Patient will continue to benefit from skilled outpatient physical therapy to address the deficits listed in the problem list box on initial evaluation, provide patient/family education and to maximize patient's level of independence in the home and community environment.  Evaluation/Treatment Tolerance: Patient tolerated treatment well        Plan: Progress calf strengthening    Goals:   Active       Physical Therapy       Physical Therapy Goal (Progressing)       Start:  01/28/25    Expected End:  04/08/25         Short Term Goals (4 weeks):  1. Patient will have improved AROM of the left ankle to 20-0-55 degrees to improve terminal stance/push-off phase of gait.  2. Patient will have decreased complaints of pain to 0/10 to demonstrate improved symptom and pain control for return to prior level of ambulation and participation.  3. Patient will be independent and compliant with issued HEP for continued functional mobility and strength to perform all activities of daily living.  4. Patient will maintain left SLS for at least 30 seconds with pertubation's to demonstrate improved postural control to reduce risk of reinjury.     Long Term Goals (8weeks):   1. Patient will have improved AROM of the left ankle to 20-0-60 to improve terminal stance/push-off phase of gait.  2. Patient will have improved strength of the left ankle to 4+/5 for improved tissue tolerance for return to prior level of function.   3. Patient will be able to resume prior functional level with no deficits to increase participation in recreational and leisure activities for improved quality of life.  4. Patient will have overall improvement in condition to have decreased FOTO Limitation  Score to 81% demonstrate clinically significant change in perceived function.

## 2025-01-31 ENCOUNTER — LAB VISIT (OUTPATIENT)
Dept: LAB | Facility: HOSPITAL | Age: 30
End: 2025-01-31
Attending: INTERNAL MEDICINE
Payer: MEDICAID

## 2025-01-31 DIAGNOSIS — S36.119A HEPATIC TRAUMA, INITIAL ENCOUNTER: ICD-10-CM

## 2025-01-31 LAB
ALBUMIN SERPL BCP-MCNC: 3.6 G/DL (ref 3.5–5.2)
ALP SERPL-CCNC: 53 U/L (ref 38–126)
ALT SERPL W/O P-5'-P-CCNC: 46 U/L (ref 10–44)
ANION GAP SERPL CALC-SCNC: 2 MMOL/L (ref 8–16)
AST SERPL-CCNC: 29 U/L (ref 15–46)
BASOPHILS # BLD AUTO: 0.05 K/UL (ref 0–0.2)
BASOPHILS NFR BLD: 0.6 % (ref 0–1.9)
BILIRUB SERPL-MCNC: 0.6 MG/DL (ref 0.1–1)
CALCIUM SERPL-MCNC: 8.9 MG/DL (ref 8.7–10.5)
CHLORIDE SERPL-SCNC: 103 MMOL/L (ref 95–110)
CO2 SERPL-SCNC: 34 MMOL/L (ref 23–29)
CREAT SERPL-MCNC: 1.19 MG/DL (ref 0.5–1.4)
DIFFERENTIAL METHOD BLD: ABNORMAL
EOSINOPHIL # BLD AUTO: 0.2 K/UL (ref 0–0.5)
EOSINOPHIL NFR BLD: 1.9 % (ref 0–8)
ERYTHROCYTE [DISTWIDTH] IN BLOOD BY AUTOMATED COUNT: 19.5 % (ref 11.5–14.5)
EST. GFR  (NO RACE VARIABLE): >60 ML/MIN/1.73 M^2
GLUCOSE SERPL-MCNC: 104 MG/DL (ref 70–110)
HCT VFR BLD AUTO: 36.7 % (ref 40–54)
HGB BLD-MCNC: 11.9 G/DL (ref 14–18)
IMM GRANULOCYTES # BLD AUTO: 0.06 K/UL (ref 0–0.04)
IMM GRANULOCYTES NFR BLD AUTO: 0.7 % (ref 0–0.5)
INR PPP: 1 (ref 0.8–1.2)
LYMPHOCYTES # BLD AUTO: 1.8 K/UL (ref 1–4.8)
LYMPHOCYTES NFR BLD: 21.8 % (ref 18–48)
MCH RBC QN AUTO: 30.1 PG (ref 27–31)
MCHC RBC AUTO-ENTMCNC: 32.4 G/DL (ref 32–36)
MCV RBC AUTO: 93 FL (ref 82–98)
MONOCYTES # BLD AUTO: 0.6 K/UL (ref 0.3–1)
MONOCYTES NFR BLD: 6.7 % (ref 4–15)
NEUTROPHILS # BLD AUTO: 5.6 K/UL (ref 1.8–7.7)
NEUTROPHILS NFR BLD: 68.3 % (ref 38–73)
NRBC BLD-RTO: 0 /100 WBC
PLATELET # BLD AUTO: 143 K/UL (ref 150–450)
PMV BLD AUTO: 9.1 FL (ref 9.2–12.9)
POTASSIUM SERPL-SCNC: 4.1 MMOL/L (ref 3.5–5.1)
PROT SERPL-MCNC: 6.8 G/DL (ref 6–8.4)
PROTHROMBIN TIME: 10.6 SEC (ref 9–12.5)
RBC # BLD AUTO: 3.95 M/UL (ref 4.6–6.2)
SODIUM SERPL-SCNC: 139 MMOL/L (ref 136–145)
UUN UR-MCNC: 16 MG/DL (ref 2–20)
WBC # BLD AUTO: 8.25 K/UL (ref 3.9–12.7)

## 2025-01-31 PROCEDURE — 80053 COMPREHEN METABOLIC PANEL: CPT | Mod: PN | Performed by: INTERNAL MEDICINE

## 2025-01-31 PROCEDURE — 85610 PROTHROMBIN TIME: CPT | Mod: PN | Performed by: INTERNAL MEDICINE

## 2025-01-31 PROCEDURE — 85025 COMPLETE CBC W/AUTO DIFF WBC: CPT | Mod: PN | Performed by: INTERNAL MEDICINE

## 2025-01-31 PROCEDURE — 36415 COLL VENOUS BLD VENIPUNCTURE: CPT | Mod: PN | Performed by: INTERNAL MEDICINE

## 2025-02-05 ENCOUNTER — CLINICAL SUPPORT (OUTPATIENT)
Dept: REHABILITATION | Facility: HOSPITAL | Age: 30
End: 2025-02-05
Payer: MEDICAID

## 2025-02-05 DIAGNOSIS — R29.898 HIP WEAKNESS: ICD-10-CM

## 2025-02-05 DIAGNOSIS — M62.81 CALF MUSCLE WEAKNESS: Primary | ICD-10-CM

## 2025-02-05 PROCEDURE — 97110 THERAPEUTIC EXERCISES: CPT | Mod: PO

## 2025-02-05 NOTE — PROGRESS NOTES
Outpatient Rehab    Physical Therapy Visit    Patient Name: Alonzo Nascimento Jr.  MRN: [id number]  YOB: 1995  Today's Date: 2025    Therapy Diagnosis:   Encounter Diagnoses   Name Primary?    Calf muscle weakness Yes    Hip weakness      Physician: Sohan Strange MD    Physician Orders: Eval and Treat  Medical Diagnosis: Calf muscle weakness [M62.81], Muscular deconditioning [R29.898]     Visit # / Visits Authorized:  3 / 12   Date of Evaluation:  2025  Insurance Authorization Period: 2025 - 2025   Plan of Care Certification:  2025     Time In: 1107   Time Out: 1200  Total Time: 53   Total Billable Time: 53 minutes         Subjective   no new complaints.  Pain reported as 0/10.      Objective            Treatment:  Therapeutic Exercise  Therapeutic Exercise Activity 1: Heel raises with power band around medial mal: 3 x 20 reps double leg  Therapeutic Exercise Activity 2: Double leg shuttle squats (3 x 15)  and single leg each (2 x 8) 4 cords  Therapeutic Exercise Activity 3: Ankle pumps with high plank: 1 minute  Therapeutic Exercise Activity 4: Single leg heel raises on shuttle: 3 x 5 reps 2.5 cords  Therapeutic Exercise Activity 5: quadraped hip extension: 2 x 10 each leg  Therapeutic Exercise Activity 6: Quadraped fire hydrant: 2 x 10 each leg    Balance/Neuromuscular Re-Education  Balance/Neuromuscular Re-Education Activity 1: BOSU balance with ball toss (single leg): 2 x 10  Balance/Neuromuscular Re-Education Activity 2: Toe scrunches: 3 minutes in standing - NP  Balance/Neuromuscular Re-Education Activity 3: Toe walkin long laps  Balance/Neuromuscular Re-Education Activity 4: SLS on foam with ball hits and 5 KG weight: 2 x 10    Gait Training  Gait Training Activity 1: Treadmill: 3 minutes 3.0 mph at 0 incline + 7 minutes at 2.0 incline    Patient's spiritual, cultural, and educational needs considered and patient agreeable to plan of care and goals.     Assessment &  Plan   Assessment: Patient reports for follow up treatment with no change in complaint of symptoms. Patient tolerated today's session with no complaints. Today's treatment included strengthening interventions. Patient tolerated progressions well and included quadraped glute strengthening due to noticable right sided hip drop during gait.  They remain limited in lower extremity strength. Pateint is progressing well towards his goals. Patient will continue to benefit from skilled outpatient physical therapy to address the deficits listed in the problem list box on initial evaluation, provide patient/family education and to maximize patient's level of independence in the home and community environment.  Evaluation/Treatment Tolerance: Patient tolerated treatment well        Plan: Continue to progress plan of care as tolerated.    Goals:   Active       Physical Therapy       Physical Therapy Goal (Progressing)       Start:  01/28/25    Expected End:  04/08/25         Short Term Goals (4 weeks):  1. Patient will have improved AROM of the left ankle to 20-0-55 degrees to improve terminal stance/push-off phase of gait.  2. Patient will have decreased complaints of pain to 0/10 to demonstrate improved symptom and pain control for return to prior level of ambulation and participation.  3. Patient will be independent and compliant with issued HEP for continued functional mobility and strength to perform all activities of daily living.  4. Patient will maintain left SLS for at least 30 seconds with pertubation's to demonstrate improved postural control to reduce risk of reinjury.     Long Term Goals (8weeks):   1. Patient will have improved AROM of the left ankle to 20-0-60 to improve terminal stance/push-off phase of gait.  2. Patient will have improved strength of the left ankle to 4+/5 for improved tissue tolerance for return to prior level of function.   3. Patient will be able to resume prior functional level with no  deficits to increase participation in recreational and leisure activities for improved quality of life.  4. Patient will have overall improvement in condition to have decreased FOTO Limitation Score to 81% demonstrate clinically significant change in perceived function.                Lavelle Watson, PT

## 2025-02-06 ENCOUNTER — CLINICAL SUPPORT (OUTPATIENT)
Dept: REHABILITATION | Facility: HOSPITAL | Age: 30
End: 2025-02-06
Payer: MEDICAID

## 2025-02-06 DIAGNOSIS — M62.81 CALF MUSCLE WEAKNESS: Primary | ICD-10-CM

## 2025-02-06 DIAGNOSIS — R29.898 HIP WEAKNESS: ICD-10-CM

## 2025-02-06 PROCEDURE — 97110 THERAPEUTIC EXERCISES: CPT | Mod: PO,CQ

## 2025-02-06 NOTE — PROGRESS NOTES
Outpatient Rehab    Physical Therapy Visit    Patient Name: Alonzo Nascimento Jr.  MRN: [id number]  YOB: 1995  Today's Date: 2025    Therapy Diagnosis:   Encounter Diagnoses   Name Primary?    Calf muscle weakness Yes    Hip weakness        Physician: Sohan Strange MD    Physician Orders: Eval and Treat  Medical Diagnosis: Calf muscle weakness [M62.81], Muscular deconditioning [R29.898]     Visit # / Visits Authorized:     Date of Evaluation:  2025  Insurance Authorization Period: 2025 - 2025   Plan of Care Certification:  2025     Time In: 1430   Time Out: 1500  Total Time: 30   Total Billable Time: 30 minutes         Subjective   Pt reports soreness from yesterday's session..         Objective            Treatment:  Therapeutic Exercise  Therapeutic Exercise Activity 1: Heel raises with power band around medial mal: 3 x 20 reps double leg  Therapeutic Exercise Activity 2: Double leg shuttle squats (3 x 15)  and single leg each (2 x 8) 4 cords  Therapeutic Exercise Activity 3: Ankle pumps with high plank: 1 minute  Therapeutic Exercise Activity 4: Single leg heel raises on shuttle: 3 x 5 reps 2.5 cords  Therapeutic Exercise Activity 5: quadraped hip extension: 2 x 10 each leg  Therapeutic Exercise Activity 6: Quadraped fire hydrant: 2 x 10 each leg    Balance/Neuromuscular Re-Education  Balance/Neuromuscular Re-Education Activity 1: BOSU balance with ball toss (single leg): 2 x 10  Balance/Neuromuscular Re-Education Activity 2: Toe scrunches: 3 minutes in standing - NP  Balance/Neuromuscular Re-Education Activity 3: Toe walkin long laps  Balance/Neuromuscular Re-Education Activity 4: SLS on foam with ball hits and 5 KG weight: 2 x 10    Gait Training  Gait Training Activity 1: Treadmill: 3 minutes 3.0 mph at 0 incline + 7 minutes at 2.0 incline    Patient's spiritual, cultural, and educational needs considered and patient agreeable to plan of care and goals.      Assessment & Plan   Assessment: Pt enters therapy session 30' late, so abbreviated program performed today. Also, pt still sore from yesterday's session w/ exercises today being performed w/ multiple rest breaks. Mod fatigue noted during SLS w/ BOSU and heel raises on shuttle. Pt w/o pain at this time during exercises stating that his ankle just feels weak. Exercises challenged appropriately. Will continue to progress as tolerated.  Evaluation/Treatment Tolerance: Patient tolerated treatment well        Plan: Continue to progress plan of care as tolerated.    Goals:   Active       Physical Therapy       Physical Therapy Goal (Progressing)       Start:  01/28/25    Expected End:  04/08/25         Short Term Goals (4 weeks):  1. Patient will have improved AROM of the left ankle to 20-0-55 degrees to improve terminal stance/push-off phase of gait.  2. Patient will have decreased complaints of pain to 0/10 to demonstrate improved symptom and pain control for return to prior level of ambulation and participation.  3. Patient will be independent and compliant with issued HEP for continued functional mobility and strength to perform all activities of daily living.  4. Patient will maintain left SLS for at least 30 seconds with pertubation's to demonstrate improved postural control to reduce risk of reinjury.     Long Term Goals (8weeks):   1. Patient will have improved AROM of the left ankle to 20-0-60 to improve terminal stance/push-off phase of gait.  2. Patient will have improved strength of the left ankle to 4+/5 for improved tissue tolerance for return to prior level of function.   3. Patient will be able to resume prior functional level with no deficits to increase participation in recreational and leisure activities for improved quality of life.  4. Patient will have overall improvement in condition to have decreased FOTO Limitation Score to 81% demonstrate clinically significant change in perceived function.                 Lavelle Mendez, PTA

## 2025-02-07 ENCOUNTER — LAB VISIT (OUTPATIENT)
Dept: LAB | Facility: HOSPITAL | Age: 30
End: 2025-02-07
Attending: INTERNAL MEDICINE
Payer: MEDICAID

## 2025-02-07 DIAGNOSIS — S36.119A HEPATIC TRAUMA, INITIAL ENCOUNTER: ICD-10-CM

## 2025-02-07 LAB
ALBUMIN SERPL BCP-MCNC: 3.7 G/DL (ref 3.5–5.2)
ALP SERPL-CCNC: 40 U/L (ref 38–126)
ALT SERPL W/O P-5'-P-CCNC: 33 U/L (ref 10–44)
ANION GAP SERPL CALC-SCNC: 4 MMOL/L (ref 8–16)
AST SERPL-CCNC: 28 U/L (ref 15–46)
BASOPHILS # BLD AUTO: 0.04 K/UL (ref 0–0.2)
BASOPHILS NFR BLD: 0.6 % (ref 0–1.9)
BILIRUB SERPL-MCNC: 0.7 MG/DL (ref 0.1–1)
CALCIUM SERPL-MCNC: 9.1 MG/DL (ref 8.7–10.5)
CHLORIDE SERPL-SCNC: 100 MMOL/L (ref 95–110)
CO2 SERPL-SCNC: 34 MMOL/L (ref 23–29)
CREAT SERPL-MCNC: 1.23 MG/DL (ref 0.5–1.4)
DIFFERENTIAL METHOD BLD: ABNORMAL
EOSINOPHIL # BLD AUTO: 0.2 K/UL (ref 0–0.5)
EOSINOPHIL NFR BLD: 2.7 % (ref 0–8)
ERYTHROCYTE [DISTWIDTH] IN BLOOD BY AUTOMATED COUNT: 18.6 % (ref 11.5–14.5)
EST. GFR  (NO RACE VARIABLE): >60 ML/MIN/1.73 M^2
GLUCOSE SERPL-MCNC: 77 MG/DL (ref 70–110)
HCT VFR BLD AUTO: 37 % (ref 40–54)
HGB BLD-MCNC: 11.9 G/DL (ref 14–18)
IMM GRANULOCYTES # BLD AUTO: 0.03 K/UL (ref 0–0.04)
IMM GRANULOCYTES NFR BLD AUTO: 0.4 % (ref 0–0.5)
INR PPP: 1 (ref 0.8–1.2)
LYMPHOCYTES # BLD AUTO: 3.2 K/UL (ref 1–4.8)
LYMPHOCYTES NFR BLD: 45.2 % (ref 18–48)
MCH RBC QN AUTO: 29.8 PG (ref 27–31)
MCHC RBC AUTO-ENTMCNC: 32.2 G/DL (ref 32–36)
MCV RBC AUTO: 93 FL (ref 82–98)
MONOCYTES # BLD AUTO: 0.7 K/UL (ref 0.3–1)
MONOCYTES NFR BLD: 10.3 % (ref 4–15)
NEUTROPHILS # BLD AUTO: 2.8 K/UL (ref 1.8–7.7)
NEUTROPHILS NFR BLD: 40.8 % (ref 38–73)
NRBC BLD-RTO: 0 /100 WBC
PLATELET # BLD AUTO: 205 K/UL (ref 150–450)
PMV BLD AUTO: 9.5 FL (ref 9.2–12.9)
POTASSIUM SERPL-SCNC: 4 MMOL/L (ref 3.5–5.1)
PROT SERPL-MCNC: 7 G/DL (ref 6–8.4)
PROTHROMBIN TIME: 11 SEC (ref 9–12.5)
RBC # BLD AUTO: 4 M/UL (ref 4.6–6.2)
SODIUM SERPL-SCNC: 138 MMOL/L (ref 136–145)
UUN UR-MCNC: 14 MG/DL (ref 2–20)
WBC # BLD AUTO: 6.97 K/UL (ref 3.9–12.7)

## 2025-02-07 PROCEDURE — 85610 PROTHROMBIN TIME: CPT | Mod: PN | Performed by: INTERNAL MEDICINE

## 2025-02-07 PROCEDURE — 85025 COMPLETE CBC W/AUTO DIFF WBC: CPT | Mod: PN | Performed by: INTERNAL MEDICINE

## 2025-02-07 PROCEDURE — 80053 COMPREHEN METABOLIC PANEL: CPT | Mod: PN | Performed by: INTERNAL MEDICINE

## 2025-02-07 PROCEDURE — 36415 COLL VENOUS BLD VENIPUNCTURE: CPT | Mod: PN | Performed by: INTERNAL MEDICINE

## 2025-02-10 ENCOUNTER — TELEPHONE (OUTPATIENT)
Dept: DERMATOLOGY | Facility: CLINIC | Age: 30
End: 2025-02-10
Payer: MEDICAID

## 2025-02-11 ENCOUNTER — CLINICAL SUPPORT (OUTPATIENT)
Dept: REHABILITATION | Facility: HOSPITAL | Age: 30
End: 2025-02-11
Payer: MEDICAID

## 2025-02-11 DIAGNOSIS — M62.81 CALF MUSCLE WEAKNESS: Primary | ICD-10-CM

## 2025-02-11 DIAGNOSIS — R29.898 HIP WEAKNESS: ICD-10-CM

## 2025-02-12 NOTE — PROGRESS NOTES
Outpatient Rehab    Physical Therapy Visit    Patient Name: Alonzo Nascimento Jr.  MRN: [id number]  YOB: 1995  Today's Date: 2/12/2025    Therapy Diagnosis:   Encounter Diagnoses   Name Primary?    Calf muscle weakness Yes    Hip weakness      Physician: Sohan Strange MD    Physician Orders: Eval and Treat  Medical Diagnosis: Calf muscle weakness [M62.81], Muscular deconditioning [R29.898]      Visit # / Visits Authorized:  5 / 12   Date of Evaluation:  1/14/2025  Insurance Authorization Period: 1/22/2025 - 4/14/2025   Plan of Care Certification:  4/14/2025     Time In: 1610   Time Out: 1700  Total Time: 50   Total Billable Time: 50    FOTO:  Intake Score:  %  Survey Score 1:  %  Survey Score 2:  %         Subjective   Pt states that he was sore after last session, however feels fine today.         Objective            Treatment:  Therapeutic Exercise  Therapeutic Exercise Activity 1: Heel raises with power band around medial mal: 3 x 20 reps double leg- NP  Therapeutic Exercise Activity 2: Double leg shuttle squats (3 x 15)  and single leg each (2 x 8) 4 cords  Therapeutic Exercise Activity 3: Ankle pumps with high plank: 1 minute- NP  Therapeutic Exercise Activity 4: Single leg heel raises on shuttle: 3 x 5 reps 2.5 cords  Therapeutic Exercise Activity 5: quadraped hip extension: 2 x 10 each leg  Therapeutic Exercise Activity 6: Quadraped fire hydrant: 2 x 10 each leg         Balance/Neuromuscular Re-Education  Balance/Neuromuscular Re-Education Activity 1: BOSU balance with ball toss (single leg): 2 x 10  Balance/Neuromuscular Re-Education Activity 2: Toe scrunches: 3 minutes in standing - NP  Balance/Neuromuscular Re-Education Activity 3: Toe walking 2x5#: 2 long laps  Balance/Neuromuscular Re-Education Activity 4: SLS on foam with ball hits and 5 KG weight: 2 x 10         Assessment & Plan   Assessment: Pt w/ some fatiguing throughout therapy session, requiring rest breaks in between all sets to  promote reduce fatigue levels and burning sensation in calves. Mod verbal and manual cues needed throughout therapy session to promote proper exercise form. Also, pt w/ increased ability maintain balance during neuromuscular re-education. Exercises challenged appropriately. Will continue to progress as tolerated.  Evaluation/Treatment Tolerance: Patient tolerated treatment well    Patient will continue to benefit from skilled outpatient physical therapy to address the deficits listed in the problem list box on initial evaluation, provide pt/family education and to maximize pt's level of independence in the home and community environment.     Patient's spiritual, cultural, and educational needs considered and patient agreeable to plan of care and goals.           Plan: Continue to progress plan of care as tolerated.    Goals:   Active       Physical Therapy       Physical Therapy Goal (Progressing)       Start:  01/28/25    Expected End:  04/08/25         Short Term Goals (4 weeks):  1. Patient will have improved AROM of the left ankle to 20-0-55 degrees to improve terminal stance/push-off phase of gait.  2. Patient will have decreased complaints of pain to 0/10 to demonstrate improved symptom and pain control for return to prior level of ambulation and participation.  3. Patient will be independent and compliant with issued HEP for continued functional mobility and strength to perform all activities of daily living.  4. Patient will maintain left SLS for at least 30 seconds with pertubation's to demonstrate improved postural control to reduce risk of reinjury.     Long Term Goals (8weeks):   1. Patient will have improved AROM of the left ankle to 20-0-60 to improve terminal stance/push-off phase of gait.  2. Patient will have improved strength of the left ankle to 4+/5 for improved tissue tolerance for return to prior level of function.   3. Patient will be able to resume prior functional level with no deficits to  increase participation in recreational and leisure activities for improved quality of life.  4. Patient will have overall improvement in condition to have decreased FOTO Limitation Score to 81% demonstrate clinically significant change in perceived function.                Lavelle Mendez, PTA

## 2025-02-14 ENCOUNTER — LAB VISIT (OUTPATIENT)
Dept: LAB | Facility: HOSPITAL | Age: 30
End: 2025-02-14
Attending: INTERNAL MEDICINE
Payer: MEDICAID

## 2025-02-14 DIAGNOSIS — S36.119A HEPATIC TRAUMA, INITIAL ENCOUNTER: ICD-10-CM

## 2025-02-14 LAB
ALBUMIN SERPL BCP-MCNC: 3.8 G/DL (ref 3.5–5.2)
ALP SERPL-CCNC: 45 U/L (ref 38–126)
ALT SERPL W/O P-5'-P-CCNC: 48 U/L (ref 10–44)
ANION GAP SERPL CALC-SCNC: 4 MMOL/L (ref 8–16)
AST SERPL-CCNC: 31 U/L (ref 15–46)
BASOPHILS # BLD AUTO: 0.05 K/UL (ref 0–0.2)
BASOPHILS NFR BLD: 0.8 % (ref 0–1.9)
BILIRUB SERPL-MCNC: 0.5 MG/DL (ref 0.1–1)
CALCIUM SERPL-MCNC: 8.9 MG/DL (ref 8.7–10.5)
CHLORIDE SERPL-SCNC: 108 MMOL/L (ref 95–110)
CO2 SERPL-SCNC: 28 MMOL/L (ref 23–29)
CREAT SERPL-MCNC: 1.24 MG/DL (ref 0.5–1.4)
DIFFERENTIAL METHOD BLD: ABNORMAL
EOSINOPHIL # BLD AUTO: 0.1 K/UL (ref 0–0.5)
EOSINOPHIL NFR BLD: 2 % (ref 0–8)
ERYTHROCYTE [DISTWIDTH] IN BLOOD BY AUTOMATED COUNT: 17.2 % (ref 11.5–14.5)
EST. GFR  (NO RACE VARIABLE): >60 ML/MIN/1.73 M^2
GLUCOSE SERPL-MCNC: 118 MG/DL (ref 70–110)
HCT VFR BLD AUTO: 37 % (ref 40–54)
HGB BLD-MCNC: 12.2 G/DL (ref 14–18)
IMM GRANULOCYTES # BLD AUTO: 0.02 K/UL (ref 0–0.04)
IMM GRANULOCYTES NFR BLD AUTO: 0.3 % (ref 0–0.5)
INR PPP: 1 (ref 0.8–1.2)
LYMPHOCYTES # BLD AUTO: 2.3 K/UL (ref 1–4.8)
LYMPHOCYTES NFR BLD: 34.4 % (ref 18–48)
MCH RBC QN AUTO: 30.3 PG (ref 27–31)
MCHC RBC AUTO-ENTMCNC: 33 G/DL (ref 32–36)
MCV RBC AUTO: 92 FL (ref 82–98)
MONOCYTES # BLD AUTO: 0.6 K/UL (ref 0.3–1)
MONOCYTES NFR BLD: 9.6 % (ref 4–15)
NEUTROPHILS # BLD AUTO: 3.5 K/UL (ref 1.8–7.7)
NEUTROPHILS NFR BLD: 52.9 % (ref 38–73)
NRBC BLD-RTO: 0 /100 WBC
PLATELET # BLD AUTO: 201 K/UL (ref 150–450)
PMV BLD AUTO: 9.4 FL (ref 9.2–12.9)
POTASSIUM SERPL-SCNC: 4 MMOL/L (ref 3.5–5.1)
PROT SERPL-MCNC: 6.9 G/DL (ref 6–8.4)
PROTHROMBIN TIME: 10.9 SEC (ref 9–12.5)
RBC # BLD AUTO: 4.02 M/UL (ref 4.6–6.2)
SODIUM SERPL-SCNC: 140 MMOL/L (ref 136–145)
UUN UR-MCNC: 15 MG/DL (ref 2–20)
WBC # BLD AUTO: 6.65 K/UL (ref 3.9–12.7)

## 2025-02-14 PROCEDURE — 85610 PROTHROMBIN TIME: CPT | Mod: PN | Performed by: INTERNAL MEDICINE

## 2025-02-14 PROCEDURE — 85025 COMPLETE CBC W/AUTO DIFF WBC: CPT | Mod: PN | Performed by: INTERNAL MEDICINE

## 2025-02-14 PROCEDURE — 80053 COMPREHEN METABOLIC PANEL: CPT | Mod: PN | Performed by: INTERNAL MEDICINE

## 2025-02-14 PROCEDURE — 36415 COLL VENOUS BLD VENIPUNCTURE: CPT | Mod: PN | Performed by: INTERNAL MEDICINE

## 2025-02-17 ENCOUNTER — CLINICAL SUPPORT (OUTPATIENT)
Dept: REHABILITATION | Facility: HOSPITAL | Age: 30
End: 2025-02-17
Payer: MEDICAID

## 2025-02-17 DIAGNOSIS — M62.81 CALF MUSCLE WEAKNESS: Primary | ICD-10-CM

## 2025-02-17 DIAGNOSIS — R29.898 HIP WEAKNESS: ICD-10-CM

## 2025-02-17 PROCEDURE — 97110 THERAPEUTIC EXERCISES: CPT | Mod: PO,CQ

## 2025-02-17 NOTE — PROGRESS NOTES
Outpatient Rehab    Physical Therapy Visit    Patient Name: Alonzo Nascimento Jr.  MRN: [id number]  YOB: 1995  Today's Date: 2/18/2025    Therapy Diagnosis:   Encounter Diagnoses   Name Primary?    Calf muscle weakness Yes    Hip weakness      Physician: Sohan Strange MD    Physician Orders: Eval and Treat  Medical Diagnosis: Calf muscle weakness [M62.81], Muscular deconditioning [R29.898]      Visit # / Visits Authorized:  6 / 12   Date of Evaluation:  1/14/2025  Insurance Authorization Period: 1/22/2025 - 4/14/2025   Plan of Care Certification:  4/14/2025     Time In: 1400   Time Out: 1455  Total Time: 55   Total Billable Time: 55    FOTO:  Intake Score:  %  Survey Score 1:  %  Survey Score 2:  %         Subjective   Pt w/ no new complaints at this time..         Objective            Treatment:  Therapeutic Exercise  Therapeutic Exercise Activity 1: Heel raises with power band around medial mal: 3 x 20 reps double leg- NP  Therapeutic Exercise Activity 2: Double leg shuttle squats (3 x 15)  and single leg each (2 x 8) 4 cords  Therapeutic Exercise Activity 3: Ankle pumps with high plank: 1 minute- NP  Therapeutic Exercise Activity 4: Single leg heel raises on shuttle: 3 x 5 reps 2.5 cords  Therapeutic Exercise Activity 5: quadraped hip extension: 2 x 10 each leg  Therapeutic Exercise Activity 6: Quadraped fire hydrant: 2 x 10 each leg         Balance/Neuromuscular Re-Education  Balance/Neuromuscular Re-Education Activity 1: BOSU balance with ball toss (single leg): 2 x 10  Balance/Neuromuscular Re-Education Activity 2: Toe scrunches: 3 minutes in standing - NP  Balance/Neuromuscular Re-Education Activity 3: Toe walking 2x5#: 2 long laps  Balance/Neuromuscular Re-Education Activity 4: SLS on foam with ball hits and 5 KG weight: 2 x 10         Assessment & Plan   Assessment: Pt tolerated therapy session w/o complaint or issue. Mod/max fatigue noted throughout exercise program which required rest  breaks to reduce fatigue. Pt w/ minor LOBs when performing SLS on airex from which he was able to self recover. Exercises challenged appropriately. Will continue to progress as tolerated.  Evaluation/Treatment Tolerance: Patient tolerated treatment well    Patient will continue to benefit from skilled outpatient physical therapy to address the deficits listed in the problem list box on initial evaluation, provide pt/family education and to maximize pt's level of independence in the home and community environment.     Patient's spiritual, cultural, and educational needs considered and patient agreeable to plan of care and goals.           Plan: Continue to progress plan of care as tolerated.    Goals:   Active       Physical Therapy       Physical Therapy Goal (Progressing)       Start:  01/28/25    Expected End:  04/08/25         Short Term Goals (4 weeks):  1. Patient will have improved AROM of the left ankle to 20-0-55 degrees to improve terminal stance/push-off phase of gait.  2. Patient will have decreased complaints of pain to 0/10 to demonstrate improved symptom and pain control for return to prior level of ambulation and participation.  3. Patient will be independent and compliant with issued HEP for continued functional mobility and strength to perform all activities of daily living.  4. Patient will maintain left SLS for at least 30 seconds with pertubation's to demonstrate improved postural control to reduce risk of reinjury.     Long Term Goals (8weeks):   1. Patient will have improved AROM of the left ankle to 20-0-60 to improve terminal stance/push-off phase of gait.  2. Patient will have improved strength of the left ankle to 4+/5 for improved tissue tolerance for return to prior level of function.   3. Patient will be able to resume prior functional level with no deficits to increase participation in recreational and leisure activities for improved quality of life.  4. Patient will have overall  improvement in condition to have decreased FOTO Limitation Score to 81% demonstrate clinically significant change in perceived function.                Lavelle Mendez, PTA

## 2025-02-20 ENCOUNTER — CLINICAL SUPPORT (OUTPATIENT)
Dept: REHABILITATION | Facility: HOSPITAL | Age: 30
End: 2025-02-20
Payer: MEDICAID

## 2025-02-20 DIAGNOSIS — M62.81 CALF MUSCLE WEAKNESS: Primary | ICD-10-CM

## 2025-02-20 DIAGNOSIS — R29.898 HIP WEAKNESS: ICD-10-CM

## 2025-02-20 PROCEDURE — 97110 THERAPEUTIC EXERCISES: CPT | Mod: PO,CQ

## 2025-02-20 NOTE — PROGRESS NOTES
Outpatient Rehab    Physical Therapy Visit    Patient Name: Alonzo Nascimento Jr.  MRN: [id number]  YOB: 1995  Today's Date: 2/20/2025    Therapy Diagnosis:   Encounter Diagnoses   Name Primary?    Calf muscle weakness Yes    Hip weakness      Physician: Sohan Strange MD    Physician Orders: Eval and Treat  Medical Diagnosis: Calf muscle weakness [M62.81], Muscular deconditioning [R29.898]      Visit # / Visits Authorized:  7 / 12   Date of Evaluation:  1/14/2025  Insurance Authorization Period: 1/22/2025 - 4/14/2025   Plan of Care Certification:  4/14/2025     Time In: 1510   Time Out: 1558  Total Time: 48   Total Billable Time: 48    FOTO:  Intake Score:  %  Survey Score 1:  %  Survey Score 2:  %         Subjective   Pt w/ no new complaints at this time..         Objective            Treatment:  Therapeutic Exercise  Therapeutic Exercise Activity 1: Heel raises with power band around medial mal: 3 x 20 reps double leg- NP  Therapeutic Exercise Activity 2: Double leg shuttle squats (3 x 15)  and single leg each (2 x 8) 4 cords  Therapeutic Exercise Activity 3: Ankle pumps with high plank: 1 minute- NP  Therapeutic Exercise Activity 4: Single leg heel raises on shuttle: 3 x 5 reps 2.5 cords  Therapeutic Exercise Activity 5: quadraped hip extension: 2 x 10 each leg  Therapeutic Exercise Activity 6: Quadraped fire hydrant: 2 x 10 each leg         Balance/Neuromuscular Re-Education  Balance/Neuromuscular Re-Education Activity 1: BOSU balance with ball toss (single leg): 2 x 10  Balance/Neuromuscular Re-Education Activity 2: Toe scrunches: 3 minutes in standing - NP  Balance/Neuromuscular Re-Education Activity 3: Toe walking 2x5#: 2 long laps  Balance/Neuromuscular Re-Education Activity 4: SLS on foam with ball hits and 5 KG weight: 2 x 10         Assessment & Plan   Assessment: Pt tolerated therapy session well and w/o complaint. Added SL Shuttle Squats to pt exercise program w/ pt able to complete  exercise w/ mod/max fatigue, and pt required short rest breaks throughout exercise program to reduce fatigue levels. Exercises challenged appropriately. Will conttinue to progress as tolerated.  Evaluation/Treatment Tolerance: Patient tolerated treatment well    Patient will continue to benefit from skilled outpatient physical therapy to address the deficits listed in the problem list box on initial evaluation, provide pt/family education and to maximize pt's level of independence in the home and community environment.     Patient's spiritual, cultural, and educational needs considered and patient agreeable to plan of care and goals.           Plan: Continue to progress plan of care as tolerated.    Goals:   Active       Physical Therapy       Physical Therapy Goal (Progressing)       Start:  01/28/25    Expected End:  04/08/25         Short Term Goals (4 weeks):  1. Patient will have improved AROM of the left ankle to 20-0-55 degrees to improve terminal stance/push-off phase of gait.  2. Patient will have decreased complaints of pain to 0/10 to demonstrate improved symptom and pain control for return to prior level of ambulation and participation.  3. Patient will be independent and compliant with issued HEP for continued functional mobility and strength to perform all activities of daily living.  4. Patient will maintain left SLS for at least 30 seconds with pertubation's to demonstrate improved postural control to reduce risk of reinjury.     Long Term Goals (8weeks):   1. Patient will have improved AROM of the left ankle to 20-0-60 to improve terminal stance/push-off phase of gait.  2. Patient will have improved strength of the left ankle to 4+/5 for improved tissue tolerance for return to prior level of function.   3. Patient will be able to resume prior functional level with no deficits to increase participation in recreational and leisure activities for improved quality of life.  4. Patient will have overall  improvement in condition to have decreased FOTO Limitation Score to 81% demonstrate clinically significant change in perceived function.                Lavelle Mendez, PTA

## 2025-02-21 ENCOUNTER — LAB VISIT (OUTPATIENT)
Dept: LAB | Facility: HOSPITAL | Age: 30
End: 2025-02-21
Attending: INTERNAL MEDICINE
Payer: MEDICAID

## 2025-02-21 DIAGNOSIS — S36.119A HEPATIC TRAUMA, INITIAL ENCOUNTER: ICD-10-CM

## 2025-02-21 LAB
ALBUMIN SERPL BCP-MCNC: 4.2 G/DL (ref 3.5–5.2)
ALP SERPL-CCNC: 42 U/L (ref 38–126)
ALT SERPL W/O P-5'-P-CCNC: 42 U/L (ref 10–44)
ANION GAP SERPL CALC-SCNC: 5 MMOL/L (ref 8–16)
AST SERPL-CCNC: 35 U/L (ref 15–46)
BASOPHILS # BLD AUTO: 0.04 K/UL (ref 0–0.2)
BASOPHILS NFR BLD: 0.6 % (ref 0–1.9)
BILIRUB SERPL-MCNC: 0.6 MG/DL (ref 0.1–1)
CALCIUM SERPL-MCNC: 9.5 MG/DL (ref 8.7–10.5)
CHLORIDE SERPL-SCNC: 105 MMOL/L (ref 95–110)
CO2 SERPL-SCNC: 30 MMOL/L (ref 23–29)
CREAT SERPL-MCNC: 1.35 MG/DL (ref 0.5–1.4)
DIFFERENTIAL METHOD BLD: ABNORMAL
EOSINOPHIL # BLD AUTO: 0.2 K/UL (ref 0–0.5)
EOSINOPHIL NFR BLD: 2.2 % (ref 0–8)
ERYTHROCYTE [DISTWIDTH] IN BLOOD BY AUTOMATED COUNT: 15.9 % (ref 11.5–14.5)
EST. GFR  (NO RACE VARIABLE): >60 ML/MIN/1.73 M^2
GLUCOSE SERPL-MCNC: 97 MG/DL (ref 70–110)
HCT VFR BLD AUTO: 40.6 % (ref 40–54)
HGB BLD-MCNC: 13.5 G/DL (ref 14–18)
IMM GRANULOCYTES # BLD AUTO: 0.01 K/UL (ref 0–0.04)
IMM GRANULOCYTES NFR BLD AUTO: 0.1 % (ref 0–0.5)
INR PPP: 1 (ref 0.8–1.2)
LYMPHOCYTES # BLD AUTO: 2.1 K/UL (ref 1–4.8)
LYMPHOCYTES NFR BLD: 30.3 % (ref 18–48)
MCH RBC QN AUTO: 30.2 PG (ref 27–31)
MCHC RBC AUTO-ENTMCNC: 33.3 G/DL (ref 32–36)
MCV RBC AUTO: 91 FL (ref 82–98)
MONOCYTES # BLD AUTO: 0.8 K/UL (ref 0.3–1)
MONOCYTES NFR BLD: 11.2 % (ref 4–15)
NEUTROPHILS # BLD AUTO: 3.8 K/UL (ref 1.8–7.7)
NEUTROPHILS NFR BLD: 55.6 % (ref 38–73)
NRBC BLD-RTO: 0 /100 WBC
PLATELET # BLD AUTO: 171 K/UL (ref 150–450)
PMV BLD AUTO: 9.4 FL (ref 9.2–12.9)
POTASSIUM SERPL-SCNC: 4.6 MMOL/L (ref 3.5–5.1)
PROT SERPL-MCNC: 7.6 G/DL (ref 6–8.4)
PROTHROMBIN TIME: 11.3 SEC (ref 9–12.5)
RBC # BLD AUTO: 4.47 M/UL (ref 4.6–6.2)
SODIUM SERPL-SCNC: 140 MMOL/L (ref 136–145)
UUN UR-MCNC: 15 MG/DL (ref 2–20)
WBC # BLD AUTO: 6.76 K/UL (ref 3.9–12.7)

## 2025-02-21 PROCEDURE — 80053 COMPREHEN METABOLIC PANEL: CPT | Mod: PN | Performed by: INTERNAL MEDICINE

## 2025-02-21 PROCEDURE — 36415 COLL VENOUS BLD VENIPUNCTURE: CPT | Mod: PN | Performed by: INTERNAL MEDICINE

## 2025-02-21 PROCEDURE — 85025 COMPLETE CBC W/AUTO DIFF WBC: CPT | Mod: PN | Performed by: INTERNAL MEDICINE

## 2025-02-21 PROCEDURE — 85610 PROTHROMBIN TIME: CPT | Mod: PN | Performed by: INTERNAL MEDICINE

## 2025-02-22 ENCOUNTER — RESULTS FOLLOW-UP (OUTPATIENT)
Dept: TRANSPLANT | Facility: CLINIC | Age: 30
End: 2025-02-22

## 2025-02-28 ENCOUNTER — LAB VISIT (OUTPATIENT)
Dept: LAB | Facility: HOSPITAL | Age: 30
End: 2025-02-28
Attending: INTERNAL MEDICINE
Payer: MEDICAID

## 2025-02-28 DIAGNOSIS — S36.119A HEPATIC TRAUMA, INITIAL ENCOUNTER: ICD-10-CM

## 2025-02-28 LAB
ALBUMIN SERPL BCP-MCNC: 4 G/DL (ref 3.5–5.2)
ALP SERPL-CCNC: 44 U/L (ref 38–126)
ALT SERPL W/O P-5'-P-CCNC: 27 U/L (ref 10–44)
ANION GAP SERPL CALC-SCNC: 4 MMOL/L (ref 8–16)
AST SERPL-CCNC: 26 U/L (ref 15–46)
BASOPHILS # BLD AUTO: 0.06 K/UL (ref 0–0.2)
BASOPHILS NFR BLD: 1 % (ref 0–1.9)
BILIRUB SERPL-MCNC: 0.4 MG/DL (ref 0.1–1)
CALCIUM SERPL-MCNC: 9.3 MG/DL (ref 8.7–10.5)
CHLORIDE SERPL-SCNC: 108 MMOL/L (ref 95–110)
CO2 SERPL-SCNC: 31 MMOL/L (ref 23–29)
CREAT SERPL-MCNC: 1.44 MG/DL (ref 0.5–1.4)
DIFFERENTIAL METHOD BLD: ABNORMAL
EOSINOPHIL # BLD AUTO: 0.2 K/UL (ref 0–0.5)
EOSINOPHIL NFR BLD: 3 % (ref 0–8)
ERYTHROCYTE [DISTWIDTH] IN BLOOD BY AUTOMATED COUNT: 15.3 % (ref 11.5–14.5)
EST. GFR  (NO RACE VARIABLE): >60 ML/MIN/1.73 M^2
GLUCOSE SERPL-MCNC: 87 MG/DL (ref 70–110)
HCT VFR BLD AUTO: 38.6 % (ref 40–54)
HGB BLD-MCNC: 12.7 G/DL (ref 14–18)
IMM GRANULOCYTES # BLD AUTO: 0.01 K/UL (ref 0–0.04)
IMM GRANULOCYTES NFR BLD AUTO: 0.2 % (ref 0–0.5)
INR PPP: 1 (ref 0.8–1.2)
LYMPHOCYTES # BLD AUTO: 2.2 K/UL (ref 1–4.8)
LYMPHOCYTES NFR BLD: 36 % (ref 18–48)
MCH RBC QN AUTO: 30 PG (ref 27–31)
MCHC RBC AUTO-ENTMCNC: 32.9 G/DL (ref 32–36)
MCV RBC AUTO: 91 FL (ref 82–98)
MONOCYTES # BLD AUTO: 0.5 K/UL (ref 0.3–1)
MONOCYTES NFR BLD: 9 % (ref 4–15)
NEUTROPHILS # BLD AUTO: 3.1 K/UL (ref 1.8–7.7)
NEUTROPHILS NFR BLD: 50.8 % (ref 38–73)
NRBC BLD-RTO: 0 /100 WBC
PLATELET # BLD AUTO: 215 K/UL (ref 150–450)
PMV BLD AUTO: 9.9 FL (ref 9.2–12.9)
POTASSIUM SERPL-SCNC: 4.1 MMOL/L (ref 3.5–5.1)
PROT SERPL-MCNC: 7.5 G/DL (ref 6–8.4)
PROTHROMBIN TIME: 11 SEC (ref 9–12.5)
RBC # BLD AUTO: 4.23 M/UL (ref 4.6–6.2)
SODIUM SERPL-SCNC: 143 MMOL/L (ref 136–145)
UUN UR-MCNC: 13 MG/DL (ref 2–20)
WBC # BLD AUTO: 6 K/UL (ref 3.9–12.7)

## 2025-02-28 PROCEDURE — 85025 COMPLETE CBC W/AUTO DIFF WBC: CPT | Mod: PN | Performed by: INTERNAL MEDICINE

## 2025-02-28 PROCEDURE — 80053 COMPREHEN METABOLIC PANEL: CPT | Mod: PN | Performed by: INTERNAL MEDICINE

## 2025-02-28 PROCEDURE — 36415 COLL VENOUS BLD VENIPUNCTURE: CPT | Mod: PN | Performed by: INTERNAL MEDICINE

## 2025-02-28 PROCEDURE — 85610 PROTHROMBIN TIME: CPT | Mod: PN | Performed by: INTERNAL MEDICINE

## 2025-03-03 ENCOUNTER — CLINICAL SUPPORT (OUTPATIENT)
Dept: REHABILITATION | Facility: HOSPITAL | Age: 30
End: 2025-03-03
Payer: MEDICAID

## 2025-03-03 DIAGNOSIS — R29.898 HIP WEAKNESS: ICD-10-CM

## 2025-03-03 DIAGNOSIS — M62.81 CALF MUSCLE WEAKNESS: Primary | ICD-10-CM

## 2025-03-03 PROCEDURE — 97110 THERAPEUTIC EXERCISES: CPT | Mod: PO

## 2025-03-03 NOTE — PROGRESS NOTES
Outpatient Rehab    Physical Therapy Visit    Patient Name: Alonzo Nascimento Jr.  MRN: [id number]  YOB: 1995  Encounter Date: 3/3/2025    Therapy Diagnosis:   Encounter Diagnoses   Name Primary?    Calf muscle weakness Yes    Hip weakness      Physician: Sohan Strange MD    Physician Orders: Eval and Treat  Medical Diagnosis: Calf muscle weakness [M62.81], Muscular deconditioning [R29.898]      Visit # / Visits Authorized:  7 / 12   Date of Evaluation:  1/14/2025  Insurance Authorization Period: 1/22/2025 - 4/14/2025   Plan of Care Certification:  4/14/2025  Time In: 0200   Time Out: 0300  Total Time: 60   Total Billable Time: 60 minutes    FOTO:  Intake Score:  %  Survey Score 1:  %  Survey Score 2:  %         Subjective   He notices himself wanting to rock back on his heels when he is standing still..  Pain reported as 0/10.      Objective            Treatment:  Therapeutic Exercise  Therapeutic Exercise Activity 1: Heel raises with power band around medial mal: 3 x 20 reps double leg  Therapeutic Exercise Activity 2: Double leg shuttle squats (3 x 15)  and single leg each (2 x 8) 4 cords  Therapeutic Exercise Activity 3: Ankle pumps with high plank: 1 minute-  Therapeutic Exercise Activity 4: Single leg heel raises on shuttle: 3 x 5 reps 2.5 cords  Therapeutic Exercise Activity 5: quadraped hip extension: 2 x 10 each leg  Therapeutic Exercise Activity 6: Quadraped fire hydrant: 2 x 10 each leg    Balance/Neuromuscular Re-Education  Balance/Neuromuscular Re-Education Activity 1: BOSU balance with ball toss (single leg): 2 x 10  Balance/Neuromuscular Re-Education Activity 4: SLS RDL with 3 cones on foam: 10 rounds  Balance/Neuromuscular Re-Education Activity 5: Bounding: 2 x 10  Balance/Neuromuscular Re-Education Activity 6: Heel raise with knee thrust: 1 lap  Balance/Neuromuscular Re-Education Activity 7: neuromusclar priming: HS scoops and frankensteins    Gait Training  Gait Training Activity 1:  Treadmill: 10minutes at 3.0 and  2.0 incline    Assessment & Plan   Assessment: Patient reports for follow up treatment with no change in complaint of symptoms. Patient tolerated today's session with mild complaints of calf tightness with balance exercises. Today's treatment included strengthening interventions. Patient tolerated progressions well and included .  They remain limited in calf weakness and hip weakness on his left side. Pateint is progressing well towards their goals.  Evaluation/Treatment Tolerance: Patient tolerated treatment well    Patient will continue to benefit from skilled outpatient physical therapy to address the deficits listed in the problem list box on initial evaluation, provide pt/family education and to maximize pt's level of independence in the home and community environment.     Patient's spiritual, cultural, and educational needs considered and patient agreeable to plan of care and goals.           Plan: Continue to progress plan of care as tolerated.    Goals:   Active       Physical Therapy       Physical Therapy Goal (Progressing)       Start:  01/28/25    Expected End:  04/08/25         Short Term Goals (4 weeks):  1. Patient will have improved AROM of the left ankle to 20-0-55 degrees to improve terminal stance/push-off phase of gait.  2. Patient will have decreased complaints of pain to 0/10 to demonstrate improved symptom and pain control for return to prior level of ambulation and participation.  3. Patient will be independent and compliant with issued HEP for continued functional mobility and strength to perform all activities of daily living.  4. Patient will maintain left SLS for at least 30 seconds with pertubation's to demonstrate improved postural control to reduce risk of reinjury.     Long Term Goals (8weeks):   1. Patient will have improved AROM of the left ankle to 20-0-60 to improve terminal stance/push-off phase of gait.  2. Patient will have improved strength of  the left ankle to 4+/5 for improved tissue tolerance for return to prior level of function.   3. Patient will be able to resume prior functional level with no deficits to increase participation in recreational and leisure activities for improved quality of life.  4. Patient will have overall improvement in condition to have decreased FOTO Limitation Score to 81% demonstrate clinically significant change in perceived function.                Lavelle Watson, PT

## 2025-03-07 ENCOUNTER — LAB VISIT (OUTPATIENT)
Dept: LAB | Facility: HOSPITAL | Age: 30
End: 2025-03-07
Attending: INTERNAL MEDICINE
Payer: MEDICAID

## 2025-03-07 DIAGNOSIS — S36.119A HEPATIC TRAUMA, INITIAL ENCOUNTER: ICD-10-CM

## 2025-03-07 LAB
ALBUMIN SERPL BCP-MCNC: 4.1 G/DL (ref 3.5–5.2)
ALP SERPL-CCNC: 49 U/L (ref 38–126)
ALT SERPL W/O P-5'-P-CCNC: 26 U/L (ref 10–44)
ANION GAP SERPL CALC-SCNC: 6 MMOL/L (ref 8–16)
AST SERPL-CCNC: 30 U/L (ref 15–46)
BASOPHILS # BLD AUTO: 0.06 K/UL (ref 0–0.2)
BASOPHILS NFR BLD: 1 % (ref 0–1.9)
BILIRUB SERPL-MCNC: 0.6 MG/DL (ref 0.1–1)
CALCIUM SERPL-MCNC: 9.2 MG/DL (ref 8.7–10.5)
CHLORIDE SERPL-SCNC: 104 MMOL/L (ref 95–110)
CO2 SERPL-SCNC: 31 MMOL/L (ref 23–29)
CREAT SERPL-MCNC: 1.42 MG/DL (ref 0.5–1.4)
DIFFERENTIAL METHOD BLD: ABNORMAL
EOSINOPHIL # BLD AUTO: 0.2 K/UL (ref 0–0.5)
EOSINOPHIL NFR BLD: 2.9 % (ref 0–8)
ERYTHROCYTE [DISTWIDTH] IN BLOOD BY AUTOMATED COUNT: 14.6 % (ref 11.5–14.5)
EST. GFR  (NO RACE VARIABLE): >60 ML/MIN/1.73 M^2
GLUCOSE SERPL-MCNC: 91 MG/DL (ref 70–110)
HCT VFR BLD AUTO: 39.5 % (ref 40–54)
HGB BLD-MCNC: 13 G/DL (ref 14–18)
IMM GRANULOCYTES # BLD AUTO: 0 K/UL (ref 0–0.04)
IMM GRANULOCYTES NFR BLD AUTO: 0 % (ref 0–0.5)
INR PPP: 1 (ref 0.8–1.2)
LYMPHOCYTES # BLD AUTO: 2.8 K/UL (ref 1–4.8)
LYMPHOCYTES NFR BLD: 46.2 % (ref 18–48)
MCH RBC QN AUTO: 29.9 PG (ref 27–31)
MCHC RBC AUTO-ENTMCNC: 32.9 G/DL (ref 32–36)
MCV RBC AUTO: 91 FL (ref 82–98)
MONOCYTES # BLD AUTO: 0.5 K/UL (ref 0.3–1)
MONOCYTES NFR BLD: 8.6 % (ref 4–15)
NEUTROPHILS # BLD AUTO: 2.5 K/UL (ref 1.8–7.7)
NEUTROPHILS NFR BLD: 41.3 % (ref 38–73)
NRBC BLD-RTO: 0 /100 WBC
PLATELET # BLD AUTO: 233 K/UL (ref 150–450)
PMV BLD AUTO: 9.4 FL (ref 9.2–12.9)
POTASSIUM SERPL-SCNC: 4.2 MMOL/L (ref 3.5–5.1)
PROT SERPL-MCNC: 7.4 G/DL (ref 6–8.4)
PROTHROMBIN TIME: 11.2 SEC (ref 9–12.5)
RBC # BLD AUTO: 4.35 M/UL (ref 4.6–6.2)
SODIUM SERPL-SCNC: 141 MMOL/L (ref 136–145)
UUN UR-MCNC: 14 MG/DL (ref 2–20)
WBC # BLD AUTO: 5.95 K/UL (ref 3.9–12.7)

## 2025-03-07 PROCEDURE — 85610 PROTHROMBIN TIME: CPT | Mod: PN | Performed by: INTERNAL MEDICINE

## 2025-03-07 PROCEDURE — 36415 COLL VENOUS BLD VENIPUNCTURE: CPT | Mod: PN | Performed by: INTERNAL MEDICINE

## 2025-03-07 PROCEDURE — 80053 COMPREHEN METABOLIC PANEL: CPT | Mod: PN | Performed by: INTERNAL MEDICINE

## 2025-03-07 PROCEDURE — 85025 COMPLETE CBC W/AUTO DIFF WBC: CPT | Mod: PN | Performed by: INTERNAL MEDICINE

## 2025-03-12 ENCOUNTER — TELEPHONE (OUTPATIENT)
Dept: DERMATOLOGY | Facility: CLINIC | Age: 30
End: 2025-03-12
Payer: MEDICAID

## 2025-03-12 ENCOUNTER — CLINICAL SUPPORT (OUTPATIENT)
Dept: REHABILITATION | Facility: HOSPITAL | Age: 30
End: 2025-03-12
Payer: MEDICAID

## 2025-03-12 DIAGNOSIS — M62.81 CALF MUSCLE WEAKNESS: Primary | ICD-10-CM

## 2025-03-12 DIAGNOSIS — R29.898 HIP WEAKNESS: ICD-10-CM

## 2025-03-12 PROCEDURE — 97110 THERAPEUTIC EXERCISES: CPT | Mod: PO

## 2025-03-12 NOTE — PROGRESS NOTES
Outpatient Rehab    Physical Therapy Progress Note    Patient Name: Alonzo Nascimento Jr.  MRN: [id number]  YOB: 1995  Encounter Date: 3/12/2025    Therapy Diagnosis:   Encounter Diagnoses   Name Primary?    Calf muscle weakness Yes    Hip weakness      Physician: Sohan Strange MD       Physician Orders: Eval and Treat  Medical Diagnosis: Calf muscle weakness [M62.81], Muscular deconditioning [R29.898]      Visit # / Visits Authorized:  9 / 12   Date of Evaluation:  1/14/2025  Insurance Authorization Period: 1/22/2025 - 4/14/2025   Plan of Care Certification:  4/14/2025     Time In: 0200   Time Out: 0300  Total Time: 60   Total Billable Time: 60 minutes    FOTO:  Intake Score: 76%  Survey Score 1: 79%  Survey Score 2:  %         Subjective   He wants to get back to cutting..  Pain reported as 0/10.      Objective       Ankle/Foot Range of Motion   Left Ankle/Foot   Active (deg) Passive (deg) Pain   Dorsiflexion (KE) 20       Dorsiflexion (KF)         Plantar Flexion 58       Ankle Inversion         Ankle Eversion         Subtalar Inversion         Subtalar Eversion         Great Toe MTP Flexion         Great Toe MTP Extension         Great Toe IP Flexion                            Ankle/Foot Strength - Planes of Motion   Right Strength Right Pain Left Strength Left  Pain   Dorsiflexion (L4)     4+     Plantar Flexion (S1)     4-     Inversion     4+     Eversion     5     Great Toe Flexion           Great Toe Extension (L5)           Lesser Toes Flexion           Lesser Toes Extension                    Treatment:  Therapeutic Exercise  Therapeutic Exercise Activity 1: Heel raises with power band around medial mal: 3 x 20 reps double leg  Therapeutic Exercise Activity 2: Double leg shuttle jumps: 4 x 10 4 cords  Therapeutic Exercise Activity 3: Eccentric heel raises: 3 x 10 on step  Therapeutic Exercise Activity 4: Single leg heel raises on shuttle: 3 x 5 reps 2.5 cords    Balance/Neuromuscular  Re-Education  Balance/Neuromuscular Re-Education Activity 1: BOSU balance with ball toss (single leg): 2 x 10  Balance/Neuromuscular Re-Education Activity 4: SLS RDL with 3 cones on foam: 10 rounds  Balance/Neuromuscular Re-Education Activity 5: Boundin laps  Balance/Neuromuscular Re-Education Activity 6: Heel raise with knee thrust: 2 lap  Balance/Neuromuscular Re-Education Activity 7: neuromusclar priming: HS scoops and frankensteins    Therapeutic Activity  Therapeutic Activity 1: Reassessment + FOTO     Treadmill 3.0 incline 10' walk    Assessment & Plan   Assessment: Patient reports for re-assessment and follow up treatment. Patient reports improvement in symptoms since start of care and has met 3/3 STG and 0/4 LTG. Patient demonstrates improvement in ankle ramge of motion but continues to demonstrate difficulty with high level cutting activites. Patient will continue to benefit from skilled physical therapy services for 1-2x/week for 10 weeks. Today's treatment included strengthening, range of motion, balance training, gait training, cardiovascular endurance, and manual therapy interventions and progressions which were tolerated well. Patient will continue to benefit from skilled PT to address deficits and maximize function.  Evaluation/Treatment Tolerance: Patient tolerated treatment well    Patient will continue to benefit from skilled outpatient physical therapy to address the deficits listed in the problem list box on initial evaluation, provide pt/family education and to maximize pt's level of independence in the home and community environment.     Patient's spiritual, cultural, and educational needs considered and patient agreeable to plan of care and goals.           Plan: Continue to progress plan of care as tolerated.    Goals:   Active       Physical Therapy       Physical Therapy Goal (Progressing)       Start:  25    Expected End:  25         Short Term Goals (4 weeks):  1. Patient  will have improved AROM of the left ankle to 20-0-55 degrees to improve terminal stance/push-off phase of gait. MET  2. Patient will have decreased complaints of pain to 0/10 to demonstrate improved symptom and pain control for return to prior level of ambulation and participation. MET  3. Patient will be independent and compliant with issued HEP for continued functional mobility and strength to perform all activities of daily living. MET  4. Patient will maintain left SLS for at least 30 seconds with pertubation's to demonstrate improved postural control to reduce risk of reinjury.     Long Term Goals (8weeks):   1. Patient will have improved AROM of the left ankle to 20-0-60 to improve terminal stance/push-off phase of gait.  2. Patient will have improved strength of the left ankle to 4+/5 for improved tissue tolerance for return to prior level of function.   3. Patient will be able to resume prior functional level with no deficits to increase participation in recreational and leisure activities for improved quality of life.  4. Patient will have overall improvement in condition to have decreased FOTO Limitation Score to 81% demonstrate clinically significant change in perceived function.                Lavelle Watson, PT

## 2025-03-12 NOTE — TELEPHONE ENCOUNTER
LVM----- Message from Med Assistant Mckeon sent at 3/10/2025  4:22 PM CDT -----  Regarding: FW: Reschedule Appointment  Contact: [phone]    ----- Message -----  From: Jazlyn Pantoja  Sent: 3/10/2025   2:54 PM CDT  To: [facility] Clinical Support Triage  Subject: Reschedule Appointment                           Type:   Appointment RequestCaller is requesting  appointment. Name of Caller:Timi is the first available appointment?noneSymptoms:Pasquale Call Back Number:[phone] Information: medical transportation did not arrive to  patient for today's visit

## 2025-03-13 ENCOUNTER — OFFICE VISIT (OUTPATIENT)
Dept: HEPATOLOGY | Facility: CLINIC | Age: 30
End: 2025-03-13
Attending: INTERNAL MEDICINE
Payer: MEDICAID

## 2025-03-13 VITALS
HEIGHT: 73 IN | DIASTOLIC BLOOD PRESSURE: 64 MMHG | WEIGHT: 184.06 LBS | OXYGEN SATURATION: 98 % | HEART RATE: 72 BPM | TEMPERATURE: 99 F | BODY MASS INDEX: 24.39 KG/M2 | RESPIRATION RATE: 18 BRPM | SYSTOLIC BLOOD PRESSURE: 118 MMHG

## 2025-03-13 DIAGNOSIS — T50.905A DRESS SYNDROME: Primary | ICD-10-CM

## 2025-03-13 DIAGNOSIS — D72.12 DRESS SYNDROME: Primary | ICD-10-CM

## 2025-03-13 PROCEDURE — 1159F MED LIST DOCD IN RCRD: CPT | Mod: CPTII,,, | Performed by: INTERNAL MEDICINE

## 2025-03-13 PROCEDURE — 99213 OFFICE O/P EST LOW 20 MIN: CPT | Mod: PBBFAC | Performed by: INTERNAL MEDICINE

## 2025-03-13 PROCEDURE — 1160F RVW MEDS BY RX/DR IN RCRD: CPT | Mod: CPTII,,, | Performed by: INTERNAL MEDICINE

## 2025-03-13 PROCEDURE — 99213 OFFICE O/P EST LOW 20 MIN: CPT | Mod: S$PBB,,, | Performed by: INTERNAL MEDICINE

## 2025-03-13 PROCEDURE — 3078F DIAST BP <80 MM HG: CPT | Mod: CPTII,,, | Performed by: INTERNAL MEDICINE

## 2025-03-13 PROCEDURE — 3074F SYST BP LT 130 MM HG: CPT | Mod: CPTII,,, | Performed by: INTERNAL MEDICINE

## 2025-03-13 PROCEDURE — 3008F BODY MASS INDEX DOCD: CPT | Mod: CPTII,,, | Performed by: INTERNAL MEDICINE

## 2025-03-13 PROCEDURE — 99999 PR PBB SHADOW E&M-EST. PATIENT-LVL III: CPT | Mod: PBBFAC,,, | Performed by: INTERNAL MEDICINE

## 2025-03-13 NOTE — PROGRESS NOTES
HEPATOLOGY FOLLOW UP    Referring Physician:   Current Corresponding Physician: Dr. Sohan Nascimento Jr. 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  He was treated with steroids and subsequently recovered.  During that hospitalization he had a significant cholestatic hepatitis.  His biopsy was consistent with a drug-induced liver injury likely secondary to Bactrim.  He has no history of underlying liver disease.  He has no symptoms of chronic liver disease.  He is no longer jaundice.  His liver enzymes and liver synthetic function have returned to normal.  Encounter Medications[1]  Review of patient's allergies indicates:   Allergen Reactions    Bactrim [sulfamethoxazole-trimethoprim] Rash     Developed DRESS     Past Medical History:   Diagnosis Date    Achilles tendon rupture 07/2023    left achilles tendon rupture s/p repair on 07/14/23 complicated by osteomyelitis of left calcaneus s/p I&D 12/4/24    Acute osteomyelitis of calcaneum, left 12/2024    osteomyelitis of left calcaneus s/p I&D 12/4/24       Review of Systems   Constitutional:  Negative for activity change, appetite change, chills, fatigue and unexpected weight change.   HENT:  Negative for congestion, facial swelling and tinnitus.    Eyes:  Negative for visual disturbance.   Respiratory:  Negative for cough, shortness of breath and wheezing.    Cardiovascular:  Negative for chest pain and palpitations.   Gastrointestinal:  Negative for abdominal distention.   Genitourinary:  Negative for dysuria.   Musculoskeletal:  Negative for arthralgias, joint swelling and myalgias.   Neurological:  Negative for syncope and headaches.   Hematological:  Does not bruise/bleed easily.   Psychiatric/Behavioral:  Negative for confusion.      Vitals:    03/13/25 0910   BP: 118/64   Pulse: 72   Resp: 18    Temp: 98.8 °F (37.1 °C)       Physical Exam  Constitutional:       Appearance: He is well-developed.   Eyes:      General: No scleral icterus.  Cardiovascular:      Rate and Rhythm: Normal rate and regular rhythm.      Heart sounds: Normal heart sounds.   Pulmonary:      Effort: Pulmonary effort is normal. No respiratory distress.      Breath sounds: Normal breath sounds. No wheezing.   Abdominal:      General: Bowel sounds are normal. There is no distension.      Palpations: Abdomen is soft. There is no mass.      Tenderness: There is no abdominal tenderness. There is no rebound.   Musculoskeletal:         General: Normal range of motion.   Lymphadenopathy:      Cervical: No cervical adenopathy.   Skin:     General: Skin is warm and dry.   Neurological:      Mental Status: He is alert and oriented to person, place, and time.         MELD 3.0: 10 at 3/7/2025  2:06 PM  MELD-Na: 10 at 3/7/2025  2:06 PM  Calculated from:  Serum Creatinine: 1.42 mg/dL at 3/7/2025  2:06 PM  Serum Sodium: 141 mmol/L (Using max of 137 mmol/L) at 3/7/2025  2:06 PM  Total Bilirubin: 0.6 mg/dL (Using min of 1 mg/dL) at 3/7/2025  2:06 PM  Serum Albumin: 4.1 g/dL (Using max of 3.5 g/dL) at 3/7/2025  2:06 PM  INR(ratio): 1 at 3/7/2025  2:06 PM  Age at listing (hypothetical): 30 years  Sex: Male at 3/7/2025  2:06 PM      Lab Results   Component Value Date    GLU 91 03/07/2025    BUN 14 03/07/2025    CREATININE 1.42 (H) 03/07/2025    CALCIUM 9.2 03/07/2025     03/07/2025    K 4.2 03/07/2025     03/07/2025    PROT 7.4 03/07/2025    CO2 31 (H) 03/07/2025    ANIONGAP 6 (L) 03/07/2025    WBC 5.95 03/07/2025    RBC 4.35 (L) 03/07/2025    HGB 13.0 (L) 03/07/2025    HCT 39.5 (L) 03/07/2025    MCV 91 03/07/2025    MCH 29.9 03/07/2025    MCHC 32.9 03/07/2025     Lab Results   Component Value Date    RDW 14.6 (H) 03/07/2025     03/07/2025    MPV 9.4 03/07/2025    GRAN 2.5 03/07/2025    GRAN 41.3 03/07/2025    LYMPH 2.8 03/07/2025     LYMPH 46.2 2025    MONO 0.5 2025    MONO 8.6 2025    EOSINOPHIL 2.9 2025    BASOPHIL 1.0 2025    EOS 0.2 2025    BASO 0.06 2025    GROUPTRH A POS 2024    BNP 27 2024    CHOL 111 (L) 2024    TRIG 226 (H) 2024    HDL 13 (L) 2024    CHOLHDL 11.7 (L) 2024    TOTALCHOLEST 8.5 (H) 2024    ALBUMIN 4.1 2025    BILIDIR 2.6 (H) 2024    AST 30 2025    ALT 26 2025    ALKPHOS 49 2025    MG 2.0 2025    LABPROT 11.2 2025    INR 1.0 2025       Assessment and Plan:  Problem List[2]  Alonzo Nascimento Jr. is a 30 y.o. male withElevated Hepatic Enzymes    Impression:    Drug-induced liver injury in the setting of DRESS syndrome - recovered.    Plan:    As he has no underlying liver disease I do not think he requires any follow-up.  I provided reassurance.  I am happy to be reinvolved should the need arise.       [1]   Outpatient Encounter Medications as of 3/13/2025   Medication Sig Dispense Refill    famotidine (PEPCID) 20 MG tablet Take 1 tablet (20 mg total) by mouth Daily. Take while on steroids. 30 tablet 0    hydrocortisone 2.5 % cream Apply topically 2 (two) times daily. Apply to rash on face. 30 g 1    ondansetron (ZOFRAN-ODT) 4 MG TbDL Take 1 tablet (4 mg total) by mouth every 8 (eight) hours as needed. 14 tablet 1    triamcinolone acetonide 0.1% (KENALOG) 0.1 % cream Apply topically 2 (two) times daily. Apply to trunk and limbs. 453 g 1    [] prednisoLONE sodium phosphate (ORAPRED) 15 mg/5 mL (5 mL) solution Take 16.7 mLs (50.1 mg total) by mouth once daily for 7 days, THEN 13.3 mLs (39.9 mg total) once daily for 7 days, THEN 10 mLs (30 mg total) once daily for 7 days, THEN 6.7 mLs (20.1 mg total) once daily for 7 days, THEN 3.3 mLs (9.9 mg total) once daily for 7 days. 420 mL 0     No facility-administered encounter medications on file as of 3/13/2025.   [2]   Patient Active Problem  List  Diagnosis    Impaired range of motion of left ankle    Impaired functional mobility, balance, gait, and endurance    Decreased range of motion    Calf muscle weakness    Weakness of both hips    Non healing left heel wound    Acute osteomyelitis of left calcaneus    DRESS syndrome    Hyponatremia    LINK (acute kidney injury)    Acute kidney injury    Liver injury    Severe systemic inflammatory response syndrome (SIRS)    Hypoalbuminemia    Fever    Thrombocytopenia    Anemia    Hip weakness

## 2025-03-17 ENCOUNTER — TELEPHONE (OUTPATIENT)
Dept: DERMATOLOGY | Facility: CLINIC | Age: 30
End: 2025-03-17
Payer: MEDICAID

## 2025-03-17 NOTE — TELEPHONE ENCOUNTER
----- Message from ROMERO Lorenzana sent at 3/14/2025 10:19 AM CDT -----  Regarding: FW: Appt  Contact: Pt  [phone]    ----- Message -----  From: Antonia Bundy  Sent: 3/13/2025   9:25 AM CDT  To: [facility] Derm Clinical Support Triage  Subject: Appt                                             Pt is calling to reschedule missed appt from 3/10/25 with Acute Derm please call

## 2025-03-24 ENCOUNTER — CLINICAL SUPPORT (OUTPATIENT)
Dept: REHABILITATION | Facility: HOSPITAL | Age: 30
End: 2025-03-24
Payer: MEDICAID

## 2025-03-24 DIAGNOSIS — R29.898 HIP WEAKNESS: ICD-10-CM

## 2025-03-24 DIAGNOSIS — M62.81 CALF MUSCLE WEAKNESS: Primary | ICD-10-CM

## 2025-03-24 PROCEDURE — 97110 THERAPEUTIC EXERCISES: CPT | Mod: PO,CQ

## 2025-03-24 NOTE — PROGRESS NOTES
Outpatient Rehab    Physical Therapy Visit    Patient Name: Alonzo Nascimento Jr.  MRN: [id number]  YOB: 1995  Encounter Date: 3/24/2025    Therapy Diagnosis:   Encounter Diagnoses   Name Primary?    Calf muscle weakness Yes    Hip weakness      Physician: Sohan Strange MD    Physician Orders: Eval and Treat  Medical Diagnosis: Calf muscle weakness  Muscular deconditioning    Visit # / Visits Authorized:  10 / 12  Insurance Authorization Period: 2025 to 2025  Date of Evaluation: 25  Plan of Care Certification: 2025     PT/PTA: PTA   Number of PTA visits since last PT visit:1  Time In: 1400   Time Out: 1455  Total Time: 55   Total Billable Time: 55    FOTO:  Intake Score:  %  Survey Score 1:  %  Survey Score 2:  %         Subjective   Pt states that he has no new complaints at this moment..         Objective            Treatment:  Therapeutic Exercise  TE 1: Heel raises with power band around medial mal: 3 x 20 reps double leg  TE 2: Double leg shuttle jumps: 4 x 10 4 cords  TE 3: Eccentric heel raises: 3 x 10 on step  TE 4: Single leg heel raises on shuttle: 3 x 5 reps 2.5 cords  Balance/Neuromuscular Re-Education  NMR 1: BOSU balance with ball toss (single leg): 2 x 10  NMR 4: SLS RDL with 3 cones on foam: 10 rounds  NMR 5: Boundin laps  NMR 6: Heel raise with knee thrust: 2 lap  NMR 7: neuromusclar priming: HS scoops and frankensteins  Therapeutic Activity  TA 1: Reassessment + FOTO NP    Time Entry(in minutes):  Therapeutic Exercise Time Entry: 55    Assessment & Plan   Assessment: Pt tolerated therapy session well and completed exercise program w/ goals to improve Ankle Strength, Balance, and Functional Mobility. Some pain noted during bounding which pt states is from accidentally landing on his heel during the first jump, and pt was amenable to completing exercise after a moment of rest. Minor LOB throughout bosu ball tosses and uni heel raises w/ pt able to self recover  each time w/o assistance. Mod verbal and manual cues needed throughout therapy session to promote proper exercise form. All exercises were tolerated w/ mod < > max fatigue. Exercises challenged appropriately. Will continue to progress as tolerated.  Evaluation/Treatment Tolerance: Patient tolerated treatment well    Patient will continue to benefit from skilled outpatient physical therapy to address the deficits listed in the problem list box on initial evaluation, provide pt/family education and to maximize pt's level of independence in the home and community environment.     Patient's spiritual, cultural, and educational needs considered and patient agreeable to plan of care and goals.           Plan: Continue to progress plan of care as tolerated.    Goals:   Active       Physical Therapy       Physical Therapy Goal (Progressing)       Start:  01/28/25    Expected End:  04/08/25         Short Term Goals (4 weeks):  1. Patient will have improved AROM of the left ankle to 20-0-55 degrees to improve terminal stance/push-off phase of gait. MET  2. Patient will have decreased complaints of pain to 0/10 to demonstrate improved symptom and pain control for return to prior level of ambulation and participation. MET  3. Patient will be independent and compliant with issued HEP for continued functional mobility and strength to perform all activities of daily living. MET  4. Patient will maintain left SLS for at least 30 seconds with pertubation's to demonstrate improved postural control to reduce risk of reinjury.     Long Term Goals (8weeks):   1. Patient will have improved AROM of the left ankle to 20-0-60 to improve terminal stance/push-off phase of gait.  2. Patient will have improved strength of the left ankle to 4+/5 for improved tissue tolerance for return to prior level of function.   3. Patient will be able to resume prior functional level with no deficits to increase participation in recreational and leisure  activities for improved quality of life.  4. Patient will have overall improvement in condition to have decreased FOTO Limitation Score to 81% demonstrate clinically significant change in perceived function.                Lavelle Mendez, PTA

## 2025-03-31 ENCOUNTER — OFFICE VISIT (OUTPATIENT)
Dept: DERMATOLOGY | Facility: CLINIC | Age: 30
End: 2025-03-31
Payer: MEDICAID

## 2025-03-31 DIAGNOSIS — Z87.39 HISTORY OF OSTEOMYELITIS: ICD-10-CM

## 2025-03-31 DIAGNOSIS — Z79.899 ENCOUNTER FOR LONG-TERM (CURRENT) DRUG USE: ICD-10-CM

## 2025-03-31 DIAGNOSIS — T50.905A DRESS SYNDROME: Primary | ICD-10-CM

## 2025-03-31 DIAGNOSIS — D72.12 DRESS SYNDROME: Primary | ICD-10-CM

## 2025-03-31 PROCEDURE — 99214 OFFICE O/P EST MOD 30 MIN: CPT | Mod: S$PBB,,, | Performed by: DERMATOLOGY

## 2025-03-31 RX ORDER — TRIAMCINOLONE ACETONIDE 1 MG/G
CREAM TOPICAL 2 TIMES DAILY
Qty: 453 G | Refills: 2 | Status: SHIPPED | OUTPATIENT
Start: 2025-03-31

## 2025-03-31 NOTE — PROGRESS NOTES
Subjective:      Patient ID:  Alonzo Nascimento Jr. is a 30 y.o. male who presents for hospital follow-up    HPI  29 yoM presenting follow-up after being hospitalized for DRESS likely from bactrim but could not rule out flagyl. His course was complicated by LINK and acute transaminates with LFTS in the thousands. The patient was discharged on prednisolone 60mg QD. The patient had hx of osteomyelitis (Citrobacter koseri and prevotella bevia) and was on PO bactrim DS and metronidazole. 12/10/2024 the patient had infected bone infection and foreign body removed.    LOV 01/2025 at which time he was continued on a prednisolone taper- 50mg for 1 week. Then decrease to 40mg for 1 week, 30mg for 1 week, 30mg for 1 week, 20mg for 1 wk, 10mg for 1 wk. He was also rx triamcinolone 0.1% cream as needed for itching areas. He was also continued on pepcid 20 mg BID.    He had a clinic visit with ID on 01/27/2025 at which time they were going to continue to monitor him off of antibiotics.He also had a visit with hepatology 03/2025 at which time they believed his liver function had returned to normal and did not believe he required further follow-up.    Today, states that he finished his course of steroids in February and has noticed emergence of some spots. He notes a couple spots on his arm and some on his lower back. States that he is intermittently applying the topical steroids to these areas.        Review of Systems   Constitutional:  Negative for fever, chills and night sweats.   Skin:  Positive for itching and rash.       Objective:   Physical Exam   Constitutional: He appears well-developed and well-nourished.   Skin:               Diagram Legend     Erythematous scaling macule/papule c/w actinic keratosis       Vascular papule c/w angioma      Pigmented verrucoid papule/plaque c/w seborrheic keratosis      Yellow umbilicated papule c/w sebaceous hyperplasia      Irregularly shaped tan macule c/w lentigo     1-2 mm smooth  white papules consistent with Milia      Movable subcutaneous cyst with punctum c/w epidermal inclusion cyst      Subcutaneous movable cyst c/w pilar cyst      Firm pink to brown papule c/w dermatofibroma      Pedunculated fleshy papule(s) c/w skin tag(s)      Evenly pigmented macule c/w junctional nevus     Mildly variegated pigmented, slightly irregular-bordered macule c/w mildly atypical nevus      Flesh colored to evenly pigmented papule c/w intradermal nevus       Pink pearly papule/plaque c/w basal cell carcinoma      Erythematous hyperkeratotic cursted plaque c/w SCC      Surgical scar with no sign of skin cancer recurrence      Open and closed comedones      Inflammatory papules and pustules      Verrucoid papule consistent consistent with wart     Erythematous eczematous patches and plaques     Dystrophic onycholytic nail with subungual debris c/w onychomycosis     Umbilicated papule    Erythematous-base heme-crusted tan verrucoid plaque consistent with inflamed seborrheic keratosis     Erythematous Silvery Scaling Plaque c/w Psoriasis     See annotation  Labs:   -12/26 labs with normal TSH and free T4   -12/27 acute hepatitis panel and hep E negative  -12/23 EBV positive   -12/30/2024 AST 2199 ALT 4366  -Bartonella negative, HSV-1 and 2, negative, RPR negative  -Histo negative    1/6/2025 CBC with Hgb 11.7, Eos 0.1, CMP with Cr 1.0 AST 49,   03/2025 CBC with Hgb 13.0, Eos 0.2, CMP Cr 1.42, AST 30, ALT 26    Assessment / Plan:        DRESS syndrome  30 yoM presenting follow-up after being hospitalized for >50% BSA or erythematous morbilliform rash +eosinophilia, leukocytosis, elevated LFTs, LINK, subjective fevers (REGISCAR of 3 at hospital admission).  Diagnosed with DRESS likely from bactrim but could not rule out flagyl. The patient is s/p PO steroid taper with with improvement in skin and stabilization of LFTs. Has seen ID and hepatology after hospital discharge who are opting for no further  antibiotics and no further follow-up due to LFT normalization. Today, pt continues with mild eczematous papules and plaques to the b/l upper extremities and some erythematous papules to the groin and back. Recommend use of topical steroids for now to alleviate itch. Can escalate therapy if not resolved. DRESS associated with a high risk of hypothyroidism and will opt to obtain follow-up TSH today.  -     TSH; Future; Expected date: 03/31/2025  -     CBC Auto Differential; Future; Expected date: 03/31/2025  -     Comprehensive Metabolic Panel; Future; Expected date: 03/31/2025  -     triamcinolone acetonide 0.1% (KENALOG) 0.1 % cream; Apply topically 2 (two) times daily. Apply to trunk and limbs. Do NOT apply to groin or face.  Dispense: 453 g; Refill: 2- refilled.  - Continue hydrocortisone 2.5% to face and groin.    Encounter for long-term (current) drug use  -     TSH; Future; Expected date: 03/31/2025  -     CBC Auto Differential; Future; Expected date: 03/31/2025  -     Comprehensive Metabolic Panel; Future; Expected date: 03/31/2025    History of osteomyelitis  Office visit with OD on 01/27/2025 at which time opted for no additional antibiotics.  - Pt has follow-up with podiatry on 04/07/2025

## 2025-04-02 ENCOUNTER — CLINICAL SUPPORT (OUTPATIENT)
Dept: REHABILITATION | Facility: HOSPITAL | Age: 30
End: 2025-04-02
Payer: MEDICAID

## 2025-04-02 DIAGNOSIS — M62.81 CALF MUSCLE WEAKNESS: Primary | ICD-10-CM

## 2025-04-02 DIAGNOSIS — R29.898 HIP WEAKNESS: ICD-10-CM

## 2025-04-02 PROCEDURE — 97110 THERAPEUTIC EXERCISES: CPT | Mod: PO

## 2025-04-07 ENCOUNTER — TELEPHONE (OUTPATIENT)
Dept: PODIATRY | Facility: CLINIC | Age: 30
End: 2025-04-07
Payer: MEDICAID

## 2025-04-07 NOTE — PROGRESS NOTES
Outpatient Rehab    Physical Therapy Progress Note : Updated Plan of Care    Patient Name: Alonzo Nascimento Jr.  MRN: [id number]  YOB: 1995  Encounter Date: 2025    Therapy Diagnosis:   Encounter Diagnoses   Name Primary?    Calf muscle weakness Yes    Hip weakness      Physician: Sohan Strange MD    Physician Orders: Eval and Treat  Medical Diagnosis: Calf muscle weakness  Muscular deconditioning    Visit # / Visits Authorized:    Insurance Authorization Period: 2025 to 2025  Date of Evaluation: 25  Plan of Care Certification: 2025     PT/PTA: 0    Number of PTA visits since last PT visit:   Time In: 0300   Time Out: 0356  Total Time: 56   Total Billable Time: 56    FOTO:  Intake Score:  %  Survey Score 1:  %  Survey Score 2:  %         Subjective   He is doing well today..  Pain reported as 0/10.      Objective       Ankle/Foot Range of Motion   Left Ankle/Foot   Active (deg) Passive (deg) Pain   Dorsiflexion (KE) 20       Dorsiflexion (KF)         Plantar Flexion 60       Ankle Inversion         Ankle Eversion         Subtalar Inversion         Subtalar Eversion         Great Toe MTP Flexion         Great Toe MTP Extension         Great Toe IP Flexion                            Ankle/Foot Strength - Planes of Motion   Right Strength Right Pain Left Strength Left  Pain   Dorsiflexion (L4)     5     Plantar Flexion (S1)     4-     Inversion     4+     Eversion     5     Great Toe Flexion           Great Toe Extension (L5)           Lesser Toes Flexion           Lesser Toes Extension                     Treatment:  Therapeutic Exercise  TE 1: Heel raises with power band around medial mal: 3 x 8 reps single leg  TE 2: Double leg shuttle jumps: 4 x 10 4 cords  TE 3: Eccentric heel raises: 3 x 10 on step  TE 4: Single leg heel raises on shuttle: 3 x 5 reps 2.5 cords  Balance/Neuromuscular Re-Education  NMR 2: Box falls off 12 inch plyo box: 3 x 10  NMR 5: Boundin  laps  NMR 7: neuromusclar priming: HS scoops and frankensteins  Gait Training  GT 1: Treadmill: 10 minutes at 3.0 and 5.0 incline    Time Entry(in minutes):  Therapeutic Exercise Time Entry: 56    Assessment & Plan    Patient reports for follow up treatment with no change in complaint of symptoms. Patient tolerated today's session with no complaints. Today's treatment included plyometirc, strengthening, and gait interventions. Patient tolerated progressions well and included plyometric box falls. They remain limited in calf strength and agility and would continue to benefit from more visits kevin improve his strenth with sport and other functional activties. Pateint is progressing well towards their goals.     Patient will continue to benefit from skilled outpatient physical therapy to address the deficits listed in the problem list box on initial evaluation, provide pt/family education and to maximize pt's level of independence in the home and community environment.     Patient's spiritual, cultural, and educational needs considered and patient agreeable to plan of care and goals.           Goals:   Active       Physical Therapy       Physical Therapy Goal (Progressing)       Start:  01/28/25    Expected End:  04/08/25         Short Term Goals (4 weeks):  1. Patient will have improved AROM of the left ankle to 20-0-55 degrees to improve terminal stance/push-off phase of gait. MET  2. Patient will have decreased complaints of pain to 0/10 to demonstrate improved symptom and pain control for return to prior level of ambulation and participation. MET  3. Patient will be independent and compliant with issued HEP for continued functional mobility and strength to perform all activities of daily living. MET  4. Patient will maintain left SLS for at least 30 seconds with pertubation's to demonstrate improved postural control to reduce risk of reinjury.     Long Term Goals (8weeks):   1. Patient will have improved AROM of the  left ankle to 20-0-60 to improve terminal stance/push-off phase of gait.  2. Patient will have improved strength of the left ankle to 4+/5 for improved tissue tolerance for return to prior level of function.   3. Patient will be able to resume prior functional level with no deficits to increase participation in recreational and leisure activities for improved quality of life.  4. Patient will have overall improvement in condition to have decreased FOTO Limitation Score to 81% demonstrate clinically significant change in perceived function.                Lavelle Watson, PT

## 2025-04-07 NOTE — TELEPHONE ENCOUNTER
----- Message from Jimmy sent at 4/7/2025  8:02 AM CDT -----  Type:   Appointment RequestCaller is requesting a sooner appointment.  Caller declined first available appointment listed below.  Caller will not accept being placed on the waitlist and is requesting a message be sent to doctor.Name of Caller:pt When is the first available appointment?today however, pt doesn't have a ride and can't make it due to weather Symptoms:f/u / xray Would the patient rather a call back or a response via [facility]? Call Best Call Back Number: [phone] Information: books were closed

## 2025-04-07 NOTE — TELEPHONE ENCOUNTER
Spoke with Mr Nascimento to assist him with rescheduling his appt with Dr Garcia due to having issues with weather today to make it to his appt. Accepted appt date and time of 4/28/25 at 3:45 pm with xray prior 3:30 pm. No other needs voiced. Call back encouraged if needed. Appt reminder mailed as well.

## 2025-04-08 ENCOUNTER — CLINICAL SUPPORT (OUTPATIENT)
Dept: REHABILITATION | Facility: HOSPITAL | Age: 30
End: 2025-04-08
Payer: MEDICAID

## 2025-04-08 DIAGNOSIS — M62.81 CALF MUSCLE WEAKNESS: Primary | ICD-10-CM

## 2025-04-08 DIAGNOSIS — R29.898 HIP WEAKNESS: ICD-10-CM

## 2025-04-08 PROCEDURE — 97110 THERAPEUTIC EXERCISES: CPT | Mod: PO,CQ

## 2025-04-09 NOTE — PROGRESS NOTES
Outpatient Rehab    Physical Therapy Visit    Patient Name: Alonzo Nascimento Jr.  MRN: [id number]  YOB: 1995  Encounter Date: 2025    Therapy Diagnosis:   Encounter Diagnoses   Name Primary?    Calf muscle weakness Yes    Hip weakness      Physician: Sohan Strange MD    Physician Orders: Eval and Treat  Medical Diagnosis: Calf muscle weakness  Muscular deconditioning    Visit # / Visits Authorized:    Insurance Authorization Period: 2025 to 2025  Date of Evaluation: 25  Plan of Care Certification: 2025     PT/PTA: PTA   Number of PTA visits since last PT visit:1  Time In: 1500   Time Out: 1600  Total Time: 60   Total Billable Time: 60    FOTO:  Intake Score:  %  Survey Score 1:  %  Survey Score 2:  %         Subjective   Pt w/ no new complaints at this time.         Objective            Treatment:  Therapeutic Exercise  TE 1: Heel raises with power band around medial mal: 3 x 8 reps single leg  TE 2: Double leg shuttle jumps: 4 x 10 4 cords  TE 3: Eccentric heel raises: 3 x 10 on step  TE 4: Single leg heel raises on shuttle: 3 x 5 reps 2.5 cords  Balance/Neuromuscular Re-Education  NMR 2: Box falls off 12 inch plyo box: 3 x 10  NMR 5: Boundin laps  NMR 7: neuromusclar priming: HS scoops and frankensteins  Gait Training  GT 1: Treadmill: 10 minutes at 3.0 and 5.0 incline    Time Entry(in minutes):  Therapeutic Exercise Time Entry: 60    Assessment & Plan   Assessment: Pt tolerated therapy session well and w/o issue. Mod fatigue noted throughout therapy session requiring short rest breaks in between exercises to reduce muscle fatigue. Extra time taken today to adjust bounding exercise so that pt lands more squarely on whole foot instead of toes. Minor LOB when performing Bosu SLS, however pt able to self recover w/o issue. Exercises challenged appropriately and will continue to progress as tolerated.       Patient will continue to benefit from skilled outpatient  physical therapy to address the deficits listed in the problem list box on initial evaluation, provide pt/family education and to maximize pt's level of independence in the home and community environment.     Patient's spiritual, cultural, and educational needs considered and patient agreeable to plan of care and goals.           Plan: Continue to progress plan of care as tolerated.    Goals:   Active       Physical Therapy       Physical Therapy Goal (Progressing)       Start:  01/28/25    Expected End:  04/08/25         Short Term Goals (4 weeks):  1. Patient will have improved AROM of the left ankle to 20-0-55 degrees to improve terminal stance/push-off phase of gait. MET  2. Patient will have decreased complaints of pain to 0/10 to demonstrate improved symptom and pain control for return to prior level of ambulation and participation. MET  3. Patient will be independent and compliant with issued HEP for continued functional mobility and strength to perform all activities of daily living. MET  4. Patient will maintain left SLS for at least 30 seconds with pertubation's to demonstrate improved postural control to reduce risk of reinjury.     Long Term Goals (8weeks):   1. Patient will have improved AROM of the left ankle to 20-0-60 to improve terminal stance/push-off phase of gait.  2. Patient will have improved strength of the left ankle to 4+/5 for improved tissue tolerance for return to prior level of function.   3. Patient will be able to resume prior functional level with no deficits to increase participation in recreational and leisure activities for improved quality of life.  4. Patient will have overall improvement in condition to have decreased FOTO Limitation Score to 81% demonstrate clinically significant change in perceived function.                Lavelle Mendez, PTA

## 2025-04-15 ENCOUNTER — CLINICAL SUPPORT (OUTPATIENT)
Dept: REHABILITATION | Facility: HOSPITAL | Age: 30
End: 2025-04-15
Payer: MEDICAID

## 2025-04-15 DIAGNOSIS — M62.81 CALF MUSCLE WEAKNESS: Primary | ICD-10-CM

## 2025-04-15 DIAGNOSIS — R29.898 HIP WEAKNESS: ICD-10-CM

## 2025-04-15 PROCEDURE — 97110 THERAPEUTIC EXERCISES: CPT | Mod: PO,CQ

## 2025-04-15 NOTE — PROGRESS NOTES
Outpatient Rehab    Physical Therapy Visit    Patient Name: Alonzo Nascimento Jr.  MRN: [id number]  YOB: 1995  Encounter Date: 4/15/2025    Therapy Diagnosis:   Encounter Diagnoses   Name Primary?    Calf muscle weakness Yes    Hip weakness      Physician: Sohan Strange MD    Physician Orders: Eval and Treat  Medical Diagnosis: Calf muscle weakness  Muscular deconditioning    Visit # / Visits Authorized:    Insurance Authorization Period: 2025 to 2025  Date of Evaluation: 25  Plan of Care Certification: 2025     PT/PTA: PTA   Number of PTA visits since last PT visit:2  Time In: 1400   Time Out: 1500  Total Time: 60   Total Billable Time: 60    FOTO:  Intake Score:  %  Survey Score 1:  %  Survey Score 2:  %         Subjective   Pt w/ no new complaints.         Objective            Treatment:  Therapeutic Exercise  TE 1: Heel raises with power band around medial mal: 3 x 8 reps single leg  TE 2: Double leg shuttle jumps: 4 x 10 4 cords  TE 3: Eccentric heel raises: 3 x 10 on step  TE 4: Single leg heel raises on shuttle: 3 x 5 reps 2.5 cords  Balance/Neuromuscular Re-Education  NMR 2: Box falls off 12 inch plyo box: 3 x 10  NMR 3: Tip Toe walks 4 laps 10# ea hand  NMR 5: Boundin laps  NMR 7: neuromusclar priming: HS scoops and frankensteins  NMR 8: Wall Sits w/ heel raises 3x10- rest breaks taken between sets  Gait Training  GT 1: Treadmill: 10 minutes at 3.0 and 5.0 incline    Time Entry(in minutes):  Therapeutic Exercise Time Entry: 60    Assessment & Plan   Assessment: Re-introduced tip toe walks w/ 10# weights in each hand to further improve calf strength and ankle stability, as well as adding wall sits w/ heel raises to improve quad and calf strength. Both progresssions were tolerated w/ mod < > max fatigue and required short rest breaks between sets to reduce muscle fatigue. Pt also demonstrates increased ability to perform bounding exercises w/ soft landing and  greater control. Exercises challenged appropriately. Will continue to progress pt as tolerated.  Evaluation/Treatment Tolerance: Patient tolerated treatment well    Patient will continue to benefit from skilled outpatient physical therapy to address the deficits listed in the problem list box on initial evaluation, provide pt/family education and to maximize pt's level of independence in the home and community environment.     Patient's spiritual, cultural, and educational needs considered and patient agreeable to plan of care and goals.           Plan: Continue to progress plan of care as tolerated.    Goals:   Active       Physical Therapy       Physical Therapy Goal (Progressing)       Start:  01/28/25    Expected End:  04/08/25         Short Term Goals (4 weeks):  1. Patient will have improved AROM of the left ankle to 20-0-55 degrees to improve terminal stance/push-off phase of gait. MET  2. Patient will have decreased complaints of pain to 0/10 to demonstrate improved symptom and pain control for return to prior level of ambulation and participation. MET  3. Patient will be independent and compliant with issued HEP for continued functional mobility and strength to perform all activities of daily living. MET  4. Patient will maintain left SLS for at least 30 seconds with pertubation's to demonstrate improved postural control to reduce risk of reinjury.     Long Term Goals (8weeks):   1. Patient will have improved AROM of the left ankle to 20-0-60 to improve terminal stance/push-off phase of gait.  2. Patient will have improved strength of the left ankle to 4+/5 for improved tissue tolerance for return to prior level of function.   3. Patient will be able to resume prior functional level with no deficits to increase participation in recreational and leisure activities for improved quality of life.  4. Patient will have overall improvement in condition to have decreased FOTO Limitation Score to 81% demonstrate  clinically significant change in perceived function.                Lavelle Mendez, NAIN

## 2025-05-27 ENCOUNTER — HOSPITAL ENCOUNTER (EMERGENCY)
Facility: HOSPITAL | Age: 30
Discharge: HOME | End: 2025-05-27
Attending: EMERGENCY MEDICINE
Payer: MEDICAID

## 2025-05-27 VITALS
WEIGHT: 180 LBS | HEIGHT: 73 IN | HEART RATE: 75 BPM | RESPIRATION RATE: 17 BRPM | OXYGEN SATURATION: 99 % | DIASTOLIC BLOOD PRESSURE: 74 MMHG | SYSTOLIC BLOOD PRESSURE: 138 MMHG | TEMPERATURE: 98 F | BODY MASS INDEX: 23.86 KG/M2

## 2025-05-27 DIAGNOSIS — M23.91 INTERNAL DERANGEMENT OF RIGHT KNEE: Primary | ICD-10-CM

## 2025-05-27 DIAGNOSIS — M25.561 ACUTE PAIN OF RIGHT KNEE: ICD-10-CM

## 2025-05-27 PROCEDURE — 29505 APPLICATION LONG LEG SPLINT: CPT | Mod: RT,ER

## 2025-05-27 PROCEDURE — 96372 THER/PROPH/DIAG INJ SC/IM: CPT | Performed by: EMERGENCY MEDICINE

## 2025-05-27 PROCEDURE — 63600175 PHARM REV CODE 636 W HCPCS: Mod: JZ,TB,ER | Performed by: EMERGENCY MEDICINE

## 2025-05-27 PROCEDURE — 99284 EMERGENCY DEPT VISIT MOD MDM: CPT | Mod: 25,ER

## 2025-05-27 RX ORDER — DICLOFENAC SODIUM 50 MG/1
50 TABLET, DELAYED RELEASE ORAL 2 TIMES DAILY PRN
Qty: 14 TABLET | Refills: 0 | Status: SHIPPED | OUTPATIENT
Start: 2025-05-27 | End: 2025-06-03

## 2025-05-27 RX ORDER — KETOROLAC TROMETHAMINE 30 MG/ML
15 INJECTION, SOLUTION INTRAMUSCULAR; INTRAVENOUS
Status: COMPLETED | OUTPATIENT
Start: 2025-05-27 | End: 2025-05-27

## 2025-05-27 RX ADMIN — KETOROLAC TROMETHAMINE 15 MG: 30 INJECTION, SOLUTION INTRAMUSCULAR; INTRAVENOUS at 01:05

## 2025-06-05 ENCOUNTER — TELEPHONE (OUTPATIENT)
Dept: FAMILY MEDICINE | Facility: HOSPITAL | Age: 30
End: 2025-06-05
Payer: MEDICAID

## 2025-06-06 ENCOUNTER — PATIENT MESSAGE (OUTPATIENT)
Dept: ORTHOPEDICS | Facility: CLINIC | Age: 30
End: 2025-06-06
Payer: MEDICAID

## 2025-06-06 ENCOUNTER — TELEPHONE (OUTPATIENT)
Dept: ORTHOPEDICS | Facility: CLINIC | Age: 30
End: 2025-06-06
Payer: MEDICAID

## 2025-06-09 ENCOUNTER — TELEPHONE (OUTPATIENT)
Dept: FAMILY MEDICINE | Facility: HOSPITAL | Age: 30
End: 2025-06-09
Payer: MEDICAID

## 2025-06-09 NOTE — TELEPHONE ENCOUNTER
Returned patients phone call to schedule no answer voicemail left.    ----- Message from Sohan Strange sent at 6/8/2025  1:08 PM CDT -----  Patient would need an appointment for evaluation.________________________[facility] PGY-3  ----- Message -----  From: Genny Moon MA  Sent: 6/5/2025   8:53 AM CDT  To: Sonia Freeman MD; Sohan Strange#      ----- Message -----  From: Kendal Ward  Sent: 6/4/2025   3:00 PM CDT  To: Alexandr Parry Staff    Type:  Orders Who Called: Pt Does the patient know what this is regarding?: requesting MRI orders  for knee Would the patient rather a call back or a response via [facility]? Call / Portal Best Call Back Number:[phone] Additional Information: pt sent request last week for orders, have not been placed

## 2025-06-10 ENCOUNTER — TELEPHONE (OUTPATIENT)
Dept: FAMILY MEDICINE | Facility: HOSPITAL | Age: 30
End: 2025-06-10
Payer: MEDICAID

## 2025-06-10 NOTE — TELEPHONE ENCOUNTER
----- Message from Kendal sent at 6/10/2025  2:26 PM CDT -----  Type:  Patient Returning CallWho Called: Pt Who Left Message for Patient: Genny Does the patient know what this is regarding?: returning missed call. Would the patient rather a call back or a response via [facility]? Call Best Call Back Number:[phone] Additional Information:

## 2025-06-18 ENCOUNTER — TELEPHONE (OUTPATIENT)
Dept: FAMILY MEDICINE | Facility: HOSPITAL | Age: 30
End: 2025-06-18
Payer: MEDICAID

## 2025-06-18 ENCOUNTER — OFFICE VISIT (OUTPATIENT)
Dept: FAMILY MEDICINE | Facility: HOSPITAL | Age: 30
End: 2025-06-18
Payer: MEDICAID

## 2025-06-18 VITALS
WEIGHT: 175.25 LBS | HEIGHT: 73 IN | SYSTOLIC BLOOD PRESSURE: 122 MMHG | HEART RATE: 60 BPM | DIASTOLIC BLOOD PRESSURE: 76 MMHG | OXYGEN SATURATION: 100 % | BODY MASS INDEX: 23.23 KG/M2

## 2025-06-18 DIAGNOSIS — S89.91XD INJURY OF RIGHT KNEE, SUBSEQUENT ENCOUNTER: Primary | ICD-10-CM

## 2025-06-18 PROCEDURE — 99213 OFFICE O/P EST LOW 20 MIN: CPT

## 2025-06-18 NOTE — TELEPHONE ENCOUNTER
Patient showed up and was seen     Copied from CRM #[id number]. Topic: General Inquiry - Patient Advice  >> Jun 18, 2025  8:54 AM eKndal wrote:  Type:  Pt Running Late     Who Called: Pt   Does the patient know what this is regarding?: pt running 10min late   Would the patient rather a call back or a response via [facility]? Call   Best Call Back Number:[phone]   Additional Information:

## 2025-06-18 NOTE — PROGRESS NOTES
History & Physical  [facility]      SUBJECTIVE:     History of Present Illness:  Patient is a 30 y.o. male. Presents to clinic for right knee buckling R knee injury.    # RIGHT knee injury    Patient patient presents due to left knee injury following playing basketball.  Patient states felt his right knee buckle.  He went to the emergency department related and x-ray which revealed moderate joint effusion.  Patient did have prior injury to this knee, ACL tear in 2018 which required surgery.  Patient denies pain but describes as uncomfortable.  States he hears popping and clicking sensation.  Denies taking Tylenol or ibuprofen, due to history of dress syndrome.  Was referred to Orthopedic, he has an appointment 06/26/2025.    ROS  A 10+ review of systems was performed with pertinent positives and negatives noted above in the history of present illness.  Other systems were negative unless otherwise specified.    Review of patient's allergies indicates:   Allergen Reactions    Bactrim [sulfamethoxazole-trimethoprim] Rash     Developed DRESS       Past Medical History:   Diagnosis Date    Achilles tendon rupture 07/2023    left achilles tendon rupture s/p repair on 07/14/23 complicated by osteomyelitis of left calcaneus s/p I&D 12/4/24    Acute osteomyelitis of calcaneum, left 12/2024    osteomyelitis of left calcaneus s/p I&D 12/4/24       Current Outpatient Medications   Medication Instructions    famotidine (PEPCID) 20 mg, Oral, Daily, Take while on steroids.    hydrocortisone 2.5 % cream Topical (Top), 2 times daily, Apply to rash on face.    ondansetron (ZOFRAN-ODT) 4 mg, Oral, Every 8 hours PRN    triamcinolone acetonide 0.1% (KENALOG) 0.1 % cream Topical (Top), 2 times daily, Apply to trunk and limbs. Do NOT apply to groin or face.       Past Surgical History:   Procedure Laterality Date    ANTERIOR CRUCIATE LIGAMENT REPAIR Right     2018    APPLICATION OF SPLINT Left 7/14/2023    Procedure:  "APPLICATION, SPLINT;  Surgeon: Lenore Pearl DPM;  Location: [facility] OR;  Service: Podiatry;  Laterality: Left;    INSERTION, ANTIBIOTIC SPACER, LOWER EXTREMITY Left 12/4/2024    Procedure: INSERTION, ANTIBIOTIC SPACER, LOWER EXTREMITY;  Surgeon: Jaswinder Garcia DPM;  Location: [facility] OR;  Service: Podiatry;  Laterality: Left;    IRRIGATION AND DEBRIDEMENT Left 12/4/2024    Procedure: IRRIGATION AND DEBRIDEMENT;  Surgeon: Jaswinder Garcia DPM;  Location: [facility] OR;  Service: Podiatry;  Laterality: Left;  mini c-arm, Stimulan jr tobramycin/Vancomycin    REPAIR OF ACHILLES TENDON Left 7/14/2023    Procedure: REPAIR, TENDON, ACHILLES;  Surgeon: Lenore Pearl DPM;  Location: [facility] OR;  Service: Podiatry;  Laterality: Left;       No family history on file.    Social History[1]     OBJECTIVE:     Vital Signs (Most Recent)  Vitals:    06/18/25 1001   BP: 122/76   Patient Position: Sitting   Pulse: 60   SpO2: 100%   Weight: 79.5 kg (175 lb 4.3 oz)   Height: 6' 1" (1.854 m)     BMI: Body mass index is 23.12 kg/m².    Physical Exam:  Physical Exam  Constitutional:       Appearance: Normal appearance. He is not ill-appearing or diaphoretic.   HENT:      Head: Normocephalic.      Nose: Nose normal. No congestion or rhinorrhea.   Cardiovascular:      Rate and Rhythm: Normal rate and regular rhythm.      Pulses: Normal pulses.      Heart sounds: Normal heart sounds.   Pulmonary:      Effort: Pulmonary effort is normal. No respiratory distress.      Breath sounds: Normal breath sounds. No wheezing.   Musculoskeletal:         General: Tenderness (mild tenderness to Lateral joint line, + Mcmurrays) present. No swelling. Normal range of motion.   Neurological:      Mental Status: He is alert and oriented to person, place, and time.          Labs  Hemoglobin A1C   Date Value Ref Range Status   11/11/2024 5.4 4.0 - 5.6 % Final     Comment:     ADA Screening Guidelines:  5.7-6.4%  Consistent with prediabetes  >or=6.5%  Consistent " with diabetes    High levels of fetal hemoglobin interfere with the HbA1C  assay. Heterozygous hemoglobin variants (HbS, HgC, etc)do  not significantly interfere with this assay.   However, presence of multiple variants may affect accuracy.       TSH   Date Value Ref Range Status   12/26/2024 0.972 0.400 - 4.000 uIU/mL Final        ASSESSMENT/PLAN:   30 y.o.male presents to clinic for    Injury of right knee, subsequent encounter  -     MRI Knee W WO Contrast Right; Future; Expected date: 06/18/2025  -  + McMurrays test, moderate joint effusion on x-ray concerning for possible ligamentous tear.  Suspect meniscal tear.  We will order MRI.   - Follow up with Orthopedics, 6/26/2025.       Provided patient with anticipatory guidance and return precautions. Treatment plan discussed with patient, all questions answered, and patient acknowledged understanding and verbal agreement.      Follow-up in: 3 months. Or sooner PRN as acute concerns arise.     Medical decision making straight forward and not complex during this visit.       Case discussed with Dr. GERTRUDE Irvin.    ________________________  Rosa Cross M.D.  [facility] Family Medicine PGY-2        *This note was partially created using [employer] Voice Recognition software. Typographical and content errors may occur with this process. While efforts are made to detect and correct such errors, in some cases errors will persist. For this reason, wording in this document should be considered in the proper context and not strictly verbatim.         [1]   Social History  Tobacco Use    Smoking status: Never    Smokeless tobacco: Never   Substance Use Topics    Alcohol use: Yes     Comment: rarely    Drug use: Yes     Types: Marijuana

## 2025-06-19 NOTE — PROGRESS NOTES
I assume primary medical responsibility for this patient. I have reviewed the history, physical, and assessement & treatment plan with the resident and agree that the care is reasonable and necessary. This service has been performed by a resident without the presence of a teaching physician under the primary care exception. If necessary, an addendum of additional findings or evaluation beyond the resident documentation will be noted below.     Estella Irvin MD

## 2025-06-23 ENCOUNTER — TELEPHONE (OUTPATIENT)
Dept: ORTHOPEDICS | Facility: CLINIC | Age: 30
End: 2025-06-23
Payer: MEDICAID

## 2025-06-23 NOTE — TELEPHONE ENCOUNTER
Spoke with pt. Informed him his MRI is scheduled after his appointment. Pt understood and I r/s appointment after his MRI appointment for his right knee.

## 2025-06-30 ENCOUNTER — HOSPITAL ENCOUNTER (OUTPATIENT)
Dept: RADIOLOGY | Facility: HOSPITAL | Age: 30
Discharge: HOME | End: 2025-06-30
Payer: MEDICAID

## 2025-06-30 DIAGNOSIS — M25.561 KNEE PAIN, RIGHT: ICD-10-CM

## 2025-06-30 DIAGNOSIS — S89.91XD INJURY OF RIGHT KNEE, SUBSEQUENT ENCOUNTER: ICD-10-CM

## 2025-06-30 DIAGNOSIS — M25.561 ACUTE PAIN OF RIGHT KNEE: Primary | ICD-10-CM

## 2025-06-30 PROCEDURE — 73721 MRI JNT OF LWR EXTRE W/O DYE: CPT | Mod: 26,RT,, | Performed by: RADIOLOGY

## 2025-06-30 PROCEDURE — 73721 MRI JNT OF LWR EXTRE W/O DYE: CPT | Mod: TC,RT

## 2025-07-02 ENCOUNTER — RESULTS FOLLOW-UP (OUTPATIENT)
Dept: FAMILY MEDICINE | Facility: HOSPITAL | Age: 30
End: 2025-07-02

## 2025-07-03 ENCOUNTER — TELEPHONE (OUTPATIENT)
Dept: ORTHOPEDICS | Facility: CLINIC | Age: 30
End: 2025-07-03

## 2025-07-17 ENCOUNTER — TELEPHONE (OUTPATIENT)
Dept: DERMATOLOGY | Facility: CLINIC | Age: 30
End: 2025-07-17

## 2025-07-22 ENCOUNTER — TELEPHONE (OUTPATIENT)
Dept: FAMILY MEDICINE | Facility: HOSPITAL | Age: 30
End: 2025-07-22
Payer: MEDICAID

## 2025-07-22 NOTE — TELEPHONE ENCOUNTER
Patient  was successfully scheduled.      Copied from CRM #[id number]. Topic: General Inquiry - Patient Advice  >> Jul 22, 2025 12:24 PM Chelita wrote:  Type:  Needs Medical Advice    Who Called: Patient    Best Call Back Number: [phone]    Additional Information: Patient is requesting a call back in regards to some paperwork he needs filled out

## 2025-07-25 ENCOUNTER — OFFICE VISIT (OUTPATIENT)
Dept: FAMILY MEDICINE | Facility: HOSPITAL | Age: 30
End: 2025-07-25
Payer: MEDICAID

## 2025-07-25 VITALS
HEART RATE: 65 BPM | BODY MASS INDEX: 22.79 KG/M2 | SYSTOLIC BLOOD PRESSURE: 111 MMHG | OXYGEN SATURATION: 99 % | DIASTOLIC BLOOD PRESSURE: 60 MMHG | WEIGHT: 171.94 LBS | RESPIRATION RATE: 18 BRPM | HEIGHT: 73 IN

## 2025-07-25 DIAGNOSIS — M23.91 INTERNAL DERANGEMENT OF RIGHT KNEE: Primary | ICD-10-CM

## 2025-07-25 PROCEDURE — 99213 OFFICE O/P EST LOW 20 MIN: CPT | Mod: 27

## 2025-07-25 NOTE — PROGRESS NOTES
"  [facility] CLINIC NOTE  Follow-up Visit      SUBJECTIVE:     Patient: Alonzo Nascimento Jr. is a 30 y.o. male.    Chief Compliant:   Chief Complaint   Patient presents with    Follow-up       History of Present Illness:  Mr. Nascimento is a 30 year old man with PMHx of Dress syndrome and Right ACL tear S/P surgical repair.  He presents to clinic for follow up of right knee injury.  He sustained an injury to his right knee approximately 2 months ago while playing basketball in which after jumping he landed on his right knee which he felt buckle. XR right knee shows no fracture or dislocation, but moderate sized joint effusion.  MRI right knee showed possible ACL graft failure, small tear of anterior horn of lateral meniscus and partial thickness PCL tear.      Review of Systems   Musculoskeletal:  Positive for joint pain and myalgias.        Right knee pain with difficulty squatting   States that right knee will become locked when squatting   Neurological:  Negative for sensory change and focal weakness.     A 10+ review of systems was performed with pertinent positives and negatives noted above in the history of present illness. Other systems were negative unless otherwise specified.    OBJECTIVE:     Vital Signs (Most Recent)  Vitals:    07/25/25 0910   BP: 111/60   BP Location: Left arm   Patient Position: Sitting   Pulse: 65   Resp: 18   SpO2: 99%   Weight: 78 kg (171 lb 15.3 oz)   Height: 6' 1" (1.854 m)     BMI: Body mass index is 22.69 kg/m².     Physical Exam:  Physical Exam  Musculoskeletal:         General: No swelling, tenderness or deformity.      Comments: Walking with slight limp  No bony prominence tenderness or swelling present  Normal passive range of motion  Unable to squat; states that knee will become locked          ASSESSMENT:   Alonzo Nascimento Jr. is a 30 y.o. male who presents to clinic to for    1. 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  - Unable to verify inability to work any job and unable to certify required time to heal from injury and therefore unable to complete requested unemployment paperwork without further input from orthopedic surgery  - Encouraged patient to attend scheduled orthopedic surgery appointments and have orthopedists complete unemployment paperwork.        Provided patient with anticipatory guidance and return precautions. Treatment plan discussed with patient, all questions answered, and patient acknowledged understanding and verbal agreement.              Dinh Disla MD   [facility] Family Medicine, PGY-2

## 2025-07-29 ENCOUNTER — OFFICE VISIT (OUTPATIENT)
Dept: ORTHOPEDICS | Facility: CLINIC | Age: 30
End: 2025-07-29
Payer: MEDICAID

## 2025-07-29 DIAGNOSIS — Z98.890 HISTORY OF RECONSTRUCTION OF ANTERIOR CRUCIATE LIGAMENT TEAR: ICD-10-CM

## 2025-07-29 DIAGNOSIS — S83.529A PARTIAL TEAR OF POSTERIOR CRUCIATE LIGAMENT OF KNEE: ICD-10-CM

## 2025-07-29 DIAGNOSIS — S83.241D ACUTE MEDIAL MENISCUS TEAR OF RIGHT KNEE, SUBSEQUENT ENCOUNTER: ICD-10-CM

## 2025-07-29 DIAGNOSIS — S83.511D DISRUPTION OF ANTERIOR CRUCIATE LIGAMENT OF RIGHT KNEE, SUBSEQUENT ENCOUNTER: Primary | ICD-10-CM

## 2025-07-29 PROBLEM — M23.91 INTERNAL DERANGEMENT OF RIGHT KNEE: Status: ACTIVE | Noted: 2025-07-29

## 2025-07-29 PROCEDURE — 1159F MED LIST DOCD IN RCRD: CPT | Mod: CPTII,,,

## 2025-07-29 PROCEDURE — 99214 OFFICE O/P EST MOD 30 MIN: CPT | Mod: S$PBB,,,

## 2025-07-29 PROCEDURE — 99213 OFFICE O/P EST LOW 20 MIN: CPT | Mod: PBBFAC,PN

## 2025-07-29 PROCEDURE — 99999 PR PBB SHADOW E&M-EST. PATIENT-LVL III: CPT | Mod: PBBFAC,,,

## 2025-07-29 RX ORDER — DICLOFENAC SODIUM 50 MG/1
50 TABLET, DELAYED RELEASE ORAL 2 TIMES DAILY
Qty: 60 TABLET | Refills: 1 | Status: SHIPPED | OUTPATIENT
Start: 2025-07-29

## 2025-07-29 NOTE — PROGRESS NOTES
Subjective:      Patient ID: Alonzo Nascimento Jr. is a 30 y.o. male.    Chief Complaint: Results      HPI: Alonzo Nascimento Jr. is a 30 y.o. male who presents to clinic for right knee pain. Patient reports injury, states on 05/26/2025 he injured his knee playing basketball.  He went to the ER the following day where he was placed in a knee immobilizer and crutches.  He saw his PCP on 06/18/2025 and MRI was ordered. Pain is located posteriorly and laterally. He reports that the pain is a 3/10 aching pain today. Pain is 5/10 at its worst.  The pain is aggravated by moderate activity and standing for long periods of time.  Associated symptoms include swelling and pseudolocking. Denies numbness, tingling, radiation . Moderate pain to bear weight.. The pain is affecting ADLs and limiting desired level of activity. There is a history of previous surgery to the knee.  Prior ACL reconstruction in 2018 with autograft.    Previous treatments include NSAIDs, bracing, and ice which has provided some relief.     Occupation:  Mechanics    Ambulating: unassisted  Diabetic:  No  Smoking:  He has never smoked.  History of DVT/PE: Negative    PAST MEDICAL HISTORY:    Past Medical History:   Diagnosis Date    Achilles tendon rupture 07/2023    left achilles tendon rupture s/p repair on 07/14/23 complicated by osteomyelitis of left calcaneus s/p I&D 12/4/24    Acute osteomyelitis of calcaneum, left 12/2024    osteomyelitis of left calcaneus s/p I&D 12/4/24     PAST SURGICAL HISTORY:    Past Surgical History:   Procedure Laterality Date    ANTERIOR CRUCIATE LIGAMENT REPAIR Right     2018    APPLICATION OF SPLINT Left 7/14/2023    Procedure: APPLICATION, SPLINT;  Surgeon: Lenore Pearl DPM;  Location: [facility] OR;  Service: Podiatry;  Laterality: Left;    INSERTION, ANTIBIOTIC SPACER, LOWER EXTREMITY Left 12/4/2024    Procedure: INSERTION, ANTIBIOTIC SPACER, LOWER EXTREMITY;  Surgeon: Jaswinder Garcia DPM;  Location: [facility] OR;  Service:  Podiatry;  Laterality: Left;    IRRIGATION AND DEBRIDEMENT Left 12/4/2024    Procedure: IRRIGATION AND DEBRIDEMENT;  Surgeon: Jaswinder Garcia DPM;  Location: [facility] OR;  Service: Podiatry;  Laterality: Left;  mini c-arm, Laian jr tobramycin/Vancomycin    REPAIR OF ACHILLES TENDON Left 7/14/2023    Procedure: REPAIR, TENDON, ACHILLES;  Surgeon: Lenore Pearl DPM;  Location: [facility] OR;  Service: Podiatry;  Laterality: Left;     FAMILY HISTORY:  No family history on file.  SOCIAL HISTORY:    Social History     Occupational History    Not on file   Tobacco Use    Smoking status: Never    Smokeless tobacco: Never   Substance and Sexual Activity    Alcohol use: Yes     Comment: rarely    Drug use: Yes     Types: Marijuana    Sexual activity: Yes     Partners: Female        MEDICATIONS: Current Medications[1]  ALLERGIES:   Review of patient's allergies indicates:   Allergen Reactions    Bactrim [sulfamethoxazole-trimethoprim] Rash     Developed DRESS       Review of Systems:  Constitution: Negative for chills, fever and night sweats.   HENT: Negative for congestion and headaches.    Eyes: Negative for blurred vision or vision loss.  Cardiovascular: Negative for chest pain and syncope.   Respiratory: Negative for cough and shortness of breath.    Endocrine: Negative for polydipsia, polyphagia and polyuria.   Hematologic/Lymphatic: Negative for bleeding problem. Does not bruise/bleed easily.   Skin: Negative for dry skin, itching and rash.   Musculoskeletal: See HPI.   Gastrointestinal: Negative for abdominal pain and bowel incontinence.   Genitourinary: Negative for bladder incontinence and nocturia.   Neurological: Negative for disturbances in coordination, loss of balance and seizures.   Psychiatric/Behavioral: Negative for depression. The patient does not have insomnia.    Allergic/Immunologic: Negative for hives and persistent infections.          Objective:        There were no vitals filed for this  visit.    PHYSICAL EXAM:  General: Alert & oriented x3, well-developed and well-nourished, in no acute distress, sitting comfortably in the exam room.  Skin: Warm and dry. Capillary refill less than 2 seconds.   Head: Normocephalic and atraumatic.   Eyes: Sclera appear normal.   Nose: No deformities seen.   Ears: No deformities seen.   Neck: No tracheal deviation present.   Pulmonary/Chest: Breathing unlabored.   Neurological: Alert and oriented to person, place, and time.   Psychiatric: Mood is pleasant and affect appropriate.     RIGHT KNEE        Body habitus is normal.         The patient walks without a limp.        The skin over the knee is has a healed scar.        Knee effusion:  trace         Tenderness:  no local tenderness.        Range of Motion:  Flexion:  130 °  Extension:  0 °        Ligament exam:   MCL:  intact   Lachman:  2+ laxity              Posterior sa+ laxity    LCL:  intact        Patellar apprehension:  negative.        Popliteal cyst:  negative.        Patellar crepitation:  absent.        Pulses DP present, PT present.        Motor:  normal 5/5 strength in all tested muscle groups.         Sensory:  normal.    Imaging:   X-Rays:  4 views of right knee dated 2025, and independently reviewed, show:  Surgical changes associated with ACL reconstruction.  Moderate joint effusion.  No acute fractures or dislocations.    MRI right knee dated 2025, reviewed, show:   Status post ACL reconstruction with graft failure/discontinuity and widening of both the tibial and to a lesser extent femoral tunnels.     Vertical tear periphery of the body and posterior horn medial meniscus and associated meniscocapsular injury     Small radial tear/fraying of the junction of the body and anterior horn lateral meniscus     Large full-thickness osteochondral defects with exuberant bone marrow edema involving the weight-bearing lateral femoral condyle and posterolateral tibial plateau.      Periarticular bone marrow edema involving the opposing surfaces of the medial femoral condyle and medial tibial plateau which may be reactive or related to bone bruises.     Nonspecific intermediate signal surrounding the PCL in the intercondylar notch could indicate localized synovitis.     Partial-thickness interstitial PCL tear.        Assessment:       1. Disruption of anterior cruciate ligament of right knee, subsequent encounter    2. History of reconstruction of anterior cruciate ligament tear    3. Acute medial meniscus tear of right knee, subsequent encounter    4. Partial tear of posterior cruciate ligament of knee          Plan:       Orders Placed This Encounter    Ambulatory Referral/Consult to Physical Therapy    diclofenac (VOLTAREN) 50 MG EC tablet       I explained the nature of the problem to the patient.     I discussed at length with the patient all the different treatment options available for his right knee including anti-inflammatories, acetaminophen, bracing, rest, ice, heat, physical therapy, and surgery.      Medications:  Prescription for Diclofenac (Voltaren) 50 mg BID provided today for PRN pain management. Patient understands to take with food and/or OTC prilosec to decrease GI side effects. Advised patient to refrain from taking other anti-inflammatory medications while taking this medication, however he may alternate with acetaminophen as needed.  Physical Therapy:  Ambulatory referral to physical therapy for knee ROM, stretching, strengthening, and conditioning.  DME:  Continue use of hinged knee brace with activities.    Pain Management: Ice compress to the affected area 2-3x a day for 15-20 minutes as needed for pain management.  Discussion:  Patient and I had a long discussion about his MRI results and the potential role of surgery in treating this condition.  Patient would like to proceed with conservative measures at this time.  If failed conservative treatment we will proceed  with surgery.        Follow-Up:  2 months for follow up.      All of the patient's questions were answered and the patient will contact us if they have any questions or concerns in the interim.        Afsaneh Mendoza PA-C  [facility]  Orthopedic Surgery    Medical Dictation software was used during the dictation of portions or the entirety of this medical record.  Phonetic or grammatic errors may exist due to the use of this software. For clarification, refer to the author of the document.               [1]   Current Outpatient Medications:     diclofenac (VOLTAREN) 50 MG EC tablet, Take 1 tablet (50 mg total) by mouth 2 (two) times daily., Disp: 60 tablet, Rfl: 1    famotidine (PEPCID) 20 MG tablet, Take 1 tablet (20 mg total) by mouth Daily. Take while on steroids., Disp: 30 tablet, Rfl: 0    hydrocortisone 2.5 % cream, Apply topically 2 (two) times daily. Apply to rash on face., Disp: 30 g, Rfl: 1    ondansetron (ZOFRAN-ODT) 4 MG TbDL, Take 1 tablet (4 mg total) by mouth every 8 (eight) hours as needed., Disp: 14 tablet, Rfl: 1    triamcinolone acetonide 0.1% (KENALOG) 0.1 % cream, Apply topically 2 (two) times daily. Apply to trunk and limbs. Do NOT apply to groin or face., Disp: 453 g, Rfl: 2

## 2025-08-05 ENCOUNTER — CLINICAL SUPPORT (OUTPATIENT)
Dept: REHABILITATION | Facility: HOSPITAL | Age: 30
End: 2025-08-05
Payer: MEDICAID

## 2025-08-05 DIAGNOSIS — S83.511D DISRUPTION OF ANTERIOR CRUCIATE LIGAMENT OF RIGHT KNEE, SUBSEQUENT ENCOUNTER: ICD-10-CM

## 2025-08-05 DIAGNOSIS — R29.898 HIP WEAKNESS: ICD-10-CM

## 2025-08-05 DIAGNOSIS — M25.361 KNEE INSTABILITY, RIGHT: Primary | ICD-10-CM

## 2025-08-05 DIAGNOSIS — S83.241D ACUTE MEDIAL MENISCUS TEAR OF RIGHT KNEE, SUBSEQUENT ENCOUNTER: ICD-10-CM

## 2025-08-05 DIAGNOSIS — S83.529A PARTIAL TEAR OF POSTERIOR CRUCIATE LIGAMENT OF KNEE: ICD-10-CM

## 2025-08-05 DIAGNOSIS — M25.561 ACUTE PAIN OF RIGHT KNEE: ICD-10-CM

## 2025-08-05 PROCEDURE — 97161 PT EVAL LOW COMPLEX 20 MIN: CPT | Mod: PO

## 2025-08-05 PROCEDURE — 97110 THERAPEUTIC EXERCISES: CPT | Mod: PO

## 2025-08-05 NOTE — PROGRESS NOTES
Outpatient Rehab    Physical Therapy Evaluation    Patient Name: Alonzo Nascimento Jr.  MRN: [id number]  YOB: 1995  Encounter Date: 8/5/2025    Therapy Diagnosis:   Encounter Diagnoses   Name Primary?    Disruption of anterior cruciate ligament of right knee, subsequent encounter     Acute medial meniscus tear of right knee, subsequent encounter     Partial tear of posterior cruciate ligament of knee     Acute pain of right knee     Hip weakness     Knee instability, right Yes     Physician: Ida Mendoza, *    Physician Orders: Eval and Treat  Medical Diagnosis: Disruption of anterior cruciate ligament of right knee, subsequent encounter  Acute medial meniscus tear of right knee, subsequent encounter  Partial tear of posterior cruciate ligament of knee  Surgical Diagnosis: Not applicable for this Episode   Surgical Date: Not applicable for this Episode  Days Since Last Surgery: Not applicable for this Episode    Visit # / Visits Authorized:  1 / 1  Insurance Authorization Period: 7/29/2025 to 7/29/2026  Date of Evaluation: 8/5/2025  Plan of Care Certification: 8/5/2025 to 10/30/25     Time In: 1405   Time Out: 1500  Total Time (in minutes): 55   Total Billable Time (in minutes): 55    Intake Outcome Measure for FOTO Survey    Therapist reviewed FOTO scores for Alonzo Nascimento Jr. on 8/5/2025.   FOTO report - see Media section or FOTO account episode details.     Intake Score (%): 49    Precautions:       Subjective   History of Present Illness  Alonzo is a 30 y.o. male who reports to physical therapy with a chief concern of Right Knee Pain.     The patient reports a medical diagnosis of Disruption of anterior cruciate ligament of right knee, subsequent encounter (S83.511D), Acute medial meniscus tear of right knee, subsequent encounter (S83.241D), Partial tear of posterior cruciate ligament of knee (S83.529A).            Diagnostic tests related to this condition: MRI studies.          History  of Present Condition/Illness: Pt reports with R knee pain after re-tearing R ACL that was reconstructed in 2018. States he had an MRI that showed partial tear of PCL and meniscus as well. Incident happened the last week of May while playing basketball. Was using crutches for a few weeks, but has been off of them since early June and only wearing knee brace while doing a lot of walking or activity. Reports no real pain, but discomfort with squatting and kneeling. Has not tried any cutting or sharp turns. Not working out LE currently, but occasionally does calf raises. Wants to get back to playing basketball.          Activities of Daily Living  Social history was obtained from Patient.    General Prior Level of Function Comments: Independent with no pain or limitations  General Current Level of Function Comments: Unable to perform sharp turns, squatting or kneeling on RLE without discomfort  Patient Responsibilities: Community mobility, Driving, Financial management, Health management, Home management, Laundry, Meal prep, Personal ADL, Shopping, Yard work    Previously independent with activities of daily living? Yes     Currently independent with activities of daily living? Yes          Previously independent with instrumental activities of daily living? Yes     Currently independent with instrumental activities of daily living? No  Activities currently needing assistance include: Home establishment and management.   Difficulty squatting and kneeling for ADLs and household chores        Pain     Patient reports a current pain level of 1/10. Pain at best is reported as 1/10. Pain at worst is reported as 6/10.   Clinical Progression (since onset): Improved  Pain Qualities: Aching  Pain-Relieving Factors: Ice  Pain-Aggravating Factors: Walking, Squatting, Lifting, Kneeling           Past Medical History/Physical Systems Review:   Alonzo Nascimento Jr.  has a past medical history of Achilles tendon rupture and Acute  osteomyelitis of calcaneum, left.    Alonzo Nascimento Jr.  has a past surgical history that includes Anterior cruciate ligament repair (Right); Repair of Achilles tendon (Left, 7/14/2023); Application of splint (Left, 7/14/2023); irrigation and debridement (Left, 12/4/2024); and insertion, antibiotic spacer, lower extremity (Left, 12/4/2024).    Alonzo has a current medication list which includes the following prescription(s): diclofenac, famotidine, hydrocortisone, ondansetron, and triamcinolone acetonide 0.1%.    Review of patient's allergies indicates:   Allergen Reactions    Bactrim [sulfamethoxazole-trimethoprim] Rash     Developed DRESS        Objective   Knee Observations     Slightly less muscle bulk in distal R quad compared to L       Knee Swelling  Location of Measurement Right  (cm) Left  (cm)   20 cm Above Joint Line       10 cm Vastus Medialis Oblique       At Joint Line       15 cm Below Joint Line       No sig edema visible at this time         Hip Range of Motion    PROM WNL bilaterally with no pain. Slightly decreased mm flexibility in R hip ER/IR    Knee Range of Motion    WNL AROM with no pain              Hip Strength - Planes of Motion   Right Strength Right Pain Left Strength Left  Pain   Flexion (L2) 4+   5     Extension 4+   5     ABduction 4+   5     ADduction 5   5     Internal Rotation 4   5     External Rotation 4+   5         Knee Strength   Right Strength Right Pain Left Strength Left  Pain   Flexion (S2) 4+   5     Prone Flexion           Extension (L3) 5   5                Knee Special Tests  Knee Ligament Tests  Positive: Right Anterior Drawer and Right Posterior Drawer  Negative: Right Valgus Stress at 0 Degrees and Right Varus Stress at 0 Degrees  mild laxity observed, mm guarding noted    not tested due to knowledge of imaging               Knee Patellar Screening       Right Patellar Mobility  Normal: Medial, Lateral, Superior, and Inferior              Squat Testing      Observations  Right Side: Knee Valgus  Squat Upper Extremity Support: no  Weight shift toward LLE w/ R hip IR     Lunge Testing - Right      Observations: Knee Valgus        Neutral hip alignment during motion            Treatment:  Therapeutic Exercise  TE 1: Pt ed: anatomy of knee and mm connected, role of mm in joint stability, POC, HEP, exercise dosage and appropriate exercise at this time  TE 2: SL bridging 2x10  TE 3: HS bidge walks x8  TE 4: SLR  w/ 3# 2x15  TE 5: seated hip IR w/ ytb 2x12  TE 6: tibial ER/IR w/ ytb x15  TE 7: SL ecc squat to high chair x5  TE 8: instructed on standing hip abd/add/flx/ext, monster walks for HEP      Time Entry(in minutes):  PT Evaluation (Low) Time Entry: 25  Therapeutic Exercise Time Entry: 30    Assessment & Plan   Assessment  Alonzo presents with a condition of Low complexity.   Presentation of Symptoms: Stable       Functional Limitations: Activity tolerance, Functional mobility, Pain with ADLs/IADLs, Squatting  Impairments: Impaired physical strength, Lack of appropriate home exercise program, Pain with functional activity    Patient Goal for Therapy (PT): return to playing basketball  Prognosis: Good  Assessment Details: Pt is a 31 y/o male presenting to PT with referral for disruption of ACL of R knee, medial meniscus tear of R knee, and partial tear of PCL in R knee. Pt demonstrates increased levels of pain, decreased R knee stability, decreased RLE strength, decreased R hip mm flexibility, decreased activity tolerance, impaired functional mobility and lack of appropriate HEP. These impairments prevent pt from being able to perform squatting, running, sharp turns, or kneeling on R knee w/o increased pain and difficulty. Pt will benefit from the use of skilled PT to improve these impairments in order to maximize functional strength/stability and allow pt to return to pain free PLOF.      Plan  From a physical therapy perspective, the patient would benefit from:  "Skilled Rehab Services    Planned therapy interventions include: Therapeutic exercise, Therapeutic activities, Neuromuscular re-education, Manual therapy, ADLs/IADLs, and Gait training.    Planned modalities to include: Biofeedback, Cryotherapy (cold pack), Electrical stimulation - attended, Electrical stimulation - passive/unattended, and Thermotherapy (hot pack).        Visit Frequency: 1 times Per Week for 12 Weeks.       This plan was discussed with Patient.   Discussion participants: Agreed Upon Plan of Care             The patient's spiritual, cultural, and educational needs were considered, and the patient is agreeable to the plan of care and goals.           Goals:   Active       Functional Strength       Patient will perform 15 reps of single leg squat on RLE to 20" surface to demonstrate improved RLE strength and knee stability       Start:  08/05/25    Expected End:  10/30/25            Patient will perform SL hop on RLE of distance equal to or greater than LLE while maintaining landing for 2s       Start:  08/05/25    Expected End:  10/30/25            Patient will perform SL triple hop for distance equal to or greater than LLE  while holding landing for 2s       Start:  08/05/25    Expected End:  10/30/25               Functional outcome       Patient will score 70% or greater on FOTO to demonstrate subjective improvement       Start:  08/05/25    Expected End:  10/30/25            Patient stated goal: return to playing basketball        Start:  08/05/25    Expected End:  10/30/25            Patient will demonstrate independence in home program for support of progression       Start:  08/05/25    Expected End:  09/19/25               Pain       Patient will report pain of 3/10 at worst, demonstrating a reduction of overall pain       Start:  08/05/25    Expected End:  09/19/25               Strength       Patient will achieve right hip abduction strength of 5/5       Start:  08/05/25    Expected End:  " 10/30/25            Patient will achieve right hip internal rotation strength of 5/5 in 90 degrees hip flexion       Start:  08/05/25    Expected End:  10/30/25                Bashir Cuellar PT

## 2025-08-11 ENCOUNTER — CLINICAL SUPPORT (OUTPATIENT)
Dept: REHABILITATION | Facility: HOSPITAL | Age: 30
End: 2025-08-11
Payer: MEDICAID

## 2025-08-11 DIAGNOSIS — M25.361 KNEE INSTABILITY, RIGHT: ICD-10-CM

## 2025-08-11 DIAGNOSIS — R29.898 HIP WEAKNESS: Primary | ICD-10-CM

## 2025-08-11 PROBLEM — M25.561 KNEE PAIN, RIGHT: Status: RESOLVED | Noted: 2025-06-30 | Resolved: 2025-08-11

## 2025-08-11 PROCEDURE — 97110 THERAPEUTIC EXERCISES: CPT | Mod: PO

## 2025-08-18 ENCOUNTER — CLINICAL SUPPORT (OUTPATIENT)
Dept: REHABILITATION | Facility: HOSPITAL | Age: 30
End: 2025-08-18
Payer: MEDICAID

## 2025-08-18 DIAGNOSIS — M25.361 KNEE INSTABILITY, RIGHT: ICD-10-CM

## 2025-08-18 DIAGNOSIS — R29.898 HIP WEAKNESS: Primary | ICD-10-CM

## 2025-08-18 PROCEDURE — 97110 THERAPEUTIC EXERCISES: CPT | Mod: PO

## 2025-08-25 ENCOUNTER — CLINICAL SUPPORT (OUTPATIENT)
Dept: REHABILITATION | Facility: HOSPITAL | Age: 30
End: 2025-08-25
Payer: MEDICAID

## 2025-08-25 DIAGNOSIS — R29.898 HIP WEAKNESS: Primary | ICD-10-CM

## 2025-08-25 DIAGNOSIS — M25.361 KNEE INSTABILITY, RIGHT: ICD-10-CM

## 2025-08-25 PROCEDURE — 97110 THERAPEUTIC EXERCISES: CPT | Mod: PO

## (undated) DEVICE — SUT MCRYL PLUS 4-0 PS2 27IN

## (undated) DEVICE — GAUZE CNFRM STRL 4INX4.1YD

## (undated) DEVICE — NDL HYPO REG 25G X 1 1/2

## (undated) DEVICE — SEE MEDLINE ITEM 154981

## (undated) DEVICE — PAD PREP CUFFED NS 24X48IN

## (undated) DEVICE — BLADE SURG #15 CARBON STEEL

## (undated) DEVICE — COVER OVERHEAD SURG LT BLUE

## (undated) DEVICE — STRIP PACKING ANTIMIC 1/4X1

## (undated) DEVICE — SWAB CULTURETTE SINGLE

## (undated) DEVICE — SYR 10CC LUER LOCK

## (undated) DEVICE — GLOVE BIOGEL PI MICRO INDIC 8

## (undated) DEVICE — BLADE SCALP OPHTL BEVEL STR

## (undated) DEVICE — ELECTRODE REM PLYHSV RETURN 9

## (undated) DEVICE — NDL HYPO STD REG BVL 18GX1.5IN

## (undated) DEVICE — BANDAGE ESMARK ELASTIC ST 6X9

## (undated) DEVICE — DRESSING GAUZE XEROFORM 5X9

## (undated) DEVICE — PACK EXTREMITY KENNER

## (undated) DEVICE — SOL IRR 0.9% NACL 500ML PB

## (undated) DEVICE — SPONGE COTTON TRAY 4X4IN

## (undated) DEVICE — KIT COLLECTION E SWAB REGULAR

## (undated) DEVICE — GLOVE BIOGEL ECLIPSE SZ 8

## (undated) DEVICE — STOCKINET 4INX48

## (undated) DEVICE — APPLICATOR CHLORAPREP ORN 26ML